# Patient Record
Sex: MALE | Race: OTHER | Employment: OTHER | URBAN - METROPOLITAN AREA
[De-identification: names, ages, dates, MRNs, and addresses within clinical notes are randomized per-mention and may not be internally consistent; named-entity substitution may affect disease eponyms.]

---

## 2024-09-16 ENCOUNTER — TELEPHONE (OUTPATIENT)
Age: 74
End: 2024-09-16

## 2024-09-16 NOTE — TELEPHONE ENCOUNTER
[facility] called in to schedule appt. Could not obtain all registration questions.

## 2024-09-18 ENCOUNTER — OFFICE VISIT (OUTPATIENT)
Age: 74
End: 2024-09-18
Payer: COMMERCIAL

## 2024-09-18 ENCOUNTER — TELEPHONE (OUTPATIENT)
Age: 74
End: 2024-09-18

## 2024-09-18 VITALS
SYSTOLIC BLOOD PRESSURE: 124 MMHG | BODY MASS INDEX: 27.05 KG/M2 | WEIGHT: 193.2 LBS | HEIGHT: 71 IN | DIASTOLIC BLOOD PRESSURE: 70 MMHG

## 2024-09-18 DIAGNOSIS — R13.14 PHARYNGOESOPHAGEAL DYSPHAGIA: Primary | ICD-10-CM

## 2024-09-18 DIAGNOSIS — R93.3 ABNORMAL CT SCAN, ESOPHAGUS: ICD-10-CM

## 2024-09-18 PROCEDURE — 99203 OFFICE O/P NEW LOW 30 MIN: CPT | Performed by: NURSE PRACTITIONER

## 2024-09-18 RX ORDER — OMEPRAZOLE 40 MG/1
40 CAPSULE, DELAYED RELEASE ORAL DAILY
Qty: 30 CAPSULE | Refills: 5 | Status: SHIPPED | OUTPATIENT
Start: 2024-09-18 | End: 2024-09-20

## 2024-09-18 RX ORDER — AMLODIPINE BESYLATE 10 MG/1
10 TABLET ORAL DAILY
COMMUNITY

## 2024-09-18 RX ORDER — LISINOPRIL 10 MG/1
TABLET ORAL
COMMUNITY

## 2024-09-18 RX ORDER — SIMVASTATIN 20 MG
20 TABLET ORAL DAILY
COMMUNITY

## 2024-09-18 NOTE — PROGRESS NOTES
[facility] - Outpatient Consultation  Hector Carrera 73 y.o. male MRN: [id number]  Encounter: [id number]    ASSESSMENT AND PLAN:      1. Pharyngoesophageal dysphagia  Acute onset of dysphagia with solid foods x 2 weeks.  Previously with occasional dysphagia which cleared with swallowing and liquid wash  Now experiencing sensation of food sticking mid-esophagus and need to regurgitate to clear.  Reports gurgling sound with attempting to swallow  CT chest/abdomen/pelvis 9/16 per PCP showed asymmetrical/right-sided esophageal thickening with surrounding inflammation  Denies GERD symptoms  Recommend proceeding with EGD to evaluate for possible esophagitis or ulceration versus esophageal mass  -Scheduled for EGD at [facility] as soon as possible  -Start omeprazole 40 mg daily  -Continue soft diet and reviewed dysphagia precautions with patient    2.  Screening for colon cancer  Patient reports remote colonoscopy at least 10 years ago  Denies colon polyps, no family history of colon cancer  Overdue for CRC screening but patient is requesting defer until dysphagia workup completed  Can discuss proceeding with colonoscopy versus Cologuard at next office visit      Followup Appointment: 2 months  ______________________________________________________________________    Chief Complaint   Patient presents with    Difficulty Swallowing     Pt was experiencing difficulty swallowing for last 1-2 weeks. Currently eating only soft foods, symptoms seemed to have improved slightly with soft foods. PCP recommended Gi consult       HPI:   Hector Carrera is a 73 y.o. year old male who presents with 2-week history of dysphagia.  Patient reports sensation of solid food sticking in mid esophagus which occurred daily and with every meal.  Reports regurgitation of food in order to clear sensation.  Denies difficulty swallowing liquids    Denies reflux symptoms or history of GERD however does avoid eating late  "at night otherwise will experience liquid reflux with lying down    Some improvement in dysphagia since modifying diet-currently eating soft/liquids.  Reports 5 pound weight loss over past 2 weeks    Patient underwent CT chest/abdomen/pelvis with contrast 9/16 per PCP which showed asymmetrical to right wall thickening of the distal esophagus with adjacent fat stranding, multiple pulmonary nodules    Remote tobacco use, stopped smoking cigarettes 25 years ago  Social EtOH only-2 times per year  Prior colonoscopy at least 10 years ago    Historical Information   History reviewed. No pertinent past medical history.  History reviewed. No pertinent surgical history.  Social History     Substance and Sexual Activity   Alcohol Use Not Currently     Social History     Substance and Sexual Activity   Drug Use Never     Social History     Tobacco Use   Smoking Status Former    Types: Cigarettes    Passive exposure: Past   Smokeless Tobacco Never     Family History   Problem Relation Age of Onset    No Known Problems Mother     No Known Problems Father     No Known Problems Maternal Grandmother     No Known Problems Maternal Grandfather     No Known Problems Paternal Grandmother     No Known Problems Paternal Grandfather     Colon cancer Neg Hx     Esophageal cancer Neg Hx        Meds/Allergies     Current Outpatient Medications:     amLODIPine (NORVASC) 10 mg tablet    lisinopril (ZESTRIL) 10 mg tablet    simvastatin (ZOCOR) 20 mg tablet    Allergies   Allergen Reactions    Nuts - Food Allergy Throat Swelling     Tree nuts       PHYSICAL EXAM:    Blood pressure 124/70, height 5' 11\" (1.803 m), weight 87.6 kg (193 lb 3.2 oz). Body mass index is 26.95 kg/m².  General Appearance: NAD, cooperative, alert  ENT:  Normocephalic, atraumatic, normal mucosa.    Neck:  Supple, symmetrical, trachea midline,   Resp:  Clear to auscultation bilaterally; no rales, rhonchi or wheezing; respirations unlabored   CV:  S1 S2, Regular rate and " rhythm; no murmur, rub, or gallop.  GI:  Soft, non-tender, non-distended; normal bowel sounds; no masses, no organomegaly   Rectal: Deferred  Musculoskeletal: No cyanosis, clubbing or edema. Normal ROM.  Skin:  No jaundice, rashes, or lesions   Psych: Normal affect, good eye contact  Neuro: No gross deficits, AAOx3              REVIEW OF SYSTEMS:    CONSTITUTIONAL: Denies any fever, chills, rigors, and weight loss.  HEENT: No earache or tinnitus. Denies hearing loss or visual disturbances.  CARDIOVASCULAR: No chest pain or palpitations.   RESPIRATORY: Denies any cough, hemoptysis, shortness of breath or dyspnea on exertion.  GASTROINTESTINAL: As noted in the History of Present Illness.   GENITOURINARY: No problems with urination. Denies any hematuria or dysuria.  NEUROLOGIC: No dizziness or vertigo, denies headaches.   MUSCULOSKELETAL: Denies any muscle or joint pain.   SKIN: Denies skin rashes or itching.   ENDOCRINE: Denies excessive thirst. Denies intolerance to heat or cold.  PSYCHOSOCIAL: Denies depression or anxiety. Denies any recent memory loss.

## 2024-09-18 NOTE — H&P (VIEW-ONLY)
[facility] - Outpatient Consultation  Hector Carrera 73 y.o. male MRN: [id number]  Encounter: [id number]    ASSESSMENT AND PLAN:      1. Pharyngoesophageal dysphagia  Acute onset of dysphagia with solid foods x 2 weeks.  Previously with occasional dysphagia which cleared with swallowing and liquid wash  Now experiencing sensation of food sticking mid-esophagus and need to regurgitate to clear.  Reports gurgling sound with attempting to swallow  CT chest/abdomen/pelvis 9/16 per PCP showed asymmetrical/right-sided esophageal thickening with surrounding inflammation  Denies GERD symptoms  Recommend proceeding with EGD to evaluate for possible esophagitis or ulceration versus esophageal mass  -Scheduled for EGD at [facility] as soon as possible  -Start omeprazole 40 mg daily  -Continue soft diet and reviewed dysphagia precautions with patient    2.  Screening for colon cancer  Patient reports remote colonoscopy at least 10 years ago  Denies colon polyps, no family history of colon cancer  Overdue for CRC screening but patient is requesting defer until dysphagia workup completed  Can discuss proceeding with colonoscopy versus Cologuard at next office visit      Followup Appointment: 2 months  ______________________________________________________________________    Chief Complaint   Patient presents with    Difficulty Swallowing     Pt was experiencing difficulty swallowing for last 1-2 weeks. Currently eating only soft foods, symptoms seemed to have improved slightly with soft foods. PCP recommended Gi consult       HPI:   Hector Carrera is a 73 y.o. year old male who presents with 2-week history of dysphagia.  Patient reports sensation of solid food sticking in mid esophagus which occurred daily and with every meal.  Reports regurgitation of food in order to clear sensation.  Denies difficulty swallowing liquids    Denies reflux symptoms or history of GERD however does avoid eating late  "at night otherwise will experience liquid reflux with lying down    Some improvement in dysphagia since modifying diet-currently eating soft/liquids.  Reports 5 pound weight loss over past 2 weeks    Patient underwent CT chest/abdomen/pelvis with contrast 9/16 per PCP which showed asymmetrical to right wall thickening of the distal esophagus with adjacent fat stranding, multiple pulmonary nodules    Remote tobacco use, stopped smoking cigarettes 25 years ago  Social EtOH only-2 times per year  Prior colonoscopy at least 10 years ago    Historical Information   History reviewed. No pertinent past medical history.  History reviewed. No pertinent surgical history.  Social History     Substance and Sexual Activity   Alcohol Use Not Currently     Social History     Substance and Sexual Activity   Drug Use Never     Social History     Tobacco Use   Smoking Status Former    Types: Cigarettes    Passive exposure: Past   Smokeless Tobacco Never     Family History   Problem Relation Age of Onset    No Known Problems Mother     No Known Problems Father     No Known Problems Maternal Grandmother     No Known Problems Maternal Grandfather     No Known Problems Paternal Grandmother     No Known Problems Paternal Grandfather     Colon cancer Neg Hx     Esophageal cancer Neg Hx        Meds/Allergies     Current Outpatient Medications:     amLODIPine (NORVASC) 10 mg tablet    lisinopril (ZESTRIL) 10 mg tablet    simvastatin (ZOCOR) 20 mg tablet    Allergies   Allergen Reactions    Nuts - Food Allergy Throat Swelling     Tree nuts       PHYSICAL EXAM:    Blood pressure 124/70, height 5' 11\" (1.803 m), weight 87.6 kg (193 lb 3.2 oz). Body mass index is 26.95 kg/m².  General Appearance: NAD, cooperative, alert  ENT:  Normocephalic, atraumatic, normal mucosa.    Neck:  Supple, symmetrical, trachea midline,   Resp:  Clear to auscultation bilaterally; no rales, rhonchi or wheezing; respirations unlabored   CV:  S1 S2, Regular rate and " rhythm; no murmur, rub, or gallop.  GI:  Soft, non-tender, non-distended; normal bowel sounds; no masses, no organomegaly   Rectal: Deferred  Musculoskeletal: No cyanosis, clubbing or edema. Normal ROM.  Skin:  No jaundice, rashes, or lesions   Psych: Normal affect, good eye contact  Neuro: No gross deficits, AAOx3              REVIEW OF SYSTEMS:    CONSTITUTIONAL: Denies any fever, chills, rigors, and weight loss.  HEENT: No earache or tinnitus. Denies hearing loss or visual disturbances.  CARDIOVASCULAR: No chest pain or palpitations.   RESPIRATORY: Denies any cough, hemoptysis, shortness of breath or dyspnea on exertion.  GASTROINTESTINAL: As noted in the History of Present Illness.   GENITOURINARY: No problems with urination. Denies any hematuria or dysuria.  NEUROLOGIC: No dizziness or vertigo, denies headaches.   MUSCULOSKELETAL: Denies any muscle or joint pain.   SKIN: Denies skin rashes or itching.   ENDOCRINE: Denies excessive thirst. Denies intolerance to heat or cold.  PSYCHOSOCIAL: Denies depression or anxiety. Denies any recent memory loss.

## 2024-09-18 NOTE — TELEPHONE ENCOUNTER
Scheduled date of EGD(as of today): 9/20/24  Physician performing EGD: ND  Location of EGD: [facility]  Instructions reviewed with patient by: DAVID  Clearances: N

## 2024-09-19 ENCOUNTER — ANESTHESIA EVENT (OUTPATIENT)
Dept: ANESTHESIOLOGY | Facility: AMBULATORY SURGERY CENTER | Age: 74
End: 2024-09-19

## 2024-09-19 ENCOUNTER — ANESTHESIA (OUTPATIENT)
Dept: ANESTHESIOLOGY | Facility: AMBULATORY SURGERY CENTER | Age: 74
End: 2024-09-19

## 2024-09-20 ENCOUNTER — TELEPHONE (OUTPATIENT)
Age: 74
End: 2024-09-20

## 2024-09-20 ENCOUNTER — HOSPITAL ENCOUNTER (OUTPATIENT)
Dept: GASTROENTEROLOGY | Facility: AMBULATORY SURGERY CENTER | Age: 74
Discharge: HOME | End: 2024-09-20
Payer: COMMERCIAL

## 2024-09-20 ENCOUNTER — ANESTHESIA (OUTPATIENT)
Dept: GASTROENTEROLOGY | Facility: AMBULATORY SURGERY CENTER | Age: 74
End: 2024-09-20

## 2024-09-20 ENCOUNTER — ANESTHESIA EVENT (OUTPATIENT)
Dept: GASTROENTEROLOGY | Facility: AMBULATORY SURGERY CENTER | Age: 74
End: 2024-09-20

## 2024-09-20 VITALS
BODY MASS INDEX: 27.02 KG/M2 | DIASTOLIC BLOOD PRESSURE: 64 MMHG | RESPIRATION RATE: 16 BRPM | WEIGHT: 193 LBS | HEART RATE: 69 BPM | HEIGHT: 71 IN | TEMPERATURE: 98 F | OXYGEN SATURATION: 98 % | SYSTOLIC BLOOD PRESSURE: 137 MMHG

## 2024-09-20 DIAGNOSIS — R93.3 ABNORMAL CT SCAN, ESOPHAGUS: ICD-10-CM

## 2024-09-20 DIAGNOSIS — R13.14 PHARYNGOESOPHAGEAL DYSPHAGIA: ICD-10-CM

## 2024-09-20 PROCEDURE — 43239 EGD BIOPSY SINGLE/MULTIPLE: CPT | Performed by: STUDENT IN AN ORGANIZED HEALTH CARE EDUCATION/TRAINING PROGRAM

## 2024-09-20 PROCEDURE — 88341 IMHCHEM/IMCYTCHM EA ADD ANTB: CPT | Performed by: SPECIALIST

## 2024-09-20 PROCEDURE — 88342 IMHCHEM/IMCYTCHM 1ST ANTB: CPT | Performed by: SPECIALIST

## 2024-09-20 PROCEDURE — 88305 TISSUE EXAM BY PATHOLOGIST: CPT | Performed by: SPECIALIST

## 2024-09-20 RX ORDER — SODIUM CHLORIDE, SODIUM LACTATE, POTASSIUM CHLORIDE, CALCIUM CHLORIDE 600; 310; 30; 20 MG/100ML; MG/100ML; MG/100ML; MG/100ML
50 INJECTION, SOLUTION INTRAVENOUS CONTINUOUS
Status: DISCONTINUED | OUTPATIENT
Start: 2024-09-20 | End: 2024-09-24 | Stop reason: HOSPADM

## 2024-09-20 RX ORDER — OMEPRAZOLE 40 MG/1
40 CAPSULE, DELAYED RELEASE ORAL 2 TIMES DAILY
Qty: 30 CAPSULE | Refills: 5 | Status: SHIPPED | OUTPATIENT
Start: 2024-09-20 | End: 2024-12-19

## 2024-09-20 RX ORDER — LIDOCAINE HYDROCHLORIDE 20 MG/ML
INJECTION, SOLUTION EPIDURAL; INFILTRATION; INTRACAUDAL; PERINEURAL AS NEEDED
Status: DISCONTINUED | OUTPATIENT
Start: 2024-09-20 | End: 2024-09-20

## 2024-09-20 RX ORDER — PROPOFOL 10 MG/ML
INJECTION, EMULSION INTRAVENOUS AS NEEDED
Status: DISCONTINUED | OUTPATIENT
Start: 2024-09-20 | End: 2024-09-20

## 2024-09-20 RX ADMIN — SODIUM CHLORIDE, SODIUM LACTATE, POTASSIUM CHLORIDE, CALCIUM CHLORIDE 50 ML/HR: 600; 310; 30; 20 INJECTION, SOLUTION INTRAVENOUS at 08:44

## 2024-09-20 RX ADMIN — PROPOFOL 150 MG: 10 INJECTION, EMULSION INTRAVENOUS at 09:01

## 2024-09-20 RX ADMIN — PROPOFOL 50 MG: 10 INJECTION, EMULSION INTRAVENOUS at 09:07

## 2024-09-20 RX ADMIN — PROPOFOL 50 MG: 10 INJECTION, EMULSION INTRAVENOUS at 09:04

## 2024-09-20 RX ADMIN — LIDOCAINE HYDROCHLORIDE 100 MG: 20 INJECTION, SOLUTION EPIDURAL; INFILTRATION; INTRACAUDAL; PERINEURAL at 09:01

## 2024-09-20 NOTE — TELEPHONE ENCOUNTER
Patients GI provider:      Number to return call: [phone]     Reason for call: Pt calling because the Dr doubled his dosage of omeprazole today after his EGD but the pharmacy wont approve him to  the script until 08.15.24 because he had just gotten a refill and according to them he should have enough to hold him til them. He will not have enough because of the increase so he needs us to speak with the Pharmacy so they understands and will give him the meds. He would like a call back once its all sorted so he can go back and .

## 2024-09-20 NOTE — INTERVAL H&P NOTE
H&P reviewed. After examining the patient I find no changes in the patients condition since the H&P had been written.    Vitals:    09/20/24 0835   BP: 148/66   Pulse: 75   Resp: 19   Temp: 98 °F (36.7 °C)   SpO2: 98%

## 2024-09-20 NOTE — ANESTHESIA POSTPROCEDURE EVALUATION
Post-Op Assessment Note    CV Status:  Stable  Pain Score: 0    Pain management: adequate       Mental Status:  Alert and awake   Hydration Status:  Euvolemic and stable   PONV Controlled:  None   Airway Patency:  Patent     Post Op Vitals Reviewed: Yes    No anethesia notable event occurred.    Staff: CRNA               /56 (09/20/24 0910)    Temp      Pulse 74 (09/20/24 0910)   Resp 18 (09/20/24 0910)    SpO2 99 % (09/20/24 0910)

## 2024-09-20 NOTE — ANESTHESIA PREPROCEDURE EVALUATION
Procedure:  EGD    Relevant Problems   ANESTHESIA (within normal limits)      CARDIO (within normal limits)      ENDO (within normal limits)      GI/HEPATIC   (+) Pharyngoesophageal dysphagia      /RENAL (within normal limits)      GYN (within normal limits)      HEMATOLOGY (within normal limits)      MUSCULOSKELETAL (within normal limits)      NEURO/PSYCH (within normal limits)      PULMONARY (within normal limits)        Physical Exam    Airway       Dental       Cardiovascular  Cardiovascular exam normal    Pulmonary  Pulmonary exam normal     Other Findings        Anesthesia Plan  ASA Score- 2     Anesthesia Type- IV sedation with anesthesia with ASA Monitors.         Additional Monitors:     Airway Plan:            Plan Factors-Exercise tolerance (METS): >4 METS.    Chart reviewed.    Patient summary reviewed.    Patient is not a current smoker. Patient not instructed to abstain from smoking on day of procedure. Patient did not smoke on day of surgery.            Induction-     Postoperative Plan-         Informed Consent- Anesthetic plan and risks discussed with patient.  I personally reviewed this patient with the CRNA. Discussed and agreed on the Anesthesia Plan with the CRNA..

## 2024-09-23 NOTE — TELEPHONE ENCOUNTER
EGD 9/20/24. Provider increased omeprazole 40 mg BID.  Spoke with pt, at this time he will continue omeprazole once daily as he is not comfortable taking it BID.  I advised bx results have not come back yet.

## 2024-09-25 NOTE — TELEPHONE ENCOUNTER
Spoke to patient and emphasized importance of taking omeprazole 40 mg twice daily due to severe grade D esophagitis on recent EGD  Patient is agreeable.  I will send new prescription to his pharmacy for twice daily dosing  Encourage patient to call the office with any further problems

## 2024-09-26 PROCEDURE — 88341 IMHCHEM/IMCYTCHM EA ADD ANTB: CPT | Performed by: SPECIALIST

## 2024-09-26 PROCEDURE — 88305 TISSUE EXAM BY PATHOLOGIST: CPT | Performed by: SPECIALIST

## 2024-09-26 PROCEDURE — 88342 IMHCHEM/IMCYTCHM 1ST ANTB: CPT | Performed by: SPECIALIST

## 2024-10-03 ENCOUNTER — TELEPHONE (OUTPATIENT)
Age: 74
End: 2024-10-03

## 2024-10-03 NOTE — TELEPHONE ENCOUNTER
Patient called stating he is running out of his Pantoprazole because of taking it 2x per day now.  I advised on 9/20/24 a new RX was sent to pharmacy for 2x per day.  He stated he will go to pharmacy to .

## 2024-10-10 DIAGNOSIS — R13.14 PHARYNGOESOPHAGEAL DYSPHAGIA: ICD-10-CM

## 2024-10-10 DIAGNOSIS — R93.3 ABNORMAL CT SCAN, ESOPHAGUS: ICD-10-CM

## 2024-10-11 RX ORDER — OMEPRAZOLE 40 MG/1
40 CAPSULE, DELAYED RELEASE ORAL 2 TIMES DAILY
Qty: 180 CAPSULE | Refills: 1 | Status: SHIPPED | OUTPATIENT
Start: 2024-10-11

## 2024-10-16 ENCOUNTER — PREP FOR PROCEDURE (OUTPATIENT)
Age: 74
End: 2024-10-16

## 2024-10-16 ENCOUNTER — TELEPHONE (OUTPATIENT)
Age: 74
End: 2024-10-16

## 2024-10-16 DIAGNOSIS — R13.14 PHARYNGOESOPHAGEAL DYSPHAGIA: Primary | ICD-10-CM

## 2024-11-07 ENCOUNTER — TELEPHONE (OUTPATIENT)
Age: 74
End: 2024-11-07

## 2024-11-07 NOTE — TELEPHONE ENCOUNTER
Patient with recent dysphagia with solid foods.  He underwent EGD 9/20/2024 which showed severe localized grade D esophagitis, moderate gastritis and duodenitis  Treated with omeprazole 40 mg twice daily  Surveillance EGD recommended in 8 weeks  Patient called the office today to report 1 week history of worsening dysphagia despite soft diet.  Difficulty managing secretions and frequent regurgitation of mucus with food particles noted in secretion.  Able to swallow liquids  Discussed with schedulers and patient is scheduled for urgent EGD tomorrow 11/8 at [facility]

## 2024-11-07 NOTE — TELEPHONE ENCOUNTER
Patients GI provider:  QUINTEN VARGHESE    Number to return call: [phone]    Reason for call: Pt calling requesting to speak to NICHOLNayeli Tobin stated he is having some issues. Advised appt on 12/6/24 for EGD does not want to confirm till he speak with her. Please contact pt for further details     Scheduled procedure/appointment date if applicable: procedure 12/6/24

## 2024-11-08 ENCOUNTER — HOSPITAL ENCOUNTER (OUTPATIENT)
Dept: GASTROENTEROLOGY | Facility: HOSPITAL | Age: 74
Setting detail: OUTPATIENT SURGERY
Discharge: HOME | End: 2024-11-08
Attending: STUDENT IN AN ORGANIZED HEALTH CARE EDUCATION/TRAINING PROGRAM
Payer: COMMERCIAL

## 2024-11-08 ENCOUNTER — ANESTHESIA (OUTPATIENT)
Dept: GASTROENTEROLOGY | Facility: HOSPITAL | Age: 74
End: 2024-11-08
Payer: COMMERCIAL

## 2024-11-08 ENCOUNTER — ANESTHESIA EVENT (OUTPATIENT)
Dept: GASTROENTEROLOGY | Facility: HOSPITAL | Age: 74
End: 2024-11-08
Payer: COMMERCIAL

## 2024-11-08 VITALS
RESPIRATION RATE: 20 BRPM | OXYGEN SATURATION: 98 % | HEART RATE: 67 BPM | DIASTOLIC BLOOD PRESSURE: 66 MMHG | BODY MASS INDEX: 27.02 KG/M2 | SYSTOLIC BLOOD PRESSURE: 150 MMHG | HEIGHT: 71 IN | TEMPERATURE: 97.4 F | WEIGHT: 193 LBS

## 2024-11-08 DIAGNOSIS — R13.14 PHARYNGOESOPHAGEAL DYSPHAGIA: ICD-10-CM

## 2024-11-08 DIAGNOSIS — K21.01 GASTROESOPHAGEAL REFLUX DISEASE WITH ESOPHAGITIS AND HEMORRHAGE: Primary | ICD-10-CM

## 2024-11-08 RX ORDER — SUCRALFATE ORAL 1 G/10ML
1 SUSPENSION ORAL 4 TIMES DAILY
Qty: 560 ML | Refills: 0 | Status: SHIPPED | OUTPATIENT
Start: 2024-11-08 | End: 2024-11-22

## 2024-11-08 RX ORDER — SODIUM CHLORIDE 9 MG/ML
INJECTION, SOLUTION INTRAVENOUS CONTINUOUS PRN
Status: DISCONTINUED | OUTPATIENT
Start: 2024-11-08 | End: 2024-11-08

## 2024-11-08 RX ORDER — PROPOFOL 10 MG/ML
INJECTION, EMULSION INTRAVENOUS AS NEEDED
Status: DISCONTINUED | OUTPATIENT
Start: 2024-11-08 | End: 2024-11-08

## 2024-11-08 RX ORDER — LIDOCAINE HYDROCHLORIDE 20 MG/ML
INJECTION, SOLUTION EPIDURAL; INFILTRATION; INTRACAUDAL; PERINEURAL AS NEEDED
Status: DISCONTINUED | OUTPATIENT
Start: 2024-11-08 | End: 2024-11-08

## 2024-11-08 RX ADMIN — SODIUM CHLORIDE: 0.9 INJECTION, SOLUTION INTRAVENOUS at 12:44

## 2024-11-08 RX ADMIN — PROPOFOL 50 MG: 10 INJECTION, EMULSION INTRAVENOUS at 13:13

## 2024-11-08 RX ADMIN — PROPOFOL 80 MG: 10 INJECTION, EMULSION INTRAVENOUS at 13:08

## 2024-11-08 RX ADMIN — LIDOCAINE HYDROCHLORIDE 100 MG: 20 INJECTION, SOLUTION EPIDURAL; INFILTRATION; INTRACAUDAL; PERINEURAL at 13:08

## 2024-11-08 RX ADMIN — PROPOFOL 50 MG: 10 INJECTION, EMULSION INTRAVENOUS at 13:09

## 2024-11-08 NOTE — ANESTHESIA PREPROCEDURE EVALUATION
Procedure:  EGD    Relevant Problems   ANESTHESIA (within normal limits)      CARDIO   (+) Hyperlipidemia   (+) Hypertension      GI/HEPATIC   (+) GERD (gastroesophageal reflux disease)   (+) Pharyngoesophageal dysphagia        Physical Exam    Airway    Mallampati score: I  TM Distance: >3 FB  Neck ROM: full     Dental   No notable dental hx     Cardiovascular  Cardiovascular exam normal    Pulmonary  Pulmonary exam normal     Other Findings        Anesthesia Plan  ASA Score- 2     Anesthesia Type- IV sedation with anesthesia with ASA Monitors.         Additional Monitors:     Airway Plan:     Comment: I discussed risks (reviewed with patient on the anesthesia consent form), benefits and alternatives of monitored sedation including the possibility under sedation to have recall or mild discomfort.  .       Plan Factors-    Chart reviewed.    Patient summary reviewed.                  Induction- intravenous.    Postoperative Plan-         Informed Consent- Anesthetic plan and risks discussed with patient.  I personally reviewed this patient with the CRNA. Discussed and agreed on the Anesthesia Plan with the CRNA..

## 2024-11-08 NOTE — H&P
"History and Physical - [facility]    Hector Carrera 73 y.o. male MRN: [id number]      HPI: Hector Carrera is a 73 y.o. male who presents for f/u grade D esophagitis.     Allergies   Allergen Reactions    Nuts - Food Allergy Throat Swelling     Tree nuts         REVIEW OF SYSTEMS: Per the HPI, and otherwise unremarkable.    Historical Information     Past Medical History:   Diagnosis Date    GERD (gastroesophageal reflux disease)     Hyperlipidemia     Hypertension      Past Surgical History:   Procedure Laterality Date    NO PAST SURGERIES       Social History   Social History     Substance and Sexual Activity   Alcohol Use Not Currently     Social History     Substance and Sexual Activity   Drug Use Never     Social History     Tobacco Use   Smoking Status Former    Types: Cigarettes    Passive exposure: Past   Smokeless Tobacco Never     Family History   Problem Relation Age of Onset    No Known Problems Mother     No Known Problems Father     No Known Problems Maternal Grandmother     No Known Problems Maternal Grandfather     No Known Problems Paternal Grandmother     No Known Problems Paternal Grandfather     Colon cancer Neg Hx     Esophageal cancer Neg Hx        Meds/Allergies       Current Outpatient Medications:     amLODIPine (NORVASC) 10 mg tablet    lisinopril (ZESTRIL) 10 mg tablet    omeprazole (PriLOSEC) 40 MG capsule    simvastatin (ZOCOR) 20 mg tablet  No current facility-administered medications for this encounter.    Facility-Administered Medications Ordered in Other Encounters:     sodium chloride 0.9 % infusion, , Intravenous, Continuous PRN, New Bag at 11/08/24 1244        Objective     /70   Pulse 78   Temp (!) 97.4 °F (36.3 °C) (Temporal)   Resp 16   Ht 5' 11\" (1.803 m)   Wt 87.5 kg (193 lb)   SpO2 98%   BMI 26.92 kg/m²       PHYSICAL EXAM    Gen: NAD AAOx3  Head: Normocephalic, Atraumatic  CV: S1S2 RRR no m/r/g  CHEST: Clear b/l no c/r/w  ABD: soft, " +BS NT/ND no masses  EXT: no edema      ASSESSMENT/PLAN:  This is a 73 y.o. year old male here for egd, and he is stable and optimized for his procedure.

## 2024-11-08 NOTE — ANESTHESIA POSTPROCEDURE EVALUATION
Post-Op Assessment Note    CV Status:  Stable  Pain Score: 0    Pain management: adequate       Mental Status:  Alert and awake   Hydration Status:  Euvolemic   PONV Controlled:  Controlled   Airway Patency:  Patent     Post Op Vitals Reviewed: Yes    No anethesia notable event occurred.    Staff: CRNA           Last Filed PACU Vitals:  Vitals Value Taken Time   Temp     Pulse 81    /71    Resp 17    SpO2 96        Modified Melissa:  No data recorded

## 2024-11-11 ENCOUNTER — TELEPHONE (OUTPATIENT)
Age: 74
End: 2024-11-11

## 2024-11-11 NOTE — TELEPHONE ENCOUNTER
Patients GI provider:  Dr. Betancourt    Number to return call: [phone]    Reason for call: Pt calling stating at EGD on 11/8/24 he was given Carafate and he is on Omeprazole. Wanted to make sure he takes both and can take at same time in the morning. Instructions tell him he has to wait 1 hour before consuming anything.   Advised him he can take them upon waking in the AM.   Please return call if different    Scheduled procedure/appointment date if applicable: 1/6/25

## 2024-11-11 NOTE — TELEPHONE ENCOUNTER
I contacted patient and reviewed previous instructions which he will follow (30 minute for med and one hour for eating) with administration of sucralfate.

## 2024-11-15 NOTE — TELEPHONE ENCOUNTER
Patients GI provider:  Dr. Crocker    Number to return call: [phone]    Reason for call: Pt called in to obtain EGD results from 11/8/2024. Please  reach out to patient once results have been reviewed, thank you.    Scheduled procedure/appointment date if applicable: Apt/procedure 1/6/2025

## 2024-11-16 ENCOUNTER — HOSPITAL ENCOUNTER (EMERGENCY)
Facility: HOSPITAL | Age: 74
End: 2024-11-16
Attending: EMERGENCY MEDICINE
Payer: COMMERCIAL

## 2024-11-16 ENCOUNTER — APPOINTMENT (EMERGENCY)
Dept: RADIOLOGY | Facility: HOSPITAL | Age: 74
End: 2024-11-16
Payer: COMMERCIAL

## 2024-11-16 ENCOUNTER — HOSPITAL ENCOUNTER (INPATIENT)
Facility: HOSPITAL | Age: 74
LOS: 6 days | Discharge: HOME | DRG: 392 | End: 2024-11-22
Attending: INTERNAL MEDICINE | Admitting: INTERNAL MEDICINE
Payer: COMMERCIAL

## 2024-11-16 VITALS
TEMPERATURE: 98.8 F | SYSTOLIC BLOOD PRESSURE: 143 MMHG | OXYGEN SATURATION: 99 % | HEART RATE: 77 BPM | RESPIRATION RATE: 16 BRPM | DIASTOLIC BLOOD PRESSURE: 66 MMHG

## 2024-11-16 DIAGNOSIS — E05.90 HYPERTHYROIDISM: ICD-10-CM

## 2024-11-16 DIAGNOSIS — K22.2 ESOPHAGEAL STRICTURE: Primary | ICD-10-CM

## 2024-11-16 DIAGNOSIS — R11.10 VOMITING: ICD-10-CM

## 2024-11-16 DIAGNOSIS — K22.2 ESOPHAGEAL STRICTURE: ICD-10-CM

## 2024-11-16 DIAGNOSIS — R13.10 DIFFICULTY SWALLOWING: Primary | ICD-10-CM

## 2024-11-16 PROBLEM — R63.4 WEIGHT LOSS: Status: ACTIVE | Noted: 2024-11-16

## 2024-11-16 LAB
2HR DELTA HS TROPONIN: -1 NG/L
ALBUMIN SERPL BCG-MCNC: 4.4 G/DL (ref 3.5–5)
ALP SERPL-CCNC: 76 U/L (ref 34–104)
ALT SERPL W P-5'-P-CCNC: 14 U/L (ref 7–52)
ANION GAP SERPL CALCULATED.3IONS-SCNC: 9 MMOL/L (ref 4–13)
AST SERPL W P-5'-P-CCNC: 16 U/L (ref 13–39)
ATRIAL RATE: 77 BPM
BASOPHILS # BLD AUTO: 0.02 THOUSANDS/ÂΜL (ref 0–0.1)
BASOPHILS NFR BLD AUTO: 0 % (ref 0–1)
BILIRUB SERPL-MCNC: 0.71 MG/DL (ref 0.2–1)
BUN SERPL-MCNC: 26 MG/DL (ref 5–25)
CALCIUM SERPL-MCNC: 9.3 MG/DL (ref 8.4–10.2)
CARDIAC TROPONIN I PNL SERPL HS: 7 NG/L (ref ?–50)
CARDIAC TROPONIN I PNL SERPL HS: 8 NG/L (ref ?–50)
CHLORIDE SERPL-SCNC: 106 MMOL/L (ref 96–108)
CO2 SERPL-SCNC: 26 MMOL/L (ref 21–32)
CREAT SERPL-MCNC: 0.78 MG/DL (ref 0.6–1.3)
EOSINOPHIL # BLD AUTO: 0.11 THOUSAND/ÂΜL (ref 0–0.61)
EOSINOPHIL NFR BLD AUTO: 1 % (ref 0–6)
ERYTHROCYTE [DISTWIDTH] IN BLOOD BY AUTOMATED COUNT: 12.3 % (ref 11.6–15.1)
GFR SERPL CREATININE-BSD FRML MDRD: 88 ML/MIN/1.73SQ M
GLUCOSE SERPL-MCNC: 96 MG/DL (ref 65–140)
HCT VFR BLD AUTO: 41.7 % (ref 36.5–49.3)
HGB BLD-MCNC: 13.7 G/DL (ref 12–17)
IMM GRANULOCYTES # BLD AUTO: 0.06 THOUSAND/UL (ref 0–0.2)
IMM GRANULOCYTES NFR BLD AUTO: 1 % (ref 0–2)
LYMPHOCYTES # BLD AUTO: 1.19 THOUSANDS/ÂΜL (ref 0.6–4.47)
LYMPHOCYTES NFR BLD AUTO: 11 % (ref 14–44)
MCH RBC QN AUTO: 29.3 PG (ref 26.8–34.3)
MCHC RBC AUTO-ENTMCNC: 32.9 G/DL (ref 31.4–37.4)
MCV RBC AUTO: 89 FL (ref 82–98)
MONOCYTES # BLD AUTO: 0.63 THOUSAND/ÂΜL (ref 0.17–1.22)
MONOCYTES NFR BLD AUTO: 6 % (ref 4–12)
NEUTROPHILS # BLD AUTO: 8.59 THOUSANDS/ÂΜL (ref 1.85–7.62)
NEUTS SEG NFR BLD AUTO: 81 % (ref 43–75)
NRBC BLD AUTO-RTO: 0 /100 WBCS
P AXIS: 13 DEGREES
PLATELET # BLD AUTO: 235 THOUSANDS/UL (ref 149–390)
PMV BLD AUTO: 9.7 FL (ref 8.9–12.7)
POTASSIUM SERPL-SCNC: 4 MMOL/L (ref 3.5–5.3)
PR INTERVAL: 148 MS
PROT SERPL-MCNC: 7.3 G/DL (ref 6.4–8.4)
QRS AXIS: 49 DEGREES
QRSD INTERVAL: 82 MS
QT INTERVAL: 368 MS
QTC INTERVAL: 416 MS
RBC # BLD AUTO: 4.67 MILLION/UL (ref 3.88–5.62)
SODIUM SERPL-SCNC: 141 MMOL/L (ref 135–147)
T WAVE AXIS: 74 DEGREES
VENTRICULAR RATE: 77 BPM
WBC # BLD AUTO: 10.6 THOUSAND/UL (ref 4.31–10.16)

## 2024-11-16 PROCEDURE — 99205 OFFICE O/P NEW HI 60 MIN: CPT | Performed by: PHYSICIAN ASSISTANT

## 2024-11-16 PROCEDURE — 96360 HYDRATION IV INFUSION INIT: CPT

## 2024-11-16 PROCEDURE — 96361 HYDRATE IV INFUSION ADD-ON: CPT

## 2024-11-16 PROCEDURE — 71045 X-RAY EXAM CHEST 1 VIEW: CPT

## 2024-11-16 PROCEDURE — 36415 COLL VENOUS BLD VENIPUNCTURE: CPT

## 2024-11-16 PROCEDURE — 99284 EMERGENCY DEPT VISIT MOD MDM: CPT

## 2024-11-16 PROCEDURE — 80053 COMPREHEN METABOLIC PANEL: CPT

## 2024-11-16 PROCEDURE — 84484 ASSAY OF TROPONIN QUANT: CPT

## 2024-11-16 PROCEDURE — 85025 COMPLETE CBC W/AUTO DIFF WBC: CPT

## 2024-11-16 PROCEDURE — 99223 1ST HOSP IP/OBS HIGH 75: CPT | Performed by: INTERNAL MEDICINE

## 2024-11-16 PROCEDURE — 99285 EMERGENCY DEPT VISIT HI MDM: CPT

## 2024-11-16 PROCEDURE — 93005 ELECTROCARDIOGRAM TRACING: CPT

## 2024-11-16 RX ORDER — HYDRALAZINE HYDROCHLORIDE 20 MG/ML
5 INJECTION INTRAMUSCULAR; INTRAVENOUS EVERY 6 HOURS PRN
Status: DISCONTINUED | OUTPATIENT
Start: 2024-11-16 | End: 2024-11-22 | Stop reason: HOSPADM

## 2024-11-16 RX ORDER — HEPARIN SODIUM 5000 [USP'U]/ML
5000 INJECTION, SOLUTION INTRAVENOUS; SUBCUTANEOUS EVERY 8 HOURS SCHEDULED
Status: DISCONTINUED | OUTPATIENT
Start: 2024-11-16 | End: 2024-11-22 | Stop reason: HOSPADM

## 2024-11-16 RX ORDER — SODIUM CHLORIDE, SODIUM GLUCONATE, SODIUM ACETATE, POTASSIUM CHLORIDE, MAGNESIUM CHLORIDE, SODIUM PHOSPHATE, DIBASIC, AND POTASSIUM PHOSPHATE .53; .5; .37; .037; .03; .012; .00082 G/100ML; G/100ML; G/100ML; G/100ML; G/100ML; G/100ML; G/100ML
75 INJECTION, SOLUTION INTRAVENOUS CONTINUOUS
Status: DISCONTINUED | OUTPATIENT
Start: 2024-11-16 | End: 2024-11-22 | Stop reason: HOSPADM

## 2024-11-16 RX ORDER — PANTOPRAZOLE SODIUM 40 MG/10ML
40 INJECTION, POWDER, LYOPHILIZED, FOR SOLUTION INTRAVENOUS EVERY 12 HOURS SCHEDULED
Status: DISCONTINUED | OUTPATIENT
Start: 2024-11-16 | End: 2024-11-22 | Stop reason: HOSPADM

## 2024-11-16 RX ADMIN — PANTOPRAZOLE SODIUM 40 MG: 40 INJECTION, POWDER, FOR SOLUTION INTRAVENOUS at 18:11

## 2024-11-16 RX ADMIN — SODIUM CHLORIDE, SODIUM GLUCONATE, SODIUM ACETATE, POTASSIUM CHLORIDE, MAGNESIUM CHLORIDE, SODIUM PHOSPHATE, DIBASIC, AND POTASSIUM PHOSPHATE 75 ML/HR: .53; .5; .37; .037; .03; .012; .00082 INJECTION, SOLUTION INTRAVENOUS at 18:11

## 2024-11-16 RX ADMIN — SODIUM CHLORIDE 1000 ML: 0.9 INJECTION, SOLUTION INTRAVENOUS at 10:03

## 2024-11-16 NOTE — EMTALA/ACUTE CARE TRANSFER
[facility] EMERGENCY DEPARTMENT  [address] [address]  [facility] [address]  Dept: [phone]      EMTALA TRANSFER CONSENT    NAME Hector MCLAIN 1950                              MRN [id number]    I have been informed of my rights regarding examination, treatment, and transfer   by Dr. Morgan Crocker DO    Benefits: Specialized equipment and/or services available at the receiving facility (Include comment)________________________ (GI)    Risks: Potential for delay in receiving treatment, Potential deterioration of medical condition, Loss of IV, Increased discomfort during transfer, Possible worsening of condition or death during transfer      Transfer Request   I acknowledge that my medical condition has been evaluated and explained to me by the emergency department physician or other qualified medical person and/or my attending physician who has recommended and offered to me further medical examination and treatment. I understand the Hospital's obligation with respect to the treatment and stabilization of my emergency medical condition. I nevertheless request to be transferred. I release the Hospital, the doctor, and any other persons caring for me from all responsibility or liability for any injury or ill effects that may result from my transfer and agree to accept all responsibility for the consequences of my choice to transfer, rather than receive stabilizing treatment at the Hospital. I understand that because the transfer is my request, my insurance may not provide reimbursement for the services.  The Hospital will assist and direct me and my family in how to make arrangements for transfer, but the hospital is not liable for any fees charged by the transport service.  In spite of this understanding, I refuse to consent to further medical examination and treatment which has been offered to me, and request transfer to Accepting Facility  Name, City & State : [facility] [place]. I authorize the performance of emergency medical procedures and treatments upon me in both transit and upon arrival at the receiving facility.  Additionally, I authorize the release of any and all medical records to the receiving facility and request they be transported with me, if possible.    I authorize the performance of emergency medical procedures and treatments upon me in both transit and upon arrival at the receiving facility.  Additionally, I authorize the release of any and all medical records to the receiving facility and request they be transported with me, if possible.  I understand that the safest mode of transportation during a medical emergency is an ambulance and that the Hospital advocates the use of this mode of transport. Risks of traveling to the receiving facility by car, including absence of medical control, life sustaining equipment, such as oxygen, and medical personnel has been explained to me and I fully understand them.    (NATALIIA CORRECT BOX BELOW)  [  ]  I consent to the stated transfer and to be transported by ambulance/helicopter.  [  ]  I consent to the stated transfer, but refuse transportation by ambulance and accept full responsibility for my transportation by car.  I understand the risks of non-ambulance transfers and I exonerate the Hospital and its staff from any deterioration in my condition that results from this refusal.    X___________________________________________    DATE  24  TIME________  Signature of patient or legally responsible individual signing on patient behalf           RELATIONSHIP TO PATIENT_________________________          Provider Certification    NAME Hector Carrera                                         1950                              MRN [id number]    A medical screening exam was performed on the above named patient.  Based on the examination:    Condition Necessitating Transfer The encounter diagnosis was  Difficulty swallowing.    Patient Condition: The patient has been stabilized such that within reasonable medical probability, no material deterioration of the patient condition or the condition of the unborn child(jose) is likely to result from the transfer    Reason for Transfer: Level of Care needed not available at this facility    Transfer Requirements: Facility [facility]   Space available and qualified personnel available for treatment as acknowledged by    Agreed to accept transfer and to provide appropriate medical treatment as acknowledged by       [place]  Appropriate medical records of the examination and treatment of the patient are provided at the time of transfer   STAFF INITIAL WHEN COMPLETED _______  Transfer will be performed by qualified personnel from    and appropriate transfer equipment as required, including the use of necessary and appropriate life support measures.    Provider Certification: I have examined the patient and explained the following risks and benefits of being transferred/refusing transfer to the patient/family:         Based on these reasonable risks and benefits to the patient and/or the unborn child(jose), and based upon the information available at the time of the patient’s examination, I certify that the medical benefits reasonably to be expected from the provision of appropriate medical treatments at another medical facility outweigh the increasing risks, if any, to the individual’s medical condition, and in the case of labor to the unborn child, from effecting the transfer.    X____________________________________________ DATE 11/16/24        TIME_______      ORIGINAL - SEND TO MEDICAL RECORDS   COPY - SEND WITH PATIENT DURING TRANSFER

## 2024-11-16 NOTE — ASSESSMENT & PLAN NOTE
Presented to [facility] ED today with inability to swallow even water  Progressively worsening dysphagia since 2.5 months  Initially seen by GI on 9/18/2024 when he complained of dysphagia for solid foods for 2 weeks  EGD on 9/20/2024 showed grade D esophagitis in the lower third of the esophagus.  He was advised to increase Omeprazole to twice daily and repeat EGD was planned in 8 to 12 weeks.  Biopsy was negative for malignancy.  On 11/7/2024 he contacted the GI office with worsening dysphagia even with soft foods and regurgitation  Urgent EGD done on 11/8/2024 showed severe, localized edematous, erythematous, friable and nodular mucosa with exudate measuring 3 mm in the lower third of the esophagus and GE junction. Indeterminate and inflamed intrinsic stricture (not traversable) in the esophagus (36 cm from the incisors).  He was advised to continue Omeprazole 40 mg twice daily and Carafate suspension was added  Today he noted inability to swallow even water and hence presented to [facility] ED  Pathology from EGD on 11/8/2024 is pending  Weight loss of 5-10 lbs in 2 months  He was seen by GI and transferred to [facility] for further evaluation based on pathology results.  If pathology is positive for malignancy surgical oncology would be consulted but if pathology is nondiagnostic he will need EUS/FNA and possible stent  NPO  IV fluids  Protonix 40 mg IV twice daily  Plan discussed with GI - possible procedure on 11/18

## 2024-11-16 NOTE — CONSULTS
Consultation - [facility] Gastroenterology     Hector Carrera 73 y.o. male MRN: [id number]  Unit/Bed#: ED 11 Encounter: [id number]    Inpatient consult to gastroenterology  Consult performed by: Izzy Clay PA-C  Consult ordered by: JEANNE Barrera          ASSESSMENT and PLAN  Hector Carrera is a 73 y.o. year old male with a past medical history of hypertension, hyperlipidemia, 20-pack-year history of tobacco use presents to the ER with progressive dysphagia/odynophagia with inability to swallow even water since yesterday but without sialorrhea and weight loss. EGD 11/8/2024 showed abnormal friable esophageal mucosa with stricture unable to be traversed, started on Carafate QID, recommend EUS FNA pending pathology which has not yet resulted.    Suspect progressive dysphagia may be due to stricture versus underlying malignancy.  Recommend transfer to [facility] for EUS/FNA with possible esophageal dilation, Dr. Betancourt discussed with Dr. Brady of advanced endoscopy who is agreeable.  In the interim recommend n.p.o. status, IV fluids, PPI IV twice daily.    Progressive dysphagia  Esophageal stricture vs mass  Weight loss  NPO/IVF  PPI 40 IV BID  Transfer to [facility] for EUS/FNA possible esophageal dilation  Follow up pending pathology results      Chief Complaint   Patient presents with    Difficulty Swallowing     History of esophageal stricture.  Unable to pass any food or liquids since yesterday        Physician Requesting Consult: Morgan Crocker, DO    HPI    Hector Carrera is a 73 y.o. year old male with a past medical history of hypertension, hyperlipidemia, 20-pack-year history of tobacco use presents to the ER with inability to swallow even water since yesterday.  Patient seen in our office in September 2024 for dysphagia to solids for 2 weeks with regurgitation.  Had CT C/A/P with contrast at [facility] PCP showing asymmetric right sided esophageal thickening with surrounding inflammation, started on PPI and set up for  endoscopy.    EGD 9/20/24 showed distal esophagitis grade D, gastritis, erosive duodenitis.  Patient started on high-dose PPI advised to have repeat endoscopy in 8 weeks, biopsy showed inflammation with atypia.      EGD 11/8/2024 showed abnormal friable esophageal mucosa with stricture unable to be traversed, started on Carafate QID, recommend EUS FNA pending pathology which has not yet resulted.    In the interim dysphagia has been getting worse and has progressed to liquids.  Since yesterday he has been unable to tolerate even water but is tolerating his secretions.  Does note odynophagia.  Denies symptoms of reflux or abdominal pain.  Last BM 2 days ago without bleeding.  Notes poor oral intake and weight loss but is unable to quantify amount.  Takes aspirin 81 mg about every third day.  Last colonoscopy over 10 years ago and was normal.      ROS:   Constitutional: denies fatigue, fever.  HEENT: denies visual disturbance, postnasal drip, sore throat.  Respiratory: denies cough, shortness of breath.  Cardiovascular: denies chest pain, leg swelling.  Gastrointestinal: as noted above in HPI.  : denies difficulty urinating, dysuria.  Musculoskeletal: denies arthralgias, back pain.  Neurological: denies dizziness, syncope.  Psychiatric: denies confusion, anxiety.    Historical Information   Past Medical History:   Diagnosis Date    GERD (gastroesophageal reflux disease)     Hyperlipidemia     Hypertension      Past Surgical History:   Procedure Laterality Date    NO PAST SURGERIES       Social History   Social History     Substance and Sexual Activity   Alcohol Use Not Currently     Social History     Substance and Sexual Activity   Drug Use Never     Social History     Tobacco Use   Smoking Status Former    Types: Cigarettes    Passive exposure: Past   Smokeless Tobacco Never     Family History   Problem Relation Age of Onset    No Known Problems Mother     No Known Problems Father     No Known Problems Maternal  "Grandmother     No Known Problems Maternal Grandfather     No Known Problems Paternal Grandmother     No Known Problems Paternal Grandfather     Colon cancer Neg Hx     Esophageal cancer Neg Hx        Meds/Allergies     No current facility-administered medications for this encounter.  Not in a hospital admission.    Allergies   Allergen Reactions    Nuts - Food Allergy Throat Swelling     Tree nuts       PHYSICAL EXAM:    Constitutional: Well-developed, no acute distress  HEENT: normocephalic, mucous membranes moist.  Neck: Supple  Skin: warm and dry  Respiratory: Lungs are clear to auscultation B/L.  Cardiovascular: Heart is regular rate and rhythm.  Gastrointestinal: Soft, nontender, nondistended with normal active bowel sounds.  No masses, guarding, rebound.   Rectal Exam: Deferred.  Extremities: No edema.  Neurologic: Nonfocal. A & O ×3.   Psychiatric: Normal affect.      Lab Results   Component Value Date    CALCIUM 9.3 11/16/2024    K 4.0 11/16/2024    CO2 26 11/16/2024     11/16/2024    BUN 26 (H) 11/16/2024    CREATININE 0.78 11/16/2024     Lab Results   Component Value Date    WBC 10.60 (H) 11/16/2024    HGB 13.7 11/16/2024    MCV 89 11/16/2024     11/16/2024     Lab Results   Component Value Date    ALT 14 11/16/2024    AST 16 11/16/2024    ALKPHOS 76 11/16/2024    TBILI 0.71 11/16/2024     No results found for: \"AMYLASE\"  No results found for: \"LIPASE\"  No results found for: \"IRON\", \"TIBC\", \"FERRITIN\"  No results found for: \"INR\"    EGD  Result Date: 11/8/2024  Narrative: Table formatting from the original result was not included. [facility] [address] [address] [phone] DATE OF SERVICE: 11/08/24 PHYSICIAN(S): Attending: Felicia Betancourt MD INDICATION: Follow up Grade D esophagitis HISTORY: Previous EGD bx negative for malignancy. Has been on PPI BID. Previous CT C/A/P 9/2024 showed irreg thickening of the distal esophagus. POST-OP " DIAGNOSIS: See the impression below. PREPROCEDURE: Informed consent was obtained for the procedure, including sedation.  Risks of perforation, hemorrhage, adverse drug reaction and aspiration were discussed. The patient was placed in the left lateral decubitus position. Patient was explained about the risks and benefits of the procedure. Risks including but not limited to bleeding, infection, and perforation were explained in detail. Also explained about less than 100% sensitivity with the exam and other alternatives. PROCEDURE: EGD DETAILS OF PROCEDURE: Patient was taken to the procedure room where a time out was performed to confirm correct patient and correct procedure. The patient underwent monitored anesthesia care, which was administered by an anesthesia professional. The patient's blood pressure, heart rate, level of consciousness, respirations, oxygen, ECG and ETCO2 were monitored throughout the procedure. The scope was introduced through the mouth and advanced to the lower third of the esophagus. The patient experienced no blood loss. The procedure was not difficult. The patient tolerated the procedure well. There were no apparent adverse events. ANESTHESIA INFORMATION: ASA: II Anesthesia Type: IV Sedation with Anesthesia MEDICATIONS: No administrations occurring from 1303 to 1315 on 11/08/24 FINDINGS: Severe, localized edematous, erythematous, friable and nodular mucosa with exudate measuring 3 mm in the lower third of the esophagus and GE junction (36 cm from the incisorsGE junction); performed cold forceps biopsy Indeterminate and inflamed intrinsic stricture (not traversable) in the esophagus (36 cm from the incisors) SPECIMENS: ID Type Source Tests Collected by Time Destination 1 : distal esophagus biopsy mucosa Tissue Esophagus TISSUE EXAM Felicia Betancourt MD 11/8/2024  1:10 PM      Impression: Worsening lower esophageal stricture, inflamed, nodular, unable to be traversed. Biopsies taken.  RECOMMENDATION: Resume home meds. Cont omeprazole 40mg twice a day. Will add carafate suspension four times a day for 14 days. Pending biopsy results, consider referral for EUS. Resume previous diet. [facility]. Follow up with your primary care provider as previously scheduled.   Felicia Betancourt MD       Imaging Studies: I have personally reviewed pertinent reports.      Pathology, and Other Studies: I have personally reviewed pertinent reports.      Patient expressed understanding and had all questions and concerns addressed.    Izzy Clay PA-C  11/16/24   11:03 AM     Counseling / Coordination of Care  Total floor / unit time spent today 45 minutes.     This chart was completed in part utilizing [employer] speech voice recognition software. Random word insertions, pronoun errors, and incomplete sentences are an occasional consequence of this system due to software limitations, and ambient noise. Any questions or concerns about the content, text, or information contained within the body of this dictation should be directly addressed to the provider for clarification.

## 2024-11-16 NOTE — ED PROVIDER NOTES
"Time reflects when diagnosis was documented in both MDM as applicable and the Disposition within this note       Time User Action Codes Description Comment    11/16/2024 10:29 AM Yanelis Kraft Add [R13.10] Difficulty swallowing     11/16/2024  3:44 PM Yanelis Kraft Add [R11.10] Vomiting           ED Disposition       ED Disposition   Transfer to Another Facility-In Network    Condition   --    Date/Time   Sat Nov 16, 2024 10:28 AM    Comment   Hector Carrera should be transferred out to [facility].               Assessment & Plan       Medical Decision Making  Differential diagnosis including BLAIRE, esophageal stricture, ulcer abnormality  Patient 73-year-old male with known esophageal stricture, last EGD was unsuccessful due to stricture.  This was completed on 11/8.  Patient reports that since last night he has attempted several times to pass liquid p.o., and it will \"sit there and I [place] and comes back up.\"  Patient has no chest pain, no abdominal pain.  Patient states his last attempt was this morning at 730 with applesauce with the same result.  Patient arrives in no acute distress, no tachycardia, no hypotension.  Patient resting comfortably during exam.  Patient tolerating own secretions, no stridor, no wheeze.  Given known etiology at this time of stricture, did reach out to GI, obtain chest x-ray, basic labs.  Given patient's age and risk factors EKG and initial troponin to assess for any further etiology for vomiting however based on history likely related to known esophageal pathology. IV fluids provided.   Mild leukocytosis, no anemia.  No BLAIRE. No electrolyte abnormality. LFT WNL.    Heart score 3.  Due to patient's reports of vomiting, troponin, EKG were ordered to rule out atypical ACS.  EKG normal sinus, no ischemic changes.  Patient's troponin negative x 2.  No further workup at this time.  Chest x-ray within normal limits.  Case was discussed with on-call GI, HALIMA Magana, and  recommendation " for transfer to [facility], and will reach out to their colleagues at that [place].  Patient was accepted to the [facility] service at that [place].  Patient stable throughout his evaluation, no vomiting, no further episodes of nausea.  Patient remains pain-free.  Patient stable for transport to [facility].       Amount and/or Complexity of Data Reviewed  Labs: ordered.  Radiology: ordered and independent interpretation performed.             Medications   sodium chloride 0.9 % bolus 1,000 mL (0 mL Intravenous Stopped 11/16/24 1200)       ED Risk Strat Scores   HEART Risk Score      Flowsheet Row Most Recent Value   Heart Score Risk Calculator    History 0 Filed at: 11/16/2024 1440   ECG 0 Filed at: 11/16/2024 1440   Age 2 Filed at: 11/16/2024 1440   Risk Factors 1 Filed at: 11/16/2024 1440   Troponin 0 Filed at: 11/16/2024 1440   HEART Score 3 Filed at: 11/16/2024 1440                               SBIRT 20yo+      Flowsheet Row Most Recent Value   Initial Alcohol Screen: US AUDIT-C     1. How often do you have a drink containing alcohol? 0 Filed at: 11/16/2024 0948   2. How many drinks containing alcohol do you have on a typical day you are drinking?  0 Filed at: 11/16/2024 0948   3a. Male UNDER 65: How often do you have five or more drinks on one occasion? 0 Filed at: 11/16/2024 0948   3b. FEMALE Any Age, or MALE 65+: How often do you have 4 or more drinks on one occassion? 0 Filed at: 11/16/2024 0948   Audit-C Score 0 Filed at: 11/16/2024 0948   ÁLVARO: How many times in the past year have you...    Used an illegal drug or used a prescription medication for non-medical reasons? Never Filed at: 11/16/2024 0948                            History of Present Illness       Chief Complaint   Patient presents with    Difficulty Swallowing     History of esophageal stricture.  Unable to pass any food or liquids since yesterday        Past Medical History:   Diagnosis Date    GERD (gastroesophageal reflux  disease)     Hyperlipidemia     Hypertension       Past Surgical History:   Procedure Laterality Date    NO PAST SURGERIES        Family History   Problem Relation Age of Onset    No Known Problems Mother     No Known Problems Father     No Known Problems Maternal Grandmother     No Known Problems Maternal Grandfather     No Known Problems Paternal Grandmother     No Known Problems Paternal Grandfather     Colon cancer Neg Hx     Esophageal cancer Neg Hx       Social History     Tobacco Use    Smoking status: Former     Types: Cigarettes     Passive exposure: Past    Smokeless tobacco: Never   Vaping Use    Vaping status: Never Used   Substance Use Topics    Alcohol use: Not Currently    Drug use: Never      E-Cigarette/Vaping    E-Cigarette Use Never User       E-Cigarette/Vaping Substances      I have reviewed and agree with the history as documented.     Patient is a 72 yo M pmhx of htn arriving for evaluation of vomiting. Patient states he has known esophageal problem that has been getting worse. Patient states he had an EGD that couldn't be completed on 11/8 due to stricture. Patient states he has had intermittent difficulty swallowing for months. Patient states since last night he has not been able to pass any PO liquid or otherwise. Patient states that his last attempted at 7:30 AM with apple sauce. Patient states he will swallow the contents, and it will cause belching and then comes back up. Patient denies chest pain, abdominal pain. Patient resting comfortably managing own secretions. Patient has no stridor or wheeze, no hypotension or tachycardia.         Review of Systems   Constitutional: Negative.    HENT: Negative.     Eyes: Negative.    Respiratory: Negative.     Cardiovascular: Negative.    Gastrointestinal:  Positive for nausea and vomiting.   Endocrine: Negative.    Genitourinary: Negative.    Musculoskeletal: Negative.    Skin: Negative.    Allergic/Immunologic: Negative.    Neurological:  Negative.    Hematological: Negative.    Psychiatric/Behavioral: Negative.     All other systems reviewed and are negative.          Objective       ED Triage Vitals [11/16/24 0946]   Temperature Pulse Blood Pressure Respirations SpO2 Patient Position - Orthostatic VS   98.8 °F (37.1 °C) 80 170/74 18 98 % Sitting      Temp Source Heart Rate Source BP Location FiO2 (%) Pain Score    Oral Monitor Left arm -- No Pain      Vitals      Date and Time Temp Pulse SpO2 Resp BP Pain Score FACES Pain Rating User   11/16/24 1504 -- 77 99 % 16 143/66 No Pain -- KP   11/16/24 1330 -- 79 98 % 20 151/67 -- --    11/16/24 1200 -- 70 99 % 15 147/66 -- --    11/16/24 1045 -- -- -- -- -- No Pain --    11/16/24 1030 -- 71 100 % 20 150/69 -- --    11/16/24 0946 98.8 °F (37.1 °C) 80 98 % 18 170/74 No Pain -- KP            Physical Exam  Vitals and nursing note reviewed.   Constitutional:       Appearance: Normal appearance. He is normal weight.   HENT:      Head: Normocephalic.      Right Ear: External ear normal.      Left Ear: External ear normal.      Nose: Nose normal.      Mouth/Throat:      Mouth: Mucous membranes are moist.      Pharynx: Oropharynx is clear.   Eyes:      Extraocular Movements: Extraocular movements intact.      Conjunctiva/sclera: Conjunctivae normal.      Pupils: Pupils are equal, round, and reactive to light.   Cardiovascular:      Rate and Rhythm: Normal rate and regular rhythm.      Pulses: Normal pulses.      Heart sounds: Normal heart sounds.   Pulmonary:      Effort: Pulmonary effort is normal.      Breath sounds: Normal breath sounds.   Abdominal:      General: Abdomen is flat. Bowel sounds are normal. There is no distension.      Palpations: Abdomen is soft. There is no mass.      Tenderness: There is no abdominal tenderness. There is no right CVA tenderness, left CVA tenderness, guarding or rebound.      Hernia: No hernia is present.   Musculoskeletal:      Cervical back: Normal range of motion  and neck supple.   Skin:     General: Skin is warm.      Capillary Refill: Capillary refill takes less than 2 seconds.   Neurological:      General: No focal deficit present.      Mental Status: He is alert and oriented to person, place, and time. Mental status is at baseline.   Psychiatric:         Mood and Affect: Mood normal.         Behavior: Behavior normal.         Thought Content: Thought content normal.         Judgment: Judgment normal.         Results Reviewed       Procedure Component Value Units Date/Time    HS Troponin I 2hr [[id number]]  (Normal) Collected: 11/16/24 1218    Lab Status: Final result Specimen: Blood from Arm, Left Updated: 11/16/24 1305     hs TnI 2hr 7 ng/L      Delta 2hr hsTnI -1 ng/L     HS Troponin I 4hr [[id number]]     Lab Status: No result Specimen: Blood     HS Troponin 0hr (reflex protocol) [[id number]]  (Normal) Collected: 11/16/24 1001    Lab Status: Final result Specimen: Blood from Arm, Left Updated: 11/16/24 1030     hs TnI 0hr 8 ng/L     Comprehensive metabolic panel [[id number]]  (Abnormal) Collected: 11/16/24 1001    Lab Status: Final result Specimen: Blood from Arm, Left Updated: 11/16/24 1024     Sodium 141 mmol/L      Potassium 4.0 mmol/L      Chloride 106 mmol/L      CO2 26 mmol/L      ANION GAP 9 mmol/L      BUN 26 mg/dL      Creatinine 0.78 mg/dL      Glucose 96 mg/dL      Calcium 9.3 mg/dL      AST 16 U/L      ALT 14 U/L      Alkaline Phosphatase 76 U/L      Total Protein 7.3 g/dL      Albumin 4.4 g/dL      Total Bilirubin 0.71 mg/dL      eGFR 88 ml/min/1.73sq m     Narrative:      National Kidney Disease Foundation guidelines for Chronic Kidney Disease (CKD):     Stage 1 with normal or high GFR (GFR > 90 mL/min/1.73 square meters)    Stage 2 Mild CKD (GFR = 60-89 mL/min/1.73 square meters)    Stage 3A Moderate CKD (GFR = 45-59 mL/min/1.73 square meters)    Stage 3B Moderate CKD (GFR = 30-44 mL/min/1.73 square meters)    Stage 4 Severe CKD (GFR = 15-29 mL/min/1.73  square meters)    Stage 5 End Stage CKD (GFR <15 mL/min/1.73 square meters)  Note: GFR calculation is accurate only with a steady state creatinine    CBC and differential [[id number]]  (Abnormal) Collected: 11/16/24 1001    Lab Status: Final result Specimen: Blood from Arm, Left Updated: 11/16/24 1009     WBC 10.60 Thousand/uL      RBC 4.67 Million/uL      Hemoglobin 13.7 g/dL      Hematocrit 41.7 %      MCV 89 fL      MCH 29.3 pg      MCHC 32.9 g/dL      RDW 12.3 %      MPV 9.7 fL      Platelets 235 Thousands/uL      nRBC 0 /100 WBCs      Segmented % 81 %      Immature Grans % 1 %      Lymphocytes % 11 %      Monocytes % 6 %      Eosinophils Relative 1 %      Basophils Relative 0 %      Absolute Neutrophils 8.59 Thousands/µL      Absolute Immature Grans 0.06 Thousand/uL      Absolute Lymphocytes 1.19 Thousands/µL      Absolute Monocytes 0.63 Thousand/µL      Eosinophils Absolute 0.11 Thousand/µL      Basophils Absolute 0.02 Thousands/µL             XR chest 1 view portable   ED Interpretation by JEANNE Barrera (11/16 1003)   No acute cardiopulmonary disease          ECG 12 Lead Documentation Only    Date/Time: 11/16/2024 9:55 AM    Performed by: JEANNE Barrera  Authorized by: JEANNE Barrera    Indications / Diagnosis:  Vomiting  ECG reviewed by me, the ED Provider: yes    Patient location:  ED  Interpretation:     Interpretation: normal    Rate:     ECG rate:  77    ECG rate assessment: normal    Rhythm:     Rhythm: sinus rhythm    Ectopy:     Ectopy: none    QRS:     QRS axis:  Normal  Conduction:     Conduction: normal    ST segments:     ST segments:  Normal  T waves:     T waves: normal        ED Medication and Procedure Management   Prior to Admission Medications   Prescriptions Last Dose Informant Patient Reported? Taking?   amLODIPine (NORVASC) 10 mg tablet  Self Yes No   Sig: Take 10 mg by mouth daily   lisinopril (ZESTRIL) 10 mg tablet  Self Yes No   Sig: take 1 tablet by mouth  twice a day for 90 days   omeprazole (PriLOSEC) 40 MG capsule   No No   Sig: TAKE 1 CAPSULE BY MOUTH TWICE A DAY   simvastatin (ZOCOR) 20 mg tablet  Self Yes No   Sig: Take 20 mg by mouth daily   sucralfate (CARAFATE) 1 g/10 mL suspension   No No   Sig: Take 10 mL (1 g total) by mouth 4 (four) times a day for 14 days      Facility-Administered Medications: None     Discharge Medication List as of 11/16/2024  1:01 PM        CONTINUE these medications which have NOT CHANGED    Details   amLODIPine (NORVASC) 10 mg tablet Take 10 mg by mouth daily, Historical Med      lisinopril (ZESTRIL) 10 mg tablet take 1 tablet by mouth twice a day for 90 days, Historical Med      omeprazole (PriLOSEC) 40 MG capsule TAKE 1 CAPSULE BY MOUTH TWICE A DAY, Starting Fri 10/11/2024, Normal      simvastatin (ZOCOR) 20 mg tablet Take 20 mg by mouth daily, Historical Med      sucralfate (CARAFATE) 1 g/10 mL suspension Take 10 mL (1 g total) by mouth 4 (four) times a day for 14 days, Starting Fri 11/8/2024, Until Fri 11/22/2024, Normal           No discharge procedures on file.  ED SEPSIS DOCUMENTATION   Time reflects when diagnosis was documented in both MDM as applicable and the Disposition within this note       Time User Action Codes Description Comment    11/16/2024 10:29 AM Yanelis Kraft [R13.10] Difficulty swallowing     11/16/2024  3:44 PM Yanelis Kraft [R11.10] Vomiting                  JEANNE aBrrera  11/16/24 1544

## 2024-11-17 ENCOUNTER — APPOINTMENT (INPATIENT)
Dept: RADIOLOGY | Facility: HOSPITAL | Age: 74
DRG: 392 | End: 2024-11-17
Payer: COMMERCIAL

## 2024-11-17 PROBLEM — E03.9 HYPOTHYROIDISM: Status: ACTIVE | Noted: 2024-11-17

## 2024-11-17 PROBLEM — B37.0 THRUSH: Status: ACTIVE | Noted: 2024-11-17

## 2024-11-17 PROBLEM — E05.90 HYPERTHYROIDISM: Status: ACTIVE | Noted: 2024-11-17

## 2024-11-17 LAB
ALBUMIN SERPL BCG-MCNC: 3.8 G/DL (ref 3.5–5)
ALP SERPL-CCNC: 66 U/L (ref 34–104)
ALT SERPL W P-5'-P-CCNC: 12 U/L (ref 7–52)
ANION GAP SERPL CALCULATED.3IONS-SCNC: 12 MMOL/L (ref 4–13)
AST SERPL W P-5'-P-CCNC: 18 U/L (ref 13–39)
BASOPHILS # BLD AUTO: 0.03 THOUSANDS/ÂΜL (ref 0–0.1)
BASOPHILS NFR BLD AUTO: 0 % (ref 0–1)
BILIRUB SERPL-MCNC: 0.6 MG/DL (ref 0.2–1)
BUN SERPL-MCNC: 27 MG/DL (ref 5–25)
CALCIUM SERPL-MCNC: 8.8 MG/DL (ref 8.4–10.2)
CHLORIDE SERPL-SCNC: 108 MMOL/L (ref 96–108)
CO2 SERPL-SCNC: 22 MMOL/L (ref 21–32)
CREAT SERPL-MCNC: 0.59 MG/DL (ref 0.6–1.3)
EOSINOPHIL # BLD AUTO: 0.22 THOUSAND/ÂΜL (ref 0–0.61)
EOSINOPHIL NFR BLD AUTO: 3 % (ref 0–6)
ERYTHROCYTE [DISTWIDTH] IN BLOOD BY AUTOMATED COUNT: 12.5 % (ref 11.6–15.1)
GFR SERPL CREATININE-BSD FRML MDRD: 99 ML/MIN/1.73SQ M
GLUCOSE SERPL-MCNC: 75 MG/DL (ref 65–140)
HCT VFR BLD AUTO: 38.6 % (ref 36.5–49.3)
HGB BLD-MCNC: 12.7 G/DL (ref 12–17)
IMM GRANULOCYTES # BLD AUTO: 0.02 THOUSAND/UL (ref 0–0.2)
IMM GRANULOCYTES NFR BLD AUTO: 0 % (ref 0–2)
LYMPHOCYTES # BLD AUTO: 1.08 THOUSANDS/ÂΜL (ref 0.6–4.47)
LYMPHOCYTES NFR BLD AUTO: 16 % (ref 14–44)
MAGNESIUM SERPL-MCNC: 1.9 MG/DL (ref 1.9–2.7)
MCH RBC QN AUTO: 29.1 PG (ref 26.8–34.3)
MCHC RBC AUTO-ENTMCNC: 32.9 G/DL (ref 31.4–37.4)
MCV RBC AUTO: 89 FL (ref 82–98)
MONOCYTES # BLD AUTO: 0.55 THOUSAND/ÂΜL (ref 0.17–1.22)
MONOCYTES NFR BLD AUTO: 8 % (ref 4–12)
NEUTROPHILS # BLD AUTO: 4.79 THOUSANDS/ÂΜL (ref 1.85–7.62)
NEUTS SEG NFR BLD AUTO: 73 % (ref 43–75)
NRBC BLD AUTO-RTO: 0 /100 WBCS
PLATELET # BLD AUTO: 199 THOUSANDS/UL (ref 149–390)
PMV BLD AUTO: 9.9 FL (ref 8.9–12.7)
POTASSIUM SERPL-SCNC: 3.7 MMOL/L (ref 3.5–5.3)
PROT SERPL-MCNC: 6.2 G/DL (ref 6.4–8.4)
RBC # BLD AUTO: 4.36 MILLION/UL (ref 3.88–5.62)
SODIUM SERPL-SCNC: 142 MMOL/L (ref 135–147)
T4 FREE SERPL-MCNC: 1.33 NG/DL (ref 0.61–1.12)
TSH SERPL DL<=0.05 MIU/L-ACNC: <0.01 UIU/ML (ref 0.45–4.5)
WBC # BLD AUTO: 6.69 THOUSAND/UL (ref 4.31–10.16)

## 2024-11-17 PROCEDURE — 84439 ASSAY OF FREE THYROXINE: CPT | Performed by: INTERNAL MEDICINE

## 2024-11-17 PROCEDURE — 71260 CT THORAX DX C+: CPT

## 2024-11-17 PROCEDURE — 84443 ASSAY THYROID STIM HORMONE: CPT | Performed by: INTERNAL MEDICINE

## 2024-11-17 PROCEDURE — 74177 CT ABD & PELVIS W/CONTRAST: CPT

## 2024-11-17 PROCEDURE — 80053 COMPREHEN METABOLIC PANEL: CPT | Performed by: INTERNAL MEDICINE

## 2024-11-17 PROCEDURE — 83735 ASSAY OF MAGNESIUM: CPT | Performed by: INTERNAL MEDICINE

## 2024-11-17 PROCEDURE — 99222 1ST HOSP IP/OBS MODERATE 55: CPT | Performed by: INTERNAL MEDICINE

## 2024-11-17 PROCEDURE — 99232 SBSQ HOSP IP/OBS MODERATE 35: CPT | Performed by: NURSE PRACTITIONER

## 2024-11-17 PROCEDURE — 85025 COMPLETE CBC W/AUTO DIFF WBC: CPT | Performed by: INTERNAL MEDICINE

## 2024-11-17 PROCEDURE — 99232 SBSQ HOSP IP/OBS MODERATE 35: CPT | Performed by: INTERNAL MEDICINE

## 2024-11-17 RX ORDER — NYSTATIN 100000 [USP'U]/ML
500000 SUSPENSION ORAL 4 TIMES DAILY
Status: DISCONTINUED | OUTPATIENT
Start: 2024-11-17 | End: 2024-11-22 | Stop reason: HOSPADM

## 2024-11-17 RX ADMIN — PANTOPRAZOLE SODIUM 40 MG: 40 INJECTION, POWDER, FOR SOLUTION INTRAVENOUS at 21:08

## 2024-11-17 RX ADMIN — PANTOPRAZOLE SODIUM 40 MG: 40 INJECTION, POWDER, FOR SOLUTION INTRAVENOUS at 10:19

## 2024-11-17 RX ADMIN — IOHEXOL 100 ML: 350 INJECTION, SOLUTION INTRAVENOUS at 13:22

## 2024-11-17 RX ADMIN — NYSTATIN 500000 UNITS: 100000 SUSPENSION ORAL at 21:07

## 2024-11-17 RX ADMIN — HEPARIN SODIUM 5000 UNITS: 5000 INJECTION, SOLUTION INTRAVENOUS; SUBCUTANEOUS at 21:08

## 2024-11-17 RX ADMIN — NYSTATIN 500000 UNITS: 100000 SUSPENSION ORAL at 13:00

## 2024-11-17 RX ADMIN — SODIUM CHLORIDE, SODIUM GLUCONATE, SODIUM ACETATE, POTASSIUM CHLORIDE, MAGNESIUM CHLORIDE, SODIUM PHOSPHATE, DIBASIC, AND POTASSIUM PHOSPHATE 75 ML/HR: .53; .5; .37; .037; .03; .012; .00082 INJECTION, SOLUTION INTRAVENOUS at 08:51

## 2024-11-17 RX ADMIN — HEPARIN SODIUM 5000 UNITS: 5000 INJECTION, SOLUTION INTRAVENOUS; SUBCUTANEOUS at 13:02

## 2024-11-17 RX ADMIN — SODIUM CHLORIDE, SODIUM GLUCONATE, SODIUM ACETATE, POTASSIUM CHLORIDE, MAGNESIUM CHLORIDE, SODIUM PHOSPHATE, DIBASIC, AND POTASSIUM PHOSPHATE 75 ML/HR: .53; .5; .37; .037; .03; .012; .00082 INJECTION, SOLUTION INTRAVENOUS at 21:12

## 2024-11-17 NOTE — ASSESSMENT & PLAN NOTE
Presented to [facility] ED today with inability to swallow even water  Progressively worsening dysphagia since 2.5 months  Initially seen by GI on 9/18/2024 when he complained of dysphagia for solid foods for 2 weeks  EGD on 9/20/2024 showed grade D esophagitis in the lower third of the esophagus.  He was advised to increase Omeprazole to twice daily and repeat EGD was planned in 8 to 12 weeks.  Biopsy was negative for malignancy.  On 11/7/2024 he contacted the GI office with worsening dysphagia even with soft foods and regurgitation  Urgent EGD done on 11/8/2024 showed severe, localized edematous, erythematous, friable and nodular mucosa with exudate measuring 3 mm in the lower third of the esophagus and GE junction. Indeterminate and inflamed intrinsic stricture (not traversable) in the esophagus (36 cm from the incisors).  He was advised to continue Omeprazole 40 mg twice daily and Carafate suspension was added  . However lacks ability to take Carafate  Today he noted inability to swallow even water and hence presented to [facility] ED  Pathology from EGD on 11/8/2024 is pending  Weight loss of 5-10 lbs in 2 month   Simultaneously noted to have low TSH elevated free T4 ?/hypothyroidism  He was seen by GI and transferred to [facility] for further evaluation based on pathology results.  If pathology is positive for malignancy surgical oncology would be consulted but if pathology is nondiagnostic he will need EUS/FNA and possible stent  NPO until confirmed with GI  Discussion with GI at bedside  IV fluids  Protonix 40 mg IV twice daily  Plan discussed with GI - possible procedure , however biopsy results have not yet returned and therefore decision not yet determined for possible EUS  Patient reports last bowel movement on Friday

## 2024-11-17 NOTE — CONSULTS
Consultation - Hector Carrera 73 y.o. male MRN: [id number]    Unit/Bed#: [facility] 815-01 Encounter: [id number]      Assessment & Plan   73-year-old male with progressively worsening dysphagia, weight loss and suppressed TSH since May 2024.  -Free T4 is mildly elevated, he is also on heparin which can displace free T4 from binding sites but that is usually an artifact/.  TSH has been suppressed since May-for now we will check T3 as well as TSI.  Consider starting methimazole after that-he is currently n.p.o. awaiting biopsy report from EGD from November 8.  GI on board-workup for esophageal stricture versus mass ongoing.      CC: hyperthyroidism Consult    History of Present Illness     HPI: Hector Carrera is a 73 y.o. year old male with history of hypertension, progressively worsening dysphagia over the past 2 months-was undergoing workup as outpatient and presented to the ED with inability to swallow even water and was admitted for further evaluation-during workup was found to have suppressed TSH and mildly elevated free T4 and thus endocrine consultation is requested for evaluation of hyperthyroidism.      History is obtained from the patient as well as his wife at the bedside-she is reporting that he used to weigh around 230 pounds a couple of years back and in the past year has lost significant weight.  Weight loss predates the dysphagia  Lost 15 lbs since may 2024 -he had seen neurology for workup of diplopia and was found to have suppressed TSH  Has had CAT scan with IV contrast in September  Was taking over-the-counter supplements including multivitamins etc. however has not been able to take any supplements for the past week    He denies any palpitations, heat intolerance, tremors, sleeping disturbances or anxiety    He reports strong family history of thyroid dysfunction with multiple family members have thyroid issues      Inpatient consult to Endocrinology  Consult performed by: Tammy Miranda MD  Consult ordered  "by: JEANNE Suarez          Review of Systems    Historical Information   Past Medical History:   Diagnosis Date    GERD (gastroesophageal reflux disease)     Hyperlipidemia     Hypertension      Past Surgical History:   Procedure Laterality Date    NO PAST SURGERIES       Social History   Social History     Substance and Sexual Activity   Alcohol Use Not Currently     Social History     Substance and Sexual Activity   Drug Use Never     Social History     Tobacco Use   Smoking Status Former    Types: Cigarettes    Passive exposure: Past   Smokeless Tobacco Never     Family History:   Family History   Problem Relation Age of Onset    No Known Problems Mother     No Known Problems Father     No Known Problems Maternal Grandmother     No Known Problems Maternal Grandfather     No Known Problems Paternal Grandmother     No Known Problems Paternal Grandfather     Colon cancer Neg Hx     Esophageal cancer Neg Hx        Meds/Allergies   Current Facility-Administered Medications   Medication Dose Route Frequency Provider Last Rate Last Admin    heparin (porcine) subcutaneous injection 5,000 Units  5,000 Units Subcutaneous Q8H MARTHA Zepeda MD        hydrALAZINE (APRESOLINE) injection 5 mg  5 mg Intravenous Q6H PRN Jory Zepeda MD        multi-electrolyte (PLASMALYTE-A/ISOLYTE-S PH 7.4) IV solution  75 mL/hr Intravenous Continuous Jory Zepeda MD 75 mL/hr at 11/17/24 0851 75 mL/hr at 11/17/24 0851    nystatin (MYCOSTATIN) oral suspension 500,000 Units  500,000 Units Swish & Swallow 4x Daily JEANNE Suarez        pantoprazole (PROTONIX) injection 40 mg  40 mg Intravenous Q12H MARTHA Zepeda MD   40 mg at 11/17/24 1019     Allergies   Allergen Reactions    Nuts - Food Allergy Throat Swelling     Tree nuts       Objective   Vitals: Blood pressure 144/63, pulse 77, temperature 97.6 °F (36.4 °C), resp. rate 18, height 5' 11\" (1.803 m), weight 86.2 kg (190 lb), SpO2 98%.    Intake/Output " "Summary (Last 24 hours) at 11/17/2024 1156  Last data filed at 11/17/2024 0800  Gross per 24 hour   Intake 1036.25 ml   Output 0 ml   Net 1036.25 ml     Invasive Devices       Peripheral Intravenous Line  Duration             Peripheral IV 11/16/24 Left;Ventral (anterior) Forearm 1 day                    Physical Exam  Vitals reviewed.   Constitutional:       General: He is not in acute distress.     Appearance: Normal appearance. He is obese. He is not ill-appearing, toxic-appearing or diaphoretic.   HENT:      Head: Normocephalic and atraumatic.   Eyes:      General: No scleral icterus.     Extraocular Movements: Extraocular movements intact.      Comments: No exophthalmos    Cardiovascular:      Rate and Rhythm: Normal rate and regular rhythm.      Heart sounds: Normal heart sounds. No murmur heard.  Pulmonary:      Effort: Pulmonary effort is normal. No respiratory distress.      Breath sounds: Normal breath sounds. No wheezing or rales.   Musculoskeletal:      Cervical back: Neck supple.      Right lower leg: No edema.      Left lower leg: No edema.      Comments: No tremors of outstretched hands   Lymphadenopathy:      Cervical: No cervical adenopathy.   Skin:     General: Skin is warm and dry.   Neurological:      General: No focal deficit present.      Mental Status: He is alert and oriented to person, place, and time.      Gait: Gait normal.   Psychiatric:         Mood and Affect: Mood normal.         Behavior: Behavior normal.         Thought Content: Thought content normal.         Judgment: Judgment normal.         The history was obtained from the review of the chart, patient.    Lab Results:       0 Result Notes      Component  Ref Range & Units (hover) 11/17/24 0612   Free T4 1.33 High    Comment: Specimens with biotin concentrations > 10 ng/mL can lead to significant (> 10%) positive bias in result.              Narrative      \"Therapeutic range for patients medicated with thyroid disorders: " "0.61-1.24 ng/dL.\"             Component  Ref Range & Units (hover) 11/17/24 0612   TSH 3RD GENERATON <0.010 Low               Component 08/08/24 05/08/24   TSH 0.024 Low  0.032 Low      Lab Results   Component Value Date    WBC 6.69 11/17/2024    HGB 12.7 11/17/2024    HCT 38.6 11/17/2024    MCV 89 11/17/2024     11/17/2024     Lab Results   Component Value Date/Time    BUN 27 (H) 11/17/2024 06:12 AM    K 3.7 11/17/2024 06:12 AM     11/17/2024 06:12 AM    CO2 22 11/17/2024 06:12 AM    CREATININE 0.59 (L) 11/17/2024 06:12 AM    AST 18 11/17/2024 06:12 AM    ALT 12 11/17/2024 06:12 AM    TP 6.2 (L) 11/17/2024 06:12 AM    ALB 3.8 11/17/2024 06:12 AM     No results for input(s): \"CHOL\", \"HDL\", \"LDL\", \"TRIG\", \"VLDL\" in the last 72 hours.  No results found for: \"MICROALBUR\", \"ZOPC60PQX\"  No results found for: \"POCGLU\"    Imaging Studies: Results Review Statement: No pertinent imaging studies reviewed.    Portions of the record may have been created with voice recognition software.   "

## 2024-11-17 NOTE — ASSESSMENT & PLAN NOTE
Home regimen: Amlodipine 10 mg daily, Lisinopril 10 mg twice daily - hold as NPO  Hydralazine prn  Monitor BP  If persistently elevated add scheduled Lopressor

## 2024-11-17 NOTE — H&P
H&P - Hospitalist   Name: Hector Carrera 73 y.o. male I MRN: [id number]  Unit/Bed#: [facility] 815-01 I Date of Admission: 11/16/2024   Date of Service: 11/16/2024 I Hospital Day: 0     Assessment & Plan  Dysphagia  Presented to [facility] ED today with inability to swallow even water  Progressively worsening dysphagia since 2.5 months  Initially seen by GI on 9/18/2024 when he complained of dysphagia for solid foods for 2 weeks  EGD on 9/20/2024 showed grade D esophagitis in the lower third of the esophagus.  He was advised to increase Omeprazole to twice daily and repeat EGD was planned in 8 to 12 weeks.  Biopsy was negative for malignancy.  On 11/7/2024 he contacted the GI office with worsening dysphagia even with soft foods and regurgitation  Urgent EGD done on 11/8/2024 showed severe, localized edematous, erythematous, friable and nodular mucosa with exudate measuring 3 mm in the lower third of the esophagus and GE junction. Indeterminate and inflamed intrinsic stricture (not traversable) in the esophagus (36 cm from the incisors).  He was advised to continue Omeprazole 40 mg twice daily and Carafate suspension was added  Today he noted inability to swallow even water and hence presented to [facility] ED  Pathology from EGD on 11/8/2024 is pending  Weight loss of 5-10 lbs in 2 months  He was seen by GI and transferred to [facility] for further evaluation based on pathology results.  If pathology is positive for malignancy surgical oncology would be consulted but if pathology is nondiagnostic he will need EUS/FNA and possible stent  NPO  IV fluids  Protonix 40 mg IV twice daily  Plan discussed with GI - possible procedure on 11/18  Esophageal stricture vs mass  Plan as above  Weight loss  Plan as above  Hypertension  Home regimen: Amlodipine 10 mg daily, Lisinopril 10 mg twice daily - hold as NPO  Hydralazine prn  Monitor BP  If persistently elevated add scheduled Lopressor  Hyperlipidemia  Home  med: Simvastatin 20 mg daily - held as NPO      VTE Pharmacologic Prophylaxis: VTE Score: 4 Moderate Risk (Score 3-4) - Pharmacological DVT Prophylaxis Ordered: heparin.  Code Status: Level 1 - Full Code   Discussion with family: Patient declined call to .     Anticipated Length of Stay: Patient will be admitted on an inpatient basis with an anticipated length of stay of greater than 2 midnights secondary to severe dysphagia/esophageal stricture vs mass awaiting evaluation.    History of Present Illness   Chief Complaint: Dysphagia    Hector Carrera is a 73 y.o. male with a PMH of hypertension, hyperlipidemia who presents as a transfer from [facility] ED for further evaluation of worsening dysphagia.   His dysphagia initially started early in September 2024 when he had difficulty swallowing solids.  EGD done on 9/20/2024 showed grade D esophagitis in the lower third of the esophagus.  His Omeprazole was increased to twice daily.   His dysphagia worsened and he underwent an urgent EGD on 11/8/2024 which showed severe, localized edematous, erythematous, friable and nodular mucosa with an indeterminate and inflamed intrinsic esophageal stricture which was not traversable.  Carafate was added and pathology results were awaited.    Today he presented to [facility] ED with inability to swallow even water.  He was seen by gastroenterology who recommended that he be kept nil by mouth, with PPI intravenous twice daily and IV fluids and was transferred to [facility] for further evaluation.  He has also noted a 5 to 10 pound weight loss over the past 2 months.    Review of Systems   Constitutional:  Positive for unexpected weight change.   Gastrointestinal:         Dysphagia   All other systems reviewed and are negative.      Historical Information   Past Medical History:   Diagnosis Date    GERD (gastroesophageal reflux disease)     Hyperlipidemia     Hypertension      Past Surgical  History:   Procedure Laterality Date    NO PAST SURGERIES       Social History     Tobacco Use    Smoking status: Former     Types: Cigarettes     Passive exposure: Past    Smokeless tobacco: Never   Vaping Use    Vaping status: Never Used   Substance and Sexual Activity    Alcohol use: Not Currently    Drug use: Never    Sexual activity: Not on file     E-Cigarette/Vaping    E-Cigarette Use Never User      E-Cigarette/Vaping Substances     Family History   Problem Relation Age of Onset    No Known Problems Mother     No Known Problems Father     No Known Problems Maternal Grandmother     No Known Problems Maternal Grandfather     No Known Problems Paternal Grandmother     No Known Problems Paternal Grandfather     Colon cancer Neg Hx     Esophageal cancer Neg Hx      Social History:  Marital Status: /Civil Union     Meds/Allergies   I have reviewed home medications with patient personally.  Prior to Admission medications    Medication Sig Start Date End Date Taking? Authorizing Provider   amLODIPine (NORVASC) 10 mg tablet Take 10 mg by mouth daily    Historical Provider, MD   lisinopril (ZESTRIL) 10 mg tablet take 1 tablet by mouth twice a day for 90 days    Historical Provider, MD   omeprazole (PriLOSEC) 40 MG capsule TAKE 1 CAPSULE BY MOUTH TWICE A DAY 10/11/24   Elijah Crocker DO   simvastatin (ZOCOR) 20 mg tablet Take 20 mg by mouth daily    Historical Provider, MD   sucralfate (CARAFATE) 1 g/10 mL suspension Take 10 mL (1 g total) by mouth 4 (four) times a day for 14 days 11/8/24 11/22/24  Felicia Betancourt MD     Allergies   Allergen Reactions    Nuts - Food Allergy Throat Swelling     Tree nuts       Objective :  Temp:  [98.3 °F (36.8 °C)-98.8 °F (37.1 °C)] 98.3 °F (36.8 °C)  HR:  [70-80] 78  BP: (143-170)/(62-74) 160/62  Resp:  [15-20] 18  SpO2:  [97 %-100 %] 97 %  O2 Device: None (Room air)    Physical Exam  Vitals reviewed.   HENT:      Head: Normocephalic.      Nose: Nose normal.       Mouth/Throat:      Mouth: Mucous membranes are moist.   Eyes:      Extraocular Movements: Extraocular movements intact.   Cardiovascular:      Rate and Rhythm: Normal rate and regular rhythm.   Pulmonary:      Effort: Pulmonary effort is normal. No respiratory distress.      Breath sounds: Normal breath sounds. No wheezing.   Abdominal:      General: Bowel sounds are normal. There is no distension.      Palpations: Abdomen is soft.      Tenderness: There is no abdominal tenderness.   Musculoskeletal:         General: No swelling.      Cervical back: Neck supple.   Skin:     General: Skin is warm and dry.   Neurological:      General: No focal deficit present.      Mental Status: He is alert and oriented to person, place, and time.   Psychiatric:         Mood and Affect: Mood normal.         Behavior: Behavior normal.          Lab Results: I have reviewed the following results:  Results from last 7 days   Lab Units 11/16/24  1001   WBC Thousand/uL 10.60*   HEMOGLOBIN g/dL 13.7   HEMATOCRIT % 41.7   PLATELETS Thousands/uL 235   SEGS PCT % 81*   LYMPHO PCT % 11*   MONO PCT % 6   EOS PCT % 1     Results from last 7 days   Lab Units 11/16/24  1001   SODIUM mmol/L 141   POTASSIUM mmol/L 4.0   CHLORIDE mmol/L 106   CO2 mmol/L 26   BUN mg/dL 26*   CREATININE mg/dL 0.78   ANION GAP mmol/L 9   CALCIUM mg/dL 9.3   ALBUMIN g/dL 4.4   TOTAL BILIRUBIN mg/dL 0.71   ALK PHOS U/L 76   ALT U/L 14   AST U/L 16   GLUCOSE RANDOM mg/dL 96           Other Study Results Review: Other studies reviewed include: EGD    Administrative Statements   I have spent a total time of 75 minutes in caring for this patient on the day of the visit/encounter including Diagnostic results, Patient and family education, Impressions, Counseling / Coordination of care, Documenting in the medical record, Reviewing / ordering tests, medicine, procedures  , Obtaining or reviewing history  , and Communicating with other healthcare professionals .    ** Please  Note: This note has been constructed using a voice recognition system. **

## 2024-11-17 NOTE — ASSESSMENT & PLAN NOTE
Potentially related to hyperthyroidism, management per primary team  NPO currently while awaiting path results   IVF

## 2024-11-17 NOTE — RESPIRATORY THERAPY NOTE
RT Protocol Note  Hector Carrera 73 y.o. male MRN: [id number]  Unit/Bed#: [facility] 815-01 Encounter: [id number]    Assessment    Active Problems:    Dysphagia    Esophageal stricture      Home Pulmonary Medications:  none       Past Medical History:   Diagnosis Date    GERD (gastroesophageal reflux disease)     Hyperlipidemia     Hypertension      Social History     Socioeconomic History    Marital status: /Civil Union     Spouse name: None    Number of children: None    Years of education: None    Highest education level: None   Occupational History    None   Tobacco Use    Smoking status: Former     Types: Cigarettes     Passive exposure: Past    Smokeless tobacco: Never   Vaping Use    Vaping status: Never Used   Substance and Sexual Activity    Alcohol use: Not Currently    Drug use: Never    Sexual activity: None   Other Topics Concern    None   Social History Narrative    None     Social Drivers of Health     Financial Resource Strain: Not on file   Food Insecurity: No Food Insecurity (11/16/2024)    Nursing - Inadequate Food Risk Classification     Worried About Running Out of Food in the Last Year: Not on file     Ran Out of Food in the Last Year: Not on file     Ran Out of Food in the Last Year: 1   Transportation Needs: No Transportation Needs (11/16/2024)    Nursing - Transportation Risk Classification     Lack of Transportation: Not on file     Lack of Transportation: 2   Physical Activity: Not on file   Stress: Not on file   Social Connections: Not on file   Intimate Partner Violence: Unknown (11/16/2024)    Nursing IPS     Feels Physically and Emotionally Safe: Not on file     Physically Hurt by Someone: Not on file     Humiliated or Emotionally Abused by Someone: Not on file     Physically Hurt by Someone: 2     Hurt or Threatened by Someone: 2   Housing Stability: Unknown (11/16/2024)    Nursing: Inadequate Housing Risk Classification     Has Housing: Not on file     Worried About Losing Housing: Not  "on file     Unable to Get Utilities: Not on file     Unable to Pay for Housing in the Last Year: 2     Has Housin       Subjective         Objective    Physical Exam:   Assessment Type: (P) Assess only  General Appearance: (P) Awake, Alert  Respiratory Pattern: (P) Normal  Chest Assessment: (P) Chest expansion symmetrical  Bilateral Breath Sounds: (P) Clear    Vitals:  Blood pressure 160/62, pulse 78, temperature 98.3 °F (36.8 °C), temperature source Oral, resp. rate 18, height 5' 11\" (1.803 m), weight 86.2 kg (190 lb), SpO2 97%.          Imaging and other studies: Results Review Statement: No pertinent imaging studies reviewed.          Plan    Respiratory Plan: (P) No distress/Pulmonary history, Discontinue Protocol        Resp Comments: (P) pt assessed for respiratory protocol.  pt has no pulmonary hx and does not take any pulmonary medications. pt  admitted for difficulty swollowing.  bronchodilators are not indicated at this time. will dc protocol.   "

## 2024-11-17 NOTE — ASSESSMENT & PLAN NOTE
In the setting of severe esophagitis causing stricture vs mass   Keep NPO   IV formulations of medications as able  IVF for hydration   Awaiting pathology from second EGD   Will obtain updated CT C/A/P to evaluate esophageal thickening, r/o LAD   Consider slim scope evaluation vs dilation vs EUS vs surg onc c/s pending pathology result

## 2024-11-17 NOTE — PROGRESS NOTES
Progress Note - Gastroenterology   Name: Hector Carrera 73 y.o. male I MRN: [id number]  [facility] I Date of Admission: 11/16/2024   Date of Service: 11/17/2024 I Hospital Day: 1     Assessment & Plan  Dysphagia  In the setting of severe esophagitis causing stricture vs mass   Keep NPO   IV formulations of medications as able  IVF for hydration   Awaiting pathology from second EGD   Will obtain updated CT C/A/P to evaluate esophageal thickening, r/o LAD   Consider slim scope evaluation vs dilation vs EUS vs surg onc c/s pending pathology result   Esophageal stricture vs mass  As above   Weight loss  Potentially related to hyperthyroidism, management per primary team  NPO currently while awaiting path results   IVF     Above discussed with primary team and patient's family. Please see attending attestation for any additional recommendations.     24 Hour Events : Transferred to [facility] for further management of esophageal stricture   Subjective :   Hector Carrera is a 73M with h/o former 20py tobacco use, HTN, HLD dysphagia, weight loss in the setting of CT with asymmetric R sided esophageal thickening and surrounding inflammation. He underwent initial EGD on 9/20 which showed severe esophagitis, gastritis, duodenitis; path with inflammation with reactive atypia, no dysplasia. Started on PPI and returned for repeat EGD 11/8 with severe inflammation, stricturing, and inability to pass scope through distal esophagus. He was discharged on PPI and carafate, biopsies still pending. He presented to [facility] ER with inability to swallow applesauce or water. No pain or bleeding.     He reports today he continues to not have any pain or discomfort. No issues with swallowing saliva. No chest pains now or in past. Was having dysphagia to liquids and solids PTA. Was taking his Ppi and carafate RTC until 2 days prior to presentation. Has not had issues with acid reflux in past, occasional regurgitation since sx started in September.      Objective :  Temp:  [97.6 °F (36.4 °C)-99.6 °F (37.6 °C)] 97.6 °F (36.4 °C)  HR:  [70-85] 77  BP: (118-170)/(62-74) 144/63  Resp:  [15-20] 18  SpO2:  [95 %-100 %] 98 %  O2 Device: None (Room air)    Physical Exam  Constitutional:       Appearance: He is normal weight.   HENT:      Head: Normocephalic and atraumatic.      Mouth/Throat:      Mouth: Mucous membranes are moist.   Cardiovascular:      Rate and Rhythm: Normal rate and regular rhythm.   Pulmonary:      Effort: Pulmonary effort is normal.      Breath sounds: Normal breath sounds.   Abdominal:      General: Abdomen is flat. There is no distension.      Palpations: Abdomen is soft.      Tenderness: There is no abdominal tenderness. There is no guarding.   Skin:     General: Skin is warm and dry.   Neurological:      General: No focal deficit present.      Mental Status: He is alert.   Psychiatric:         Mood and Affect: Mood normal.         Lab Results: I have reviewed the following results:CBC/BMP:   .     11/17/24  0612   WBC 6.69   HGB 12.7   HCT 38.6      SODIUM 142   K 3.7      CO2 22   BUN 27*   CREATININE 0.59*   GLUC 75   MG 1.9    , LFTs:   .     11/17/24  0612   AST 18   ALT 12   ALB 3.8   TBILI 0.60   ALKPHOS 66    , TSH:   Results from last 7 days   Lab Units 11/17/24  0612   TSH 3RD GENERATON uIU/mL <0.010*       Imaging Results Review: I reviewed radiology reports from this admission including: chest xray.  Other Study Results Review: Pathology reports reviewed.

## 2024-11-17 NOTE — PLAN OF CARE
Problem: PAIN - ADULT  Goal: Verbalizes/displays adequate comfort level or baseline comfort level  Description: Interventions:  - Encourage patient to monitor pain and request assistance  - Assess pain using appropriate pain scale  - Administer analgesics based on type and severity of pain and evaluate response  - Implement non-pharmacological measures as appropriate and evaluate response  - Consider cultural and social influences on pain and pain management  - Notify physician/advanced practitioner if interventions unsuccessful or patient reports new pain  Outcome: Progressing     Problem: INFECTION - ADULT  Goal: Absence or prevention of progression during hospitalization  Description: INTERVENTIONS:  - Assess and monitor for signs and symptoms of infection  - Monitor lab/diagnostic results  - Monitor all insertion sites, i.e. indwelling lines, tubes, and drains  - Monitor endotracheal if appropriate and nasal secretions for changes in amount and color  - Edison appropriate cooling/warming therapies per order  - Administer medications as ordered  - Instruct and encourage patient and family to use good hand hygiene technique  - Identify and instruct in appropriate isolation precautions for identified infection/condition  Outcome: Progressing  Goal: Absence of fever/infection during neutropenic period  Description: INTERVENTIONS:  - Monitor WBC    Outcome: Progressing     Problem: SAFETY ADULT  Goal: Patient will remain free of falls  Description: INTERVENTIONS:  - Educate patient/family on patient safety including physical limitations  - Instruct patient to call for assistance with activity   - Consult OT/PT to assist with strengthening/mobility   - Keep Call bell within reach  - Keep bed low and locked with side rails adjusted as appropriate  - Keep care items and personal belongings within reach  - Initiate and maintain comfort rounds  - Make Fall Risk Sign visible to staff  Problem: Knowledge Deficit  Goal:  Patient/family/caregiver demonstrates understanding of disease process, treatment plan, medications, and discharge instructions  Description: Complete learning assessment and assess knowledge base.  Interventions:  - Provide teaching at level of understanding  - Provide teaching via preferred learning methods  Outcome: Progressing     - Apply yellow socks and bracelet for high fall risk patients  - Consider moving patient to room near nurses station  Outcome: Progressing  Goal: Maintain or return to baseline ADL function  Description: INTERVENTIONS:  -  Assess patient's ability to carry out ADLs; assess patient's baseline for ADL function and identify physical deficits which impact ability to perform ADLs (bathing, care of mouth/teeth, toileting, grooming, dressing, etc.)  - Assess/evaluate cause of self-care deficits   - Assess range of motion  - Assess patient's mobility; develop plan if impaired  - Assess patient's need for assistive devices and provide as appropriate  - Encourage maximum independence but intervene and supervise when necessary  - Involve family in performance of ADLs  - Assess for home care needs following discharge   - Consider OT consult to assist with ADL evaluation and planning for discharge  - Provide patient education as appropriate  Outcome: Progressing  Goal: Maintains/Returns to pre admission functional level  Description: INTERVENTIONS:  - Perform AM-PAC 6 Click Basic Mobility/ Daily Activity assessment daily.  - Set and communicate daily mobility goal to care team and patient/family/caregiver.   - Collaborate with rehabilitation services on mobility goals if consulted  - Out of bed for toileting  - Record patient progress and toleration of activity level   Outcome: Progressing

## 2024-11-17 NOTE — ASSESSMENT & PLAN NOTE
Tongue with white coating  Question if residual from Carafate administration at home vs a thrush  Will treat with nystatin swish and request that he spit rather than swallow -discussed with nursing

## 2024-11-17 NOTE — PROGRESS NOTES
Progress Note - Hospitalist   Name: Hector Carrera 73 y.o. male I MRN: [id number]  [facility] I Date of Admission: 11/16/2024   Date of Service: 11/17/2024 I Hospital Day: 1    Assessment & Plan  Dysphagia  Presented to [facility] ED today with inability to swallow even water  Progressively worsening dysphagia since 2.5 months  Initially seen by GI on 9/18/2024 when he complained of dysphagia for solid foods for 2 weeks  EGD on 9/20/2024 showed grade D esophagitis in the lower third of the esophagus.  He was advised to increase Omeprazole to twice daily and repeat EGD was planned in 8 to 12 weeks.  Biopsy was negative for malignancy.  On 11/7/2024 he contacted the GI office with worsening dysphagia even with soft foods and regurgitation  Urgent EGD done on 11/8/2024 showed severe, localized edematous, erythematous, friable and nodular mucosa with exudate measuring 3 mm in the lower third of the esophagus and GE junction. Indeterminate and inflamed intrinsic stricture (not traversable) in the esophagus (36 cm from the incisors).  He was advised to continue Omeprazole 40 mg twice daily and Carafate suspension was added  . However lacks ability to take Carafate  Today he noted inability to swallow even water and hence presented to [facility] ED  Pathology from EGD on 11/8/2024 is pending  Weight loss of 5-10 lbs in 2 month   Simultaneously noted to have low TSH elevated free T4 ?/hypothyroidism  He was seen by GI and transferred to [facility] for further evaluation based on pathology results.  If pathology is positive for malignancy surgical oncology would be consulted but if pathology is nondiagnostic he will need EUS/FNA and possible stent  NPO until confirmed with GI  Discussion with GI at bedside  IV fluids  Protonix 40 mg IV twice daily  Plan discussed with GI - possible procedure , however biopsy results have not yet returned and therefore decision not yet determined for possible  EUS  Patient reports last bowel movement on Friday  Esophageal stricture vs mass  Plan as above  Patient denies any reflux type symptoms -last known eating  Thrush  Tongue with white coating  Question if residual from Carafate administration at home vs a thrush  Will treat with nystatin swish and request that he spit rather than swallow -discussed with nursing  Weight loss  Plan as above  Hypertension  Home regimen: Amlodipine 10 mg daily, Lisinopril 10 mg twice daily - hold as NPO  Hydralazine prn  Monitor BP  If persistently elevated add scheduled Lopressor  Hyperlipidemia  Home med: Simvastatin 20 mg daily - held as NPO    Hypothyroidism  Patient noted to have TSH less than 0.010 and a free T4 of 1.33  Upon further discussion with family/daughter and wife at bedside.  They report significant family history of hypothyroidism.  1 daughter having had recent removal of thyroid for unclear etiology.  Recently it appears that PCP may have encouraged outpatient workup but inability per wife to get appointment for at least 1 year  Patient endorses weight loss but is also not been eating due to primary diagnosis  Ongoing workup  Consult endocrinology     VTE Pharmacologic Prophylaxis: VTE Score: 4 High Risk (Score >/= 5) - Pharmacological DVT Prophylaxis Ordered: heparin. Sequential Compression Devices Ordered.    Mobility:   Basic Mobility Inpatient Raw Score: 24  JH-HLM Goal: 8: Walk 250 feet or more  JH-HLM Achieved: 8: Walk 250 feet ot more  JH-HLM Goal achieved. Continue to encourage appropriate mobility.    Patient Centered Rounds: I performed bedside rounds with nursing staff today.   Discussions with Specialists or Other Care Team Provider: nursing     Education and Discussions with Family / Patient: Updated  (wife and daughter) at bedside.    Current Length of Stay: 1 day(s)  Current Patient Status: Inpatient   Certification Statement: The patient will continue to require additional inpatient  hospital stay due to inability to tolerate any oral intake  Discharge Plan: Anticipate discharge in >72 hrs to home.    Code Status: Level 1 - Full Code    Subjective   Patient resting in bed appears comfortable.  Describes his lack of ability to swallow anything and seems to go partial weight down and then will regurgitate back up per the patient.  He reports a pain like sensation when trying to swallow.  Denies any reflux symptoms when not eating.  Tongue appears to have thrush versus residual from prior Carafate at home.  Patient denies any dizziness or lightheadedness no palpitations has had weight loss.  Last bowel movement reported to be Friday, he reports because he has not eaten typically does not go every day.    Objective :  Temp:  [97.6 °F (36.4 °C)-99.6 °F (37.6 °C)] 97.6 °F (36.4 °C)  HR:  [70-85] 77  BP: (118-160)/(62-67) 144/63  Resp:  [15-20] 18  SpO2:  [95 %-99 %] 98 %  O2 Device: None (Room air)    Body mass index is 26.5 kg/m².     Input and Output Summary (last 24 hours):     Intake/Output Summary (Last 24 hours) at 11/17/2024 1112  Last data filed at 11/17/2024 0800  Gross per 24 hour   Intake 1036.25 ml   Output 0 ml   Net 1036.25 ml       Physical Exam  Constitutional:       General: He is not in acute distress.     Appearance: He is not ill-appearing, toxic-appearing or diaphoretic.      Comments: Cachectic   HENT:      Head: Atraumatic.      Comments: Temporal wasting  Eyes:      General:         Right eye: No discharge.         Left eye: No discharge.   Cardiovascular:      Rate and Rhythm: Normal rate.      Heart sounds: No murmur heard.     No friction rub. No gallop.   Pulmonary:      Effort: No respiratory distress.      Breath sounds: No stridor. No wheezing, rhonchi or rales.   Chest:      Chest wall: No tenderness.   Abdominal:      General: There is no distension.      Palpations: There is no mass.      Tenderness: There is no abdominal tenderness. There is no guarding or rebound.       Hernia: No hernia is present.   Musculoskeletal:         General: No swelling, tenderness, deformity or signs of injury.      Right lower leg: No edema.      Left lower leg: No edema.   Skin:     Coloration: Skin is not jaundiced or pale.      Findings: No bruising, erythema, lesion or rash.   Neurological:      Mental Status: He is alert and oriented to person, place, and time.   Psychiatric:         Behavior: Behavior normal.           Lines/Drains:              Lab Results: I have reviewed the following results:   Results from last 7 days   Lab Units 11/17/24  0612   WBC Thousand/uL 6.69   HEMOGLOBIN g/dL 12.7   HEMATOCRIT % 38.6   PLATELETS Thousands/uL 199   SEGS PCT % 73   LYMPHO PCT % 16   MONO PCT % 8   EOS PCT % 3     Results from last 7 days   Lab Units 11/17/24  0612   SODIUM mmol/L 142   POTASSIUM mmol/L 3.7   CHLORIDE mmol/L 108   CO2 mmol/L 22   BUN mg/dL 27*   CREATININE mg/dL 0.59*   ANION GAP mmol/L 12   CALCIUM mg/dL 8.8   ALBUMIN g/dL 3.8   TOTAL BILIRUBIN mg/dL 0.60   ALK PHOS U/L 66   ALT U/L 12   AST U/L 18   GLUCOSE RANDOM mg/dL 75                       Recent Cultures (last 7 days):         Imaging Results Review: I reviewed radiology reports from this admission including: chest xray.  Other Study Results Review: No additional pertinent studies reviewed.    Last 24 Hours Medication List:     Current Facility-Administered Medications:     heparin (porcine) subcutaneous injection 5,000 Units, Q8H MARTHA    hydrALAZINE (APRESOLINE) injection 5 mg, Q6H PRN    multi-electrolyte (PLASMALYTE-A/ISOLYTE-S PH 7.4) IV solution, Continuous, Last Rate: 75 mL/hr (11/17/24 0851)    nystatin (MYCOSTATIN) oral suspension 500,000 Units, 4x Daily    pantoprazole (PROTONIX) injection 40 mg, Q12H [facility]    Administrative Statements   Today, Patient Was Seen By: JEANNE Suarez      **Please Note: This note may have been constructed using a voice recognition system.**

## 2024-11-18 ENCOUNTER — RESULTS FOLLOW-UP (OUTPATIENT)
Dept: GASTROENTEROLOGY | Facility: CLINIC | Age: 74
End: 2024-11-18

## 2024-11-18 ENCOUNTER — TELEPHONE (OUTPATIENT)
Dept: GASTROENTEROLOGY | Facility: CLINIC | Age: 74
End: 2024-11-18

## 2024-11-18 LAB
ATRIAL RATE: 77 BPM
P AXIS: 13 DEGREES
PR INTERVAL: 148 MS
QRS AXIS: 49 DEGREES
QRSD INTERVAL: 82 MS
QT INTERVAL: 368 MS
QTC INTERVAL: 416 MS
T WAVE AXIS: 74 DEGREES
T3 SERPL-MCNC: 0.7 NG/ML (ref 0.9–1.8)
VENTRICULAR RATE: 77 BPM

## 2024-11-18 PROCEDURE — 99232 SBSQ HOSP IP/OBS MODERATE 35: CPT | Performed by: NURSE PRACTITIONER

## 2024-11-18 PROCEDURE — 84480 ASSAY TRIIODOTHYRONINE (T3): CPT | Performed by: INTERNAL MEDICINE

## 2024-11-18 PROCEDURE — 99232 SBSQ HOSP IP/OBS MODERATE 35: CPT | Performed by: INTERNAL MEDICINE

## 2024-11-18 PROCEDURE — 84445 ASSAY OF TSI GLOBULIN: CPT | Performed by: INTERNAL MEDICINE

## 2024-11-18 PROCEDURE — 93010 ELECTROCARDIOGRAM REPORT: CPT | Performed by: INTERNAL MEDICINE

## 2024-11-18 RX ADMIN — NYSTATIN 500000 UNITS: 100000 SUSPENSION ORAL at 08:21

## 2024-11-18 RX ADMIN — NYSTATIN 500000 UNITS: 100000 SUSPENSION ORAL at 21:06

## 2024-11-18 RX ADMIN — NYSTATIN 500000 UNITS: 100000 SUSPENSION ORAL at 12:25

## 2024-11-18 RX ADMIN — NYSTATIN 500000 UNITS: 100000 SUSPENSION ORAL at 18:32

## 2024-11-18 RX ADMIN — HEPARIN SODIUM 5000 UNITS: 5000 INJECTION, SOLUTION INTRAVENOUS; SUBCUTANEOUS at 16:22

## 2024-11-18 RX ADMIN — PANTOPRAZOLE SODIUM 40 MG: 40 INJECTION, POWDER, FOR SOLUTION INTRAVENOUS at 21:06

## 2024-11-18 RX ADMIN — HEPARIN SODIUM 5000 UNITS: 5000 INJECTION, SOLUTION INTRAVENOUS; SUBCUTANEOUS at 05:18

## 2024-11-18 RX ADMIN — PANTOPRAZOLE SODIUM 40 MG: 40 INJECTION, POWDER, FOR SOLUTION INTRAVENOUS at 08:21

## 2024-11-18 RX ADMIN — HEPARIN SODIUM 5000 UNITS: 5000 INJECTION, SOLUTION INTRAVENOUS; SUBCUTANEOUS at 21:06

## 2024-11-18 NOTE — ASSESSMENT & PLAN NOTE
Lab Results   Component Value Date    [id number] <0.010 (L) 11/17/2024    FREET4 1.33 (H) 11/17/2024   Noted to have suppressed TSH since May 2024 while undergoing evaluation for diplopia.  At the time advised to see endocrinology but did not.  Endorses weight loss for the past 1.5 years, but no heat intolerance, diarrhea, anxiety, tremors, sleep disturbance.  Also endorses intermittent diplopia, particularly towards the end of the day.  Was previously evaluated by [facility] neurology with diplopia without clear etiology.  Now undergoing evaluation for dysphagia with undetectable TSH and mildly elevated free T4.  No thyromegaly or thyroid nodules noted on physical exam.  T3 earlier today-0.7.  Unclear etiology at this time question hyperthyroidism versus nonthyroidal illness syndrome.    Plan:  TSI pending at this time-follow-up.  Will order reverse T3  Depending on results-consider initiating management with methimazole

## 2024-11-18 NOTE — PROGRESS NOTES
Progress Note - Endocrinology   Name: Hector Carrera 73 y.o. male I MRN: [id number]  Unit/Bed#: [facility] 815-01 I Date of Admission: 11/16/2024   Date of Service: 11/18/2024 I Hospital Day: 2    Assessment & Plan  Hyperthyroidism  Lab Results   Component Value Date    [id number] <0.010 (L) 11/17/2024    FREET4 1.33 (H) 11/17/2024   Noted to have suppressed TSH since May 2024 while undergoing evaluation for diplopia.  At the time advised to see endocrinology but did not.  Endorses weight loss for the past 1.5 years, but no heat intolerance, diarrhea, anxiety, tremors, sleep disturbance.  Also endorses intermittent diplopia, particularly towards the end of the day.  Was previously evaluated by [facility] neurology with diplopia without clear etiology.  Now undergoing evaluation for dysphagia with undetectable TSH and mildly elevated free T4.  No thyromegaly or thyroid nodules noted on physical exam.  T3 earlier today-0.7.  Unclear etiology at this time question hyperthyroidism versus nonthyroidal illness syndrome.    Plan:  TSI pending at this time-follow-up.  Will order reverse T3  Depending on results-consider initiating management with methimazole   Weight loss  Question whether this is in the setting of hyperthyroidism, although he denies any additional symptoms, and physical exam does not demonstrate thyromegaly or thyroid nodules.  Dysphagia  Presented to the ED due to worsening dysphagia since September 2024 - at the time of presentation had dysphagia for liquids.   Plan as below.  Esophageal stricture vs mass  Indeterminate and inflamed intrinsic stricture in the esophagus-36 cm from incisors.  Severe localized edematous, erythematous, friable and nodular mucosa with exudate noted on EGD performed on 11/8/2024.    Management per primary team and GI.    24 Hour Events : No significant overnight events.  Subjective : Patient seen and examined at the bedside.  Not in acute distress at the time of my evaluation.  He  continues to endorse difficulty swallowing.  States he has had 50 pound weight loss in the past 1.5 years.  Denies heat intolerance, diarrhea, tremors, palpitations, sleep disturbance.  States that he was previously evaluated for diplopia at [facility].    Objective :  Temp:  [97.4 °F (36.3 °C)-100.1 °F (37.8 °C)] 97.4 °F (36.3 °C)  HR:  [76-82] 76  BP: (154-160)/(67-72) 154/67  Resp:  [16] 16  SpO2:  [94 %-98 %] 98 %  O2 Device: None (Room air)    Physical Exam  Vitals and nursing note reviewed.   Constitutional:       General: He is not in acute distress.     Appearance: Normal appearance. He is not ill-appearing.   HENT:      Head: Normocephalic and atraumatic.      Nose: Nose normal.      Mouth/Throat:      Pharynx: Oropharynx is clear.   Eyes:      General: No scleral icterus.        Right eye: No discharge.         Left eye: No discharge.      Extraocular Movements: Extraocular movements intact.      Conjunctiva/sclera: Conjunctivae normal.   Neck:      Comments: Thyroid gland not enlarged, nontender to palpation.  No thyroid nodules appreciated.  Cardiovascular:      Rate and Rhythm: Normal rate and regular rhythm.      Heart sounds: Normal heart sounds. No murmur heard.  Pulmonary:      Effort: Pulmonary effort is normal. No respiratory distress.      Breath sounds: Normal breath sounds. No wheezing, rhonchi or rales.   Abdominal:      General: There is no distension.      Palpations: Abdomen is soft.      Tenderness: There is no abdominal tenderness.   Musculoskeletal:         General: Normal range of motion.      Cervical back: Normal range of motion and neck supple.   Skin:     General: Skin is warm and dry.      Coloration: Skin is not jaundiced or pale.   Neurological:      Mental Status: He is alert and oriented to person, place, and time. Mental status is at baseline.   Psychiatric:         Mood and Affect: Mood normal.         Behavior: Behavior normal.         Lab Results: I have reviewed the following  results:CBC/BMP: No new results in last 24 hours. , LFTs: No new results in last 24 hours.     Imaging Results Review: I reviewed radiology reports from this admission including: CT chest and CT abdomen/pelvis.  Other Study Results Review: No additional pertinent studies reviewed.

## 2024-11-18 NOTE — ASSESSMENT & PLAN NOTE
Presented to the ED due to worsening dysphagia since September 2024 - at the time of presentation had dysphagia for liquids.   Plan as below.

## 2024-11-18 NOTE — ASSESSMENT & PLAN NOTE
Presented to [facility] ED today with inability to swallow even water  Progressively worsening dysphagia since 2.5 months  Initially seen by GI on 9/18/2024 when he complained of dysphagia for solid foods for 2 weeks  EGD on 9/20/2024 showed grade D esophagitis in the lower third of the esophagus.  He was advised to increase Omeprazole to twice daily and repeat EGD was planned in 8 to 12 weeks.  Biopsy was negative for malignancy.  On 11/7/2024 he contacted the GI office with worsening dysphagia even with soft foods and regurgitation  Urgent EGD done on 11/8/2024 showed severe, localized edematous, erythematous, friable and nodular mucosa with exudate measuring 3 mm in the lower third of the esophagus and GE junction. Indeterminate and inflamed intrinsic stricture (not traversable) in the esophagus (36 cm from the incisors).  He was advised to continue Omeprazole 40 mg twice daily and Carafate suspension was added  . However lacks ability to take Carafate and ppi now iv   Noted inability to swallow even water and hence presented to [facility] ED  Pathology from EGD on 11/8/2024 is pending  Weight loss of 5-10 lbs in 2 month   Simultaneously noted to have low TSH elevated free T4 ?/hyperthyroidism  He was seen by GI and transferred to [facility] for further evaluation based on pathology results.  If pathology is positive for malignancy surgical oncology would be consulted but if pathology is nondiagnostic he will need EUS/FNA and possible stent  NPO until confirmed with GI  Discussion with GI at bedside 11/17  CT chest abdomen and pelvis completed pending read . Called reading room for read   IV fluids will ultimately require nutrients   Protonix 40 mg IV twice daily  Plan discussed with GI - possible procedure , however biopsy results have not yet returned and therefore decision not yet determined for possible EUS  Patient reports last bowel movement on Friday

## 2024-11-18 NOTE — ASSESSMENT & PLAN NOTE
Indeterminate and inflamed intrinsic stricture in the esophagus-36 cm from incisors.  Severe localized edematous, erythematous, friable and nodular mucosa with exudate noted on EGD performed on 11/8/2024.    Management per primary team and GI.

## 2024-11-18 NOTE — ASSESSMENT & PLAN NOTE
73M with 20pack yeat tobacco use hx, progressive dysphagia over several months, initial EGD performed in September 2024 showed grade D esophagitis, gastritis and duodenitis.  Started on high-dose PPI twice daily.  Presented with worsening dysphagia.  Repeat EGD November 2024 showed worsening lower esophageal stricture that was inflamed, nodular and unable to be traversed.  Biopsies taken.  Suspect progressive dysphagia despite PPI and Carafate secondary to worsening severe esophagitis causing seizure versus mass.Transferred from  for dilation vs EUS.    Plan  Awaiting pathology from second EGD. Reached out to Path lab today to make it STAT  Keep NPO  CT chest abdomen pelvis obtained 11/1720 24 to evaluate esophageal thickening and assess for lymphadenopathy.  Awaiting official read  Continue Protonix 40 mg IV twice daily.  IV formulations of medications as able  IVF for hydration   Consider slim scope evaluation vs dilation vs EUS vs surg onc c/s pending pathology result

## 2024-11-18 NOTE — PROGRESS NOTES
Progress Note - Hospitalist   Name: Hector Carrera 73 y.o. male I MRN: [id number]  [facility] I Date of Admission: 11/16/2024   Date of Service: 11/18/2024 I Hospital Day: 2    Assessment & Plan  Dysphagia  Presented to [facility] ED today with inability to swallow even water  Progressively worsening dysphagia since 2.5 months  Initially seen by GI on 9/18/2024 when he complained of dysphagia for solid foods for 2 weeks  EGD on 9/20/2024 showed grade D esophagitis in the lower third of the esophagus.  He was advised to increase Omeprazole to twice daily and repeat EGD was planned in 8 to 12 weeks.  Biopsy was negative for malignancy.  On 11/7/2024 he contacted the GI office with worsening dysphagia even with soft foods and regurgitation  Urgent EGD done on 11/8/2024 showed severe, localized edematous, erythematous, friable and nodular mucosa with exudate measuring 3 mm in the lower third of the esophagus and GE junction. Indeterminate and inflamed intrinsic stricture (not traversable) in the esophagus (36 cm from the incisors).  He was advised to continue Omeprazole 40 mg twice daily and Carafate suspension was added  . However lacks ability to take Carafate and ppi now iv   Noted inability to swallow even water and hence presented to [facility] ED  Pathology from EGD on 11/8/2024 is pending  Weight loss of 5-10 lbs in 2 month   Simultaneously noted to have low TSH elevated free T4 ?/hyperthyroidism  He was seen by GI and transferred to [facility] for further evaluation based on pathology results.  If pathology is positive for malignancy surgical oncology would be consulted but if pathology is nondiagnostic he will need EUS/FNA and possible stent  NPO until confirmed with GI  Discussion with GI at bedside 11/17  CT chest abdomen and pelvis completed pending read . Called reading room for read   IV fluids will ultimately require nutrients   Protonix 40 mg IV twice daily  Plan discussed  with GI - possible procedure , however biopsy results have not yet returned and therefore decision not yet determined for possible EUS  Patient reports last bowel movement on Friday  Esophageal stricture vs mass  Plan as above  Patient denies any reflux type symptoms -last known eating  Thrush  Tongue with white coating  Question if residual from Carafate administration at home vs a thrush  Will treat with nystatin swish and request that he spit rather than swallow -discussed with nursing  Hyperthyroidism  Patient noted to have TSH less than 0.010 and a free T4 of 1.33  Upon further discussion with family/daughter and wife at bedside.  They report significant family history of hyperthyroidism.  1 daughter having had recent removal of thyroid for unclear etiology.  Recently it appears that PCP may have encouraged outpatient workup but inability per wife to get appointment for at least 1 year  Patient endorses weight loss but is also not been eating due to primary diagnosis  Ongoing workup/ pruior studies with contrast could affect   Consult endocrinology , appreciated   Per endo T3 total is 0.7  May need to consider Methimazole however remains NPO at this time   Weight loss  Plan as above  Hypertension  Home regimen: Amlodipine 10 mg daily, Lisinopril 10 mg twice daily - hold as NPO  Hydralazine prn  Monitor BP  If persistently elevated add scheduled Lopressor  Hyperlipidemia  Home med: Simvastatin 20 mg daily - held as NPO      VTE Pharmacologic Prophylaxis: VTE Score: 4 High Risk (Score >/= 5) - Pharmacological DVT Prophylaxis Ordered: heparin. Sequential Compression Devices Ordered.    Mobility:   Basic Mobility Inpatient Raw Score: 24  JH-HLM Goal: 8: Walk 250 feet or more  JH-HLM Achieved: 6: Walk 10 steps or more  JH-HLM Goal achieved. Continue to encourage appropriate mobility.    Patient Centered Rounds: I performed bedside rounds with nursing staff today.   Discussions with Specialists or Other Care Team  Provider: nursing     Education and Discussions with Family / Patient:  patients wife will be coming in he will call me to talk to her when she arrives .     Current Length of Stay: 2 day(s)  Current Patient Status: Inpatient   Certification Statement: The patient will continue to require additional inpatient hospital stay due to pending biopsy and possible procedure   Discharge Plan: Anticipate discharge in >72 hrs to home.    Code Status: Level 1 - Full Code    Subjective   Patient sitting in chair.  Reports he is feeling well overall.  He does state however that he feels tired.  No nausea vomiting no bowel movement.  Reports he cannot have 1 because he has not had any nutrition.  Tongue still appears white on examination.  Awaiting further plan understands plan provided to him by GI fellow this morning    Objective :  Temp:  [97.4 °F (36.3 °C)-100.1 °F (37.8 °C)] 97.4 °F (36.3 °C)  HR:  [76-82] 76  BP: (154-160)/(67-72) 154/67  Resp:  [16] 16  SpO2:  [94 %-98 %] 98 %  O2 Device: None (Room air)    Body mass index is 26.5 kg/m².     Input and Output Summary (last 24 hours):     Intake/Output Summary (Last 24 hours) at 11/18/2024 1007  Last data filed at 11/18/2024 0830  Gross per 24 hour   Intake 0 ml   Output 0 ml   Net 0 ml       Physical Exam  Constitutional:       General: He is not in acute distress.     Appearance: He is not ill-appearing, toxic-appearing or diaphoretic.      Comments: Cachectic    HENT:      Head: Normocephalic and atraumatic.      Mouth/Throat:      Comments: Tongue white ? Thrush vs residual from prehospital carafate  Eyes:      General:         Right eye: No discharge.         Left eye: No discharge.   Cardiovascular:      Rate and Rhythm: Normal rate.      Heart sounds: No murmur heard.     No friction rub. No gallop.   Pulmonary:      Effort: No respiratory distress.      Breath sounds: No stridor. No wheezing, rhonchi or rales.   Chest:      Chest wall: No tenderness.   Abdominal:       General: There is no distension.      Palpations: There is no mass.      Tenderness: There is no abdominal tenderness. There is no guarding or rebound.      Hernia: No hernia is present.   Musculoskeletal:         General: No swelling, tenderness, deformity or signs of injury.      Right lower leg: No edema.      Left lower leg: No edema.   Skin:     Coloration: Skin is not jaundiced or pale.      Findings: No bruising, erythema, lesion or rash.   Neurological:      Mental Status: He is alert and oriented to person, place, and time.   Psychiatric:         Behavior: Behavior normal.           Lines/Drains:              Lab Results: I have reviewed the following results:   Results from last 7 days   Lab Units 11/17/24  0612   WBC Thousand/uL 6.69   HEMOGLOBIN g/dL 12.7   HEMATOCRIT % 38.6   PLATELETS Thousands/uL 199   SEGS PCT % 73   LYMPHO PCT % 16   MONO PCT % 8   EOS PCT % 3     Results from last 7 days   Lab Units 11/17/24  0612   SODIUM mmol/L 142   POTASSIUM mmol/L 3.7   CHLORIDE mmol/L 108   CO2 mmol/L 22   BUN mg/dL 27*   CREATININE mg/dL 0.59*   ANION GAP mmol/L 12   CALCIUM mg/dL 8.8   ALBUMIN g/dL 3.8   TOTAL BILIRUBIN mg/dL 0.60   ALK PHOS U/L 66   ALT U/L 12   AST U/L 18   GLUCOSE RANDOM mg/dL 75                       Recent Cultures (last 7 days):         Imaging Results Review: I reviewed radiology reports from this admission including: CT chest and CT abdomen/pelvis.  Other Study Results Review: No additional pertinent studies reviewed.    Last 24 Hours Medication List:     Current Facility-Administered Medications:     heparin (porcine) subcutaneous injection 5,000 Units, Q8H [facility]    hydrALAZINE (APRESOLINE) injection 5 mg, Q6H PRN    multi-electrolyte (PLASMALYTE-A/ISOLYTE-S PH 7.4) IV solution, Continuous, Last Rate: 75 mL/hr (11/17/24 2112)    nystatin (MYCOSTATIN) oral suspension 500,000 Units, 4x Daily    pantoprazole (PROTONIX) injection 40 mg, Q12H [facility]    Administrative Statements   Today,  Patient Was Seen By: JEANNE Suarez      **Please Note: This note may have been constructed using a voice recognition system.**

## 2024-11-18 NOTE — ASSESSMENT & PLAN NOTE
Patient noted to have TSH less than 0.010 and a free T4 of 1.33  Upon further discussion with family/daughter and wife at bedside.  They report significant family history of hyperthyroidism.  1 daughter having had recent removal of thyroid for unclear etiology.  Recently it appears that PCP may have encouraged outpatient workup but inability per wife to get appointment for at least 1 year  Patient endorses weight loss but is also not been eating due to primary diagnosis  Ongoing workup/ pruior studies with contrast could affect   Consult endocrinology , appreciated   Per endo T3 total is 0.7  May need to consider Methimazole however remains NPO at this time

## 2024-11-18 NOTE — QUICK NOTE
Preliminary results of patient's pathology from esophageal biopsy on 11/8/2024 returned with findings of highly atypical glandular epithelium, pending outside consultation.  NPO with strict aspiration precaution given CT scan findings of esophageal dilation.  Will discuss with advanced endoscopy team tomorrow options for possible interventions including EGD with slim scope +/- dilation vs stent placement.      Ade Bowen MD  Gastroenterology Fellow, PGY- 4  Available on EPIC Secure Chat  11/18/2024 3:31 PM

## 2024-11-18 NOTE — PLAN OF CARE
Problem: PAIN - ADULT  Goal: Verbalizes/displays adequate comfort level or baseline comfort level  Description: Interventions:  - Encourage patient to monitor pain and request assistance  - Assess pain using appropriate pain scale  - Administer analgesics based on type and severity of pain and evaluate response  - Implement non-pharmacological measures as appropriate and evaluate response  - Consider cultural and social influences on pain and pain management  - Notify physician/advanced practitioner if interventions unsuccessful or patient reports new pain  Outcome: Progressing     Problem: INFECTION - ADULT  Goal: Absence or prevention of progression during hospitalization  Description: INTERVENTIONS:  - Assess and monitor for signs and symptoms of infection  - Monitor lab/diagnostic results  - Monitor all insertion sites, i.e. indwelling lines, tubes, and drains  - Monitor endotracheal if appropriate and nasal secretions for changes in amount and color  - [place] appropriate cooling/warming therapies per order  - Administer medications as ordered  - Instruct and encourage patient and family to use good hand hygiene technique  - Identify and instruct in appropriate isolation precautions for identified infection/condition  Outcome: Progressing  Goal: Absence of fever/infection during neutropenic period  Description: INTERVENTIONS:  - Monitor WBC    Outcome: Progressing     Problem: SAFETY ADULT  Goal: Patient will remain free of falls  Description: INTERVENTIONS:  - Educate patient/family on patient safety including physical limitations  - Instruct patient to call for assistance with activity   - Consult OT/PT to assist with strengthening/mobility   - Keep Call bell within reach  - Keep bed low and locked with side rails adjusted as appropriate  - Keep care items and personal belongings within reach  - Initiate and maintain comfort rounds  - Make Fall Risk Sign visible to staff  - Apply yellow socks and bracelet  for high fall risk patients  - Consider moving patient to room near nurses station  Outcome: Progressing  Goal: Maintain or return to baseline ADL function  Description: INTERVENTIONS:  -  Assess patient's ability to carry out ADLs; assess patient's baseline for ADL function and identify physical deficits which impact ability to perform ADLs (bathing, care of mouth/teeth, toileting, grooming, dressing, etc.)  - Assess/evaluate cause of self-care deficits   - Assess range of motion  - Assess patient's mobility; develop plan if impaired  - Assess patient's need for assistive devices and provide as appropriate  - Encourage maximum independence but intervene and supervise when necessary  - Involve family in performance of ADLs  - Assess for home care needs following discharge   - Consider OT consult to assist with ADL evaluation and planning for discharge  - Provide patient education as appropriate  Outcome: Progressing  Goal: Maintains/Returns to pre admission functional level  Description: INTERVENTIONS:  - Perform AM-PAC 6 Click Basic Mobility/ Daily Activity assessment daily.  - Set and communicate daily mobility goal to care team and patient/family/caregiver.   - Collaborate with rehabilitation services on mobility goals if consulted  - Out of bed for toileting  - Record patient progress and toleration of activity level   Outcome: Progressing     Problem: DISCHARGE PLANNING  Goal: Discharge to home or other facility with appropriate resources  Description: INTERVENTIONS:  - Identify barriers to discharge w/patient and caregiver  - Arrange for needed discharge resources and transportation as appropriate  - Identify discharge learning needs (meds, wound care, etc.)  - Arrange for interpretive services to assist at discharge as needed  - Refer to Case Management Department for coordinating discharge planning if the patient needs post-hospital services based on physician/advanced practitioner order or complex needs  related to functional status, cognitive ability, or social support system  Outcome: Progressing     Problem: Knowledge Deficit  Goal: Patient/family/caregiver demonstrates understanding of disease process, treatment plan, medications, and discharge instructions  Description: Complete learning assessment and assess knowledge base.  Interventions:  - Provide teaching at level of understanding  - Provide teaching via preferred learning methods  Outcome: Progressing     Problem: GASTROINTESTINAL - ADULT  Goal: Maintains or returns to baseline bowel function  Description: INTERVENTIONS:  - Assess bowel function  - Encourage oral fluids to ensure adequate hydration  - Administer IV fluids if ordered to ensure adequate hydration  - Administer ordered medications as needed  - Encourage mobilization and activity  - Consider nutritional services referral to assist patient with adequate nutrition and appropriate food choices  Outcome: Progressing  Goal: Maintains adequate nutritional intake  Description: INTERVENTIONS:  - Monitor percentage of each meal consumed  - Identify factors contributing to decreased intake, treat as appropriate  - Assist with meals as needed  - Monitor I&O, weight, and lab values if indicated  - Obtain nutrition services referral as needed  Outcome: Progressing

## 2024-11-18 NOTE — UTILIZATION REVIEW
"NOTIFICATION OF INPATIENT ADMISSION   AUTHORIZATION REQUEST   SERVICING FACILITY:   [facility]  Address: [address] [place] [address]  Tax ID: [id number]  NPI: [id number] ATTENDING PROVIDER:  Attending Name and NPI#: Stephanie Osman Do [[id number]]  Address: [address] [place] [address]  Phone: [phone]   ADMISSION INFORMATION:  Place of Service: Inpatient [facility]  Place of Service Code: 21  Inpatient Admission Date/Time: 11/16/24  4:10 PM  Discharge Date/Time: No discharge date for patient encounter.  Admitting Diagnosis Code/Description:  Difficulty swallowing [R13.10]     UTILIZATION REVIEW CONTACT:  Lakesha \"Naima\"Bao Utilization   Network Utilization Review Department  Phone: [phone]  Fax: [phone]  Email: [email or web address]  Contact for approvals/pending authorizations, clinical reviews, and discharge.     PHYSICIAN ADVISORY SERVICES:  Medical Necessity Denial & Kesu-kn-Kyjw Review  Phone: [phone]  Fax: [phone]  Email: [email or web address]     DISCHARGE SUPPORT TEAM:  For Patients Discharge Needs & Updates  Phone: [phone] opt. 2 Fax: [phone]  Email: [email or web address]     "

## 2024-11-18 NOTE — PLAN OF CARE
Problem: PAIN - ADULT  Goal: Verbalizes/displays adequate comfort level or baseline comfort level  Description: Interventions:  - Encourage patient to monitor pain and request assistance  - Assess pain using appropriate pain scale  - Administer analgesics based on type and severity of pain and evaluate response  - Implement non-pharmacological measures as appropriate and evaluate response  - Consider cultural and social influences on pain and pain management  - Notify physician/advanced practitioner if interventions unsuccessful or patient reports new pain  Outcome: Progressing     Problem: DISCHARGE PLANNING  Goal: Discharge to home or other facility with appropriate resources  Description: INTERVENTIONS:  - Identify barriers to discharge w/patient and caregiver  - Arrange for needed discharge resources and transportation as appropriate  - Identify discharge learning needs (meds, wound care, etc.)  - Arrange for interpretive services to assist at discharge as needed  - Refer to Case Management Department for coordinating discharge planning if the patient needs post-hospital services based on physician/advanced practitioner order or complex needs related to functional status, cognitive ability, or social support system  Outcome: Progressing     Problem: Knowledge Deficit  Goal: Patient/family/caregiver demonstrates understanding of disease process, treatment plan, medications, and discharge instructions  Description: Complete learning assessment and assess knowledge base.  Interventions:  - Provide teaching at level of understanding  - Provide teaching via preferred learning methods  Outcome: Progressing     Problem: GASTROINTESTINAL - ADULT  Goal: Maintains or returns to baseline bowel function  Description: INTERVENTIONS:  - Assess bowel function  - Encourage oral fluids to ensure adequate hydration  - Administer IV fluids if ordered to ensure adequate hydration  - Administer ordered medications as needed  -  Encourage mobilization and activity  - Consider nutritional services referral to assist patient with adequate nutrition and appropriate food choices  Outcome: Not Progressing  Goal: Maintains adequate nutritional intake  Description: INTERVENTIONS:  - Monitor percentage of each meal consumed  - Identify factors contributing to decreased intake, treat as appropriate  - Assist with meals as needed  - Monitor I&O, weight, and lab values if indicated  - Obtain nutrition services referral as needed  Outcome: Not Progressing

## 2024-11-18 NOTE — ASSESSMENT & PLAN NOTE
Question whether this is in the setting of hyperthyroidism, although he denies any additional symptoms, and physical exam does not demonstrate thyromegaly or thyroid nodules.

## 2024-11-18 NOTE — ASSESSMENT & PLAN NOTE
Plan as above   Detail Level: Detailed Post-Care Instructions: I reviewed with the patient in detail post-care instructions. Patient is to wear sunprotection, and avoid picking at any of the treated lesions. Pt may apply Vaseline to crusted or scabbing areas. Render Post-Care Instructions In Note?: no Number Of Freeze-Thaw Cycles: 3 freeze-thaw cycles Consent: The patient's consent was obtained including but not limited to risks of crusting, scabbing, blistering, scarring, darker or lighter pigmentary change, recurrence, incomplete removal and infection. Medical Necessity Information: It is in your best interest to select a reason for this procedure from the list below. All of these items fulfill various CMS LCD requirements except the new and changing color options. Medical Necessity Clause: This procedure was medically necessary because the lesions that were treated were:

## 2024-11-18 NOTE — PROGRESS NOTES
Progress Note - Gastroenterology   Name: Hector Carrera 73 y.o. male I MRN: [id number]  [facility] Date of Admission: 11/16/2024   Date of Service: 11/18/2024 I Hospital Day: 2     Assessment & Plan  Dysphagia  73M with 20pack yeat tobacco use hx, progressive dysphagia over several months, initial EGD performed in September 2024 showed grade D esophagitis, gastritis and duodenitis.  Started on high-dose PPI twice daily.  Presented with worsening dysphagia.  Repeat EGD November 2024 showed worsening lower esophageal stricture that was inflamed, nodular and unable to be traversed.  Biopsies taken.  Suspect progressive dysphagia despite PPI and Carafate secondary to worsening severe esophagitis causing seizure versus mass.Transferred from  for dilation vs EUS.    Plan  Awaiting pathology from second EGD. Reached out to Path lab today to make it STAT  Keep NPO  CT chest abdomen pelvis obtained 11/1720 24 to evaluate esophageal thickening and assess for lymphadenopathy.  Awaiting official read  Continue Protonix 40 mg IV twice daily.  IV formulations of medications as able  IVF for hydration   Consider slim scope evaluation vs dilation vs EUS vs surg onc c/s pending pathology result   Esophageal stricture vs mass  As above   Weight loss  Potentially related to hyperthyroidism, management per primary team  NPO currently while awaiting path results   IVF   Thrush  Nyastatin swish and spit for 7-14days  Hyperthyroidism  Management per primary team      Subjective : Patient seen and examined bedside.  Denies any acute complaints.  He has some oropharyngeal discomfort but denies chest pain, abdominal pain, nausea or vomiting.      Objective :  Temp:  [97.4 °F (36.3 °C)-100.1 °F (37.8 °C)] 97.4 °F (36.3 °C)  HR:  [76-82] 76  BP: (154-160)/(67-72) 154/67  Resp:  [16] 16  SpO2:  [94 %-98 %] 98 %  O2 Device: None (Room air)    Physical Exam  Vitals and nursing note reviewed.   Constitutional:       General: He is not  in acute distress.     Appearance: He is well-developed.   HENT:      Head: Normocephalic and atraumatic.   Eyes:      Conjunctiva/sclera: Conjunctivae normal.   Cardiovascular:      Rate and Rhythm: Normal rate and regular rhythm.      Heart sounds: No murmur heard.  Pulmonary:      Effort: Pulmonary effort is normal. No respiratory distress.      Breath sounds: Normal breath sounds.   Abdominal:      Palpations: Abdomen is soft.      Tenderness: There is no abdominal tenderness.   Musculoskeletal:         General: No swelling.      Cervical back: Neck supple.   Skin:     General: Skin is warm and dry.      Capillary Refill: Capillary refill takes less than 2 seconds.   Neurological:      Mental Status: He is alert.   Psychiatric:         Mood and Affect: Mood normal.         Lab Results: I have reviewed the following results:CBC/BMP: No new results in last 24 hours.     Imaging Results Review: I reviewed radiology reports from this admission including: CT chest and CT abdomen/pelvis.  Ade Bowen MD  Gastroenterology Fellow, PGY- 4  Available on EPIC Secure Chat  11/18/2024 10:11 AM

## 2024-11-18 NOTE — UTILIZATION REVIEW
Initial Clinical Review    Admission: Date/Time/Statement:   Admission Orders (From admission, onward)       Ordered        11/16/24 1637  INPATIENT ADMISSION  Once                          Orders Placed This Encounter   Procedures    INPATIENT ADMISSION     Standing Status:   Standing     Number of Occurrences:   1     Level of Care:   Med Surg [16]     Estimated length of stay:   More than 2 Midnights     Certification:   I certify that inpatient services are medically necessary for this patient for a duration of greater than two midnights. See H&P and MD Progress Notes for additional information about the patient's course of treatment.     Initial Presentation: 73 y.o. male with PMHx includes HTN, HLD, who presented initially to [facility] then transferred to [facility], admitted Inpatient status dt Severe Dysphagia.  Presented due to continued swallowing difficulties and now with the inability to swallow liquids.  He was seen by GI who recommended that he be kept NPO, with PPI intravenous twice daily and IV fluids and transferred for further evaluation.  He has also noted a 5 to 10 pound weight loss over the past 2 months.    Plan:  Admit to med surg :  GI consult, keep NPO, continue IVF and IV PPI, monitor VS and labs.     Anticipated Length of Stay/Certification Statement: Patient will be admitted on an inpatient basis with an anticipated length of stay of greater than 2 midnights secondary to severe dysphagia/esophageal stricture vs mass awaiting evaluation.     11/16 Per GI: given he is a former smoker and EGD shows worsening disease despite 8 weeks of PPI BID, concerning for malignancy is high. Will transfer pt to [facility] for further management. Asked pathology to expedite the current EGD path as well.   If path shows malignancy - will need surg onc.  If path remains nondiagnostic, may need dilation with EUS/FNA for better targeted sampling? Poss stent?      Date: 11/17   Day 2:   biopsy results have not yet returned and therefore decision not yet determined for possible EUS. Consult Endocrinology.    11/17 Per Endocrinology: Free T4 is mildly elevated, he is also on heparin which can displace free T4 from binding sites but that is usually an artifact. TSH has been suppressed since May-for now we will check T3 as well as TSI. Consider starting methimazole after that-he is currently n.p.o. awaiting biopsy report from EGD from 11/8/24.    Date: 11/18  Day 3: Has surpassed a 2nd midnight with active treatments and services. GI and Endo following. Per GI: Awaiting pathology from second EGD. Reached out to Path lab today to make it STAT. Keep NPO, continue IVF and IV PPI.     Scheduled Medications:  heparin (porcine), 5,000 Units, Subcutaneous, Q8H MARTHA  nystatin, 500,000 Units, Swish & Swallow, 4x Daily  pantoprazole, 40 mg, Intravenous, Q12H MARTHA      Continuous IV Infusions:  multi-electrolyte, 75 mL/hr, Intravenous, Continuous      PRN Meds:  hydrALAZINE, 5 mg, Intravenous, Q6H PRN      Triage Vitals   Temperature Pulse Respirations Blood Pressure SpO2 Pain Score   11/16/24 1616 11/16/24 1616 11/16/24 1616 11/16/24 1616 11/16/24 1616 11/16/24 1746   98.3 °F (36.8 °C) 78 18 160/62 97 % No Pain     Weight (last 2 days)       Date/Time Weight    11/16/24 16:16:28 86.2 (190)            Vital Signs (last 3 days)       Date/Time Temp Pulse Resp BP MAP (mmHg) SpO2 O2 Device Pain    11/18/24 08:09:13 97.4 °F (36.3 °C) 76 -- 154/67 96 98 % -- No Pain    11/17/24 22:01:29 100.1 °F (37.8 °C) 82 16 160/72 101 94 % -- --    11/17/24 2036 -- -- -- -- -- -- None (Room air) No Pain    11/17/24 0850 -- -- -- -- -- -- -- No Pain    11/17/24 07:45:30 97.6 °F (36.4 °C) 77 18 144/63 90 98 % -- --    11/16/24 22:05:12 99.6 °F (37.6 °C) 85 16 118/66 83 95 % -- --    11/16/24 2014 -- -- -- -- -- -- -- No Pain    11/16/24 1751 -- -- -- -- -- -- None (Room air) --    11/16/24 2516 -- -- -- -- -- -- -- No Pain    11/16/24  16:16:28 98.3 °F (36.8 °C) 78 18 160/62 95 97 % -- --              Pertinent Labs/Diagnostic Test Results:   Radiology:  CT chest abdomen pelvis w contrast   Final Interpretation by Albert Leyva MD (11/18 1026)      1.  Distal esophageal wall thickening with surrounding inflammation and small amount of fluid. No extraluminal gas identified. If there is clinical concern for esophageal perforation this may be evaluated with CT or fluoroscopic esophagram. Correlate    with pending biopsy results for etiology of wall thickening. No prior imaging available.   2.  Retained contents in the upper esophagus to the level of the neck. Patient may be at risk for aspiration.   3.  Ill-defined nodular and groundglass opacities greatest in the right lower lobe. Largest nodule measures 1 cm. These may be infectious/inflammatory. Recommend 3-month follow-up chest CT.   4.  No suspicious lymphadenopathy.   5.  Multiple bilateral simple renal cysts. A 2.8 cm right renal lesion measuring slightly above simple fluid density also likely reflects a cyst. This can be monitored on follow-up imaging or confirmed with outpatient renal ultrasound.            The study was marked in EPIC for immediate notification.               Workstation performed: [id number]           Cardiology:  No orders to display     GI:  No orders to display           Results from last 7 days   Lab Units 11/17/24 0612 11/16/24  1001   WBC Thousand/uL 6.69 10.60*   HEMOGLOBIN g/dL 12.7 13.7   HEMATOCRIT % 38.6 41.7   PLATELETS Thousands/uL 199 235   TOTAL NEUT ABS Thousands/µL 4.79 8.59*         Results from last 7 days   Lab Units 11/17/24 0612 11/16/24  1001   SODIUM mmol/L 142 141   POTASSIUM mmol/L 3.7 4.0   CHLORIDE mmol/L 108 106   CO2 mmol/L 22 26   ANION GAP mmol/L 12 9   BUN mg/dL 27* 26*   CREATININE mg/dL 0.59* 0.78   EGFR ml/min/1.73sq m 99 88   CALCIUM mg/dL 8.8 9.3   MAGNESIUM mg/dL 1.9  --      Results from last 7 days   Lab Units 11/17/24 0612  11/16/24  1001   AST U/L 18 16   ALT U/L 12 14   ALK PHOS U/L 66 76   TOTAL PROTEIN g/dL 6.2* 7.3   ALBUMIN g/dL 3.8 4.4   TOTAL BILIRUBIN mg/dL 0.60 0.71         Results from last 7 days   Lab Units 11/17/24  0612 11/16/24  1001   GLUCOSE RANDOM mg/dL 75 96     Results from last 7 days   Lab Units 11/16/24  1218 11/16/24  1001   HS TNI 0HR ng/L  --  8   HS TNI 2HR ng/L 7  --    HSTNI D2 ng/L -1  --      Results from last 7 days   Lab Units 11/17/24  0612   TSH 3RD GENERATON uIU/mL <0.010*     Past Medical History:   Diagnosis Date    GERD (gastroesophageal reflux disease)     Hyperlipidemia     Hypertension      Present on Admission:   Dysphagia   Hypertension   Hyperlipidemia      Admitting Diagnosis: Difficulty swallowing [R13.10]  Age/Sex: 73 y.o. male    Network Utilization Review Department  ATTENTION: Please call with any questions or concerns to [phone] and carefully listen to the prompts so that you are directed to the right person. All voicemails are confidential.   For Discharge needs, contact Care Management DC Support Team at [phone] opt. 2  Send all requests for admission clinical reviews, approved or denied determinations and any other requests to dedicated fax number below belonging to the campus where the patient is receiving treatment. List of dedicated fax numbers for the Facilities:  FACILITY NAME UR FAX NUMBER   ADMISSION DENIALS (Administrative/Medical Necessity) [phone]   DISCHARGE SUPPORT TEAM (NETWORK) [phone]   PARENT CHILD HEALTH (Maternity/NICU/Pediatrics) [phone]   [facility] [phone]   [facility] [phone]   [facility] [phone]   [facility] [phone]   [facility] [phone]   [facility] [phone]   [facility] [phone]   [facility]  [facility] [phone]   [facility] [phone]   [facility] [phone]   [facility] [phone]   [facility] [phone]

## 2024-11-19 ENCOUNTER — APPOINTMENT (INPATIENT)
Dept: GASTROENTEROLOGY | Facility: HOSPITAL | Age: 74
DRG: 392 | End: 2024-11-19
Payer: COMMERCIAL

## 2024-11-19 ENCOUNTER — ANESTHESIA (INPATIENT)
Dept: GASTROENTEROLOGY | Facility: HOSPITAL | Age: 74
DRG: 392 | End: 2024-11-19
Payer: COMMERCIAL

## 2024-11-19 ENCOUNTER — ANESTHESIA EVENT (INPATIENT)
Dept: GASTROENTEROLOGY | Facility: HOSPITAL | Age: 74
DRG: 392 | End: 2024-11-19
Payer: COMMERCIAL

## 2024-11-19 LAB
ALBUMIN SERPL BCG-MCNC: 4 G/DL (ref 3.5–5)
ALP SERPL-CCNC: 66 U/L (ref 34–104)
ALT SERPL W P-5'-P-CCNC: 22 U/L (ref 7–52)
ANION GAP SERPL CALCULATED.3IONS-SCNC: 12 MMOL/L (ref 4–13)
AST SERPL W P-5'-P-CCNC: 26 U/L (ref 13–39)
BASOPHILS # BLD AUTO: 0.03 THOUSANDS/ÂΜL (ref 0–0.1)
BASOPHILS NFR BLD AUTO: 1 % (ref 0–1)
BILIRUB SERPL-MCNC: 0.55 MG/DL (ref 0.2–1)
BUN SERPL-MCNC: 25 MG/DL (ref 5–25)
CALCIUM SERPL-MCNC: 9.1 MG/DL (ref 8.4–10.2)
CHLORIDE SERPL-SCNC: 111 MMOL/L (ref 96–108)
CO2 SERPL-SCNC: 24 MMOL/L (ref 21–32)
CREAT SERPL-MCNC: 0.66 MG/DL (ref 0.6–1.3)
EOSINOPHIL # BLD AUTO: 0.11 THOUSAND/ÂΜL (ref 0–0.61)
EOSINOPHIL NFR BLD AUTO: 2 % (ref 0–6)
ERYTHROCYTE [DISTWIDTH] IN BLOOD BY AUTOMATED COUNT: 12.8 % (ref 11.6–15.1)
GFR SERPL CREATININE-BSD FRML MDRD: 94 ML/MIN/1.73SQ M
GLUCOSE SERPL-MCNC: 85 MG/DL (ref 65–140)
HCT VFR BLD AUTO: 39.2 % (ref 36.5–49.3)
HGB BLD-MCNC: 13 G/DL (ref 12–17)
IMM GRANULOCYTES # BLD AUTO: 0.01 THOUSAND/UL (ref 0–0.2)
IMM GRANULOCYTES NFR BLD AUTO: 0 % (ref 0–2)
LYMPHOCYTES # BLD AUTO: 1.19 THOUSANDS/ÂΜL (ref 0.6–4.47)
LYMPHOCYTES NFR BLD AUTO: 19 % (ref 14–44)
MCH RBC QN AUTO: 29.6 PG (ref 26.8–34.3)
MCHC RBC AUTO-ENTMCNC: 33.2 G/DL (ref 31.4–37.4)
MCV RBC AUTO: 89 FL (ref 82–98)
MONOCYTES # BLD AUTO: 0.56 THOUSAND/ÂΜL (ref 0.17–1.22)
MONOCYTES NFR BLD AUTO: 9 % (ref 4–12)
NEUTROPHILS # BLD AUTO: 4.44 THOUSANDS/ÂΜL (ref 1.85–7.62)
NEUTS SEG NFR BLD AUTO: 69 % (ref 43–75)
NRBC BLD AUTO-RTO: 0 /100 WBCS
PLATELET # BLD AUTO: 206 THOUSANDS/UL (ref 149–390)
PMV BLD AUTO: 10 FL (ref 8.9–12.7)
POTASSIUM SERPL-SCNC: 3.7 MMOL/L (ref 3.5–5.3)
PROT SERPL-MCNC: 6.3 G/DL (ref 6.4–8.4)
RBC # BLD AUTO: 4.39 MILLION/UL (ref 3.88–5.62)
SODIUM SERPL-SCNC: 147 MMOL/L (ref 135–147)
WBC # BLD AUTO: 6.34 THOUSAND/UL (ref 4.31–10.16)

## 2024-11-19 PROCEDURE — 43239 EGD BIOPSY SINGLE/MULTIPLE: CPT | Performed by: INTERNAL MEDICINE

## 2024-11-19 PROCEDURE — 99232 SBSQ HOSP IP/OBS MODERATE 35: CPT | Performed by: FAMILY MEDICINE

## 2024-11-19 PROCEDURE — 85025 COMPLETE CBC W/AUTO DIFF WBC: CPT | Performed by: NURSE PRACTITIONER

## 2024-11-19 PROCEDURE — 80053 COMPREHEN METABOLIC PANEL: CPT | Performed by: NURSE PRACTITIONER

## 2024-11-19 PROCEDURE — 88342 IMHCHEM/IMCYTCHM 1ST ANTB: CPT | Performed by: PATHOLOGY

## 2024-11-19 PROCEDURE — 0DB48ZX EXCISION OF ESOPHAGOGASTRIC JUNCTION, VIA NATURAL OR ARTIFICIAL OPENING ENDOSCOPIC, DIAGNOSTIC: ICD-10-PCS | Performed by: INTERNAL MEDICINE

## 2024-11-19 PROCEDURE — 84482 T3 REVERSE: CPT

## 2024-11-19 PROCEDURE — C1874 STENT, COATED/COV W/DEL SYS: HCPCS

## 2024-11-19 PROCEDURE — C1769 GUIDE WIRE: HCPCS

## 2024-11-19 PROCEDURE — NC001 PR NO CHARGE: Performed by: INTERNAL MEDICINE

## 2024-11-19 PROCEDURE — 88305 TISSUE EXAM BY PATHOLOGIST: CPT | Performed by: PATHOLOGY

## 2024-11-19 PROCEDURE — 43266 EGD ENDOSCOPIC STENT PLACE: CPT | Performed by: INTERNAL MEDICINE

## 2024-11-19 PROCEDURE — 0D738DZ DILATION OF LOWER ESOPHAGUS WITH INTRALUMINAL DEVICE, VIA NATURAL OR ARTIFICIAL OPENING ENDOSCOPIC: ICD-10-PCS | Performed by: INTERNAL MEDICINE

## 2024-11-19 PROCEDURE — 88341 IMHCHEM/IMCYTCHM EA ADD ANTB: CPT | Performed by: PATHOLOGY

## 2024-11-19 RX ORDER — ACETAMINOPHEN 160 MG/5ML
650 SUSPENSION ORAL EVERY 4 HOURS PRN
Status: DISCONTINUED | OUTPATIENT
Start: 2024-11-19 | End: 2024-11-19

## 2024-11-19 RX ORDER — SUCCINYLCHOLINE/SOD CL,ISO/PF 100 MG/5ML
SYRINGE (ML) INTRAVENOUS AS NEEDED
Status: DISCONTINUED | OUTPATIENT
Start: 2024-11-19 | End: 2024-11-19

## 2024-11-19 RX ORDER — DEXAMETHASONE SODIUM PHOSPHATE 10 MG/ML
INJECTION, SOLUTION INTRAMUSCULAR; INTRAVENOUS AS NEEDED
Status: DISCONTINUED | OUTPATIENT
Start: 2024-11-19 | End: 2024-11-19

## 2024-11-19 RX ORDER — ACETAMINOPHEN 10 MG/ML
1000 INJECTION, SOLUTION INTRAVENOUS EVERY 6 HOURS PRN
Status: DISCONTINUED | OUTPATIENT
Start: 2024-11-19 | End: 2024-11-22 | Stop reason: HOSPADM

## 2024-11-19 RX ORDER — PROPOFOL 10 MG/ML
INJECTION, EMULSION INTRAVENOUS AS NEEDED
Status: DISCONTINUED | OUTPATIENT
Start: 2024-11-19 | End: 2024-11-19

## 2024-11-19 RX ORDER — ONDANSETRON 2 MG/ML
INJECTION INTRAMUSCULAR; INTRAVENOUS AS NEEDED
Status: DISCONTINUED | OUTPATIENT
Start: 2024-11-19 | End: 2024-11-19

## 2024-11-19 RX ORDER — ACETAMINOPHEN 325 MG/1
650 TABLET ORAL EVERY 6 HOURS PRN
Status: DISCONTINUED | OUTPATIENT
Start: 2024-11-19 | End: 2024-11-19

## 2024-11-19 RX ORDER — LIDOCAINE HYDROCHLORIDE 10 MG/ML
INJECTION, SOLUTION EPIDURAL; INFILTRATION; INTRACAUDAL; PERINEURAL AS NEEDED
Status: DISCONTINUED | OUTPATIENT
Start: 2024-11-19 | End: 2024-11-19

## 2024-11-19 RX ADMIN — DEXAMETHASONE SODIUM PHOSPHATE 10 MG: 10 INJECTION, SOLUTION INTRAMUSCULAR; INTRAVENOUS at 15:22

## 2024-11-19 RX ADMIN — SODIUM CHLORIDE, SODIUM GLUCONATE, SODIUM ACETATE, POTASSIUM CHLORIDE, MAGNESIUM CHLORIDE, SODIUM PHOSPHATE, DIBASIC, AND POTASSIUM PHOSPHATE 75 ML/HR: .53; .5; .37; .037; .03; .012; .00082 INJECTION, SOLUTION INTRAVENOUS at 00:48

## 2024-11-19 RX ADMIN — NYSTATIN 500000 UNITS: 100000 SUSPENSION ORAL at 12:38

## 2024-11-19 RX ADMIN — NYSTATIN 500000 UNITS: 100000 SUSPENSION ORAL at 17:05

## 2024-11-19 RX ADMIN — HYDRALAZINE HYDROCHLORIDE 5 MG: 20 INJECTION INTRAMUSCULAR; INTRAVENOUS at 18:00

## 2024-11-19 RX ADMIN — ONDANSETRON 4 MG: 2 INJECTION INTRAMUSCULAR; INTRAVENOUS at 15:22

## 2024-11-19 RX ADMIN — NYSTATIN 500000 UNITS: 100000 SUSPENSION ORAL at 08:49

## 2024-11-19 RX ADMIN — SODIUM CHLORIDE, SODIUM GLUCONATE, SODIUM ACETATE, POTASSIUM CHLORIDE, MAGNESIUM CHLORIDE, SODIUM PHOSPHATE, DIBASIC, AND POTASSIUM PHOSPHATE 75 ML/HR: .53; .5; .37; .037; .03; .012; .00082 INJECTION, SOLUTION INTRAVENOUS at 21:59

## 2024-11-19 RX ADMIN — PANTOPRAZOLE SODIUM 40 MG: 40 INJECTION, POWDER, FOR SOLUTION INTRAVENOUS at 21:53

## 2024-11-19 RX ADMIN — Medication 100 MG: at 15:18

## 2024-11-19 RX ADMIN — HEPARIN SODIUM 5000 UNITS: 5000 INJECTION, SOLUTION INTRAVENOUS; SUBCUTANEOUS at 21:53

## 2024-11-19 RX ADMIN — PANTOPRAZOLE SODIUM 40 MG: 40 INJECTION, POWDER, FOR SOLUTION INTRAVENOUS at 08:49

## 2024-11-19 RX ADMIN — LIDOCAINE HYDROCHLORIDE 50 MG: 10 INJECTION, SOLUTION EPIDURAL; INFILTRATION; INTRACAUDAL; PERINEURAL at 15:17

## 2024-11-19 RX ADMIN — SODIUM CHLORIDE, SODIUM GLUCONATE, SODIUM ACETATE, POTASSIUM CHLORIDE, MAGNESIUM CHLORIDE, SODIUM PHOSPHATE, DIBASIC, AND POTASSIUM PHOSPHATE 75 ML/HR: .53; .5; .37; .037; .03; .012; .00082 INJECTION, SOLUTION INTRAVENOUS at 13:48

## 2024-11-19 RX ADMIN — HEPARIN SODIUM 5000 UNITS: 5000 INJECTION, SOLUTION INTRAVENOUS; SUBCUTANEOUS at 05:43

## 2024-11-19 RX ADMIN — PROPOFOL 140 MG: 10 INJECTION, EMULSION INTRAVENOUS at 15:17

## 2024-11-19 RX ADMIN — ACETAMINOPHEN 1000 MG: 10 INJECTION INTRAVENOUS at 19:47

## 2024-11-19 NOTE — ASSESSMENT & PLAN NOTE
73M with 20pack yeat tobacco use hx, progressive dysphagia over several months, initial EGD performed in September 2024 showed grade D esophagitis, gastritis and duodenitis.  Started on high-dose PPI twice daily.  Presented with worsening dysphagia.  Repeat EGD November 2024 showed worsening lower esophageal stricture that was inflamed, nodular and unable to be traversed.  Biopsies taken.  Suspect progressive dysphagia despite PPI and Carafate secondary to worsening severe esophagitis causing stricture versus mass.Transferred from  for dilation vs EUS. CT chest abdomen pelvis obtained 11/17/24 distal esophageal wall thickening with surrounding inflammation.  Retained fluid contents up to the level of his neck.  No suspicious lymphadenopathy    Plan   Esophageal biopsy on 11/8/2024 returned with findings of highly atypical glandular epithelium, pending outside consultation.   Discussed with advanced endoscopy team, plan for repeat EGD with Slim scope with tentative esophageal stent placement and repeat biopsy today.  Keep NPO  Continue Protonix 40 mg IV twice daily.  IV formulations of medications as able  IVF for hydration

## 2024-11-19 NOTE — ANESTHESIA PREPROCEDURE EVALUATION
Procedure:  EGD    EKG 2024:  NSR    Relevant Problems   CARDIO   (+) Hyperlipidemia   (+) Hypertension      ENDO   (+) Hyperthyroidism      GI/HEPATIC   (+) Dysphagia   (+) GERD (gastroesophageal reflux disease)      Esophageal stricture with dysphagia to solids and liquids    Last ate apple sauce on Friday    Meds:  Amlod, HCTZ, hydral none taken since Friday  SBP 140s    METS >4      Physical Exam    Airway    Mallampati score: II         Dental        Cardiovascular  Cardiovascular exam normal    Pulmonary  Pulmonary exam normal     Other Findings        Anesthesia Plan  ASA Score- 2     Anesthesia Type- general with ASA Monitors.         Additional Monitors:     Airway Plan: ETT.    Comment: GA vs sedation.       Plan Factors-Exercise tolerance (METS): >4 METS.Exercise comment: Can go up 2 flights of stairs in home  .    Chart reviewed. EKG reviewed.   Patient summary reviewed.    Patient is not a current smoker.              Induction- intravenous.    Postoperative Plan-     Perioperative Resuscitation Plan - Level 1 - Full Code.       Informed Consent- Anesthetic plan and risks discussed with patient.  I personally reviewed this patient with the CRNA. Discussed and agreed on the Anesthesia Plan with the CRNA..

## 2024-11-19 NOTE — PLAN OF CARE
Problem: PAIN - ADULT  Goal: Verbalizes/displays adequate comfort level or baseline comfort level  Description: Interventions:  - Encourage patient to monitor pain and request assistance  - Assess pain using appropriate pain scale  - Administer analgesics based on type and severity of pain and evaluate response  - Implement non-pharmacological measures as appropriate and evaluate response  - Consider cultural and social influences on pain and pain management  - Notify physician/advanced practitioner if interventions unsuccessful or patient reports new pain  Outcome: Progressing     Problem: INFECTION - ADULT  Goal: Absence or prevention of progression during hospitalization  Description: INTERVENTIONS:  - Assess and monitor for signs and symptoms of infection  - Monitor lab/diagnostic results  - Monitor all insertion sites, i.e. indwelling lines, tubes, and drains  - Monitor endotracheal if appropriate and nasal secretions for changes in amount and color  - Schuyler appropriate cooling/warming therapies per order  - Administer medications as ordered  - Instruct and encourage patient and family to use good hand hygiene technique  - Identify and instruct in appropriate isolation precautions for identified infection/condition  Outcome: Progressing     Problem: SAFETY ADULT  Goal: Patient will remain free of falls  Description: INTERVENTIONS:  - Educate patient/family on patient safety including physical limitations  - Instruct patient to call for assistance with activity   - Consult OT/PT to assist with strengthening/mobility   - Keep Call bell within reach  - Keep bed low and locked with side rails adjusted as appropriate  - Keep care items and personal belongings within reach  - Initiate and maintain comfort rounds  - Make Fall Risk Sign visible to staff  - Apply yellow socks and bracelet for high fall risk patients  - Consider moving patient to room near nurses station  Outcome: Progressing  Goal: Maintain or  return to baseline ADL function  Description: INTERVENTIONS:  -  Assess patient's ability to carry out ADLs; assess patient's baseline for ADL function and identify physical deficits which impact ability to perform ADLs (bathing, care of mouth/teeth, toileting, grooming, dressing, etc.)  - Assess/evaluate cause of self-care deficits   - Assess range of motion  - Assess patient's mobility; develop plan if impaired  - Assess patient's need for assistive devices and provide as appropriate  - Encourage maximum independence but intervene and supervise when necessary  - Involve family in performance of ADLs  - Assess for home care needs following discharge   - Consider OT consult to assist with ADL evaluation and planning for discharge  - Provide patient education as appropriate  Outcome: Progressing

## 2024-11-19 NOTE — PROGRESS NOTES
Progress Note - Gastroenterology   Name: Hector Carrera 73 y.o. male I MRN: [id number]  Unit/Bed#: [facility] 815-01 I Date of Admission: 11/16/2024   Date of Service: 11/19/2024 I Hospital Day: 3     Assessment & Plan  Dysphagia  73M with 20pack yeat tobacco use hx, progressive dysphagia over several months, initial EGD performed in September 2024 showed grade D esophagitis, gastritis and duodenitis.  Started on high-dose PPI twice daily.  Presented with worsening dysphagia.  Repeat EGD November 2024 showed worsening lower esophageal stricture that was inflamed, nodular and unable to be traversed.  Biopsies taken.  Suspect progressive dysphagia despite PPI and Carafate secondary to worsening severe esophagitis causing stricture versus mass.Transferred from  for dilation vs EUS. CT chest abdomen pelvis obtained 11/17/24 distal esophageal wall thickening with surrounding inflammation.  Retained fluid contents up to the level of his neck.  No suspicious lymphadenopathy    Plan   Esophageal biopsy on 11/8/2024 returned with findings of highly atypical glandular epithelium, pending outside consultation.   Discussed with advanced endoscopy team, plan for repeat EGD with Slim scope with tentative esophageal stent placement and repeat biopsy today.  Keep NPO  Continue Protonix 40 mg IV twice daily.  IV formulations of medications as able  IVF for hydration   Esophageal stricture vs mass  As above   Weight loss  Potentially related to hyperthyroidism, management per primary team  NPO currently while awaiting path results   IVF   Thrush  Nyastatin swish and spit for 7-14days  Hyperthyroidism  Management per primary team      Subjective : No reported acute events overnight.  Patient is understandably frustrated with the course of his clinical illness     Objective :  Temp:  [97.8 °F (36.6 °C)-98.5 °F (36.9 °C)] 98.5 °F (36.9 °C)  HR:  [63-68] 63  BP: (160-164)/(63-70) 164/63  Resp:  [15-18] 18  SpO2:  [96 %-99 %] 99 %  O2 Device:  None (Room air)    Physical Exam  Vitals and nursing note reviewed.   Constitutional:       General: He is not in acute distress.     Appearance: He is well-developed.   HENT:      Head: Normocephalic and atraumatic.   Eyes:      Conjunctiva/sclera: Conjunctivae normal.   Cardiovascular:      Rate and Rhythm: Normal rate and regular rhythm.      Heart sounds: No murmur heard.  Pulmonary:      Effort: Pulmonary effort is normal. No respiratory distress.      Breath sounds: Normal breath sounds.   Abdominal:      Palpations: Abdomen is soft.      Tenderness: There is no abdominal tenderness.   Musculoskeletal:         General: No swelling.      Cervical back: Neck supple.   Skin:     General: Skin is warm and dry.      Capillary Refill: Capillary refill takes less than 2 seconds.   Neurological:      Mental Status: He is alert.   Psychiatric:         Mood and Affect: Mood normal.         Lab Results: I have reviewed the following results:CBC/BMP:   .     11/19/24  0548   WBC 6.34   HGB 13.0   HCT 39.2      SODIUM 147   K 3.7   *   CO2 24   BUN 25   CREATININE 0.66   GLUC 85        Imaging Results Review: I reviewed radiology reports from this admission including: CT chest.  Other Study Results Review: No additional pertinent studies reviewed.    Ade Bowen MD  Gastroenterology Fellow, PGY- 4  Available on EPIC Secure Chat  11/19/2024 12:11 PM

## 2024-11-19 NOTE — PROGRESS NOTES
Progress Note - Hospitalist   Name: Hector Carrera 73 y.o. male I MRN: [id number]  Unit/Bed#: [facility] 815-01 I Date of Admission: 11/16/2024   Date of Service: 11/19/2024 I Hospital Day: 3    Assessment & Plan  Dysphagia  Pending biopsy  EGD and stenting November 19  Will advance diet per GI recommendations  Continue PPI  Esophageal stricture vs mass  GI consulted and is planning EGD with stenting 1/19  Thrush  Continue nystatin swish and swallow  Hyperthyroidism  Patient noted to have TSH less than 0.010 and a free T4 of 1.33  Upon further discussion with family/daughter and wife at bedside.  They report significant family history of hyperthyroidism.  1 daughter having had recent removal of thyroid for unclear etiology.  Recently it appears that PCP may have encouraged outpatient workup but inability per wife to get appointment for at least 1 year  Patient endorses weight loss but is also not been eating due to primary diagnosis  Ongoing workup/ pruior studies with contrast could affect   Consult endocrinology , appreciated   Per endo T3 total is 0.7  May need to consider Methimazole however remains NPO at this time   Weight loss  Plan as above  Hypertension  Home regimen: Amlodipine 10 mg daily, Lisinopril 10 mg twice daily - hold as NPO  Hydralazine prn  Monitor BP  If persistently elevated add scheduled Lopressor  Hyperlipidemia  Home med: Simvastatin 20 mg daily - held as NPO      VTE Pharmacologic Prophylaxis: VTE Score: 4 Moderate Risk (Score 3-4) - Pharmacological DVT Prophylaxis Ordered: heparin.    Mobility:   Basic Mobility Inpatient Raw Score: 24  JH-HLM Goal: 8: Walk 250 feet or more  JH-HLM Achieved: 8: Walk 250 feet ot more  JH-HLM Goal achieved. Continue to encourage appropriate mobility.    Patient Centered Rounds: I performed bedside rounds with nursing staff today.   Discussions with Specialists or Other Care Team Provider: Gastroenterology    Education and Discussions with Family / Patient: Patient  declined call to .     Current Length of Stay: 3 day(s)  Current Patient Status: Inpatient   Certification Statement: The patient will continue to require additional inpatient hospital stay due to esophageal mass  Discharge Plan:  Pending clinical improvement    Code Status: Level 1 - Full Code    Subjective   This is a very pleasant 73-year-old gentleman who was seen evaluated today at bedside.  Patient is frustrated regarding his circumstance however is understandable.    Objective :  Temp:  [97.8 °F (36.6 °C)-98.5 °F (36.9 °C)] 98.3 °F (36.8 °C)  HR:  [63-68] 65  BP: (160-170)/(63-78) 170/78  Resp:  [15-18] 18  SpO2:  [96 %-99 %] 99 %  O2 Device: None (Room air)    Body mass index is 26.5 kg/m².     Input and Output Summary (last 24 hours):     Intake/Output Summary (Last 24 hours) at 11/19/2024 1504  Last data filed at 11/19/2024 1445  Gross per 24 hour   Intake 2215 ml   Output 900 ml   Net 1315 ml       Physical Exam  Vitals reviewed.   Constitutional:       Appearance: He is ill-appearing.   HENT:      Head: Normocephalic.      Nose: No congestion.      Mouth/Throat:      Pharynx: No oropharyngeal exudate or posterior oropharyngeal erythema.   Eyes:      Conjunctiva/sclera: Conjunctivae normal.   Cardiovascular:      Rate and Rhythm: Normal rate and regular rhythm.   Pulmonary:      Effort: Pulmonary effort is normal.   Abdominal:      General: Abdomen is flat.      Palpations: Abdomen is soft.   Skin:     General: Skin is warm and dry.   Neurological:      Mental Status: He is alert and oriented to person, place, and time. Mental status is at baseline.           Lines/Drains:              Lab Results: I have reviewed the following results:   Results from last 7 days   Lab Units 11/19/24  0548   WBC Thousand/uL 6.34   HEMOGLOBIN g/dL 13.0   HEMATOCRIT % 39.2   PLATELETS Thousands/uL 206   SEGS PCT % 69   LYMPHO PCT % 19   MONO PCT % 9   EOS PCT % 2     Results from last 7 days   Lab Units  11/19/24  0548   SODIUM mmol/L 147   POTASSIUM mmol/L 3.7   CHLORIDE mmol/L 111*   CO2 mmol/L 24   BUN mg/dL 25   CREATININE mg/dL 0.66   ANION GAP mmol/L 12   CALCIUM mg/dL 9.1   ALBUMIN g/dL 4.0   TOTAL BILIRUBIN mg/dL 0.55   ALK PHOS U/L 66   ALT U/L 22   AST U/L 26   GLUCOSE RANDOM mg/dL 85                       Recent Cultures (last 7 days):         Imaging Results Review: I reviewed radiology reports from this admission including: CT abdomen/pelvis.  Other Study Results Review: No additional pertinent studies reviewed.    Last 24 Hours Medication List:     Current Facility-Administered Medications:     heparin (porcine) subcutaneous injection 5,000 Units, Q8H MARTHA    hydrALAZINE (APRESOLINE) injection 5 mg, Q6H PRN    multi-electrolyte (PLASMALYTE-A/ISOLYTE-S PH 7.4) IV solution, Continuous, Last Rate: 75 mL/hr (11/19/24 1348)    nystatin (MYCOSTATIN) oral suspension 500,000 Units, 4x Daily    pantoprazole (PROTONIX) injection 40 mg, Q12H MARTHA    Administrative Statements   Today, Patient Was Seen By: Mata Rodríguez MD  I have spent a total time of 40 minutes in caring for this patient on the day of the visit/encounter including Diagnostic results, Prognosis, Importance of tx compliance, Reviewing / ordering tests, medicine, procedures  , and Communicating with other healthcare professionals .    **Please Note: This note may have been constructed using a voice recognition system.**

## 2024-11-19 NOTE — ASSESSMENT & PLAN NOTE
Pending biopsy  EGD and stenting November 19  Will advance diet per GI recommendations  Continue PPI

## 2024-11-19 NOTE — ANESTHESIA POSTPROCEDURE EVALUATION
Post-Op Assessment Note    CV Status:  Stable    Pain management: adequate       Mental Status:  Alert and awake   Hydration Status:  Euvolemic   PONV Controlled:  Controlled   Airway Patency:  Patent     Post Op Vitals Reviewed: Yes    No anethesia notable event occurred.    Staff: Anesthesiologist, CRNA           Last Filed PACU Vitals:  Vitals Value Taken Time   Temp 97.6 °F (36.4 °C) 11/19/24 1559   Pulse 77 11/19/24 1615   /83 11/19/24 1615   Resp 16 11/19/24 1615   SpO2 98 % 11/19/24 1615       Modified Melissa:  Activity: 2 (11/19/2024  4:13 PM)  Respiration: 2 (11/19/2024  4:13 PM)  Circulation: 2 (11/19/2024  4:13 PM)  Consciousness: 2 (11/19/2024  4:13 PM)  Oxygen Saturation: 2 (11/19/2024  4:13 PM)  Modified Melissa Score: 10 (11/19/2024  4:13 PM)

## 2024-11-19 NOTE — ANESTHESIA POSTPROCEDURE EVALUATION
Post-Op Assessment Note    CV Status:  Stable  Pain Score: 0    Pain management: adequate       Mental Status:  Awake   Hydration Status:  Euvolemic   PONV Controlled:  Controlled   Airway Patency:  Patent     Post Op Vitals Reviewed: Yes    No anethesia notable event occurred.    Staff: Anesthesiologist, CRNA           Last Filed PACU Vitals:  Vitals Value Taken Time   Temp 97.6    Pulse 87    /81    Resp 20    SpO2 97        Modified Melissa:  Activity: 2 (11/19/2024  2:00 PM)  Respiration: 2 (11/19/2024  2:00 PM)  Circulation: 2 (11/19/2024  2:00 PM)  Consciousness: 2 (11/19/2024  2:00 PM)  Oxygen Saturation: 2 (11/19/2024  2:00 PM)  Modified Melissa Score: 10 (11/19/2024  2:00 PM)

## 2024-11-20 LAB
ALBUMIN SERPL BCG-MCNC: 3.9 G/DL (ref 3.5–5)
ALP SERPL-CCNC: 64 U/L (ref 34–104)
ALT SERPL W P-5'-P-CCNC: 21 U/L (ref 7–52)
ANION GAP SERPL CALCULATED.3IONS-SCNC: 13 MMOL/L (ref 4–13)
AST SERPL W P-5'-P-CCNC: 22 U/L (ref 13–39)
BASOPHILS # BLD AUTO: 0 THOUSANDS/ÂΜL (ref 0–0.1)
BASOPHILS NFR BLD AUTO: 0 % (ref 0–1)
BILIRUB SERPL-MCNC: 0.46 MG/DL (ref 0.2–1)
BUN SERPL-MCNC: 26 MG/DL (ref 5–25)
CALCIUM SERPL-MCNC: 9.5 MG/DL (ref 8.4–10.2)
CHLORIDE SERPL-SCNC: 111 MMOL/L (ref 96–108)
CO2 SERPL-SCNC: 22 MMOL/L (ref 21–32)
CREAT SERPL-MCNC: 0.69 MG/DL (ref 0.6–1.3)
EOSINOPHIL # BLD AUTO: 0 THOUSAND/ÂΜL (ref 0–0.61)
EOSINOPHIL NFR BLD AUTO: 0 % (ref 0–6)
ERYTHROCYTE [DISTWIDTH] IN BLOOD BY AUTOMATED COUNT: 12.8 % (ref 11.6–15.1)
GFR SERPL CREATININE-BSD FRML MDRD: 92 ML/MIN/1.73SQ M
GLUCOSE SERPL-MCNC: 114 MG/DL (ref 65–140)
HCT VFR BLD AUTO: 39.5 % (ref 36.5–49.3)
HGB BLD-MCNC: 12.7 G/DL (ref 12–17)
IMM GRANULOCYTES # BLD AUTO: 0.01 THOUSAND/UL (ref 0–0.2)
IMM GRANULOCYTES NFR BLD AUTO: 0 % (ref 0–2)
LYMPHOCYTES # BLD AUTO: 0.37 THOUSANDS/ÂΜL (ref 0.6–4.47)
LYMPHOCYTES NFR BLD AUTO: 10 % (ref 14–44)
MAGNESIUM SERPL-MCNC: 2.3 MG/DL (ref 1.9–2.7)
MCH RBC QN AUTO: 29.1 PG (ref 26.8–34.3)
MCHC RBC AUTO-ENTMCNC: 32.2 G/DL (ref 31.4–37.4)
MCV RBC AUTO: 91 FL (ref 82–98)
MONOCYTES # BLD AUTO: 0.12 THOUSAND/ÂΜL (ref 0.17–1.22)
MONOCYTES NFR BLD AUTO: 3 % (ref 4–12)
NEUTROPHILS # BLD AUTO: 3.35 THOUSANDS/ÂΜL (ref 1.85–7.62)
NEUTS SEG NFR BLD AUTO: 87 % (ref 43–75)
NRBC BLD AUTO-RTO: 0 /100 WBCS
PLATELET # BLD AUTO: 198 THOUSANDS/UL (ref 149–390)
PMV BLD AUTO: 10.2 FL (ref 8.9–12.7)
POTASSIUM SERPL-SCNC: 4.4 MMOL/L (ref 3.5–5.3)
PROT SERPL-MCNC: 6.5 G/DL (ref 6.4–8.4)
RBC # BLD AUTO: 4.36 MILLION/UL (ref 3.88–5.62)
SODIUM SERPL-SCNC: 146 MMOL/L (ref 135–147)
TSI SER-ACNC: <0.1 IU/L (ref 0–0.55)
WBC # BLD AUTO: 3.85 THOUSAND/UL (ref 4.31–10.16)

## 2024-11-20 PROCEDURE — 99232 SBSQ HOSP IP/OBS MODERATE 35: CPT | Performed by: INTERNAL MEDICINE

## 2024-11-20 PROCEDURE — 80053 COMPREHEN METABOLIC PANEL: CPT | Performed by: FAMILY MEDICINE

## 2024-11-20 PROCEDURE — 85025 COMPLETE CBC W/AUTO DIFF WBC: CPT | Performed by: FAMILY MEDICINE

## 2024-11-20 PROCEDURE — 83735 ASSAY OF MAGNESIUM: CPT | Performed by: FAMILY MEDICINE

## 2024-11-20 PROCEDURE — 99232 SBSQ HOSP IP/OBS MODERATE 35: CPT | Performed by: FAMILY MEDICINE

## 2024-11-20 RX ADMIN — SODIUM CHLORIDE, SODIUM GLUCONATE, SODIUM ACETATE, POTASSIUM CHLORIDE, MAGNESIUM CHLORIDE, SODIUM PHOSPHATE, DIBASIC, AND POTASSIUM PHOSPHATE 75 ML/HR: .53; .5; .37; .037; .03; .012; .00082 INJECTION, SOLUTION INTRAVENOUS at 20:46

## 2024-11-20 RX ADMIN — NYSTATIN 500000 UNITS: 100000 SUSPENSION ORAL at 09:09

## 2024-11-20 RX ADMIN — HEPARIN SODIUM 5000 UNITS: 5000 INJECTION, SOLUTION INTRAVENOUS; SUBCUTANEOUS at 14:51

## 2024-11-20 RX ADMIN — HEPARIN SODIUM 5000 UNITS: 5000 INJECTION, SOLUTION INTRAVENOUS; SUBCUTANEOUS at 22:15

## 2024-11-20 RX ADMIN — HEPARIN SODIUM 5000 UNITS: 5000 INJECTION, SOLUTION INTRAVENOUS; SUBCUTANEOUS at 05:14

## 2024-11-20 RX ADMIN — NYSTATIN 500000 UNITS: 100000 SUSPENSION ORAL at 22:16

## 2024-11-20 RX ADMIN — NYSTATIN 500000 UNITS: 100000 SUSPENSION ORAL at 17:19

## 2024-11-20 RX ADMIN — PANTOPRAZOLE SODIUM 40 MG: 40 INJECTION, POWDER, FOR SOLUTION INTRAVENOUS at 20:52

## 2024-11-20 RX ADMIN — PANTOPRAZOLE SODIUM 40 MG: 40 INJECTION, POWDER, FOR SOLUTION INTRAVENOUS at 09:08

## 2024-11-20 NOTE — PLAN OF CARE
Problem: PAIN - ADULT  Goal: Verbalizes/displays adequate comfort level or baseline comfort level  Description: Interventions:  - Encourage patient to monitor pain and request assistance  - Assess pain using appropriate pain scale  - Administer analgesics based on type and severity of pain and evaluate response  - Implement non-pharmacological measures as appropriate and evaluate response  - Consider cultural and social influences on pain and pain management  - Notify physician/advanced practitioner if interventions unsuccessful or patient reports new pain  Outcome: Progressing     Problem: INFECTION - ADULT  Goal: Absence or prevention of progression during hospitalization  Description: INTERVENTIONS:  - Assess and monitor for signs and symptoms of infection  - Monitor lab/diagnostic results  - Monitor all insertion sites, i.e. indwelling lines, tubes, and drains  - Monitor endotracheal if appropriate and nasal secretions for changes in amount and color  - Norco appropriate cooling/warming therapies per order  - Administer medications as ordered  - Instruct and encourage patient and family to use good hand hygiene technique  - Identify and instruct in appropriate isolation precautions for identified infection/condition  Outcome: Progressing  Goal: Absence of fever/infection during neutropenic period  Description: INTERVENTIONS:  - Monitor WBC    Outcome: Progressing     Problem: SAFETY ADULT  Goal: Patient will remain free of falls  Description: INTERVENTIONS:  - Educate patient/family on patient safety including physical limitations  - Instruct patient to call for assistance with activity   - Consult OT/PT to assist with strengthening/mobility   - Keep Call bell within reach  - Keep bed low and locked with side rails adjusted as appropriate  - Keep care items and personal belongings within reach  - Initiate and maintain comfort rounds  - Make Fall Risk Sign visible to staff  - Obtain necessary fall risk  management equipment:-   - Apply yellow socks and bracelet for high fall risk patients  - Consider moving patient to room near nurses station  Outcome: Progressing  Goal: Maintain or return to baseline ADL function  Description: INTERVENTIONS:  -  Assess patient's ability to carry out ADLs; assess patient's baseline for ADL function and identify physical deficits which impact ability to perform ADLs (bathing, care of mouth/teeth, toileting, grooming, dressing, etc.)  - Assess/evaluate cause of self-care deficits   - Assess range of motion  - Assess patient's mobility; develop plan if impaired  - Assess patient's need for assistive devices and provide as appropriate  - Encourage maximum independence but intervene and supervise when necessary  - Involve family in performance of ADLs  - Assess for home care needs following discharge   - Consider OT consult to assist with ADL evaluation and planning for discharge  - Provide patient education as appropriate  Outcome: Progressing    Outcome: Progressing     Problem: DISCHARGE PLANNING  Goal: Discharge to home or other facility with appropriate resources  Description: INTERVENTIONS:  - Identify barriers to discharge w/patient and caregiver  - Arrange for needed discharge resources and transportation as appropriate  - Identify discharge learning needs (meds, wound care, etc.)  - Arrange for interpretive services to assist at discharge as needed  - Refer to Case Management Department for coordinating discharge planning if the patient needs post-hospital services based on physician/advanced practitioner order or complex needs related to functional status, cognitive ability, or social support system  Outcome: Progressing     Problem: Knowledge Deficit  Goal: Patient/family/caregiver demonstrates understanding of disease process, treatment plan, medications, and discharge instructions  Description: Complete learning assessment and assess knowledge base.  Interventions:  -  Provide teaching at level of understanding  - Provide teaching via preferred learning methods  Outcome: Progressing     Problem: GASTROINTESTINAL - ADULT  Goal: Maintains or returns to baseline bowel function  Description: INTERVENTIONS:  - Assess bowel function  - Encourage oral fluids to ensure adequate hydration  - Administer IV fluids if ordered to ensure adequate hydration  - Administer ordered medications as needed  - Encourage mobilization and activity  - Consider nutritional services referral to assist patient with adequate nutrition and appropriate food choices  Outcome: Progressing  Goal: Maintains adequate nutritional intake  Description: INTERVENTIONS:  - Monitor percentage of each meal consumed  - Identify factors contributing to decreased intake, treat as appropriate  - Assist with meals as needed  - Monitor I&O, weight, and lab values if indicated  - Obtain nutrition services referral as needed  Outcome: Progressing     Problem: Nutrition/Hydration-ADULT  Goal: Nutrient/Hydration intake appropriate for improving, restoring or maintaining nutritional needs  Description: Monitor and assess patient's nutrition/hydration status for malnutrition. Collaborate with interdisciplinary team and initiate plan and interventions as ordered.  Monitor patient's weight and dietary intake as ordered or per policy. Utilize nutrition screening tool and intervene as necessary. Determine patient's food preferences and provide high-protein, high-caloric foods as appropriate.     INTERVENTIONS:  - Monitor oral intake, urinary output, labs, and treatment plans  - Assess nutrition and hydration status and recommend course of action  - Evaluate amount of meals eaten  - Assist patient with eating if necessary   - Allow adequate time for meals  - Recommend/ encourage appropriate diets, oral nutritional supplements, and vitamin/mineral supplements  - Order, calculate, and assess calorie counts as needed  - Recommend, monitor, and  adjust tube feedings and TPN/PPN based on assessed needs  - Assess need for intravenous fluids  - Provide specific nutrition/hydration education as appropriate  - Include patient/family/caregiver in decisions related to nutrition  Outcome: Progressing

## 2024-11-20 NOTE — PROGRESS NOTES
Progress Note - Hospitalist   Name: Hector Carrera 73 y.o. male I MRN: [id number]  Unit/Bed#: [facility] 815-01 I Date of Admission: 11/16/2024   Date of Service: 11/20/2024 I Hospital Day: 4    Assessment & Plan  Dysphagia  S/p EGD and stenting November 19  Will advance diet per GI recommendations  Continue PPI  Esophageal stricture vs mass  Post biopsy, stent placement by GI on November 19  Continue IV fluid hydration  Patient diet advanced, monitor patient tolerance   Thrush  Continue nystatin swish and swallow  Hyperthyroidism  Patient noted to have TSH less than 0.010 and a free T4 of 1.33  Upon further discussion with family/daughter and wife at bedside.  They report significant family history of hyperthyroidism.  1 daughter having had recent removal of thyroid for unclear etiology.  Recently it appears that PCP may have encouraged outpatient workup but inability per wife to get appointment for at least 1 year  Patient endorses weight loss but is also not been eating due to primary diagnosis  Ongoing workup/ pruior studies with contrast could affect   Consult endocrinology , appreciated   Per endo T3 total is 0.7  May need to consider Methimazole   Weight loss  Plan as above  Hypertension  Home regimen: Amlodipine 10 mg daily, Lisinopril 10 mg twice daily - hold as NPO  Hydralazine prn  Monitor BP  If persistently elevated add scheduled Lopressor  Hyperlipidemia  Home med: Simvastatin 20 mg daily      VTE Pharmacologic Prophylaxis: VTE Score: 4 Moderate Risk (Score 3-4) - Pharmacological DVT Prophylaxis Ordered: heparin.    Mobility:   Basic Mobility Inpatient Raw Score: 24  JH-HLM Goal: 8: Walk 250 feet or more  JH-HLM Achieved: 6: Walk 10 steps or more  JH-HLM Goal NOT achieved. Continue with multidisciplinary rounding and encourage appropriate mobility to improve upon JH-HLM goals.    Patient Centered Rounds: I performed bedside rounds with nursing staff today.   Discussions with Specialists or Other Care Team  Provider: GI    Education and Discussions with Family / Patient: Patient declined call to .     Current Length of Stay: 4 day(s)  Current Patient Status: Inpatient   Certification Statement: The patient will continue to require additional inpatient hospital stay due to esophageal stricture  Discharge Plan:  Pending clinical improvement    Code Status: Level 1 - Full Code    Subjective   Is a very pleasant 73-year-old gentleman who was seen and evaluated today at bedside.  Patient has no acute complaints at this time.    Objective :  Temp:  [97.6 °F (36.4 °C)-99.6 °F (37.6 °C)] 99.3 °F (37.4 °C)  HR:  [70-90] 72  BP: (141-190)/(67-83) 141/68  Resp:  [16-18] 18  SpO2:  [96 %-99 %] 97 %  O2 Device: None (Room air)    Body mass index is 26.5 kg/m².     Input and Output Summary (last 24 hours):     Intake/Output Summary (Last 24 hours) at 11/20/2024 1526  Last data filed at 11/20/2024 1401  Gross per 24 hour   Intake 1520 ml   Output 940 ml   Net 580 ml       Physical Exam  Vitals reviewed.   Constitutional:       Appearance: He is ill-appearing.   HENT:      Head: Normocephalic.      Nose: No congestion.      Mouth/Throat:      Pharynx: No oropharyngeal exudate or posterior oropharyngeal erythema.   Eyes:      Conjunctiva/sclera: Conjunctivae normal.   Cardiovascular:      Rate and Rhythm: Normal rate and regular rhythm.   Pulmonary:      Effort: Pulmonary effort is normal.   Abdominal:      General: Abdomen is flat.      Palpations: Abdomen is soft.   Skin:     General: Skin is warm and dry.   Neurological:      Mental Status: He is alert and oriented to person, place, and time. Mental status is at baseline.           Lines/Drains:              Lab Results: I have reviewed the following results:   Results from last 7 days   Lab Units 11/20/24  0357   WBC Thousand/uL 3.85*   HEMOGLOBIN g/dL 12.7   HEMATOCRIT % 39.5   PLATELETS Thousands/uL 198   SEGS PCT % 87*   LYMPHO PCT % 10*   MONO PCT % 3*   EOS PCT %  0     Results from last 7 days   Lab Units 11/20/24  0357   SODIUM mmol/L 146   POTASSIUM mmol/L 4.4   CHLORIDE mmol/L 111*   CO2 mmol/L 22   BUN mg/dL 26*   CREATININE mg/dL 0.69   ANION GAP mmol/L 13   CALCIUM mg/dL 9.5   ALBUMIN g/dL 3.9   TOTAL BILIRUBIN mg/dL 0.46   ALK PHOS U/L 64   ALT U/L 21   AST U/L 22   GLUCOSE RANDOM mg/dL 114                       Recent Cultures (last 7 days):         Imaging Results Review: No pertinent imaging studies reviewed.  Other Study Results Review: No additional pertinent studies reviewed.    Last 24 Hours Medication List:     Current Facility-Administered Medications:     acetaminophen (Ofirmev) injection 1,000 mg, Q6H PRN, Last Rate: 1,000 mg (11/19/24 1947)    heparin (porcine) subcutaneous injection 5,000 Units, Q8H MARTHA    hydrALAZINE (APRESOLINE) injection 5 mg, Q6H PRN    multi-electrolyte (PLASMALYTE-A/ISOLYTE-S PH 7.4) IV solution, Continuous, Last Rate: 75 mL/hr (11/19/24 2159)    nystatin (MYCOSTATIN) oral suspension 500,000 Units, 4x Daily    pantoprazole (PROTONIX) injection 40 mg, Q12H MARTHA    Administrative Statements   Today, Patient Was Seen By: Mata Rodríguez MD  I have spent a total time of 40 minutes in caring for this patient on the day of the visit/encounter including Diagnostic results, Prognosis, Instructions for management, Importance of tx compliance, Counseling / Coordination of care, Reviewing / ordering tests, medicine, procedures  , and Obtaining or reviewing history  .    **Please Note: This note may have been constructed using a voice recognition system.**

## 2024-11-20 NOTE — ASSESSMENT & PLAN NOTE
73M with 20pack yeat tobacco use hx, progressive dysphagia over several months, initial EGD performed in September 2024 showed grade D esophagitis, gastritis and duodenitis.  Started on high-dose PPI twice daily.  Presented with worsening dysphagia.  Repeat EGD November 2024 showed worsening lower esophageal stricture that was inflamed, nodular and unable to be traversed.  Biopsies taken.  Suspect progressive dysphagia despite PPI and Carafate secondary to worsening severe esophagitis causing stricture versus mass.Transferred from  for dilation vs EUS. CT chest abdomen pelvis obtained 11/17/24 distal esophageal wall thickening with surrounding inflammation.  Retained fluid contents up to the level of his neck.  No suspicious lymphadenopathy    Now status post EGD yesterday with indeterminant stricture for which biopsies were obtained and stent placed.    Plan   Esophageal biopsy on 11/8/2024 returned with findings of highly atypical glandular epithelium, pending outside consultation.   Started on full liquid diet with addition of nutritional supplements.  Will see how the patient does.  Continue Protonix 40 mg IV twice daily.  IV formulations of medications as able  IVF for hydration

## 2024-11-20 NOTE — ASSESSMENT & PLAN NOTE
Lab Results   Component Value Date    [id number] <0.010 (L) 11/17/2024    FREET4 1.33 (H) 11/17/2024   Noted to have suppressed TSH since May 2024 while undergoing evaluation for diplopia.  At the time advised to see endocrinology but did not.  Endorses weight loss for the past 1.5 years, but no heat intolerance, diarrhea, anxiety, tremors, sleep disturbance.  Also endorses intermittent diplopia, particularly towards the end of the day.  Was previously evaluated by [facility] neurology with diplopia without clear etiology.  Now undergoing evaluation for dysphagia with undetectable TSH and mildly elevated free T4.  No thyromegaly or thyroid nodules noted on physical exam.   T3 earlier today-0.7.TSI - negative  Unclear etiology at this time question hyperthyroidism versus nonthyroidal illness syndrome.    Plan:  Hold off on initiating methimazole given lack of symptoms  Recommend repeating TFTs in 2-4 weeks after discharge from hospital.   Recommend close endocrinology follow up

## 2024-11-20 NOTE — ASSESSMENT & PLAN NOTE
Post biopsy, stent placement by GI on November 19  Continue IV fluid hydration  Patient diet advanced, monitor patient tolerance

## 2024-11-20 NOTE — PROGRESS NOTES
Progress Note - Endocrinology   Name: Hector Carrera 73 y.o. male I MRN: [id number]  Unit/Bed#: [facility] 815-01 I Date of Admission: 11/16/2024   Date of Service: 11/20/2024 I Hospital Day: 4    Assessment & Plan  Hyperthyroidism  Lab Results   Component Value Date    [id number] <0.010 (L) 11/17/2024    FREET4 1.33 (H) 11/17/2024   Noted to have suppressed TSH since May 2024 while undergoing evaluation for diplopia.  At the time advised to see endocrinology but did not.  Endorses weight loss for the past 1.5 years, but no heat intolerance, diarrhea, anxiety, tremors, sleep disturbance.  Also endorses intermittent diplopia, particularly towards the end of the day.  Was previously evaluated by [facility] neurology with diplopia without clear etiology.  Now undergoing evaluation for dysphagia with undetectable TSH and mildly elevated free T4.  No thyromegaly or thyroid nodules noted on physical exam.   T3 earlier today-0.7.TSI - negative  Unclear etiology at this time question hyperthyroidism versus nonthyroidal illness syndrome.    Plan:  Hold off on initiating methimazole given lack of symptoms  Recommend repeating TFTs in 2-4 weeks after discharge from hospital.   Recommend close endocrinology follow up  Weight loss  Question whether this is in the setting of hyperthyroidism, although he denies any additional symptoms, and physical exam does not demonstrate thyromegaly or thyroid nodules.  Dysphagia  Presented to the ED due to worsening dysphagia since September 2024 - at the time of presentation had dysphagia for liquids.   Plan as below.  Esophageal stricture vs mass  Indeterminate and inflamed intrinsic stricture in the esophagus-36 cm from incisors.  Severe localized edematous, erythematous, friable and nodular mucosa with exudate noted on EGD performed on 11/8/2024.    Management per primary team and GI.    24 Hour Events : No acute overnight events.  Subjective : Patient seen and examined at the bedside, not in acute  distress at the time of my evaluation. Offers no new complaints.     Objective :  Temp:  [99.3 °F (37.4 °C)-99.6 °F (37.6 °C)] 99.5 °F (37.5 °C)  HR:  [67-72] 67  BP: (141-161)/(68-75) 159/70  Resp:  [18-19] 19  SpO2:  [96 %-97 %] 97 %    Physical Exam  Vitals and nursing note reviewed.   Constitutional:       General: He is not in acute distress.     Appearance: Normal appearance. He is not ill-appearing, toxic-appearing or diaphoretic.   HENT:      Head: Normocephalic and atraumatic.      Nose: Nose normal.      Mouth/Throat:      Pharynx: Oropharynx is clear.   Eyes:      General: No scleral icterus.        Right eye: No discharge.         Left eye: No discharge.      Extraocular Movements: Extraocular movements intact.      Conjunctiva/sclera: Conjunctivae normal.   Pulmonary:      Effort: Pulmonary effort is normal. No respiratory distress.   Abdominal:      General: Abdomen is flat. There is no distension.   Musculoskeletal:         General: Normal range of motion.      Cervical back: Normal range of motion and neck supple.   Skin:     Coloration: Skin is not jaundiced or pale.   Neurological:      Mental Status: He is alert and oriented to person, place, and time. Mental status is at baseline.   Psychiatric:         Mood and Affect: Mood normal.         Behavior: Behavior normal.         Thought Content: Thought content normal.         Judgment: Judgment normal.         Lab Results: I have reviewed the following results:CBC/BMP:   .     11/20/24  0357   WBC 3.85*   HGB 12.7   HCT 39.5      SODIUM 146   K 4.4   *   CO2 22   BUN 26*   CREATININE 0.69   GLUC 114   MG 2.3    , Lipid Profile:   , TSH:   Results from last 7 days   Lab Units 11/17/24  0612   TSH 3RD GENERATON uIU/mL <0.010*       Imaging Results Review: No pertinent imaging studies reviewed.  Other Study Results Review: No additional pertinent studies reviewed.

## 2024-11-20 NOTE — PROGRESS NOTES
Progress Note - Gastroenterology   Name: Hector Carrera 73 y.o. male I MRN: [id number]  Unit/Bed#: [facility] 815-01 I Date of Admission: 11/16/2024   Date of Service: 11/20/2024 I Hospital Day: 4    Assessment & Plan  Dysphagia  73M with 20pack yeat tobacco use hx, progressive dysphagia over several months, initial EGD performed in September 2024 showed grade D esophagitis, gastritis and duodenitis.  Started on high-dose PPI twice daily.  Presented with worsening dysphagia.  Repeat EGD November 2024 showed worsening lower esophageal stricture that was inflamed, nodular and unable to be traversed.  Biopsies taken.  Suspect progressive dysphagia despite PPI and Carafate secondary to worsening severe esophagitis causing stricture versus mass.Transferred from  for dilation vs EUS. CT chest abdomen pelvis obtained 11/17/24 distal esophageal wall thickening with surrounding inflammation.  Retained fluid contents up to the level of his neck.  No suspicious lymphadenopathy    Now status post EGD yesterday with indeterminant stricture for which biopsies were obtained and stent placed.    Plan   Esophageal biopsy on 11/8/2024 returned with findings of highly atypical glandular epithelium, pending outside consultation.   Started on full liquid diet with addition of nutritional supplements.  Will see how the patient does.  Continue Protonix 40 mg IV twice daily.  IV formulations of medications as able  IVF for hydration   Esophageal stricture vs mass  As above   Weight loss  Potentially related to hyperthyroidism, management per primary team  NPO currently while awaiting path results   IVF   Thrush  Nyastatin swish and spit for 7-14days  Hyperthyroidism  Management per primary team        Subjective   Patient seen this morning with wife at the bedside.  He has some abdominal discomfort but no significant chest pain, heartburn, regurgitation.  He tolerated some liquids and feels well.  He is eager to be able to drink and eventually  eat    Objective :  Temp:  [97.6 °F (36.4 °C)-99.6 °F (37.6 °C)] 99.3 °F (37.4 °C)  HR:  [65-90] 72  BP: (141-190)/(67-83) 141/68  Resp:  [16-18] 18  SpO2:  [96 %-99 %] 97 %  O2 Device: None (Room air)    PHYSICAL EXAMINATION:    General Appearance:   Alert, cooperative, no distress   HEENT:  Normocephalic, atraumatic, anicteric. Neck supple, symmetrical, trachea midline.   Lungs:   Equal chest rise and unlabored breathing, normal effort, no coughing.   Cardiovascular:   No visualized JVD.   Abdomen:   No abdominal distension.   Skin:   No jaundice, rashes, or lesions.    Musculoskeletal:   Normal range of motion visualized.   Psych:  Normal affect and normal insight.   Neuro:  Alert and appropriate.         Lab Results: I have reviewed the following results:

## 2024-11-20 NOTE — PLAN OF CARE
Problem: PAIN - ADULT  Goal: Verbalizes/displays adequate comfort level or baseline comfort level  Description: Interventions:  - Encourage patient to monitor pain and request assistance  - Assess pain using appropriate pain scale  - Administer analgesics based on type and severity of pain and evaluate response  - Implement non-pharmacological measures as appropriate and evaluate response  - Consider cultural and social influences on pain and pain management  - Notify physician/advanced practitioner if interventions unsuccessful or patient reports new pain  Outcome: Progressing     Problem: INFECTION - ADULT  Goal: Absence or prevention of progression during hospitalization  Description: INTERVENTIONS:  - Assess and monitor for signs and symptoms of infection  - Monitor lab/diagnostic results  - Monitor all insertion sites, i.e. indwelling lines, tubes, and drains  - Monitor endotracheal if appropriate and nasal secretions for changes in amount and color  - [place] appropriate cooling/warming therapies per order  - Administer medications as ordered  - Instruct and encourage patient and family to use good hand hygiene technique  - Identify and instruct in appropriate isolation precautions for identified infection/condition  Outcome: Progressing  Goal: Absence of fever/infection during neutropenic period  Description: INTERVENTIONS:  - Monitor WBC    Outcome: Progressing     Problem: SAFETY ADULT  Goal: Patient will remain free of falls  Description: INTERVENTIONS:  - Educate patient/family on patient safety including physical limitations  - Instruct patient to call for assistance with activity   - Consult OT/PT to assist with strengthening/mobility   - Keep Call bell within reach  - Keep bed low and locked with side rails adjusted as appropriate  - Keep care items and personal belongings within reach  - Initiate and maintain comfort rounds  - Make Fall Risk Sign visible to staff  - Apply yellow socks and bracelet  for high fall risk patients  - Consider moving patient to room near nurses station  Outcome: Progressing  Goal: Maintain or return to baseline ADL function  Description: INTERVENTIONS:  -  Assess patient's ability to carry out ADLs; assess patient's baseline for ADL function and identify physical deficits which impact ability to perform ADLs (bathing, care of mouth/teeth, toileting, grooming, dressing, etc.)  - Assess/evaluate cause of self-care deficits   - Assess range of motion  - Assess patient's mobility; develop plan if impaired  - Assess patient's need for assistive devices and provide as appropriate  - Encourage maximum independence but intervene and supervise when necessary  - Involve family in performance of ADLs  - Assess for home care needs following discharge   - Consider OT consult to assist with ADL evaluation and planning for discharge  - Provide patient education as appropriate  Outcome: Progressing  Goal: Maintains/Returns to pre admission functional level  Description: INTERVENTIONS:  - Perform AM-PAC 6 Click Basic Mobility/ Daily Activity assessment daily.  - Set and communicate daily mobility goal to care team and patient/family/caregiver.   - Collaborate with rehabilitation services on mobility goals if consulted  - Stand patient 3 times a day  - Ambulate patient 3 times a day  - Out of bed to chair 3 times a day   - Out of bed for toileting  - Record patient progress and toleration of activity level   Outcome: Progressing     Problem: DISCHARGE PLANNING  Goal: Discharge to home or other facility with appropriate resources  Description: INTERVENTIONS:  - Identify barriers to discharge w/patient and caregiver  - Arrange for needed discharge resources and transportation as appropriate  - Identify discharge learning needs (meds, wound care, etc.)  - Arrange for interpretive services to assist at discharge as needed  - Refer to Case Management Department for coordinating discharge planning if the  patient needs post-hospital services based on physician/advanced practitioner order or complex needs related to functional status, cognitive ability, or social support system  Outcome: Progressing     Problem: Knowledge Deficit  Goal: Patient/family/caregiver demonstrates understanding of disease process, treatment plan, medications, and discharge instructions  Description: Complete learning assessment and assess knowledge base.  Interventions:  - Provide teaching at level of understanding  - Provide teaching via preferred learning methods  Outcome: Progressing     Problem: GASTROINTESTINAL - ADULT  Goal: Maintains or returns to baseline bowel function  Description: INTERVENTIONS:  - Assess bowel function  - Encourage oral fluids to ensure adequate hydration  - Administer IV fluids if ordered to ensure adequate hydration  - Administer ordered medications as needed  - Encourage mobilization and activity  - Consider nutritional services referral to assist patient with adequate nutrition and appropriate food choices  Outcome: Progressing  Goal: Maintains adequate nutritional intake  Description: INTERVENTIONS:  - Monitor percentage of each meal consumed  - Identify factors contributing to decreased intake, treat as appropriate  - Assist with meals as needed  - Monitor I&O, weight, and lab values if indicated  - Obtain nutrition services referral as needed  Outcome: Progressing     Problem: Nutrition/Hydration-ADULT  Goal: Nutrient/Hydration intake appropriate for improving, restoring or maintaining nutritional needs  Description: Monitor and assess patient's nutrition/hydration status for malnutrition. Collaborate with interdisciplinary team and initiate plan and interventions as ordered.  Monitor patient's weight and dietary intake as ordered or per policy. Utilize nutrition screening tool and intervene as necessary. Determine patient's food preferences and provide high-protein, high-caloric foods as appropriate.      INTERVENTIONS:  - Monitor oral intake, urinary output, labs, and treatment plans  - Assess nutrition and hydration status and recommend course of action  - Evaluate amount of meals eaten  - Assist patient with eating if necessary   - Allow adequate time for meals  - Recommend/ encourage appropriate diets, oral nutritional supplements, and vitamin/mineral supplements  - Order, calculate, and assess calorie counts as needed  - Recommend, monitor, and adjust tube feedings and TPN/PPN based on assessed needs  - Assess need for intravenous fluids  - Provide specific nutrition/hydration education as appropriate  - Include patient/family/caregiver in decisions related to nutrition  Outcome: Progressing

## 2024-11-20 NOTE — ASSESSMENT & PLAN NOTE
Patient noted to have TSH less than 0.010 and a free T4 of 1.33  Upon further discussion with family/daughter and wife at bedside.  They report significant family history of hyperthyroidism.  1 daughter having had recent removal of thyroid for unclear etiology.  Recently it appears that PCP may have encouraged outpatient workup but inability per wife to get appointment for at least 1 year  Patient endorses weight loss but is also not been eating due to primary diagnosis  Ongoing workup/ pruior studies with contrast could affect   Consult endocrinology , appreciated   Per endo T3 total is 0.7  May need to consider Methimazole

## 2024-11-21 LAB
ALBUMIN SERPL BCG-MCNC: 3.6 G/DL (ref 3.5–5)
ALP SERPL-CCNC: 57 U/L (ref 34–104)
ALT SERPL W P-5'-P-CCNC: 19 U/L (ref 7–52)
ANION GAP SERPL CALCULATED.3IONS-SCNC: 9 MMOL/L (ref 4–13)
AST SERPL W P-5'-P-CCNC: 23 U/L (ref 13–39)
BASOPHILS # BLD AUTO: 0.01 THOUSANDS/ÂΜL (ref 0–0.1)
BASOPHILS NFR BLD AUTO: 0 % (ref 0–1)
BILIRUB SERPL-MCNC: 0.6 MG/DL (ref 0.2–1)
BUN SERPL-MCNC: 25 MG/DL (ref 5–25)
CALCIUM SERPL-MCNC: 8.4 MG/DL (ref 8.4–10.2)
CHLORIDE SERPL-SCNC: 111 MMOL/L (ref 96–108)
CO2 SERPL-SCNC: 25 MMOL/L (ref 21–32)
CREAT SERPL-MCNC: 0.61 MG/DL (ref 0.6–1.3)
EOSINOPHIL # BLD AUTO: 0.03 THOUSAND/ÂΜL (ref 0–0.61)
EOSINOPHIL NFR BLD AUTO: 0 % (ref 0–6)
ERYTHROCYTE [DISTWIDTH] IN BLOOD BY AUTOMATED COUNT: 12.8 % (ref 11.6–15.1)
GFR SERPL CREATININE-BSD FRML MDRD: 97 ML/MIN/1.73SQ M
GLUCOSE SERPL-MCNC: 105 MG/DL (ref 65–140)
HCT VFR BLD AUTO: 36.6 % (ref 36.5–49.3)
HGB BLD-MCNC: 11.8 G/DL (ref 12–17)
IMM GRANULOCYTES # BLD AUTO: 0.02 THOUSAND/UL (ref 0–0.2)
IMM GRANULOCYTES NFR BLD AUTO: 0 % (ref 0–2)
LYMPHOCYTES # BLD AUTO: 1.02 THOUSANDS/ÂΜL (ref 0.6–4.47)
LYMPHOCYTES NFR BLD AUTO: 15 % (ref 14–44)
MAGNESIUM SERPL-MCNC: 2.1 MG/DL (ref 1.9–2.7)
MCH RBC QN AUTO: 28.8 PG (ref 26.8–34.3)
MCHC RBC AUTO-ENTMCNC: 32.2 G/DL (ref 31.4–37.4)
MCV RBC AUTO: 89 FL (ref 82–98)
MONOCYTES # BLD AUTO: 0.63 THOUSAND/ÂΜL (ref 0.17–1.22)
MONOCYTES NFR BLD AUTO: 9 % (ref 4–12)
NEUTROPHILS # BLD AUTO: 5.1 THOUSANDS/ÂΜL (ref 1.85–7.62)
NEUTS SEG NFR BLD AUTO: 76 % (ref 43–75)
NRBC BLD AUTO-RTO: 0 /100 WBCS
PLATELET # BLD AUTO: 165 THOUSANDS/UL (ref 149–390)
PMV BLD AUTO: 10.2 FL (ref 8.9–12.7)
POTASSIUM SERPL-SCNC: 3.5 MMOL/L (ref 3.5–5.3)
PROT SERPL-MCNC: 5.6 G/DL (ref 6.4–8.4)
RBC # BLD AUTO: 4.1 MILLION/UL (ref 3.88–5.62)
SODIUM SERPL-SCNC: 145 MMOL/L (ref 135–147)
T3REVERSE SERPL-MCNC: 74.9 NG/DL (ref 9.2–24.1)
WBC # BLD AUTO: 6.81 THOUSAND/UL (ref 4.31–10.16)

## 2024-11-21 PROCEDURE — 99232 SBSQ HOSP IP/OBS MODERATE 35: CPT | Performed by: FAMILY MEDICINE

## 2024-11-21 PROCEDURE — 80053 COMPREHEN METABOLIC PANEL: CPT | Performed by: FAMILY MEDICINE

## 2024-11-21 PROCEDURE — 85025 COMPLETE CBC W/AUTO DIFF WBC: CPT | Performed by: FAMILY MEDICINE

## 2024-11-21 PROCEDURE — 83735 ASSAY OF MAGNESIUM: CPT | Performed by: FAMILY MEDICINE

## 2024-11-21 PROCEDURE — 99232 SBSQ HOSP IP/OBS MODERATE 35: CPT | Performed by: INTERNAL MEDICINE

## 2024-11-21 RX ADMIN — NYSTATIN 500000 UNITS: 100000 SUSPENSION ORAL at 17:58

## 2024-11-21 RX ADMIN — NYSTATIN 500000 UNITS: 100000 SUSPENSION ORAL at 09:00

## 2024-11-21 RX ADMIN — NYSTATIN 500000 UNITS: 100000 SUSPENSION ORAL at 13:36

## 2024-11-21 RX ADMIN — HEPARIN SODIUM 5000 UNITS: 5000 INJECTION, SOLUTION INTRAVENOUS; SUBCUTANEOUS at 13:36

## 2024-11-21 RX ADMIN — NYSTATIN 500000 UNITS: 100000 SUSPENSION ORAL at 21:56

## 2024-11-21 RX ADMIN — SODIUM CHLORIDE, SODIUM GLUCONATE, SODIUM ACETATE, POTASSIUM CHLORIDE, MAGNESIUM CHLORIDE, SODIUM PHOSPHATE, DIBASIC, AND POTASSIUM PHOSPHATE 75 ML/HR: .53; .5; .37; .037; .03; .012; .00082 INJECTION, SOLUTION INTRAVENOUS at 19:57

## 2024-11-21 RX ADMIN — PANTOPRAZOLE SODIUM 40 MG: 40 INJECTION, POWDER, FOR SOLUTION INTRAVENOUS at 21:56

## 2024-11-21 RX ADMIN — HEPARIN SODIUM 5000 UNITS: 5000 INJECTION, SOLUTION INTRAVENOUS; SUBCUTANEOUS at 05:43

## 2024-11-21 RX ADMIN — PANTOPRAZOLE SODIUM 40 MG: 40 INJECTION, POWDER, FOR SOLUTION INTRAVENOUS at 09:00

## 2024-11-21 RX ADMIN — SODIUM CHLORIDE, SODIUM GLUCONATE, SODIUM ACETATE, POTASSIUM CHLORIDE, MAGNESIUM CHLORIDE, SODIUM PHOSPHATE, DIBASIC, AND POTASSIUM PHOSPHATE 75 ML/HR: .53; .5; .37; .037; .03; .012; .00082 INJECTION, SOLUTION INTRAVENOUS at 05:43

## 2024-11-21 RX ADMIN — HEPARIN SODIUM 5000 UNITS: 5000 INJECTION, SOLUTION INTRAVENOUS; SUBCUTANEOUS at 21:56

## 2024-11-21 NOTE — ASSESSMENT & PLAN NOTE
Post biopsy, stent placement by GI on November 19  Continue IV fluid hydration  Patient diet advanced, monitor patient tolerance   Nutrition service consulted follow-up with calorie count

## 2024-11-21 NOTE — PROGRESS NOTES
Progress Note - Hospitalist   Name: Hector Carrera 73 y.o. male I MRN: [id number]  Unit/Bed#: [facility] 815-01 I Date of Admission: 11/16/2024   Date of Service: 11/21/2024 I Hospital Day: 5    Assessment & Plan  Dysphagia  S/p EGD and stenting November 19  Will advance diet per GI recommendations  Continue PPI  Esophageal stricture vs mass  Post biopsy, stent placement by GI on November 19  Continue IV fluid hydration  Patient diet advanced, monitor patient tolerance   Nutrition service consulted follow-up with calorie count  Thrush  Continue nystatin swish and swallow  Hyperthyroidism  Patient noted to have TSH less than 0.010 and a free T4 of 1.33  Upon further discussion with family/daughter and wife at bedside.  They report significant family history of hyperthyroidism.  1 daughter having had recent removal of thyroid for unclear etiology.  Recently it appears that PCP may have encouraged outpatient workup but inability per wife to get appointment for at least 1 year  Patient endorses weight loss but is also not been eating due to primary diagnosis  Ongoing workup/ pruior studies with contrast could affect   Consult endocrinology , appreciated   Per endo T3 total is 0.7  May need to consider Methimazole   Weight loss  Plan as above  Hypertension  Home regimen: Amlodipine 10 mg daily, Lisinopril 10 mg twice daily - hold as NPO  Hydralazine prn  Monitor BP  If persistently elevated add scheduled Lopressor  Hyperlipidemia  Home med: Simvastatin 20 mg daily      VTE Pharmacologic Prophylaxis: VTE Score: 4 Moderate Risk (Score 3-4) - Pharmacological DVT Prophylaxis Ordered: heparin.    Mobility:   Basic Mobility Inpatient Raw Score: 24  JH-HLM Goal: 8: Walk 250 feet or more  JH-HLM Achieved: 4: Move to chair/commode  JH-HLM Goal NOT achieved. Continue with multidisciplinary rounding and encourage appropriate mobility to improve upon JH-HLM goals.    Patient Centered Rounds: I performed bedside rounds with nursing staff  today.   Discussions with Specialists or Other Care Team Provider: GI    Education and Discussions with Family / Patient: Updated  (wife) at bedside.    Current Length of Stay: 5 day(s)  Current Patient Status: Inpatient   Certification Statement: The patient will continue to require additional inpatient hospital stay due to calorie count to ensure patient has adequate nutritional intake  Discharge Plan: Anticipate discharge in 24-48 hrs to pending PT/OT recommendations    Code Status: Level 1 - Full Code    Subjective   Is a very pleasant 73-year-old gentleman who was seen and evaluated today at bedside.  Patient denies any acute complaints at this time.    Objective :  Temp:  [98.6 °F (37 °C)-99.5 °F (37.5 °C)] 98.6 °F (37 °C)  HR:  [62-82] 82  BP: (149-159)/(70-73) 149/71  Resp:  [14-19] 14  SpO2:  [97 %-98 %] 98 %  O2 Device: None (Room air)    Body mass index is 26.5 kg/m².     Input and Output Summary (last 24 hours):     Intake/Output Summary (Last 24 hours) at 11/21/2024 1429  Last data filed at 11/21/2024 0901  Gross per 24 hour   Intake 300 ml   Output 700 ml   Net -400 ml       Physical Exam  Vitals reviewed.   Constitutional:       Appearance: He is ill-appearing.   HENT:      Head: Normocephalic.      Nose: No congestion.      Mouth/Throat:      Pharynx: No oropharyngeal exudate or posterior oropharyngeal erythema.   Eyes:      Conjunctiva/sclera: Conjunctivae normal.   Cardiovascular:      Rate and Rhythm: Normal rate and regular rhythm.   Pulmonary:      Effort: Pulmonary effort is normal.   Abdominal:      General: Abdomen is flat.      Palpations: Abdomen is soft.   Skin:     General: Skin is warm and dry.   Neurological:      Mental Status: He is alert and oriented to person, place, and time. Mental status is at baseline.           Lines/Drains:              Lab Results: I have reviewed the following results:   Results from last 7 days   Lab Units 11/21/24  0533   WBC Thousand/uL 6.81    HEMOGLOBIN g/dL 11.8*   HEMATOCRIT % 36.6   PLATELETS Thousands/uL 165   SEGS PCT % 76*   LYMPHO PCT % 15   MONO PCT % 9   EOS PCT % 0     Results from last 7 days   Lab Units 11/21/24  0533   SODIUM mmol/L 145   POTASSIUM mmol/L 3.5   CHLORIDE mmol/L 111*   CO2 mmol/L 25   BUN mg/dL 25   CREATININE mg/dL 0.61   ANION GAP mmol/L 9   CALCIUM mg/dL 8.4   ALBUMIN g/dL 3.6   TOTAL BILIRUBIN mg/dL 0.60   ALK PHOS U/L 57   ALT U/L 19   AST U/L 23   GLUCOSE RANDOM mg/dL 105                       Recent Cultures (last 7 days):         Imaging Results Review: No pertinent imaging studies reviewed.  Other Study Results Review: No additional pertinent studies reviewed.    Last 24 Hours Medication List:     Current Facility-Administered Medications:     acetaminophen (Ofirmev) injection 1,000 mg, Q6H PRN, Last Rate: 1,000 mg (11/19/24 1947)    heparin (porcine) subcutaneous injection 5,000 Units, Q8H MARTHA    hydrALAZINE (APRESOLINE) injection 5 mg, Q6H PRN    multi-electrolyte (PLASMALYTE-A/ISOLYTE-S PH 7.4) IV solution, Continuous, Last Rate: 75 mL/hr (11/21/24 0543)    nystatin (MYCOSTATIN) oral suspension 500,000 Units, 4x Daily    pantoprazole (PROTONIX) injection 40 mg, Q12H [facility]    Administrative Statements   Today, Patient Was Seen By: Mata Rodríguez MD  I have spent a total time of 40 minutes in caring for this patient on the day of the visit/encounter including Diagnostic results, Prognosis, Importance of tx compliance, Impressions, Reviewing / ordering tests, medicine, procedures  , and Communicating with other healthcare professionals .    **Please Note: This note may have been constructed using a voice recognition system.**

## 2024-11-21 NOTE — PROGRESS NOTES
Progress Note - Gastroenterology   Name: Hector Carrera 73 y.o. male I MRN: [id number]  Unit/Bed#: [facility] 815-01 I Date of Admission: 11/16/2024   Date of Service: 11/21/2024 I Hospital Day: 5     Assessment & Plan  Dysphagia  73M with 20pack yeat tobacco use hx, progressive dysphagia over several months, initial EGD performed in September 2024 showed grade D esophagitis, gastritis and duodenitis.  Started on high-dose PPI twice daily.  Presented with worsening dysphagia.  Repeat EGD November 2024 showed worsening lower esophageal stricture that was inflamed, nodular and unable to be traversed.  Biopsies taken.  Suspect progressive dysphagia despite PPI and Carafate secondary to worsening severe esophagitis causing stricture versus mass.Transferred from  for dilation vs EUS. CT chest abdomen pelvis obtained 11/17/24 distal esophageal wall thickening with surrounding inflammation.  Retained fluid contents up to the level of his neck.  No suspicious lymphadenopathy. Esophageal biopsy on 11/8/2024 returned with findings of highly atypical glandular epithelium (pending further outside consultation)     Now status post EGD 11/19 with indeterminant stricture for which biopsies were obtained and stent placed.    Plan  Started on full liquid diet with addition of nutritional supplements.  He appears to be tolerating this diet but reports symptoms of regurgitation.  Continue with PPI twice daily and can consider addition of Reglan.  Okay to advance his diet to surgical soft as this may help with regurgitation symptoms.  Will place nutrition consult to evaluate caloric intake needs prior to discharge  Okay for discharge from a GI perspective.  He already has an outpatient GI follow-up scheduled for 01/06/2025.  Esophageal stricture vs mass  As above   Will follow-up biopsy results outpatient  Weight loss  Potentially related to hyperthyroidism, management per primary team  NPO currently while awaiting path results   IVF    Thrush  Nyastatin swish and spit for 7-14days  Hyperthyroidism  Management per primary team      Subjective : Patient reports tolerating a liquid diet over the past 24 hours but with some regurgitative symptoms.  Denies any nausea, vomiting, fever, chills, worsening dysphagia or abdominal pain.    Objective :  Temp:  [98.6 °F (37 °C)-99.5 °F (37.5 °C)] 98.6 °F (37 °C)  HR:  [62-82] 82  BP: (149-159)/(70-73) 149/71  Resp:  [14-19] 14  SpO2:  [97 %-98 %] 98 %  O2 Device: None (Room air)    Physical Exam  Vitals and nursing note reviewed.   Constitutional:       General: He is not in acute distress.     Appearance: He is well-developed.   HENT:      Head: Normocephalic and atraumatic.   Eyes:      Conjunctiva/sclera: Conjunctivae normal.   Cardiovascular:      Rate and Rhythm: Normal rate and regular rhythm.      Heart sounds: No murmur heard.  Pulmonary:      Effort: Pulmonary effort is normal. No respiratory distress.      Breath sounds: Normal breath sounds.   Abdominal:      Palpations: Abdomen is soft.      Tenderness: There is no abdominal tenderness.   Musculoskeletal:         General: No swelling.      Cervical back: Neck supple.   Skin:     General: Skin is warm and dry.      Capillary Refill: Capillary refill takes less than 2 seconds.   Neurological:      Mental Status: He is alert.   Psychiatric:         Mood and Affect: Mood normal.         Lab Results: I have reviewed the following results:CBC/BMP:   .     11/21/24  0533   WBC 6.81   HGB 11.8*   HCT 36.6      SODIUM 145   K 3.5   *   CO2 25   BUN 25   CREATININE 0.61   GLUC 105   MG 2.1        Imaging Results Review: No pertinent imaging studies reviewed.  Other Study Results Review: No additional pertinent studies reviewed.    Ade Bowen MD  Gastroenterology Fellow, PGY- 4  Available on [employer] Secure Chat  11/21/2024 11:24 AM

## 2024-11-21 NOTE — ASSESSMENT & PLAN NOTE
73M with 20pack yeat tobacco use hx, progressive dysphagia over several months, initial EGD performed in September 2024 showed grade D esophagitis, gastritis and duodenitis.  Started on high-dose PPI twice daily.  Presented with worsening dysphagia.  Repeat EGD November 2024 showed worsening lower esophageal stricture that was inflamed, nodular and unable to be traversed.  Biopsies taken.  Suspect progressive dysphagia despite PPI and Carafate secondary to worsening severe esophagitis causing stricture versus mass.Transferred from  for dilation vs EUS. CT chest abdomen pelvis obtained 11/17/24 distal esophageal wall thickening with surrounding inflammation.  Retained fluid contents up to the level of his neck.  No suspicious lymphadenopathy. Esophageal biopsy on 11/8/2024 returned with findings of highly atypical glandular epithelium (pending further outside consultation)     Now status post EGD 11/19 with indeterminant stricture for which biopsies were obtained and stent placed.    Plan  Started on full liquid diet with addition of nutritional supplements.  He appears to be tolerating this diet but reports symptoms of regurgitation.  Continue with PPI twice daily and can consider addition of Reglan.  Okay to advance his diet to surgical soft as this may help with regurgitation symptoms.  Will place nutrition consult to evaluate caloric intake needs prior to discharge  Okay for discharge from a GI perspective.  He already has an outpatient GI follow-up scheduled for 01/06/2025.

## 2024-11-21 NOTE — PLAN OF CARE
Problem: PAIN - ADULT  Goal: Verbalizes/displays adequate comfort level or baseline comfort level  Description: Interventions:  - Encourage patient to monitor pain and request assistance  - Assess pain using appropriate pain scale  - Administer analgesics based on type and severity of pain and evaluate response  - Implement non-pharmacological measures as appropriate and evaluate response  - Consider cultural and social influences on pain and pain management  - Notify physician/advanced practitioner if interventions unsuccessful or patient reports new pain  Outcome: Progressing     Problem: INFECTION - ADULT  Goal: Absence or prevention of progression during hospitalization  Description: INTERVENTIONS:  - Assess and monitor for signs and symptoms of infection  - Monitor lab/diagnostic results  - Monitor all insertion sites, i.e. indwelling lines, tubes, and drains  - Monitor endotracheal if appropriate and nasal secretions for changes in amount and color  - [place] appropriate cooling/warming therapies per order  - Administer medications as ordered  - Instruct and encourage patient and family to use good hand hygiene technique  - Identify and instruct in appropriate isolation precautions for identified infection/condition  Outcome: Progressing  Goal: Absence of fever/infection during neutropenic period  Description: INTERVENTIONS:  - Monitor WBC    Outcome: Progressing     Problem: SAFETY ADULT  Goal: Patient will remain free of falls  Description: INTERVENTIONS:  - Educate patient/family on patient safety including physical limitations  - Instruct patient to call for assistance with activity   - Consult OT/PT to assist with strengthening/mobility   - Keep Call bell within reach  - Keep bed low and locked with side rails adjusted as appropriate  - Keep care items and personal belongings within reach  - Initiate and maintain comfort rounds  - Obtain necessary fall risk management equipment  - Apply yellow socks  and bracelet for high fall risk patients  - Consider moving patient to room near nurses station  Outcome: Progressing  Goal: Maintain or return to baseline ADL function  Description: INTERVENTIONS:  -  Assess patient's ability to carry out ADLs; assess patient's baseline for ADL function and identify physical deficits which impact ability to perform ADLs (bathing, care of mouth/teeth, toileting, grooming, dressing, etc.)  - Assess/evaluate cause of self-care deficits   - Assess range of motion  - Assess patient's mobility; develop plan if impaired  - Assess patient's need for assistive devices and provide as appropriate  - Encourage maximum independence but intervene and supervise when necessary  - Involve family in performance of ADLs  - Assess for home care needs following discharge   - Consider OT consult to assist with ADL evaluation and planning for discharge  - Provide patient education as appropriate  Outcome: Progressing  Goal: Maintains/Returns to pre admission functional level  Description: INTERVENTIONS:  - Perform AM-PAC 6 Click Basic Mobility/ Daily Activity assessment daily.  - Set and communicate daily mobility goal to care team and patient/family/caregiver.   - Collaborate with rehabilitation services on mobility goals if consulted  - Perform Range of Motion 2 times a day.  - Dangle patient 3 times a day  - Stand patient 3 times a day  - Ambulate patient 3 times a day  - Out of bed to chair 3 times a day   - Out of bed for meals 3 times a day  - Out of bed for toileting  - Record patient progress and toleration of activity level   Outcome: Progressing     Problem: DISCHARGE PLANNING  Goal: Discharge to home or other facility with appropriate resources  Description: INTERVENTIONS:  - Identify barriers to discharge w/patient and caregiver  - Arrange for needed discharge resources and transportation as appropriate  - Identify discharge learning needs (meds, wound care, etc.)  - Arrange for interpretive  services to assist at discharge as needed  - Refer to Case Management Department for coordinating discharge planning if the patient needs post-hospital services based on physician/advanced practitioner order or complex needs related to functional status, cognitive ability, or social support system  Outcome: Progressing     Problem: Knowledge Deficit  Goal: Patient/family/caregiver demonstrates understanding of disease process, treatment plan, medications, and discharge instructions  Description: Complete learning assessment and assess knowledge base.  Interventions:  - Provide teaching at level of understanding  - Provide teaching via preferred learning methods  Outcome: Progressing     Problem: GASTROINTESTINAL - ADULT  Goal: Maintains or returns to baseline bowel function  Description: INTERVENTIONS:  - Assess bowel function  - Encourage oral fluids to ensure adequate hydration  - Administer IV fluids if ordered to ensure adequate hydration  - Administer ordered medications as needed  - Encourage mobilization and activity  - Consider nutritional services referral to assist patient with adequate nutrition and appropriate food choices  Outcome: Progressing  Goal: Maintains adequate nutritional intake  Description: INTERVENTIONS:  - Monitor percentage of each meal consumed  - Identify factors contributing to decreased intake, treat as appropriate  - Assist with meals as needed  - Monitor I&O, weight, and lab values if indicated  - Obtain nutrition services referral as needed  Outcome: Progressing  Goal: Minimal or absence of nausea and/or vomiting  Description: INTERVENTIONS:  - Administer IV fluids if ordered to ensure adequate hydration  - Maintain NPO status until nausea and vomiting are resolved  - Nasogastric tube if ordered  - Administer ordered antiemetic medications as needed  - Provide nonpharmacologic comfort measures as appropriate  - Advance diet as tolerated, if ordered  - Consider nutrition services  referral to assist patient with adequate nutrition and appropriate food choices  Outcome: Progressing  Goal: Oral mucous membranes remain intact  Description: INTERVENTIONS  - Assess oral mucosa and hygiene practices  - Implement preventative oral hygiene regimen  - Implement oral medicated treatments as ordered  - Initiate Nutrition services referral as needed  Outcome: Progressing     Problem: Nutrition/Hydration-ADULT  Goal: Nutrient/Hydration intake appropriate for improving, restoring or maintaining nutritional needs  Description: Monitor and assess patient's nutrition/hydration status for malnutrition. Collaborate with interdisciplinary team and initiate plan and interventions as ordered.  Monitor patient's weight and dietary intake as ordered or per policy. Utilize nutrition screening tool and intervene as necessary. Determine patient's food preferences and provide high-protein, high-caloric foods as appropriate.     INTERVENTIONS:  - Monitor oral intake, urinary output, labs, and treatment plans  - Assess nutrition and hydration status and recommend course of action  - Evaluate amount of meals eaten  - Assist patient with eating if necessary   - Allow adequate time for meals  - Recommend/ encourage appropriate diets, oral nutritional supplements, and vitamin/mineral supplements  - Order, calculate, and assess calorie counts as needed  - Recommend, monitor, and adjust tube feedings and TPN/PPN based on assessed needs  - Assess need for intravenous fluids  - Provide specific nutrition/hydration education as appropriate  - Include patient/family/caregiver in decisions related to nutrition  Outcome: Progressing

## 2024-11-21 NOTE — PLAN OF CARE
Problem: PAIN - ADULT  Goal: Verbalizes/displays adequate comfort level or baseline comfort level  Description: Interventions:  - Encourage patient to monitor pain and request assistance  - Assess pain using appropriate pain scale  - Administer analgesics based on type and severity of pain and evaluate response  - Implement non-pharmacological measures as appropriate and evaluate response  - Consider cultural and social influences on pain and pain management  - Notify physician/advanced practitioner if interventions unsuccessful or patient reports new pain  Outcome: Progressing     Problem: SAFETY ADULT  Goal: Patient will remain free of falls  Description: INTERVENTIONS:  - Educate patient/family on patient safety including physical limitations  - Instruct patient to call for assistance with activity   - Consult OT/PT to assist with strengthening/mobility   - Keep Call bell within reach  - Keep bed low and locked with side rails adjusted as appropriate  - Keep care items and personal belongings within reach  - Initiate and maintain comfort rounds  - Obtain necessary fall risk management equipment  - Apply yellow socks and bracelet for high fall risk patients  - Consider moving patient to room near nurses station  Outcome: Progressing     Problem: INFECTION - ADULT  Goal: Absence or prevention of progression during hospitalization  Description: INTERVENTIONS:  - Assess and monitor for signs and symptoms of infection  - Monitor lab/diagnostic results  - Monitor all insertion sites, i.e. indwelling lines, tubes, and drains  - Monitor endotracheal if appropriate and nasal secretions for changes in amount and color  - [place] appropriate cooling/warming therapies per order  - Administer medications as ordered  - Instruct and encourage patient and family to use good hand hygiene technique  - Identify and instruct in appropriate isolation precautions for identified infection/condition  Outcome: Progressing      Problem: GASTROINTESTINAL - ADULT  Goal: Maintains or returns to baseline bowel function  Description: INTERVENTIONS:  - Assess bowel function  - Encourage oral fluids to ensure adequate hydration  - Administer IV fluids if ordered to ensure adequate hydration  - Administer ordered medications as needed  - Encourage mobilization and activity  - Consider nutritional services referral to assist patient with adequate nutrition and appropriate food choices  Outcome: Progressing     Problem: GASTROINTESTINAL - ADULT  Goal: Maintains adequate nutritional intake  Description: INTERVENTIONS:  - Monitor percentage of each meal consumed  - Identify factors contributing to decreased intake, treat as appropriate  - Assist with meals as needed  - Monitor I&O, weight, and lab values if indicated  - Obtain nutrition services referral as needed  Outcome: Progressing     Problem: GASTROINTESTINAL - ADULT  Goal: Minimal or absence of nausea and/or vomiting  Description: INTERVENTIONS:  - Administer IV fluids if ordered to ensure adequate hydration  - Maintain NPO status until nausea and vomiting are resolved  - Nasogastric tube if ordered  - Administer ordered antiemetic medications as needed  - Provide nonpharmacologic comfort measures as appropriate  - Advance diet as tolerated, if ordered  - Consider nutrition services referral to assist patient with adequate nutrition and appropriate food choices  Outcome: Progressing     Problem: GASTROINTESTINAL - ADULT  Goal: Oral mucous membranes remain intact  Description: INTERVENTIONS  - Assess oral mucosa and hygiene practices  - Implement preventative oral hygiene regimen  - Implement oral medicated treatments as ordered  - Initiate Nutrition services referral as needed  Outcome: Progressing     Problem: Nutrition/Hydration-ADULT  Goal: Nutrient/Hydration intake appropriate for improving, restoring or maintaining nutritional needs  Description: Monitor and assess patient's  nutrition/hydration status for malnutrition. Collaborate with interdisciplinary team and initiate plan and interventions as ordered.  Monitor patient's weight and dietary intake as ordered or per policy. Utilize nutrition screening tool and intervene as necessary. Determine patient's food preferences and provide high-protein, high-caloric foods as appropriate.     INTERVENTIONS:  - Monitor oral intake, urinary output, labs, and treatment plans  - Assess nutrition and hydration status and recommend course of action  - Evaluate amount of meals eaten  - Assist patient with eating if necessary   - Allow adequate time for meals  - Recommend/ encourage appropriate diets, oral nutritional supplements, and vitamin/mineral supplements  - Order, calculate, and assess calorie counts as needed  - Recommend, monitor, and adjust tube feedings and TPN/PPN based on assessed needs  - Assess need for intravenous fluids  - Provide specific nutrition/hydration education as appropriate  - Include patient/family/caregiver in decisions related to nutrition  Outcome: Progressing     Problem: Knowledge Deficit  Goal: Patient/family/caregiver demonstrates understanding of disease process, treatment plan, medications, and discharge instructions  Description: Complete learning assessment and assess knowledge base.  Interventions:  - Provide teaching at level of understanding  - Provide teaching via preferred learning methods  Outcome: Progressing     Problem: DISCHARGE PLANNING  Goal: Discharge to home or other facility with appropriate resources  Description: INTERVENTIONS:  - Identify barriers to discharge w/patient and caregiver  - Arrange for needed discharge resources and transportation as appropriate  - Identify discharge learning needs (meds, wound care, etc.)  - Arrange for interpretive services to assist at discharge as needed  - Refer to Case Management Department for coordinating discharge planning if the patient needs post-hospital  services based on physician/advanced practitioner order or complex needs related to functional status, cognitive ability, or social support system  Outcome: Progressing

## 2024-11-22 VITALS
DIASTOLIC BLOOD PRESSURE: 68 MMHG | TEMPERATURE: 98.7 F | HEART RATE: 67 BPM | BODY MASS INDEX: 26.6 KG/M2 | RESPIRATION RATE: 16 BRPM | HEIGHT: 71 IN | OXYGEN SATURATION: 95 % | SYSTOLIC BLOOD PRESSURE: 146 MMHG | WEIGHT: 190 LBS

## 2024-11-22 DIAGNOSIS — R13.10 DYSPHAGIA: Primary | ICD-10-CM

## 2024-11-22 LAB
ALBUMIN SERPL BCG-MCNC: 3.5 G/DL (ref 3.5–5)
ALP SERPL-CCNC: 58 U/L (ref 34–104)
ALT SERPL W P-5'-P-CCNC: 18 U/L (ref 7–52)
ANION GAP SERPL CALCULATED.3IONS-SCNC: 8 MMOL/L (ref 4–13)
AST SERPL W P-5'-P-CCNC: 21 U/L (ref 13–39)
BASOPHILS # BLD AUTO: 0.01 THOUSANDS/ÂΜL (ref 0–0.1)
BASOPHILS NFR BLD AUTO: 0 % (ref 0–1)
BILIRUB SERPL-MCNC: 0.65 MG/DL (ref 0.2–1)
BUN SERPL-MCNC: 20 MG/DL (ref 5–25)
CALCIUM SERPL-MCNC: 8.3 MG/DL (ref 8.4–10.2)
CHLORIDE SERPL-SCNC: 107 MMOL/L (ref 96–108)
CO2 SERPL-SCNC: 28 MMOL/L (ref 21–32)
CREAT SERPL-MCNC: 0.58 MG/DL (ref 0.6–1.3)
EOSINOPHIL # BLD AUTO: 0.11 THOUSAND/ÂΜL (ref 0–0.61)
EOSINOPHIL NFR BLD AUTO: 2 % (ref 0–6)
ERYTHROCYTE [DISTWIDTH] IN BLOOD BY AUTOMATED COUNT: 12.7 % (ref 11.6–15.1)
GFR SERPL CREATININE-BSD FRML MDRD: 99 ML/MIN/1.73SQ M
GLUCOSE SERPL-MCNC: 96 MG/DL (ref 65–140)
HCT VFR BLD AUTO: 37 % (ref 36.5–49.3)
HGB BLD-MCNC: 12.1 G/DL (ref 12–17)
IMM GRANULOCYTES # BLD AUTO: 0.02 THOUSAND/UL (ref 0–0.2)
IMM GRANULOCYTES NFR BLD AUTO: 0 % (ref 0–2)
LYMPHOCYTES # BLD AUTO: 1.07 THOUSANDS/ÂΜL (ref 0.6–4.47)
LYMPHOCYTES NFR BLD AUTO: 16 % (ref 14–44)
MAGNESIUM SERPL-MCNC: 2 MG/DL (ref 1.9–2.7)
MCH RBC QN AUTO: 28.9 PG (ref 26.8–34.3)
MCHC RBC AUTO-ENTMCNC: 32.7 G/DL (ref 31.4–37.4)
MCV RBC AUTO: 89 FL (ref 82–98)
MONOCYTES # BLD AUTO: 0.54 THOUSAND/ÂΜL (ref 0.17–1.22)
MONOCYTES NFR BLD AUTO: 8 % (ref 4–12)
NEUTROPHILS # BLD AUTO: 4.9 THOUSANDS/ÂΜL (ref 1.85–7.62)
NEUTS SEG NFR BLD AUTO: 74 % (ref 43–75)
NRBC BLD AUTO-RTO: 0 /100 WBCS
PLATELET # BLD AUTO: 149 THOUSANDS/UL (ref 149–390)
PMV BLD AUTO: 10.9 FL (ref 8.9–12.7)
POTASSIUM SERPL-SCNC: 3.3 MMOL/L (ref 3.5–5.3)
PROT SERPL-MCNC: 5.7 G/DL (ref 6.4–8.4)
RBC # BLD AUTO: 4.18 MILLION/UL (ref 3.88–5.62)
SODIUM SERPL-SCNC: 143 MMOL/L (ref 135–147)
WBC # BLD AUTO: 6.65 THOUSAND/UL (ref 4.31–10.16)

## 2024-11-22 PROCEDURE — 80053 COMPREHEN METABOLIC PANEL: CPT | Performed by: FAMILY MEDICINE

## 2024-11-22 PROCEDURE — 99239 HOSP IP/OBS DSCHRG MGMT >30: CPT | Performed by: FAMILY MEDICINE

## 2024-11-22 PROCEDURE — 83735 ASSAY OF MAGNESIUM: CPT | Performed by: FAMILY MEDICINE

## 2024-11-22 PROCEDURE — 85025 COMPLETE CBC W/AUTO DIFF WBC: CPT | Performed by: FAMILY MEDICINE

## 2024-11-22 RX ORDER — POTASSIUM CHLORIDE 20MEQ/15ML
40 LIQUID (ML) ORAL ONCE
Status: COMPLETED | OUTPATIENT
Start: 2024-11-22 | End: 2024-11-22

## 2024-11-22 RX ORDER — NYSTATIN 100000 [USP'U]/ML
500000 SUSPENSION ORAL 4 TIMES DAILY
Qty: 600 ML | Refills: 0 | Status: SHIPPED | OUTPATIENT
Start: 2024-11-22

## 2024-11-22 RX ORDER — PANTOPRAZOLE SODIUM 40 MG/10ML
40 INJECTION, POWDER, LYOPHILIZED, FOR SOLUTION INTRAVENOUS EVERY 12 HOURS SCHEDULED
Qty: 1 EACH | Refills: 0 | Status: SHIPPED | OUTPATIENT
Start: 2024-11-22 | End: 2024-11-22

## 2024-11-22 RX ORDER — PANTOPRAZOLE SODIUM 40 MG/1
40 TABLET, DELAYED RELEASE ORAL 2 TIMES DAILY
Qty: 60 TABLET | Refills: 0 | Status: SHIPPED | OUTPATIENT
Start: 2024-11-22

## 2024-11-22 RX ADMIN — NYSTATIN 500000 UNITS: 100000 SUSPENSION ORAL at 09:07

## 2024-11-22 RX ADMIN — POTASSIUM CHLORIDE 40 MEQ: 1.5 SOLUTION ORAL at 09:06

## 2024-11-22 RX ADMIN — PANTOPRAZOLE SODIUM 40 MG: 40 INJECTION, POWDER, FOR SOLUTION INTRAVENOUS at 09:06

## 2024-11-22 RX ADMIN — NYSTATIN 500000 UNITS: 100000 SUSPENSION ORAL at 11:47

## 2024-11-22 RX ADMIN — HEPARIN SODIUM 5000 UNITS: 5000 INJECTION, SOLUTION INTRAVENOUS; SUBCUTANEOUS at 06:09

## 2024-11-22 RX ADMIN — SODIUM CHLORIDE, SODIUM GLUCONATE, SODIUM ACETATE, POTASSIUM CHLORIDE, MAGNESIUM CHLORIDE, SODIUM PHOSPHATE, DIBASIC, AND POTASSIUM PHOSPHATE 75 ML/HR: .53; .5; .37; .037; .03; .012; .00082 INJECTION, SOLUTION INTRAVENOUS at 06:09

## 2024-11-22 NOTE — ASSESSMENT & PLAN NOTE
Patient noted to have TSH less than 0.010 and a free T4 of 1.33  Upon further discussion with family/daughter and wife at bedside.  They report significant family history of hyperthyroidism.  1 daughter having had recent removal of thyroid for unclear etiology.  Recently it appears that PCP may have encouraged outpatient workup but inability per wife to get appointment for at least 1 year  Patient endorses weight loss but is also not been eating due to primary diagnosis  Ongoing workup/ pruior studies with contrast could affect   Consult endocrinology , appreciated   Per endo T3 total is 0.7  May need to consider Methimazole   F/U endocrinology outpatient

## 2024-11-22 NOTE — PLAN OF CARE
Problem: PAIN - ADULT  Goal: Verbalizes/displays adequate comfort level or baseline comfort level  Description: Interventions:  - Encourage patient to monitor pain and request assistance  - Assess pain using appropriate pain scale  - Administer analgesics based on type and severity of pain and evaluate response  - Implement non-pharmacological measures as appropriate and evaluate response  - Consider cultural and social influences on pain and pain management  - Notify physician/advanced practitioner if interventions unsuccessful or patient reports new pain  Outcome: Progressing     Problem: SAFETY ADULT  Goal: Patient will remain free of falls  Description: INTERVENTIONS:  - Educate patient/family on patient safety including physical limitations  - Instruct patient to call for assistance with activity   - Consult OT/PT to assist with strengthening/mobility   - Keep Call bell within reach  - Keep bed low and locked with side rails adjusted as appropriate  - Keep care items and personal belongings within reach  - Initiate and maintain comfort rounds  - Obtain necessary fall risk management equipment  - Apply yellow socks and bracelet for high fall risk patients  - Consider moving patient to room near nurses station  Outcome: Progressing     Problem: INFECTION - ADULT  Goal: Absence or prevention of progression during hospitalization  Description: INTERVENTIONS:  - Assess and monitor for signs and symptoms of infection  - Monitor lab/diagnostic results  - Monitor all insertion sites, i.e. indwelling lines, tubes, and drains  - Monitor endotracheal if appropriate and nasal secretions for changes in amount and color  - [place] appropriate cooling/warming therapies per order  - Administer medications as ordered  - Instruct and encourage patient and family to use good hand hygiene technique  - Identify and instruct in appropriate isolation precautions for identified infection/condition  Outcome: Progressing      Problem: GASTROINTESTINAL - ADULT  Goal: Maintains or returns to baseline bowel function  Description: INTERVENTIONS:  - Assess bowel function  - Encourage oral fluids to ensure adequate hydration  - Administer IV fluids if ordered to ensure adequate hydration  - Administer ordered medications as needed  - Encourage mobilization and activity  - Consider nutritional services referral to assist patient with adequate nutrition and appropriate food choices  Outcome: Progressing     Problem: GASTROINTESTINAL - ADULT  Goal: Minimal or absence of nausea and/or vomiting  Description: INTERVENTIONS:  - Administer IV fluids if ordered to ensure adequate hydration  - Maintain NPO status until nausea and vomiting are resolved  - Nasogastric tube if ordered  - Administer ordered antiemetic medications as needed  - Provide nonpharmacologic comfort measures as appropriate  - Advance diet as tolerated, if ordered  - Consider nutrition services referral to assist patient with adequate nutrition and appropriate food choices  Outcome: Progressing     Problem: GASTROINTESTINAL - ADULT  Goal: Oral mucous membranes remain intact  Description: INTERVENTIONS  - Assess oral mucosa and hygiene practices  - Implement preventative oral hygiene regimen  - Implement oral medicated treatments as ordered  - Initiate Nutrition services referral as needed  Outcome: Progressing     Problem: Nutrition/Hydration-ADULT  Goal: Nutrient/Hydration intake appropriate for improving, restoring or maintaining nutritional needs  Description: Monitor and assess patient's nutrition/hydration status for malnutrition. Collaborate with interdisciplinary team and initiate plan and interventions as ordered.  Monitor patient's weight and dietary intake as ordered or per policy. Utilize nutrition screening tool and intervene as necessary. Determine patient's food preferences and provide high-protein, high-caloric foods as appropriate.     INTERVENTIONS:  - Monitor  oral intake, urinary output, labs, and treatment plans  - Assess nutrition and hydration status and recommend course of action  - Evaluate amount of meals eaten  - Assist patient with eating if necessary   - Allow adequate time for meals  - Recommend/ encourage appropriate diets, oral nutritional supplements, and vitamin/mineral supplements  - Order, calculate, and assess calorie counts as needed  - Recommend, monitor, and adjust tube feedings and TPN/PPN based on assessed needs  - Assess need for intravenous fluids  - Provide specific nutrition/hydration education as appropriate  - Include patient/family/caregiver in decisions related to nutrition  Outcome: Progressing     Problem: Knowledge Deficit  Goal: Patient/family/caregiver demonstrates understanding of disease process, treatment plan, medications, and discharge instructions  Description: Complete learning assessment and assess knowledge base.  Interventions:  - Provide teaching at level of understanding  - Provide teaching via preferred learning methods  Outcome: Progressing     Problem: DISCHARGE PLANNING  Goal: Discharge to home or other facility with appropriate resources  Description: INTERVENTIONS:  - Identify barriers to discharge w/patient and caregiver  - Arrange for needed discharge resources and transportation as appropriate  - Identify discharge learning needs (meds, wound care, etc.)  - Arrange for interpretive services to assist at discharge as needed  - Refer to Case Management Department for coordinating discharge planning if the patient needs post-hospital services based on physician/advanced practitioner order or complex needs related to functional status, cognitive ability, or social support system  Outcome: Progressing

## 2024-11-22 NOTE — ASSESSMENT & PLAN NOTE
Post biopsy, stent placement by GI on November 19  Continue IV fluid hydration  Patient diet advanced, monitor patient tolerance   Pt tolerating PO diet well

## 2024-11-22 NOTE — DISCHARGE SUMMARY
Discharge Summary - Hospitalist   Name: Hector Carrera 73 y.o. male I MRN: [id number]  [facility] I Date of Admission: 11/16/2024   Date of Service: 11/22/2024 I Hospital Day: 6     Assessment & Plan  Dysphagia  S/p EGD and stenting November 19  Continue PPI  Esophageal stricture vs mass  Post biopsy, stent placement by GI on November 19  Continue IV fluid hydration  Patient diet advanced, monitor patient tolerance   Pt tolerating PO diet well  Thrush  Continue nystatin swish and swallow  Hyperthyroidism  Patient noted to have TSH less than 0.010 and a free T4 of 1.33  Upon further discussion with family/daughter and wife at bedside.  They report significant family history of hyperthyroidism.  1 daughter having had recent removal of thyroid for unclear etiology.  Recently it appears that PCP may have encouraged outpatient workup but inability per wife to get appointment for at least 1 year  Patient endorses weight loss but is also not been eating due to primary diagnosis  Ongoing workup/ pruior studies with contrast could affect   Consult endocrinology , appreciated   Per endo T3 total is 0.7  May need to consider Methimazole   F/U endocrinology outpatient   Weight loss  Plan as above  Hypertension  Home regimen: Amlodipine 10 mg daily, Lisinopril 10 mg twice daily - hold as NPO  Hydralazine prn  Monitor BP  If persistently elevated add scheduled Lopressor  Hyperlipidemia  Home med: Simvastatin 20 mg daily       Medical Problems      Discharging Physician / Practitioner: Mata Rodríguez MD  PCP: Nella Hernandez MD  Admission Date:   Admission Orders (From admission, onward)       Ordered        11/16/24 1637  INPATIENT ADMISSION  Once                          Discharge Date: 11/22/24    Consultations During Hospital Stay:  Gastroenterology  Endocrinology    Procedures Performed:   EGD    Significant Findings / Test Results:   1.  Distal esophageal wall thickening with surrounding inflammation and small amount  of fluid. No extraluminal gas identified. If there is clinical concern for esophageal perforation this may be evaluated with CT or fluoroscopic esophagram. Correlate   with pending biopsy results for etiology of wall thickening. No prior imaging available.  2.  Retained contents in the upper esophagus to the level of the neck. Patient may be at risk for aspiration.  3.  Ill-defined nodular and groundglass opacities greatest in the right lower lobe. Largest nodule measures 1 cm. These may be infectious/inflammatory. Recommend 3-month follow-up chest CT.  4.  No suspicious lymphadenopathy.  5.  Multiple bilateral simple renal cysts. A 2.8 cm right renal lesion measuring slightly above simple fluid density also likely reflects a cyst. This can be monitored on follow-up imaging or confirmed with outpatient renal ultrasound.    Incidental Findings:   As above   I reviewed the above mentioned incidental findings with the patient and/or family and they expressed understanding.    Test Results Pending at Discharge (will require follow up):   Biopsy     Outpatient Tests Requested:  None    Complications:  None    Reason for Admission: Dysphagia     Hospital Course:   Hector Carrera is a 73 y.o. male patient who originally presented to the hospital on 11/16/2024 due to dysphagia.  Patient previously was unable to tolerate water.  Patient had EGD with balloon dilation.  Patient is now able to tolerate approximately 90% of his meals.  Patient is medically cleared for discharge.  Patient will need close follow-up outpatient.      Please see above list of diagnoses and related plan for additional information.     Condition at Discharge: stable    Discharge Day Visit / Exam:   Subjective: This is a very pleasant 73-year-old gentleman who was seen evaluated today at bedside.  Patient denies any acute complaints at this time.  Vitals: Blood Pressure: 146/68 (11/22/24 0624)  Pulse: 67 (11/22/24 0624)  Temperature: 98.7 °F (37.1 °C)  "(11/22/24 0624)  Temp Source: Tympanic (11/19/24 1559)  Respirations: 16 (11/22/24 0624)  Height: 5' 11\" (180.3 cm) (11/16/24 1616)  Weight - Scale: 86.2 kg (190 lb) (11/16/24 1616)  SpO2: 95 % (11/22/24 0624)  Physical Exam  Vitals reviewed.   Constitutional:       Appearance: He is ill-appearing.   HENT:      Head: Normocephalic.      Nose: No congestion.      Mouth/Throat:      Pharynx: No oropharyngeal exudate or posterior oropharyngeal erythema.   Eyes:      Conjunctiva/sclera: Conjunctivae normal.   Cardiovascular:      Rate and Rhythm: Normal rate and regular rhythm.   Pulmonary:      Effort: Pulmonary effort is normal.   Abdominal:      General: Abdomen is flat.      Palpations: Abdomen is soft.   Skin:     General: Skin is warm and dry.   Neurological:      Mental Status: He is alert and oriented to person, place, and time. Mental status is at baseline.          Discussion with Family: Updated  (wife, sister, and friend) at bedside.    Discharge instructions/Information to patient and family:   See after visit summary for information provided to patient and family.      Provisions for Follow-Up Care:  See after visit summary for information related to follow-up care and any pertinent home health orders.      Mobility at time of Discharge:   Basic Mobility Inpatient Raw Score: 24  JH-HLM Goal: 8: Walk 250 feet or more  JH-HLM Achieved: 8: Walk 250 feet ot more  HLM Goal achieved. Continue to encourage appropriate mobility.     Disposition:   Home    Planned Readmission: None    Discharge Medications:  See after visit summary for reconciled discharge medications provided to patient and/or family.      Administrative Statements   Discharge Statement:  I have spent a total time of 40 minutes in caring for this patient on the day of the visit/encounter. >30 minutes of time was spent on: Diagnostic results, Prognosis, Instructions for management, Risk factor reductions, Documenting in the medical " record, and Communicating with other healthcare professionals .    **Please Note: This note may have been constructed using a voice recognition system**

## 2024-11-25 ENCOUNTER — PATIENT OUTREACH (OUTPATIENT)
Dept: HEMATOLOGY ONCOLOGY | Facility: CLINIC | Age: 74
End: 2024-11-25

## 2024-11-25 ENCOUNTER — HOSPITAL ENCOUNTER (INPATIENT)
Facility: HOSPITAL | Age: 74
LOS: 3 days | Discharge: HOME | DRG: 374 | End: 2024-11-28
Attending: EMERGENCY MEDICINE | Admitting: FAMILY MEDICINE
Payer: COMMERCIAL

## 2024-11-25 ENCOUNTER — TELEPHONE (OUTPATIENT)
Age: 74
End: 2024-11-25

## 2024-11-25 ENCOUNTER — DOCUMENTATION (OUTPATIENT)
Dept: HEMATOLOGY ONCOLOGY | Facility: CLINIC | Age: 74
End: 2024-11-25

## 2024-11-25 ENCOUNTER — TELEPHONE (OUTPATIENT)
Dept: GASTROENTEROLOGY | Facility: CLINIC | Age: 74
End: 2024-11-25

## 2024-11-25 DIAGNOSIS — R63.4 WEIGHT LOSS: ICD-10-CM

## 2024-11-25 DIAGNOSIS — C78.89 MALIGNANT NEOPLASM METASTATIC TO LOWER THIRD OF ESOPHAGUS WITH UNKNOWN PRIMARY SITE (HCC): ICD-10-CM

## 2024-11-25 DIAGNOSIS — C15.3 MALIGNANT NEOPLASM OF UPPER THIRD OF ESOPHAGUS (HCC): Primary | ICD-10-CM

## 2024-11-25 DIAGNOSIS — K22.2 ESOPHAGEAL STRICTURE: ICD-10-CM

## 2024-11-25 DIAGNOSIS — E05.90 HYPERTHYROIDISM: ICD-10-CM

## 2024-11-25 DIAGNOSIS — C80.1 MALIGNANT NEOPLASM METASTATIC TO LOWER THIRD OF ESOPHAGUS WITH UNKNOWN PRIMARY SITE (HCC): ICD-10-CM

## 2024-11-25 DIAGNOSIS — C15.9 ESOPHAGEAL CANCER (HCC): ICD-10-CM

## 2024-11-25 DIAGNOSIS — R13.10 DYSPHAGIA: ICD-10-CM

## 2024-11-25 DIAGNOSIS — C15.5 MALIGNANT NEOPLASM OF LOWER THIRD OF ESOPHAGUS (HCC): Primary | ICD-10-CM

## 2024-11-25 LAB
ANION GAP SERPL CALCULATED.3IONS-SCNC: 11 MMOL/L (ref 4–13)
BASOPHILS # BLD AUTO: 0.02 THOUSANDS/ΜL (ref 0–0.1)
BASOPHILS NFR BLD AUTO: 0 % (ref 0–1)
BUN SERPL-MCNC: 27 MG/DL (ref 5–25)
CALCIUM SERPL-MCNC: 9.3 MG/DL (ref 8.4–10.2)
CHLORIDE SERPL-SCNC: 107 MMOL/L (ref 96–108)
CO2 SERPL-SCNC: 27 MMOL/L (ref 21–32)
CREAT SERPL-MCNC: 0.68 MG/DL (ref 0.6–1.3)
EOSINOPHIL # BLD AUTO: 0.13 THOUSAND/ΜL (ref 0–0.61)
EOSINOPHIL NFR BLD AUTO: 2 % (ref 0–6)
ERYTHROCYTE [DISTWIDTH] IN BLOOD BY AUTOMATED COUNT: 12.9 % (ref 11.6–15.1)
GFR SERPL CREATININE-BSD FRML MDRD: 93 ML/MIN/1.73SQ M
GLUCOSE SERPL-MCNC: 85 MG/DL (ref 65–140)
HCT VFR BLD AUTO: 41.8 % (ref 36.5–49.3)
HGB BLD-MCNC: 13.5 G/DL (ref 12–17)
IMM GRANULOCYTES # BLD AUTO: 0.06 THOUSAND/UL (ref 0–0.2)
IMM GRANULOCYTES NFR BLD AUTO: 1 % (ref 0–2)
LYMPHOCYTES # BLD AUTO: 0.91 THOUSANDS/ΜL (ref 0.6–4.47)
LYMPHOCYTES NFR BLD AUTO: 11 % (ref 14–44)
MCH RBC QN AUTO: 29.1 PG (ref 26.8–34.3)
MCHC RBC AUTO-ENTMCNC: 32.3 G/DL (ref 31.4–37.4)
MCV RBC AUTO: 90 FL (ref 82–98)
MONOCYTES # BLD AUTO: 0.66 THOUSAND/ΜL (ref 0.17–1.22)
MONOCYTES NFR BLD AUTO: 8 % (ref 4–12)
NEUTROPHILS # BLD AUTO: 6.46 THOUSANDS/ΜL (ref 1.85–7.62)
NEUTS SEG NFR BLD AUTO: 78 % (ref 43–75)
NRBC BLD AUTO-RTO: 0 /100 WBCS
PLATELET # BLD AUTO: 182 THOUSANDS/UL (ref 149–390)
PMV BLD AUTO: 10.6 FL (ref 8.9–12.7)
POTASSIUM SERPL-SCNC: 3.8 MMOL/L (ref 3.5–5.3)
RBC # BLD AUTO: 4.64 MILLION/UL (ref 3.88–5.62)
SODIUM SERPL-SCNC: 145 MMOL/L (ref 135–147)
WBC # BLD AUTO: 8.24 THOUSAND/UL (ref 4.31–10.16)

## 2024-11-25 PROCEDURE — 99223 1ST HOSP IP/OBS HIGH 75: CPT | Performed by: FAMILY MEDICINE

## 2024-11-25 PROCEDURE — 99285 EMERGENCY DEPT VISIT HI MDM: CPT

## 2024-11-25 PROCEDURE — 99285 EMERGENCY DEPT VISIT HI MDM: CPT | Performed by: EMERGENCY MEDICINE

## 2024-11-25 PROCEDURE — 80048 BASIC METABOLIC PNL TOTAL CA: CPT

## 2024-11-25 PROCEDURE — 96360 HYDRATION IV INFUSION INIT: CPT

## 2024-11-25 PROCEDURE — 85025 COMPLETE CBC W/AUTO DIFF WBC: CPT

## 2024-11-25 PROCEDURE — 36415 COLL VENOUS BLD VENIPUNCTURE: CPT

## 2024-11-25 RX ORDER — AMLODIPINE BESYLATE 5 MG/1
10 TABLET ORAL DAILY
Status: DISCONTINUED | OUTPATIENT
Start: 2024-11-26 | End: 2024-11-26

## 2024-11-25 RX ORDER — ENOXAPARIN SODIUM 100 MG/ML
40 INJECTION SUBCUTANEOUS DAILY
Status: DISCONTINUED | OUTPATIENT
Start: 2024-11-26 | End: 2024-11-28 | Stop reason: HOSPADM

## 2024-11-25 RX ORDER — PANTOPRAZOLE SODIUM 40 MG/10ML
40 INJECTION, POWDER, LYOPHILIZED, FOR SOLUTION INTRAVENOUS
Status: DISCONTINUED | OUTPATIENT
Start: 2024-11-26 | End: 2024-11-26

## 2024-11-25 RX ORDER — LISINOPRIL 10 MG/1
10 TABLET ORAL 2 TIMES DAILY
Status: DISCONTINUED | OUTPATIENT
Start: 2024-11-26 | End: 2024-11-26

## 2024-11-25 RX ORDER — DEXTROSE MONOHYDRATE, SODIUM CHLORIDE, AND POTASSIUM CHLORIDE 50; 1.49; 4.5 G/1000ML; G/1000ML; G/1000ML
125 INJECTION, SOLUTION INTRAVENOUS CONTINUOUS
Status: DISCONTINUED | OUTPATIENT
Start: 2024-11-25 | End: 2024-11-28 | Stop reason: HOSPADM

## 2024-11-25 RX ORDER — POTASSIUM CHLORIDE 14.9 MG/ML
20 INJECTION INTRAVENOUS ONCE
Status: DISCONTINUED | OUTPATIENT
Start: 2024-11-25 | End: 2024-11-25

## 2024-11-25 RX ORDER — LISINOPRIL 10 MG/1
10 TABLET ORAL DAILY
Status: DISCONTINUED | OUTPATIENT
Start: 2024-11-26 | End: 2024-11-25

## 2024-11-25 RX ADMIN — SODIUM CHLORIDE 1000 ML: 0.9 INJECTION, SOLUTION INTRAVENOUS at 20:49

## 2024-11-25 NOTE — TELEPHONE ENCOUNTER
Called patient w results of the final send out pathology from my EGD earlier this month. Discussed w patient and wife Mitzi.     Pt discharged 3 days ago from [facility] [facility] after EGD/esophageal stent. Path pending.     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     Pt is still not eating enough, and I advised that if he is not tolerating enough fluids to go back to the ER.    In the meantime, will refer to oncology for further work up.

## 2024-11-25 NOTE — TELEPHONE ENCOUNTER
Called and spoke to Mitzi regarding Dr. Crocker recommendations.  Wife states he does not want to go back to [facility] due to lack of communication.  Feels they did not keep them in the loop.  Explained she is free to go to any hospital but recommend [facility] incase needs any procedures.  States understanding and will see what  wants to do.

## 2024-11-25 NOTE — PROGRESS NOTES
All records needed are in patients chart. No records retrieval needed at this time.     Referral received/ Chart reviewed for work up completed    Imaging completed:    [] PET/CT   [] MRI   [] CT   [] US   [] Mammo   [] Bone scan   [x] N/A    Pathology completed:    Date:   Location:   [x]N/A    Additional records needed:   [] Genomic report   [] Genetic testing results   [] Office Note   [] Procedure/ Operative note   [] Lab results   [x] N/A      [] Radiation Oncology records retrieval needed (PN to route to rad/onc clerical pool once scheduled)  Date:  Location:      Chart rvw'd on 24      Referring Provider: Felicia Betancourt MD      Diagnosis: malignant neoplasm of lower third of esophagus      EGD/EUS:   EGD Fluoro 24    Impression:  Successful placement of esophageal stent in the distal esophagus area of the diaphyseal stricture. Biopsies were obtained.     Pathology:  Path pending from 24    Imagin24 CT c/a/p w contrast  1.  Distal esophageal wall thickening with surrounding inflammation and small amount of fluid. No extraluminal gas identified. If there is clinical concern for esophageal perforation this may be evaluated with CT or fluoroscopic esophagram. Correlate   with pending biopsy results for etiology of wall thickening. No prior imaging available.  2.  Retained contents in the upper esophagus to the level of the neck. Patient may be at risk for aspiration.  3.  Ill-defined nodular and groundglass opacities greatest in the right lower lobe. Largest nodule measures 1 cm. These may be infectious/inflammatory. Recommend 3-month follow-up chest CT.  4.  No suspicious lymphadenopathy.  5.  Multiple bilateral simple renal cysts. A 2.8 cm right renal lesion measuring slightly above simple fluid density also likely reflects a cyst. This can be monitored on follow-up imaging or confirmed with outpatient renal ultrasound.    Scheduled appointments: N/A    Surgery: N/A    Post Surgical  Pathology: N/A

## 2024-11-25 NOTE — PROGRESS NOTES
Call made to f/u if pt is going to go to ER based on symptoms that were called in today since having stent placed at hospital last week.    Per wife, Mitzi, pt is not keeping foods down now including soft foods/liquids, spitting/vomiting every time after done eating and bringing up white substance including having trouble managing secretions. Pt had stent placed last Tuesday and has progressively been having worsening symptoms.     NN advised pt to return to ER based on above, pt/wife agreeable to returning back to [place] ER. NN contact information provided. NN highly stressed to the pt/wife that addressing the acute issue of dysphagia won't be addressed as quickly outpt as compared to ER, pt/wife verbalize understanding.     NN will f/u tomorrow on outcome.

## 2024-11-25 NOTE — TELEPHONE ENCOUNTER
Pts' wife calling for a sooner appt because Pt is not doing well, was in the ED on 11.16 and saw  then told to FU on 1.06 but Pt continues to have symptoms and wife is very distressed. I have nothing before 12.02 with  or PA/NP, connected to Nurse Baca to advise

## 2024-11-25 NOTE — TELEPHONE ENCOUNTER
Wife Mitzi (consent verified by PEP) calling to say that  is not doing well with eating and drinking.  Was discharged from [facility] on Friday after having stent placed for swallowing.  Friday night patient started having trouble swallowing again.  When drinks anything, patient vomits it back up.  Wife unsure how much he is actually retaining.  Patient is weak and not looking well according to wife.  Patient was originally at [facility] but then taken to [facility] for stent.  Advised ED

## 2024-11-26 ENCOUNTER — ANESTHESIA (INPATIENT)
Dept: GASTROENTEROLOGY | Facility: HOSPITAL | Age: 74
DRG: 374 | End: 2024-11-26
Payer: COMMERCIAL

## 2024-11-26 ENCOUNTER — ANESTHESIA EVENT (INPATIENT)
Dept: GASTROENTEROLOGY | Facility: HOSPITAL | Age: 74
DRG: 374 | End: 2024-11-26
Payer: COMMERCIAL

## 2024-11-26 ENCOUNTER — APPOINTMENT (INPATIENT)
Dept: RADIOLOGY | Facility: HOSPITAL | Age: 74
DRG: 374 | End: 2024-11-26
Payer: COMMERCIAL

## 2024-11-26 ENCOUNTER — APPOINTMENT (INPATIENT)
Dept: GASTROENTEROLOGY | Facility: HOSPITAL | Age: 74
DRG: 374 | End: 2024-11-26
Payer: COMMERCIAL

## 2024-11-26 ENCOUNTER — PATIENT OUTREACH (OUTPATIENT)
Dept: HEMATOLOGY ONCOLOGY | Facility: CLINIC | Age: 74
End: 2024-11-26

## 2024-11-26 PROBLEM — C78.89: Status: ACTIVE | Noted: 2024-11-25

## 2024-11-26 PROBLEM — C80.1: Status: ACTIVE | Noted: 2024-11-25

## 2024-11-26 PROBLEM — C15.9 ESOPHAGEAL CANCER (HCC): Status: ACTIVE | Noted: 2024-11-16

## 2024-11-26 LAB
ANION GAP SERPL CALCULATED.3IONS-SCNC: 7 MMOL/L (ref 4–13)
BASOPHILS # BLD AUTO: 0.02 THOUSANDS/ΜL (ref 0–0.1)
BASOPHILS NFR BLD AUTO: 0 % (ref 0–1)
BUN SERPL-MCNC: 19 MG/DL (ref 5–25)
CALCIUM SERPL-MCNC: 8.3 MG/DL (ref 8.4–10.2)
CHLORIDE SERPL-SCNC: 108 MMOL/L (ref 96–108)
CO2 SERPL-SCNC: 28 MMOL/L (ref 21–32)
CREAT SERPL-MCNC: 0.57 MG/DL (ref 0.6–1.3)
EOSINOPHIL # BLD AUTO: 0.21 THOUSAND/ΜL (ref 0–0.61)
EOSINOPHIL NFR BLD AUTO: 3 % (ref 0–6)
ERYTHROCYTE [DISTWIDTH] IN BLOOD BY AUTOMATED COUNT: 12.8 % (ref 11.6–15.1)
GFR SERPL CREATININE-BSD FRML MDRD: 100 ML/MIN/1.73SQ M
GLUCOSE SERPL-MCNC: 111 MG/DL (ref 65–140)
HCT VFR BLD AUTO: 37.2 % (ref 36.5–49.3)
HGB BLD-MCNC: 12 G/DL (ref 12–17)
IMM GRANULOCYTES # BLD AUTO: 0.04 THOUSAND/UL (ref 0–0.2)
IMM GRANULOCYTES NFR BLD AUTO: 1 % (ref 0–2)
LYMPHOCYTES # BLD AUTO: 0.52 THOUSANDS/ΜL (ref 0.6–4.47)
LYMPHOCYTES NFR BLD AUTO: 6 % (ref 14–44)
MCH RBC QN AUTO: 28.8 PG (ref 26.8–34.3)
MCHC RBC AUTO-ENTMCNC: 32.3 G/DL (ref 31.4–37.4)
MCV RBC AUTO: 89 FL (ref 82–98)
MONOCYTES # BLD AUTO: 0.62 THOUSAND/ΜL (ref 0.17–1.22)
MONOCYTES NFR BLD AUTO: 8 % (ref 4–12)
NEUTROPHILS # BLD AUTO: 6.72 THOUSANDS/ΜL (ref 1.85–7.62)
NEUTS SEG NFR BLD AUTO: 82 % (ref 43–75)
NRBC BLD AUTO-RTO: 0 /100 WBCS
PLATELET # BLD AUTO: 146 THOUSANDS/UL (ref 149–390)
PLATELET # BLD AUTO: 149 THOUSANDS/UL (ref 149–390)
PMV BLD AUTO: 10.2 FL (ref 8.9–12.7)
PMV BLD AUTO: 10.6 FL (ref 8.9–12.7)
POTASSIUM SERPL-SCNC: 3.6 MMOL/L (ref 3.5–5.3)
RBC # BLD AUTO: 4.17 MILLION/UL (ref 3.88–5.62)
SODIUM SERPL-SCNC: 143 MMOL/L (ref 135–147)
T4 FREE SERPL-MCNC: 1.49 NG/DL (ref 0.61–1.12)
TSH SERPL DL<=0.05 MIU/L-ACNC: <0.01 UIU/ML (ref 0.45–4.5)
WBC # BLD AUTO: 8.13 THOUSAND/UL (ref 4.31–10.16)

## 2024-11-26 PROCEDURE — 99222 1ST HOSP IP/OBS MODERATE 55: CPT | Performed by: INTERNAL MEDICINE

## 2024-11-26 PROCEDURE — 84432 ASSAY OF THYROGLOBULIN: CPT

## 2024-11-26 PROCEDURE — C1874 STENT, COATED/COV W/DEL SYS: HCPCS

## 2024-11-26 PROCEDURE — 99223 1ST HOSP IP/OBS HIGH 75: CPT | Performed by: INTERNAL MEDICINE

## 2024-11-26 PROCEDURE — 99223 1ST HOSP IP/OBS HIGH 75: CPT | Performed by: STUDENT IN AN ORGANIZED HEALTH CARE EDUCATION/TRAINING PROGRAM

## 2024-11-26 PROCEDURE — 0D718DZ DILATION OF UPPER ESOPHAGUS WITH INTRALUMINAL DEVICE, VIA NATURAL OR ARTIFICIAL OPENING ENDOSCOPIC: ICD-10-PCS | Performed by: INTERNAL MEDICINE

## 2024-11-26 PROCEDURE — 36415 COLL VENOUS BLD VENIPUNCTURE: CPT | Performed by: FAMILY MEDICINE

## 2024-11-26 PROCEDURE — 43266 EGD ENDOSCOPIC STENT PLACE: CPT | Performed by: INTERNAL MEDICINE

## 2024-11-26 PROCEDURE — 84439 ASSAY OF FREE THYROXINE: CPT

## 2024-11-26 PROCEDURE — 0DP58DZ REMOVAL OF INTRALUMINAL DEVICE FROM ESOPHAGUS, VIA NATURAL OR ARTIFICIAL OPENING ENDOSCOPIC: ICD-10-PCS | Performed by: INTERNAL MEDICINE

## 2024-11-26 PROCEDURE — 84443 ASSAY THYROID STIM HORMONE: CPT

## 2024-11-26 PROCEDURE — 85025 COMPLETE CBC W/AUTO DIFF WBC: CPT | Performed by: FAMILY MEDICINE

## 2024-11-26 PROCEDURE — C1769 GUIDE WIRE: HCPCS

## 2024-11-26 PROCEDURE — 85049 AUTOMATED PLATELET COUNT: CPT | Performed by: FAMILY MEDICINE

## 2024-11-26 PROCEDURE — 76536 US EXAM OF HEAD AND NECK: CPT

## 2024-11-26 PROCEDURE — 43247 EGD REMOVE FOREIGN BODY: CPT | Performed by: INTERNAL MEDICINE

## 2024-11-26 PROCEDURE — 71046 X-RAY EXAM CHEST 2 VIEWS: CPT

## 2024-11-26 PROCEDURE — 74018 RADEX ABDOMEN 1 VIEW: CPT

## 2024-11-26 PROCEDURE — 99232 SBSQ HOSP IP/OBS MODERATE 35: CPT | Performed by: INTERNAL MEDICINE

## 2024-11-26 PROCEDURE — 80048 BASIC METABOLIC PNL TOTAL CA: CPT | Performed by: FAMILY MEDICINE

## 2024-11-26 PROCEDURE — 86800 THYROGLOBULIN ANTIBODY: CPT

## 2024-11-26 RX ORDER — PANTOPRAZOLE SODIUM 40 MG/10ML
40 INJECTION, POWDER, LYOPHILIZED, FOR SOLUTION INTRAVENOUS EVERY 12 HOURS SCHEDULED
Status: DISCONTINUED | OUTPATIENT
Start: 2024-11-26 | End: 2024-11-28 | Stop reason: HOSPADM

## 2024-11-26 RX ORDER — SODIUM CHLORIDE, SODIUM LACTATE, POTASSIUM CHLORIDE, CALCIUM CHLORIDE 600; 310; 30; 20 MG/100ML; MG/100ML; MG/100ML; MG/100ML
125 INJECTION, SOLUTION INTRAVENOUS CONTINUOUS
Status: DISCONTINUED | OUTPATIENT
Start: 2024-11-26 | End: 2024-11-27

## 2024-11-26 RX ORDER — LABETALOL HYDROCHLORIDE 5 MG/ML
10 INJECTION, SOLUTION INTRAVENOUS ONCE AS NEEDED
Status: CANCELLED | OUTPATIENT
Start: 2024-11-26

## 2024-11-26 RX ORDER — ACETAMINOPHEN 325 MG/1
650 TABLET ORAL EVERY 4 HOURS PRN
Status: DISCONTINUED | OUTPATIENT
Start: 2024-11-26 | End: 2024-11-28 | Stop reason: HOSPADM

## 2024-11-26 RX ORDER — ONDANSETRON 2 MG/ML
4 INJECTION INTRAMUSCULAR; INTRAVENOUS ONCE AS NEEDED
Status: CANCELLED | OUTPATIENT
Start: 2024-11-26

## 2024-11-26 RX ORDER — SUCCINYLCHOLINE/SOD CL,ISO/PF 100 MG/5ML
SYRINGE (ML) INTRAVENOUS AS NEEDED
Status: DISCONTINUED | OUTPATIENT
Start: 2024-11-26 | End: 2024-11-26

## 2024-11-26 RX ORDER — PROPOFOL 10 MG/ML
INJECTION, EMULSION INTRAVENOUS AS NEEDED
Status: DISCONTINUED | OUTPATIENT
Start: 2024-11-26 | End: 2024-11-26

## 2024-11-26 RX ORDER — LIDOCAINE HYDROCHLORIDE 20 MG/ML
INJECTION, SOLUTION EPIDURAL; INFILTRATION; INTRACAUDAL; PERINEURAL AS NEEDED
Status: DISCONTINUED | OUTPATIENT
Start: 2024-11-26 | End: 2024-11-26

## 2024-11-26 RX ORDER — SODIUM CHLORIDE 9 MG/ML
INJECTION, SOLUTION INTRAVENOUS CONTINUOUS PRN
Status: DISCONTINUED | OUTPATIENT
Start: 2024-11-26 | End: 2024-11-26

## 2024-11-26 RX ORDER — HYDROMORPHONE HCL IN WATER/PF 6 MG/30 ML
0.2 PATIENT CONTROLLED ANALGESIA SYRINGE INTRAVENOUS EVERY 4 HOURS PRN
Status: DISCONTINUED | OUTPATIENT
Start: 2024-11-26 | End: 2024-11-28 | Stop reason: HOSPADM

## 2024-11-26 RX ORDER — LABETALOL HYDROCHLORIDE 5 MG/ML
10 INJECTION, SOLUTION INTRAVENOUS EVERY 6 HOURS PRN
Status: DISCONTINUED | OUTPATIENT
Start: 2024-11-26 | End: 2024-11-28 | Stop reason: HOSPADM

## 2024-11-26 RX ORDER — ENALAPRILAT 1.25 MG/ML
1.25 INJECTION INTRAVENOUS EVERY 6 HOURS
Status: DISCONTINUED | OUTPATIENT
Start: 2024-11-26 | End: 2024-11-27

## 2024-11-26 RX ADMIN — ENALAPRILAT 1.25 MG: 1.25 INJECTION INTRAVENOUS at 17:47

## 2024-11-26 RX ADMIN — SODIUM CHLORIDE: 0.9 INJECTION, SOLUTION INTRAVENOUS at 16:24

## 2024-11-26 RX ADMIN — POTASSIUM CHLORIDE, DEXTROSE MONOHYDRATE AND SODIUM CHLORIDE 125 ML/HR: 150; 5; 450 INJECTION, SOLUTION INTRAVENOUS at 08:20

## 2024-11-26 RX ADMIN — ENALAPRILAT 1.25 MG: 1.25 INJECTION INTRAVENOUS at 08:51

## 2024-11-26 RX ADMIN — Medication 100 MG: at 16:26

## 2024-11-26 RX ADMIN — HYDROMORPHONE HYDROCHLORIDE 0.2 MG: 0.2 INJECTION, SOLUTION INTRAMUSCULAR; INTRAVENOUS; SUBCUTANEOUS at 18:14

## 2024-11-26 RX ADMIN — LIDOCAINE HYDROCHLORIDE 100 MG: 20 INJECTION, SOLUTION EPIDURAL; INFILTRATION; INTRACAUDAL; PERINEURAL at 16:26

## 2024-11-26 RX ADMIN — POTASSIUM CHLORIDE, DEXTROSE MONOHYDRATE AND SODIUM CHLORIDE 125 ML/HR: 150; 5; 450 INJECTION, SOLUTION INTRAVENOUS at 00:06

## 2024-11-26 RX ADMIN — POTASSIUM CHLORIDE, DEXTROSE MONOHYDRATE AND SODIUM CHLORIDE 125 ML/HR: 150; 5; 450 INJECTION, SOLUTION INTRAVENOUS at 21:16

## 2024-11-26 RX ADMIN — ENOXAPARIN SODIUM 40 MG: 40 INJECTION SUBCUTANEOUS at 08:59

## 2024-11-26 RX ADMIN — PANTOPRAZOLE SODIUM 40 MG: 40 INJECTION, POWDER, FOR SOLUTION INTRAVENOUS at 21:18

## 2024-11-26 RX ADMIN — PANTOPRAZOLE SODIUM 40 MG: 40 INJECTION, POWDER, FOR SOLUTION INTRAVENOUS at 08:59

## 2024-11-26 RX ADMIN — PROPOFOL 150 MG: 10 INJECTION, EMULSION INTRAVENOUS at 16:26

## 2024-11-26 RX ADMIN — HYDROMORPHONE HYDROCHLORIDE 0.2 MG: 0.2 INJECTION, SOLUTION INTRAMUSCULAR; INTRAVENOUS; SUBCUTANEOUS at 22:16

## 2024-11-26 RX ADMIN — PROPOFOL 50 MG: 10 INJECTION, EMULSION INTRAVENOUS at 16:41

## 2024-11-26 NOTE — ASSESSMENT & PLAN NOTE
Typically on norvasc and lisinopril however both on hold given NPO status/dysphagia  Place on IV enalapril q6h scheduled for now   PROBLEM DIAGNOSES  Problem: EKG abnormality  Assessment and Plan: EKG changes have spontaneously resolved. No symptoms at present. Cardiac enzyme testing unrevealing.  -No need for further inpatient testing  -Can consider stress testing as an outpatient if consistent with goals of care  -Continue statin    Problem: Shortness of breath  Assessment and Plan: Resolved  -CXR with clear lungs  -O2 saturation normal on room air  -Continue maintenance inhaler    Problem: Dementia  Assessment and Plan: Patient currently has a "" at home to assist with daily needs. Plan is for patient to fly to [place] in next few days to live with sister and establish care there, possibly at an assisted living facility. She notes no new medication changes, but that since her hospitalization over a month ago, she has been having some issues with her memory and mood.  -Currently calm and not a danger to self or others  -Will defer addition of antipsychotic regimen at this time  -Outpatient f/u with Dr. Schulte  -Social work evaluation.    Problem: Hypothyroidism  Assessment and Plan: -Continue synthroid at home dose    Problem: Asthma  Assessment and Plan: -Continue combination inhaler    Problem: Living alone  Assessment and Plan:     Problem: Hyperlipidemia  Assessment and Plan: -Continue statin at home dose  Discharge planning to home into the care of her sister today, pending social work evaluation.  Discharge time 35 minutes.  Will discuss with geriatrician, Dr. Schulte.

## 2024-11-26 NOTE — PROGRESS NOTES
NN phone outreach to the pt's wife to follow up after our call yesterday, we discussed the pt going to the ER where his stent was placed last week ([facility]). Navigation advised for the pt to go to the ER for f/u due to severe dysphagia and vomiting. New esophageal CA dx. The wife states the pt has been admitted and their plan is for GI to place feeding tube. I asked her if the pt is going to be rcving RT verus sx, she states that I mentioned the pt needing to build up his stamina and asked would they still do RT. I explained they may have him there a few days providing nutrition in order to help him regain his strength before them starting therapy and could still be at the hospital, but most likely will start OP since [facility] does not have RT dept anymore. She understands.     Wife expresses her stress over how much is happening w her  at this time, she is overwhelmed and upset. I provided emotional support and explained that once the pt has the feeding tube, regaining strength, and started on therapy that things will get better, just getting through this portion of the upfront diagnosis. She thanked me, I told her to call me if she has further questions or concerns that arise before the pt is seen in office.

## 2024-11-26 NOTE — ASSESSMENT & PLAN NOTE
Newly diagnosed as above, based on pathology report  S/p stent placement on 11/19  Awaiting GI and oncology input

## 2024-11-26 NOTE — ED PROVIDER NOTES
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 in setting of esophageal mass.  Will order CBC and BMP and admit to medicine.  Will give fluid bolus.    Patient was admitted to medicine in stable condition.    Amount and/or Complexity of Data Reviewed  Labs: ordered.    Risk  Decision regarding hospitalization.             Medications   enoxaparin (LOVENOX) subcutaneous injection 40 mg (40 mg Subcutaneous Given 11/26/24 0859)   dextrose 5 % and sodium chloride 0.45 % with KCl 20 mEq/L infusion (125 mL/hr Intravenous New Bag 11/26/24 0820)   pantoprazole (PROTONIX) injection 40 mg (40 mg Intravenous Given 11/26/24 0859)   Enalaprilat (VASOTEC) injection 1.25 mg (1.25 mg Intravenous Given 11/26/24 0851)   sodium chloride 0.9 % bolus 1,000 mL (0 mL Intravenous Stopped 11/26/24 0007)       ED Risk Strat Scores                                               History of Present Illness       Chief Complaint   Patient presents with    Medical Problem     Sent from oncology for new dx of esophageal cancer. Sent for vomiting and not being able to keep things down. Stent put in last week and is not working.        Past Medical History:   Diagnosis Date    GERD (gastroesophageal reflux disease)     Hyperlipidemia     Hypertension       Past Surgical History:   Procedure  Laterality Date    NO PAST SURGERIES        Family History   Problem Relation Age of Onset    No Known Problems Mother     No Known Problems Father     No Known Problems Maternal Grandmother     No Known Problems Maternal Grandfather     No Known Problems Paternal Grandmother     No Known Problems Paternal Grandfather     Colon cancer Neg Hx     Esophageal cancer Neg Hx       Social History     Tobacco Use    Smoking status: Former     Types: Cigarettes     Passive exposure: Past    Smokeless tobacco: Never   Vaping Use    Vaping status: Never Used   Substance Use Topics    Alcohol use: Not Currently    Drug use: Never      E-Cigarette/Vaping    E-Cigarette Use Never User       E-Cigarette/Vaping Substances      I have reviewed and agree with the history as documented.     HPI    Patient is a 73-year-old male with recently diagnosed esophageal carcinoma who presents with vomiting.  Patient reports that last week he had a stent placed for an esophageal mass but is unable to tolerate p.o. since.  He has been vomiting small amounts throughout the day.  He has not been able to tolerate fluids.  He was told today that he has esophageal carcinoma.  He spoke with GI who recommended admission for possible feeding tube placement.  He denies fever, chills, abdominal pain.    Review of Systems   Constitutional:  Negative for chills and fever.   Respiratory:  Negative for cough and shortness of breath.    Cardiovascular:  Negative for chest pain and palpitations.   Gastrointestinal:  Positive for vomiting. Negative for abdominal pain.   All other systems reviewed and are negative.          Objective       ED Triage Vitals   Temperature Pulse Blood Pressure Respirations SpO2 Patient Position - Orthostatic VS   11/25/24 1719 11/25/24 1719 11/25/24 1719 11/25/24 1719 11/25/24 1719 --   98.3 °F (36.8 °C) 75 169/71 18 98 %       Temp Source Heart Rate Source BP Location FiO2 (%) Pain Score    11/25/24 1719 11/26/24 0634 -- -- --     Tympanic Monitor         Vitals      Date and Time Temp Pulse SpO2 Resp BP Pain Score FACES Pain Rating User   11/26/24 0900 -- 74 95 % -- 158/73 -- -- JK   11/26/24 0634 -- 67 98 % 15 167/77 -- -- MB   11/26/24 0115 -- 66 93 % -- 168/79 -- -- MB   11/25/24 2230 -- 76 97 % 14 160/76 -- -- MB   11/25/24 1719 98.3 °F (36.8 °C) 75 98 % 18 169/71 -- -- HOLLIS            Physical Exam  Vitals and nursing note reviewed.   HENT:      Head: Normocephalic and atraumatic.      Nose: No congestion or rhinorrhea.      Mouth/Throat:      Mouth: Mucous membranes are dry.   Eyes:      General:         Right eye: No discharge.         Left eye: No discharge.      Extraocular Movements: Extraocular movements intact.   Cardiovascular:      Rate and Rhythm: Normal rate and regular rhythm.   Pulmonary:      Effort: Pulmonary effort is normal. No respiratory distress.      Breath sounds: Normal breath sounds. No wheezing or rhonchi.   Abdominal:      Palpations: Abdomen is soft.      Tenderness: There is no abdominal tenderness.   Musculoskeletal:      Cervical back: Normal range of motion.      Right lower leg: No edema.      Left lower leg: No edema.   Skin:     General: Skin is warm and dry.      Capillary Refill: Capillary refill takes less than 2 seconds.   Neurological:      Mental Status: He is alert.   Psychiatric:         Mood and Affect: Mood normal.         Results Reviewed       Procedure Component Value Units Date/Time    Basic metabolic panel [[id number]]  (Abnormal) Collected: 11/26/24 0612    Lab Status: Final result Specimen: Blood from Arm, Left Updated: 11/26/24 0654     Sodium 143 mmol/L      Potassium 3.6 mmol/L      Chloride 108 mmol/L      CO2 28 mmol/L      ANION GAP 7 mmol/L      BUN 19 mg/dL      Creatinine 0.57 mg/dL      Glucose 111 mg/dL      Calcium 8.3 mg/dL      eGFR 100 ml/min/1.73sq m     Narrative:      National Kid[employer] Foundation guidelines for Chronic Kidney Disease (CKD):     Stage 1 with normal  or high GFR (GFR > 90 mL/min/1.73 square meters)    Stage 2 Mild CKD (GFR = 60-89 mL/min/1.73 square meters)    Stage 3A Moderate CKD (GFR = 45-59 mL/min/1.73 square meters)    Stage 3B Moderate CKD (GFR = 30-44 mL/min/1.73 square meters)    Stage 4 Severe CKD (GFR = 15-29 mL/min/1.73 square meters)    Stage 5 End Stage CKD (GFR <15 mL/min/1.73 square meters)  Note: GFR calculation is accurate only with a steady state creatinine    CBC and differential [[id number]]  (Abnormal) Collected: 11/26/24 0612    Lab Status: Final result Specimen: Blood from Arm, Left Updated: 11/26/24 0647     WBC 8.13 Thousand/uL      RBC 4.17 Million/uL      Hemoglobin 12.0 g/dL      Hematocrit 37.2 %      MCV 89 fL      MCH 28.8 pg      MCHC 32.3 g/dL      RDW 12.8 %      MPV 10.2 fL      Platelets 146 Thousands/uL      nRBC 0 /100 WBCs      Segmented % 82 %      Immature Grans % 1 %      Lymphocytes % 6 %      Monocytes % 8 %      Eosinophils Relative 3 %      Basophils Relative 0 %      Absolute Neutrophils 6.72 Thousands/µL      Absolute Immature Grans 0.04 Thousand/uL      Absolute Lymphocytes 0.52 Thousands/µL      Absolute Monocytes 0.62 Thousand/µL      Eosinophils Absolute 0.21 Thousand/µL      Basophils Absolute 0.02 Thousands/µL     Platelet count [[id number]]  (Normal) Collected: 11/26/24 0612    Lab Status: Final result Specimen: Blood from Arm, Left Updated: 11/26/24 0638     Platelets 149 Thousands/uL      MPV 10.6 fL     Basic metabolic panel [[id number]]  (Abnormal) Collected: 11/25/24 2048    Lab Status: Final result Specimen: Blood from Arm, Left Updated: 11/25/24 2137     Sodium 145 mmol/L      Potassium 3.8 mmol/L      Chloride 107 mmol/L      CO2 27 mmol/L      ANION GAP 11 mmol/L      BUN 27 mg/dL      Creatinine 0.68 mg/dL      Glucose 85 mg/dL      Calcium 9.3 mg/dL      eGFR 93 ml/min/1.73sq m     Narrative:      National Kidney Disease Foundation guidelines for Chronic Kidney Disease (CKD):     Stage 1 with normal  or high GFR (GFR > 90 mL/min/1.73 square meters)    Stage 2 Mild CKD (GFR = 60-89 mL/min/1.73 square meters)    Stage 3A Moderate CKD (GFR = 45-59 mL/min/1.73 square meters)    Stage 3B Moderate CKD (GFR = 30-44 mL/min/1.73 square meters)    Stage 4 Severe CKD (GFR = 15-29 mL/min/1.73 square meters)    Stage 5 End Stage CKD (GFR <15 mL/min/1.73 square meters)  Note: GFR calculation is accurate only with a steady state creatinine    CBC and differential [[id number]]  (Abnormal) Collected: 11/25/24 2048    Lab Status: Final result Specimen: Blood from Arm, Left Updated: 11/25/24 2100     WBC 8.24 Thousand/uL      RBC 4.64 Million/uL      Hemoglobin 13.5 g/dL      Hematocrit 41.8 %      MCV 90 fL      MCH 29.1 pg      MCHC 32.3 g/dL      RDW 12.9 %      MPV 10.6 fL      Platelets 182 Thousands/uL      nRBC 0 /100 WBCs      Segmented % 78 %      Immature Grans % 1 %      Lymphocytes % 11 %      Monocytes % 8 %      Eosinophils Relative 2 %      Basophils Relative 0 %      Absolute Neutrophils 6.46 Thousands/µL      Absolute Immature Grans 0.06 Thousand/uL      Absolute Lymphocytes 0.91 Thousands/µL      Absolute Monocytes 0.66 Thousand/µL      Eosinophils Absolute 0.13 Thousand/µL      Basophils Absolute 0.02 Thousands/µL             XR chest pa and lateral    (Results Pending)       Procedures    ED Medication and Procedure Management   Prior to Admission Medications   Prescriptions Last Dose Informant Patient Reported? Taking?   amLODIPine (NORVASC) 10 mg tablet  Self Yes No   Sig: Take 10 mg by mouth daily   lisinopril (ZESTRIL) 10 mg tablet  Self Yes No   Sig: take 1 tablet by mouth twice a day for 90 days   nystatin (MYCOSTATIN) 500,000 units/5 mL suspension   No No   Sig: Swish and swallow 5 mL (500,000 Units total) 4 (four) times a day   pantoprazole (PROTONIX) 40 mg tablet   No No   Sig: Take 1 tablet (40 mg total) by mouth 2 (two) times a day   simvastatin (ZOCOR) 20 mg tablet  Self Yes No   Sig: Take 20 mg by  mouth daily   sucralfate (CARAFATE) 1 g/10 mL suspension   No No   Sig: Take 10 mL (1 g total) by mouth 4 (four) times a day for 14 days      Facility-Administered Medications: None     Patient's Medications   Discharge Prescriptions    No medications on file     No discharge procedures on file.  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

## 2024-11-26 NOTE — ANESTHESIA POSTPROCEDURE EVALUATION
Post-Op Assessment Note    CV Status:  Stable  Pain Score: 0    Pain management: adequate       Mental Status:  Alert and awake   Hydration Status:  Euvolemic   PONV Controlled:  Controlled   Airway Patency:  Patent     Post Op Vitals Reviewed: Yes    No anethesia notable event occurred.    Staff: Anesthesiologist           Last Filed PACU Vitals:  Vitals Value Taken Time   Temp 100.5 °F (38.1 °C) 11/26/24 1700   Pulse 100 11/26/24 1710   /87 11/26/24 1710   Resp 20 11/26/24 1710   SpO2 98 % 11/26/24 1710       Modified Melissa:  Activity: 2 (11/26/2024  5:00 PM)  Respiration: 2 (11/26/2024  5:00 PM)  Circulation: 2 (11/26/2024  5:00 PM)  Consciousness: 1 (11/26/2024  5:00 PM)  Oxygen Saturation: 2 (11/26/2024  5:00 PM)  Modified Melissa Score: 9 (11/26/2024  5:00 PM)

## 2024-11-26 NOTE — H&P
H&P - Hospitalist   Name: Hector Carrera 73 y.o. male I MRN: [id number]  Unit/Bed#: ED 21 I Date of Admission: 11/25/2024   Date of Service: 11/25/2024 I Hospital Day: 0     Assessment & Plan  Dysphagia  Patient with dysphagia secondary to esophageal mass.  Unable to tolerate liquids or solids despite stent being placed within the past 24 hours.  Will reconsult gastroenterology and oncology.  Will start IV fluids although at this point metabolically doing well  Hypertension  Patient is on a calcium channel blocker and ACE inhibitor but able to tolerate p.o.  Unguinal try sips of meds but if this cannot work may need IV medication  GERD (gastroesophageal reflux disease)  Patient is on PPI therapy for GERD.  Will switch to IV  Esophageal stricture vs mass  Patient with esophageal stricture.  Due to esophageal mass.  Had a stent placed but still unable to tolerate p.o.  As above will hydrate reconsult GI and consult the oncology team  Weight loss  Patient with weight loss likely secondary to decreased p.o. intake as well as the malignancy.  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  GI is following who placed the stent as noted above we will also get the oncology team to let the hand.      VTE Pharmacologic Prophylaxis:   Moderate Risk (Score 3-4) - Pharmacological DVT Prophylaxis Contraindicated. Sequential Compression Devices Ordered.  Code Status: Prior full  Discussion with family: Updated  (pt) at bedside.    Anticipated Length of Stay: Patient will be admitted on an inpatient basis with an anticipated length of stay of greater than 2 midnights secondary to secondary cannot take p.o.    History of Present Illness   Chief Complaint: Inability to tolerate p.o.    Hector Carrera is a 73 y.o. male with a PMH of pretension, GERD, recent diagnosis of esophageal cancer status post stent placement in the past 48 hours who  presents with assistant inability to tolerate p.o.  Patient actually is a very healthy saundra until the past 2 to 3 months has noticed decreased ability to tolerate p.o. first solids and then over a 3 to 4-day.  Liquids as well.  Do this came to the hospital also gastroenterology and EGD was done which showed esophageal mass and a stricture and a stent was placed.  The patient is post to follow-up with oncology to get treatment unfortunately he is still unable to take p.o liquids or solids.  For that reason he came to the emergency room.  Aside from this fact the patient is lying well.  The plan is to bring him into the hospital to have GI see him again consider a larger stent potentially and also have the oncology team see him the possibility of radiation.  Or whether surgery could be an option.  Patient is full code.  He is hemodynamically stable his blood pressure somewhat elevated because he could not take his blood pressure medicine BMP and CBC were unremarkable..    Review of Systems   Constitutional:  Negative for activity change, appetite change, chills, diaphoresis, fatigue and fever.   HENT:  Negative for congestion, ear discharge, ear pain, mouth sores, nosebleeds, sinus pain, sneezing, tinnitus and voice change.    Eyes:  Negative for pain and itching.   Respiratory:  Positive for stridor. Negative for apnea, cough, choking, chest tightness, shortness of breath and wheezing.    Cardiovascular:  Negative for chest pain, palpitations and leg swelling.   Gastrointestinal:  Positive for vomiting. Negative for abdominal distention, blood in stool, constipation and nausea.   Genitourinary:  Negative for dysuria, enuresis, frequency and hematuria.   Musculoskeletal:  Negative for arthralgias, back pain, gait problem, joint swelling and myalgias.   Skin:  Negative for color change, pallor, rash and wound.   Neurological:  Positive for dizziness. Negative for tremors, seizures, syncope, facial asymmetry,  light-headedness and numbness.   Hematological:  Negative for adenopathy.   Psychiatric/Behavioral:  Negative for agitation, behavioral problems and confusion.        Historical Information   Past Medical History:   Diagnosis Date    GERD (gastroesophageal reflux disease)     Hyperlipidemia     Hypertension      Past Surgical History:   Procedure Laterality Date    NO PAST SURGERIES       Social History     Tobacco Use    Smoking status: Former     Types: Cigarettes     Passive exposure: Past    Smokeless tobacco: Never   Vaping Use    Vaping status: Never Used   Substance and Sexual Activity    Alcohol use: Not Currently    Drug use: Never    Sexual activity: Not on file     E-Cigarette/Vaping    E-Cigarette Use Never User      E-Cigarette/Vaping Substances       Social History:  Marital Status: /Civil Union   Occupation: retired [employer]  Patient Pre-hospital Living Situation: Home  Patient Pre-hospital Level of Mobility: walks  Patient Pre-hospital Diet Restrictions: none    Meds/Allergies   I have reviewed home medications with patient personally.  Prior to Admission medications    Medication Sig Start Date End Date Taking? Authorizing Provider   amLODIPine (NORVASC) 10 mg tablet Take 10 mg by mouth daily    Historical Provider, MD   lisinopril (ZESTRIL) 10 mg tablet take 1 tablet by mouth twice a day for 90 days    Historical Provider, MD   nystatin (MYCOSTATIN) 500,000 units/5 mL suspension Swish and swallow 5 mL (500,000 Units total) 4 (four) times a day 11/22/24   Mata Rodríguez MD   pantoprazole (PROTONIX) 40 mg tablet Take 1 tablet (40 mg total) by mouth 2 (two) times a day 11/22/24   Mata Rodríguez MD   simvastatin (ZOCOR) 20 mg tablet Take 20 mg by mouth daily    Historical Provider, MD   sucralfate (CARAFATE) 1 g/10 mL suspension Take 10 mL (1 g total) by mouth 4 (four) times a day for 14 days 11/8/24 11/22/24  Felicia Betancourt MD     Allergies   Allergen Reactions    Nuts - Food Allergy Throat  Swelling     Tree nuts       Objective :  Temp:  [98.3 °F (36.8 °C)] 98.3 °F (36.8 °C)  HR:  [75] 75  BP: (169)/(71) 169/71  Resp:  [18] 18  SpO2:  [98 %] 98 %  O2 Device: None (Room air)    Physical Exam  Constitutional:       Appearance: Normal appearance.   HENT:      Head: Normocephalic.      Right Ear: Tympanic membrane normal.      Left Ear: Tympanic membrane normal.      Nose: Nose normal. No congestion or rhinorrhea.      Mouth/Throat:      Mouth: Mucous membranes are moist.   Eyes:      General:         Right eye: No discharge.      Extraocular Movements: Extraocular movements intact.      Pupils: Pupils are equal, round, and reactive to light.   Cardiovascular:      Rate and Rhythm: Normal rate and regular rhythm.      Pulses: Normal pulses.   Pulmonary:      Effort: Pulmonary effort is normal. No respiratory distress.      Breath sounds: Normal breath sounds. No stridor.   Abdominal:      General: Abdomen is flat. There is no distension.      Palpations: Abdomen is soft. There is no mass.      Tenderness: There is no right CVA tenderness or left CVA tenderness.   Musculoskeletal:         General: No swelling or tenderness. Normal range of motion.      Cervical back: Normal range of motion and neck supple. No rigidity or tenderness.      Right lower leg: No edema.      Left lower leg: No edema.   Skin:     General: Skin is warm.      Capillary Refill: Capillary refill takes less than 2 seconds.      Coloration: Skin is not jaundiced or pale.      Findings: No lesion or rash.   Neurological:      General: No focal deficit present.      Mental Status: He is alert and oriented to person, place, and time.   Psychiatric:         Mood and Affect: Mood normal.         Behavior: Behavior normal.          Lines/Drains:            Lab Results: I have reviewed the following results:  Results from last 7 days   Lab Units 11/25/24  2048   WBC Thousand/uL 8.24   HEMOGLOBIN g/dL 13.5   HEMATOCRIT % 41.8   PLATELETS  "Thousands/uL 182   SEGS PCT % 78*   LYMPHO PCT % 11*   MONO PCT % 8   EOS PCT % 2     Results from last 7 days   Lab Units 11/22/24  0517   SODIUM mmol/L 143   POTASSIUM mmol/L 3.3*   CHLORIDE mmol/L 107   CO2 mmol/L 28   BUN mg/dL 20   CREATININE mg/dL 0.58*   ANION GAP mmol/L 8   CALCIUM mg/dL 8.3*   ALBUMIN g/dL 3.5   TOTAL BILIRUBIN mg/dL 0.65   ALK PHOS U/L 58   ALT U/L 18   AST U/L 21   GLUCOSE RANDOM mg/dL 96             No results found for: \"HGBA1C\"              Administrative Statements       ** Please Note: This note has been constructed using a voice recognition system. **    "

## 2024-11-26 NOTE — ANESTHESIA PREPROCEDURE EVALUATION
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         Additional Monitors:     Airway Plan: ETT.           Plan Factors-Exercise tolerance (METS): >4 METS.    Chart reviewed.   Existing labs reviewed. Patient summary reviewed.    Patient is not a current smoker.              Induction- intravenous.    Postoperative Plan- . Planned trial extubation    Perioperative Resuscitation Plan - Level 1 - Full Code.       Informed Consent- Anesthetic plan and risks discussed with patient.  I personally reviewed this patient with the CRNA. Discussed and agreed on the Anesthesia Plan with the CRNA..      Discussed General Anesthesia with patient including but not limited to risk of cardiac insult, pulmonary complication, stroke, reaction to medications and death. All questions answered and consent was obtained.

## 2024-11-26 NOTE — ASSESSMENT & PLAN NOTE
Stent with GI on 11/19. Stopped tolerating PO on 11/23  Good functional status for surgical candidacy  Plan:  Follow up GI EGD. J-tube consideration depending on EGD outcomes

## 2024-11-26 NOTE — ASSESSMENT & PLAN NOTE
Patient with esophageal stricture.  Due to esophageal mass.  Had a stent placed but still unable to tolerate p.o.  As above will hydrate reconsult GI and consult the oncology team

## 2024-11-26 NOTE — UTILIZATION REVIEW
Initial Clinical Review    Admission: Date/Time/Statement:   Admission Orders (From admission, onward)       Ordered        11/25/24 2133  Inpatient Admission  Once                          Orders Placed This Encounter   Procedures    Inpatient Admission     Standing Status:   Standing     Number of Occurrences:   1     Level of Care:   Med Surg [16]     Estimated length of stay:   More than 2 Midnights     Certification:   I certify that inpatient services are medically necessary for this patient for a duration of greater than two midnights. See H&P and MD Progress Notes for additional information about the patient's course of treatment.     ED Arrival Information       Expected   -    Arrival   11/25/2024 17:11    Acuity   Urgent              Means of arrival   Walk-In    Escorted by   Self    Service   Hospitalist    Admission type   Emergency              Arrival complaint   oncoloy             Chief Complaint   Patient presents with    Medical Problem     Sent from oncology for new dx of esophageal cancer. Sent for vomiting and not being able to keep things down. Stent put in last week and is not working.        Initial Presentation: 73 y.o. male to ED presents for inability to tolerated po. Pt t actually is a very healthy saundra until the past 2 to 3 months has noticed decreased ability to tolerate p.o. first solids and then over a 3 to 4-day. Liquids as well. 11/19 S/p EGD with GI which showed esophageal mass and a stricture and a stent was place. He is post to follow-up with oncology to get treatment unfortunately he is still unable to take p.o liquids or solids and came to ED.   PMH for pretension, GERD, recent diagnosis of esophageal cancer status post stent placement in the past 48 hours.  Admit to Inpatient Dx; Dysphagia. Wt loss; likely due to decrease po intake and malignancy  Had a stent placed 11/19 but still unable to tolerate p.o.   Plan; NPO; Sips with meds. Iv fluids. Iv PPI q12. GI and Oncology  consult.     Anticipated Length of Stay/Certification Statement: Patient will be admitted on an inpatient basis with an anticipated length of stay of greater than 2 midnights secondary to secondary cannot take p.o.     Date: 11/26   Day 2:   Progress notes; NPO, Sips with meds. Iv fluids. GI and Oncology consult pending.   Iv enalapril q6h for now. Continue Iv PPI bid.     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 Esophageal stricture  US Thyroid.  Thyroglobulin, thyroid levels. Speech consult recommended, consider tube feeds  F/u results of recent GI biopsy on 11/19, reach out to path to expedite.  Will need CT chest in less than 3 months. F/u GI recommendations.    Surgical-Oncology cons; Dysphagia  F/u GI EGD. J-tube consideration depending on EGD outcomes.  Pt has had significant weight loss (50lbs over year, 10lbs in last two weeks). He has excellent functional status, does yard work, cooks/cleans, drives, lives with wife and son.   Unable to mechanically get down liquids or solids. Spits these up     GI cons; Progressive dysphagia and weight loss over the last few months.  He has undergone multiple EGDs with findings of esophageal stricture s/p esophageal stent placement on 11/19 with updated biopsy findings now concerning for esophageal malignancy.  He reports difficulty with keeping down fluids.  CXR to ensure stent still in good position  Plan for EGD today. Keep NPO.     11/26 S/p EGD;  IMPRESSION:  Fully covered metal stent was visualized in the upper third of the esophagus. The stent was removed. The stent was replaced with a fully covered metal stent measuring 18 mm x 12 cm.  The stomach, duodenal bulb and 2nd part of the duodenum appeared normal.    RECOMMENDATION:  Follow clinically and calorie counts  Consider PEG tube placement if not able to tolerate PO      Date: 11/27  Day 3: Has surpassed a 2nd midnight with active treatments and services.  Iv PPI q12. Iv fluids.  Diet advanced to full liquid today 11/27, monitor for ability to tolerated for at least 24 hrs.  Nystatin swish and spit added for thrush. Change Iv meds to attempt home BP pills.  Pt was able to have clears for breakfast. He will try full liquids for lunch. He is hesitant to be dc too soon given readmission   On exam; underweight    Per Surgical-Oncology; Pt reports some mild chest and back discomfort. Patient did not try clear liquids after EGD due to fear of pain and dysphagia. No nausea vomiting fevers chills chest pain or shortness of breath.   On exam; Abdomen soft,non  tender        ED Treatment-Medication Administration from 11/25/2024 1711 to 11/26/2024 0945         Date/Time Order Dose Route Action     11/25/2024 2049 sodium chloride 0.9 % bolus 1,000 mL 1,000 mL Intravenous New Bag     11/26/2024 0859 enoxaparin (LOVENOX) subcutaneous injection 40 mg 40 mg Subcutaneous Given     11/26/2024 0006 dextrose 5 % and sodium chloride 0.45 % with KCl 20 mEq/L infusion 125 mL/hr Intravenous New Bag     11/26/2024 0820 dextrose 5 % and sodium chloride 0.45 % with KCl 20 mEq/L infusion 125 mL/hr Intravenous New Bag     11/26/2024 0859 pantoprazole (PROTONIX) injection 40 mg 40 mg Intravenous Given     11/26/2024 0851 Enalaprilat (VASOTEC) injection 1.25 mg 1.25 mg Intravenous Given            Scheduled Medications:  amLODIPine, 10 mg, Oral, Daily  enoxaparin, 40 mg, Subcutaneous, Daily  lisinopril, 10 mg, Oral, Daily  nystatin, 500,000 Units, Swish & Spit, BID  pantoprazole, 40 mg, Intravenous, Q12H MARTHA      Continuous IV Infusions:  dextrose 5 % and sodium chloride 0.45 % with KCl 20 mEq/L, 125 mL/hr, Intravenous, Continuous  lactated ringers, 125 mL/hr, Intravenous, Continuous      PRN Meds:  acetaminophen, 650 mg, Oral, Q4H PRN  HYDROmorphone, 0.2 mg, Intravenous, Q4H PRN  labetalol, 10 mg, Intravenous, Q6H PRN      ED Triage Vitals   Temperature Pulse Respirations Blood Pressure SpO2 Pain Score   11/25/24 0156  11/25/24 1719 11/25/24 1719 11/25/24 1719 11/25/24 1719 11/26/24 1053   98.3 °F (36.8 °C) 75 18 169/71 98 % No Pain     Weight (last 2 days)       Date/Time Weight    11/26/24 1436 76.9 (169.62)            Vital Signs (last 3 days)       Date/Time Temp Pulse Resp BP MAP (mmHg) SpO2 O2 Device Pain    11/27/24 07:44:53 98.3 °F (36.8 °C) 81 16 156/70 99 96 % -- --    11/27/24 06:03:54 -- 71 -- 162/76 105 94 % -- --    11/27/24 00:02:31 98.1 °F (36.7 °C) 76 -- 162/75 104 95 % -- --    11/26/24 2216 -- -- -- -- -- -- -- 4    11/26/24 21:15:42 -- 69 -- 169/74 106 96 % -- 6 11/26/24 19:27:54 -- 70 -- 179/78 112 97 % -- --    11/26/24 18:19:30 -- 76 -- 171/77 108 97 % -- --    11/26/24 1814 -- -- -- -- -- -- -- 6 11/26/24 18:05:59 -- 74 -- 175/80 112 97 % -- --    11/26/24 17:47:35 99 °F (37.2 °C) 83 17 201/87 125 97 % -- --    11/26/24 1747 -- -- -- -- -- -- None (Room air) --    11/26/24 1731 -- 74 20 200/88 -- 98 % None (Room air) --    11/26/24 1720 -- 79 20 202/66 -- 98 % None (Room air) --    11/26/24 1715 -- 85 20 192/87 -- 98 % None (Room air) --    11/26/24 1710 -- 100 20 198/87 -- 98 % None (Room air) --    11/26/24 1705 -- 85 20 172/102 -- 97 % None (Room air) --    11/26/24 1700 100.5 °F (38.1 °C) 94 20 163/117 -- 97 % None (Room air) --    11/26/24 1532 99.3 °F (37.4 °C) 78 16 160/70 -- 98 % None (Room air) No Pain    11/26/24 1444 -- -- -- -- -- -- None (Room air) No Pain    11/26/24 14:12:33 98.3 °F (36.8 °C) 72 16 154/72 99 96 % -- --    11/26/24 1209 -- -- -- -- -- -- -- No Pain    11/26/24 1053 -- -- -- -- -- -- -- No Pain    11/26/24 0900 -- 74 -- 158/73 105 95 % -- --    11/26/24 0634 -- 67 15 167/77 -- 98 % None (Room air) --    11/26/24 0115 -- 66 -- 168/79 114 93 % -- --    11/25/24 2230 -- 76 14 160/76 109 97 % -- --    11/25/24 1719 98.3 °F (36.8 °C) 75 18 169/71 -- 98 % None (Room air) --            Pertinent Labs/Diagnostic Test Results:   Radiology:  XR abdomen 1 view kub   Final  Interpretation by Matt Ku MD (11/27 0029)      Fluoroscopy provided for procedure guidance.      Please refer to the separate procedure note for additional details.                  Workstation performed: [id number]         US thyroid   Final Interpretation by Curtis Marie MD (11/26 2590)      The following meet current ACR criteria for recommending ultrasound guided biopsy:   Right nodule 2      The left nodules 1 and 2 meet criteria for ultrasound follow-up in 1, 2, 3 and 5 years.      Reference: ACR Thyroid Imaging, Reporting and Data System (TI-RADS): White Paper of the ACR TI-RADS Committee. J AM Bambi Radiol 2017;14:587-595. Additional recommendations based on American Thyroid Association 2015 guidelines.         Workstation performed: [id number]         XR chest pa and lateral   Final Interpretation by Albert Ace DO (11/26 1215)      Stent projects over the proximal esophagus.      No evidence of acute cardiopulmonary abnormality.            Resident: Luis Santos I, the attending radiologist, have reviewed the images and agree with the final report above.      Workstation performed: [id number]           Cardiology:  No orders to display     GI:  EGD Fluoro   Final Result by Bárbara Brady MD (11/26 1706)   Fully covered metal stent was visualized in the upper third of the    esophagus. The stent was removed. The stent was replaced with a fully    covered metal stent measuring 18 mm x 12 cm.   The stomach, duodenal bulb and 2nd part of the duodenum appeared normal.         RECOMMENDATION:   Follow clinically and calorie counts   Consider PEG tube placement if not able to tolerate PO           Bárbara Brady MD               Results from last 7 days   Lab Units 11/27/24  0455 11/26/24  0612 11/25/24  2048 11/22/24  0517 11/21/24  0533   WBC Thousand/uL 5.62 8.13 8.24 6.65 6.81   HEMOGLOBIN g/dL 12.3 12.0 13.5 12.1 11.8*   HEMATOCRIT % 38.9 37.2 41.8 37.0 36.6    PLATELETS Thousands/uL 153 146*  149 182 149 165   TOTAL NEUT ABS Thousands/µL 4.58 6.72 6.46 4.90 5.10         Results from last 7 days   Lab Units 11/27/24 0455 11/26/24 0612 11/25/24 2048 11/22/24  0517 11/21/24  0533   SODIUM mmol/L 140 143 145 143 145   POTASSIUM mmol/L 3.4* 3.6 3.8 3.3* 3.5   CHLORIDE mmol/L 104 108 107 107 111*   CO2 mmol/L 27 28 27 28 25   ANION GAP mmol/L 9 7 11 8 9   BUN mg/dL 10 19 27* 20 25   CREATININE mg/dL 0.62 0.57* 0.68 0.58* 0.61   EGFR ml/min/1.73sq m 97 100 93 99 97   CALCIUM mg/dL 8.5 8.3* 9.3 8.3* 8.4   MAGNESIUM mg/dL  --   --   --  2.0 2.1     Results from last 7 days   Lab Units 11/27/24 0455 11/22/24 0517 11/21/24  0533   AST U/L 15 21 23   ALT U/L 18 18 19   ALK PHOS U/L 67 58 57   TOTAL PROTEIN g/dL 5.9* 5.7* 5.6*   ALBUMIN g/dL 3.6 3.5 3.6   TOTAL BILIRUBIN mg/dL 0.81 0.65 0.60         Results from last 7 days   Lab Units 11/27/24 0455 11/26/24 0612 11/25/24 2048 11/22/24  0517 11/21/24  0533   GLUCOSE RANDOM mg/dL 123 111 85 96 105         Past Medical History:   Diagnosis Date    GERD (gastroesophageal reflux disease)     Hyperlipidemia     Hypertension      Present on Admission:   GERD (gastroesophageal reflux disease)   Hypertension   Weight loss   Dysphagia      Admitting Diagnosis: 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 male    Network Utilization Review Department  ATTENTION: Please call with any questions or concerns to [phone] and carefully listen to the prompts so that you are directed to the right person. All voicemails are confidential.   For Discharge needs, contact Care Management DC Support Team at [phone] opt. 2  Send all requests for admission clinical reviews, approved or denied determinations and any other requests to dedicated fax number below belonging to the campus where the patient is  receiving treatment. List of dedicated fax numbers for the Facilities:  FACILITY NAME UR FAX NUMBER   ADMISSION DENIALS (Administrative/Medical Necessity) [phone]   DISCHARGE SUPPORT TEAM (NETWORK) [phone]   PARENT CHILD HEALTH (Maternity/NICU/Pediatrics) [phone]   [facility] [phone]   [facility] [phone]   [facility] [phone]   [facility] [phone]   [facility] [phone]   [facility] [phone]   [facility] [phone]   [facility] [phone]   [facility] [phone]   Critical access hospital [phone]   [facility] [phone]   [facility] [phone]

## 2024-11-26 NOTE — ANESTHESIA POSTPROCEDURE EVALUATION
Post-Op Assessment Note    CV Status:  Stable  Pain Score: 0    Pain management: adequate       Mental Status:  Alert and awake   Hydration Status:  Euvolemic   PONV Controlled:  Controlled   Airway Patency:  Patent     Post Op Vitals Reviewed: Yes    No anethesia notable event occurred.    Staff: Anesthesiologist           Last Filed PACU Vitals:  Vitals Value Taken Time   Temp     Pulse     BP     Resp     SpO2         Modified Melissa:  Activity: 2 (11/26/2024  3:33 PM)  Respiration: 2 (11/26/2024  3:33 PM)  Circulation: 2 (11/26/2024  3:33 PM)  Consciousness: 2 (11/26/2024  3:33 PM)  Oxygen Saturation: 2 (11/26/2024  3:33 PM)  Modified Melissa Score: 10 (11/26/2024  3:33 PM)

## 2024-11-26 NOTE — CONSULTS
Consultation - Surgery-General   Name: Hector Carrera 73 y.o. 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 Stopped tolerating PO on 11/23  Good functional status for surgical candidacy  Plan:  Follow up GI EGD. J-tube consideration depending on EGD outcomes  Hypertension    GERD (gastroesophageal reflux disease)    Weight loss      History of Present Illness   Consult for dysphagia (possible J tube) with newly diagnosed esophageal cnacer with stent in place. Hector Carrera is a 73 y.o. male with hx of newly diagnosed distal esophageal carcinoma (36cm from incisors) with stent placed on 11/19. 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 He had an EGD for stent placement with repeat biopsy (pending) on 11/19. He currently presents with recurrent dysphagia, inability to tolerate any OP. He had a stent placed by GI on 11/19. He stopped being able to mechanically get anything down without regurgitation starting on 11/23 (4 days ago). His biopsy results came back yesterday, so no surgery team involved had been involved yet.    For nutritional/functional status, he has has had significant weight loss (50lbs over year, 10lbs in last two weeks). He has excellent functional status, does yard work, cooks/cleans, drives, lives with wife and son.    Review of Systems   Constitutional:  Positive for fatigue. Negative for fever.   Eyes: Negative.    Respiratory: Negative.     Cardiovascular: Negative.    Gastrointestinal: Negative.   Negative for abdominal distention, abdominal pain and nausea.        Unable to mechanically get down liquids or solids. Spits these up  No nausea   Endocrine: Negative.    Genitourinary: Negative.    Musculoskeletal: Negative.    Skin: Negative.    Neurological: Negative.    Hematological: Negative.      Objective :  Temp:  [98.3 °F (36.8 °C)] 98.3 °F (36.8 °C)  HR:  [66-76] 67  BP: (160-169)/(71-79) 167/77  Resp:  [14-18] 15  SpO2:  [93 %-98 %] 98 %  O2 Device: None (Room air)      Physical Exam  Constitutional:       Appearance: Normal appearance.   HENT:      Head: Normocephalic and atraumatic.      Mouth/Throat:      Mouth: Mucous membranes are moist.   Eyes:      Extraocular Movements: Extraocular movements intact.   Cardiovascular:      Rate and Rhythm: Normal rate and regular rhythm.   Pulmonary:      Effort: Pulmonary effort is normal.   Abdominal:      General: Abdomen is flat.      Palpations: Abdomen is soft.   Musculoskeletal:         General: Normal range of motion.      Cervical back: Normal range of motion and neck supple.   Skin:     General: Skin is warm and dry.   Neurological:      General: No focal deficit present.      Mental Status: He is alert and oriented to person, place, and time.         Lab Results: I have reviewed the following results:  Recent Labs     11/26/24  0612   WBC 8.13   HGB 12.0   HCT 37.2   *  149   SODIUM 143   K 3.6      CO2 28   BUN 19   CREATININE 0.57*   GLUC 111

## 2024-11-26 NOTE — ASSESSMENT & PLAN NOTE
Patient with malignant neoplasm of the esophagus.  GI is following who placed the stent as noted above we will also get the oncology team to let the hand.

## 2024-11-26 NOTE — ED ATTENDING ATTESTATION
11/25/2024  I, Talib Mills DO, saw and evaluated the patient. I have discussed the patient with the resident/non-physician practitioner and agree with the resident's/non-physician practitioner's findings, Plan of Care, and MDM as documented in the resident's/non-physician practitioner's note, except where noted. All available labs and Radiology studies were reviewed.  I was present for key portions of any procedure(s) performed by the resident/non-physician practitioner and I was immediately available to provide assistance.       At this point I agree with the current assessment done in the Emergency Department.  I have conducted an independent evaluation of this patient a history and physical is as follows:  Patient is a 73-year-old male with history of hyperlipidemia, hypertension, GERD, accompanied by his family.  The patient was hospitalized November 16 through November 22, 2024 for dysphagia, esophageal stricture versus mass, had EGD placed on November 19, and since then he has been still having a hard time swallowing liquids.  No fever, no chills, no pain anywhere, no abdominal pain, but over the last several days having increased vomiting of liquids and is not able to keep things down.  Patient was called today by his gastroenterology office, advised that the workup performed during the hospital was concerning for possibly a esophageal carcinoma and recommend the patient be admitted for possible feeding tube placement and oncology management.  Family indicates patient is otherwise acting normally    General:  Patient is well-appearing  Head:  Atraumatic  Eyes:  Conjunctiva pink  ENT:  Mucous membranes are dry, No swelling of the posterior pharynx. No tonsillar enlargement, exudate, lesions. No swelling in the floor of the mouth. No uvula deviation. No trismus.  Neck:  Supple  Cardiac:  S1-S2, without murmurs  Lungs:  Clear to auscultation bilaterally  Abdomen:  Soft, nontender, normal bowel sounds, no  CVA tenderness, no tympany, no rigidity, no guarding  Extremities:  Normal range of motion  Neurologic:  Awake, fluent speech, normal comprehension. AAOx3.   Skin:  Pink warm and dry  Psychiatric:  Alert, pleasant, cooperative          ED Course     Labs Reviewed   CBC AND DIFFERENTIAL - Abnormal       Result Value Ref Range Status    WBC 8.24  4.31 - 10.16 Thousand/uL Final    RBC 4.64  3.88 - 5.62 Million/uL Final    Hemoglobin 13.5  12.0 - 17.0 g/dL Final    Hematocrit 41.8  36.5 - 49.3 % Final    MCV 90  82 - 98 fL Final    MCH 29.1  26.8 - 34.3 pg Final    MCHC 32.3  31.4 - 37.4 g/dL Final    RDW 12.9  11.6 - 15.1 % Final    MPV 10.6  8.9 - 12.7 fL Final    Platelets 182  149 - 390 Thousands/uL Final    nRBC 0  /100 WBCs Final    Segmented % 78 (*) 43 - 75 % Final    Immature Grans % 1  0 - 2 % Final    Lymphocytes % 11 (*) 14 - 44 % Final    Monocytes % 8  4 - 12 % Final    Eosinophils Relative 2  0 - 6 % Final    Basophils Relative 0  0 - 1 % Final    Absolute Neutrophils 6.46  1.85 - 7.62 Thousands/µL Final    Absolute Immature Grans 0.06  0.00 - 0.20 Thousand/uL Final    Absolute Lymphocytes 0.91  0.60 - 4.47 Thousands/µL Final    Absolute Monocytes 0.66  0.17 - 1.22 Thousand/µL Final    Eosinophils Absolute 0.13  0.00 - 0.61 Thousand/µL Final    Basophils Absolute 0.02  0.00 - 0.10 Thousands/µL Final   BASIC METABOLIC PANEL - Abnormal    Sodium 145  135 - 147 mmol/L Final    Potassium 3.8  3.5 - 5.3 mmol/L Final    Chloride 107  96 - 108 mmol/L Final    CO2 27  21 - 32 mmol/L Final    ANION GAP 11  4 - 13 mmol/L Final    BUN 27 (*) 5 - 25 mg/dL Final    Creatinine 0.68  0.60 - 1.30 mg/dL Final    Comment: Standardized to IDMS reference method    Glucose 85  65 - 140 mg/dL Final    Comment: If the patient is fasting, the ADA then defines impaired fasting glucose as > 100 mg/dL and diabetes as > or equal to 123 mg/dL.    Calcium 9.3  8.4 - 10.2 mg/dL Final    eGFR 93  ml/min/1.73sq m Final    Narrative:      National Kidney Disease Foundation guidelines for Chronic Kidney Disease (CKD):     Stage 1 with normal or high GFR (GFR > 90 mL/min/1.73 square meters)    Stage 2 Mild CKD (GFR = 60-89 mL/min/1.73 square meters)    Stage 3A Moderate CKD (GFR = 45-59 mL/min/1.73 square meters)    Stage 3B Moderate CKD (GFR = 30-44 mL/min/1.73 square meters)    Stage 4 Severe CKD (GFR = 15-29 mL/min/1.73 square meters)    Stage 5 End Stage CKD (GFR <15 mL/min/1.73 square meters)  Note: GFR calculation is accurate only with a steady state creatinine   PLATELET COUNT     Patient reassessed, feeling comfortable.  Patient has dysphagia, recently diagnosed esophageal cancer, unable to completely swallow liquids and adequately hydrated home.  Case discussed with admitting medicine physician and patient accepted for admission.    The patient was evaluated for sepsis in the emergency department. After that assessment, at the time of admission, there was no evidence of severe sepsis or septic shock or indication that the patient should be included in the SEP-1 CMS quality measure.      MEDICAL DECISION MAKING CODING    COLLECTION AND INTERPRETATION OF DATA  I reviewed prior external notes, including November 22, 2024 hospital discharge summary    I ordered each unique test  Tests reviewed personally by me:  Labs: See above            Critical Care Time  Procedures

## 2024-11-26 NOTE — ASSESSMENT & PLAN NOTE
Patient with dysphagia secondary to esophageal mass.  Unable to tolerate liquids or solids despite stent being placed within the past 24 hours.  Will reconsult gastroenterology and oncology.  Will start IV fluids although at this point metabolically doing well

## 2024-11-26 NOTE — ASSESSMENT & PLAN NOTE
"Patient recently discharged on 11/22 after admission for progressive dysphagia and recent EGD showing \"worsening lower esophageal stricture that was inflamed, nodular and unable to be traversed.\" S/P EGD with stent placement on 11/19.  Presented to ER as still was unable to tolerate much by mouth despite stent.  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

## 2024-11-26 NOTE — ASSESSMENT & PLAN NOTE
Patient with weight loss likely secondary to decreased p.o. intake as well as the malignancy.  Distant history of smoking

## 2024-11-26 NOTE — CONSULTS
Consultation - Gastroenterology   Name: Hector Carrera 73 y.o. male I MRN: [id number]  Unit/Bed#: ED 21 I Date of Admission: 11/25/2024   Date of Service: 11/26/2024 I Hospital Day: 1   Inpatient consult to gastroenterology  Consult performed by: Ade Bowen MD  Consult ordered by: Horace Sheffield MD        Physician Requesting Evaluation: Peter Nagy   Reason for Evaluation / Principal Problem: Malignant neoplasm of the esophagus, esophageal stricture    Assessment & Plan  Dysphagia  Briefly, this is a 73-year-old male with a history of tobacco use, progressive dysphagia and weight loss over the last few months.  He has undergone multiple EGDs with findings of esophageal stricture s/p esophageal stent placement on 11/19 with updated biopsy findings now concerning for esophageal malignancy.  He reports difficulty with keeping down fluids    Plan  -Obtain chest x-ray to ensure stent still in good position  -Will plan for EGD today with Slim scope to evaluate for stent occlusion  -Keep n.p.o.  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 male  20pack year tobacco use hx, recent diagnosis of esophageal stricture s/p esophageal stent placement presented with c/o progressive dysphagia.    Initially presented a few months ago with complaints of progressive dysphagia and weight loss.  CT scan showed asymmetric right-sided esophageal thickening with surrounding inflammation.  He underwent the initial EGD on 9/20 which showed severe esophagitis, gastritis, duodenitis.  Pathology showed reactive atypia with no dysplasia.  He was started on high-dose PPI with plans for repeat EGD.  He represented again with similar complaints and underwent repeat EGD on 11/8 that showed severe inflammation, stricture with inability to pass the scope  through the distal esophagus.  CT chest abdomen pelvis obtained 11/17/24 distal esophageal wall thickening with surrounding inflammation. Retained fluid contents up to the level of his neck. No suspicious lymphadenopathy Preliminary results of patient's pathology from esophageal biopsy on 11/8/2024 returned with findings of highly atypical glandular epithelium, pending outside consultation at that time.  He underwent another EGD with Slim scope on fluoroscopy with repeat biopsy and stent placement on 11/19.  He had improvement in his symptoms and was started on full liquid diet along with nutritional shakes.  He was discharged on 11/22.  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     He now returns to the ED with continued dysphagia and intolerance to fluids.          Review of Systems  See HPI    Objective :  Temp:  [98.3 °F (36.8 °C)] 98.3 °F (36.8 °C)  HR:  [66-76] 74  BP: (158-169)/(71-79) 158/73  Resp:  [14-18] 15  SpO2:  [93 %-98 %] 95 %  O2 Device: None (Room air)    Physical Exam  Vitals and nursing note reviewed.   Constitutional:       General: He is not in acute distress.     Appearance: He is well-developed.   HENT:      Head: Normocephalic and atraumatic.   Eyes:      Conjunctiva/sclera: Conjunctivae normal.   Cardiovascular:      Rate and Rhythm: Normal rate and regular rhythm.      Heart sounds: No murmur heard.  Pulmonary:      Effort: Pulmonary effort is normal. No respiratory distress.      Breath sounds: Normal breath sounds.   Abdominal:      Palpations: Abdomen is soft.      Tenderness: There is no abdominal tenderness.   Musculoskeletal:         General: No swelling.      Cervical back: Neck supple.   Skin:     General: Skin is warm and dry.      Capillary Refill: Capillary refill takes less than 2 seconds.   Neurological:      Mental Status: He is alert.   Psychiatric:         Mood  and Affect: Mood normal.           Lab Results: I have reviewed the following results:CBC/BMP:   .     11/26/24  0612   WBC 8.13   HGB 12.0   HCT 37.2   *  149   SODIUM 143   K 3.6      CO2 28   BUN 19   CREATININE 0.57*   GLUC 111        Imaging Results Review: No pertinent imaging studies reviewed.  Other Study Results Review: No additional pertinent studies reviewed.    Ade Bowen MD  Gastroenterology Fellow, PGY- 4  Available on [employer] Secure Chat  11/26/2024 12:34 PM

## 2024-11-26 NOTE — PROGRESS NOTES
"Progress Note - Hospitalist   Name: Hector Carrera 73 y.o. male I MRN: [id number]  Unit/Bed#: ED 21 I Date of Admission: 11/25/2024   Date of Service: 11/26/2024 I Hospital Day: 1    Assessment & Plan  Dysphagia  Patient recently discharged on 11/22 after admission for progressive dysphagia and recent EGD showing \"worsening lower esophageal stricture that was inflamed, nodular and unable to be traversed.\" S/P EGD with stent placement on 11/19.  Presented to ER as still was unable to tolerate much by mouth despite stent.  Appreciate GI input--currently NPO with IVF pending their evaluation  Appreciate oncology input in light of pathology results showing \"focal intramucosal carcinoma in the background of high grade dysplasia of the esophagus\"  Hypertension  Typically on norvasc and lisinopril however both on hold given NPO status/dysphagia  Place on IV enalapril q6h scheduled for now  GERD (gastroesophageal reflux disease)  Continue PPI IV BID  Weight loss  Likely 2/2 poor oral intake in setting of malignancy  Appreciate GI and oncology inputs  Malignant neoplasm of upper third of esophagus (HCC)  Newly diagnosed as above, based on pathology report  S/p stent placement on 11/19  Awaiting GI and oncology input    VTE Pharmacologic Prophylaxis: VTE Score: 3 Moderate Risk (Score 3-4) - Pharmacological DVT Prophylaxis Ordered: enoxaparin (Lovenox).    Mobility:      -[facility] Goal achieved. Continue to encourage appropriate mobility.    Patient Centered Rounds: I performed bedside rounds with nursing staff today.   Discussions with Specialists or Other Care Team Provider: appreciate GI and oncology inputs     Education and Discussions with Family / Patient: Patient declined call to .     Current Length of Stay: 1 day(s)  Current Patient Status: Inpatient   Certification Statement: The patient will continue to require additional inpatient hospital stay due to as above  Discharge Plan: Anticipate discharge in 48-72 " hrs to home.    Code Status: Level 1 - Full Code    Subjective   Pt feels alright this AM. Awaiting to hear GI plan.     Objective :  Temp:  [98.3 °F (36.8 °C)] 98.3 °F (36.8 °C)  HR:  [66-76] 67  BP: (160-169)/(71-79) 167/77  Resp:  [14-18] 15  SpO2:  [93 %-98 %] 98 %  O2 Device: None (Room air)    There is no height or weight on file to calculate BMI.     Input and Output Summary (last 24 hours):     Intake/Output Summary (Last 24 hours) at 11/26/2024 0739  Last data filed at 11/26/2024 0610  Gross per 24 hour   Intake 1000 ml   Output 300 ml   Net 700 ml       Physical Exam  Vitals and nursing note reviewed.   Constitutional:       General: He is not in acute distress.     Appearance: He is underweight.   Cardiovascular:      Rate and Rhythm: Normal rate and regular rhythm.   Pulmonary:      Effort: No respiratory distress.   Abdominal:      General: There is no distension.   Musculoskeletal:      Right lower leg: No edema.      Left lower leg: No edema.   Neurological:      Mental Status: He is alert and oriented to person, place, and time.   Psychiatric:         Mood and Affect: Mood normal.           Lines/Drains:              Lab Results: I have reviewed the following results:   Results from last 7 days   Lab Units 11/26/24  0612   WBC Thousand/uL 8.13   HEMOGLOBIN g/dL 12.0   HEMATOCRIT % 37.2   PLATELETS Thousands/uL 146*  149   SEGS PCT % 82*   LYMPHO PCT % 6*   MONO PCT % 8   EOS PCT % 3     Results from last 7 days   Lab Units 11/26/24  0612 11/25/24 2048 11/22/24  0517   SODIUM mmol/L 143   < > 143   POTASSIUM mmol/L 3.6   < > 3.3*   CHLORIDE mmol/L 108   < > 107   CO2 mmol/L 28   < > 28   BUN mg/dL 19   < > 20   CREATININE mg/dL 0.57*   < > 0.58*   ANION GAP mmol/L 7   < > 8   CALCIUM mg/dL 8.3*   < > 8.3*   ALBUMIN g/dL  --   --  3.5   TOTAL BILIRUBIN mg/dL  --   --  0.65   ALK PHOS U/L  --   --  58   ALT U/L  --   --  18   AST U/L  --   --  21   GLUCOSE RANDOM mg/dL 111   < > 96    < > = values in  this interval not displayed.                       Recent Cultures (last 7 days):         Imaging Results Review: No pertinent imaging studies reviewed.  Other Study Results Review: No additional pertinent studies reviewed.    Last 24 Hours Medication List:     Current Facility-Administered Medications:     dextrose 5 % and sodium chloride 0.45 % with KCl 20 mEq/L infusion, Continuous, Last Rate: 125 mL/hr (11/26/24 0006)    Enalaprilat (VASOTEC) injection 1.25 mg, Q6H    enoxaparin (LOVENOX) subcutaneous injection 40 mg, Daily    pantoprazole (PROTONIX) injection 40 mg, Q12H MARTHA    Administrative Statements   Today, Patient Was Seen By: Stacey Gorman PA-C      **Please Note: This note may have been constructed using a voice recognition system.**

## 2024-11-26 NOTE — ASSESSMENT & PLAN NOTE
Briefly, this is a 73-year-old male with a history of tobacco use, progressive dysphagia and weight loss over the last few months.  He has undergone multiple EGDs with findings of esophageal stricture s/p esophageal stent placement on 11/19 with updated biopsy findings now concerning for esophageal malignancy.  He reports difficulty with keeping down fluids    Plan  -Obtain chest x-ray to ensure stent still in good position  -Will plan for EGD today with Slim scope to evaluate for stent occlusion  -Keep n.p.o.  -Surgical oncology consulted appreciate recommendations

## 2024-11-26 NOTE — ASSESSMENT & PLAN NOTE
Patient is on a calcium channel blocker and ACE inhibitor but able to tolerate p.o.  Unguinal try sips of meds but if this cannot work may need IV medication

## 2024-11-26 NOTE — CONSULTS
"  [facility]On[facility]  Consultation Note  Encounter: [id number]     PATIENT INFO     Name: Hector Carrera  YOB: 1950   Age: 73 y.o.   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 Repeat samples performed last week, if similarly inconclusive would need PET scan to determine if metastasis is present.     Obtain US Thyroid  Obtain thyroglobulin, thyroid levels  Will need CT chest in less than 3 months  Speech consult recommended, consider tube feeds  F/u results of recent GI biopsy on 11/19, reach out to path to expedite.  Follow-up GI recs  Outpt heme onc follow-up     HISTORY OF PRESENT ILLNESS       Hector Carrera is a 73 y.o. male past medical history of hypothyroidism, hypertension, hyperlipidemia, recent treatment of thrush who was recently hospitalized from 11/16-11/22 for esophageal stricture and worsening dysphagia.  Patient initially complained of dysphagia back in September where he was referred to gastroenterology who initially performed EGD showing gastritis with no dysplasia.  Patient had complained of 5 pound weight loss at that time.  Repeat EGD 11/8 showing focal intramucosal carcinoma in the background of high-grade columnar epithelial dysplasia and cardiac type mucosa with adjoining reactive squamous mucosa.  Ki-67 index is elevated and p53 shows wild-type staining pattern.  Patient subsequently continued to have dysphagia and in November of this month was hospitalized for dysphagia as described above.  Ended up getting esophageal stent placed by GI 11/19.  Patient was discharged home however continued to have worsening " difficulty swallowing and now complaining of difficulty swallowing liquids.  Hematology being consulted for concerns of worsening malignant neoplasm of the esophagus and esophageal stricture.     REVIEW OF SYSTEMS     CONSTITUTIONAL: Denies any fever, chills, rigors, and weight loss  HEENT: No earache or tinnitus, denies hearing loss or visual disturbances  CARDIOVASCULAR: No chest pain or palpitations  RESPIRATORY: Denies any cough, hemoptysis, shortness of breath or dyspnea on exertion  GASTROINTESTINAL: As noted in the History of Present Illness  GENITOURINARY: No problems with urination, denies any hematuria or dysuria  NEUROLOGIC: No dizziness or vertigo, denies headaches   MUSCULOSKELETAL: Denies any muscle or joint pain   SKIN: Denies skin rashes or itching  ENDOCRINE: Denies excessive thirst, denies intolerance to heat or cold  PSYCHOSOCIAL: Denies depression or anxiety, denies any recent memory loss     Historical Information   Past Medical History:   Diagnosis Date    GERD (gastroesophageal reflux disease)     Hyperlipidemia     Hypertension      Past Surgical History:   Procedure Laterality Date    NO PAST SURGERIES       Social History   Social History     Substance and Sexual Activity   Alcohol Use Not Currently     Social History     Substance and Sexual Activity   Drug Use Never     Social History     Tobacco Use   Smoking Status Former    Types: Cigarettes    Passive exposure: Past   Smokeless Tobacco Never     Family History   Problem Relation Age of Onset    No Known Problems Mother     No Known Problems Father     No Known Problems Maternal Grandmother     No Known Problems Maternal Grandfather     No Known Problems Paternal Grandmother     No Known Problems Paternal Grandfather     Colon cancer Neg Hx     Esophageal cancer Neg Hx         MEDICATIONS & ALLERGIES     Meds/Allergies   Not in a hospital admission.    Current Facility-Administered Medications:     dextrose 5 % and sodium chloride 0.45  % with KCl 20 mEq/L infusion, Continuous, Last Rate: 125 mL/hr (11/26/24 0820)    Enalaprilat (VASOTEC) injection 1.25 mg, Q6H    enoxaparin (LOVENOX) subcutaneous injection 40 mg, Daily    pantoprazole (PROTONIX) injection 40 mg, Q12H MARTHA  Allergies   Allergen Reactions    Nuts - Food Allergy Throat Swelling     Tree nuts        PHYSICAL EXAM     Objective   Blood pressure 167/77, pulse 67, temperature 98.3 °F (36.8 °C), temperature source Tympanic, resp. rate 15, SpO2 98%. There is no height or weight on file to calculate BMI.    Intake/Output Summary (Last 24 hours) at 11/26/2024 0839  Last data filed at 11/26/2024 0610  Gross per 24 hour   Intake 1000 ml   Output 300 ml   Net 700 ml     Medication Administration - last 24 hours from 11/25/2024 0839 to 11/26/2024 0839         Date/Time Order Dose Route Action Action by     11/26/2024 0007 EST sodium chloride 0.9 % bolus 1,000 mL 0 mL Intravenous Stopped Leticia Eduardo RN     11/25/2024 2049 EST sodium chloride 0.9 % bolus 1,000 mL 1,000 mL Intravenous New Bag Joan Zapien RN     11/26/2024 0820 EST dextrose 5 % and sodium chloride 0.45 % with KCl 20 mEq/L infusion 125 mL/hr Intravenous New Bag Jody Johnson RN     11/26/2024 0006 EST dextrose 5 % and sodium chloride 0.45 % with KCl 20 mEq/L infusion 125 mL/hr Intravenous New Bag Leticia Eduardo RN     11/25/2024 2206 EST potassium chloride 20 mEq IVPB (premix) 20 mEq Intravenous Not Given Joan Zapien RN            General Appearance:   Alert, cooperative, no distress   HEENT:   Normocephalic, atraumatic, anicteric     Lungs:   Equal chest rise, respirations unlabored    Heart:   Regular rate and rhythm   Abdomen:   Soft, non-tender, non-distended; normal bowel sounds; no masses, no organomegaly    Extremities:   No cyanosis, clubbing or edema    Neuro:   Moves all 4 extremities    Skin:   No jaundice, rashes, or lesions      Invasive Devices       Peripheral Intravenous Line  Duration              Peripheral IV 11/25/24 Left;Proximal;Ventral (anterior) Forearm <1 day                     LABORATORY RESULTS     Admission on 11/25/2024   Component Date Value    WBC 11/25/2024 8.24     RBC 11/25/2024 4.64     Hemoglobin 11/25/2024 13.5     Hematocrit 11/25/2024 41.8     MCV 11/25/2024 90     MCH 11/25/2024 29.1     MCHC 11/25/2024 32.3     RDW 11/25/2024 12.9     MPV 11/25/2024 10.6     Platelets 11/25/2024 182     nRBC 11/25/2024 0     Segmented % 11/25/2024 78 (H)     Immature Grans % 11/25/2024 1     Lymphocytes % 11/25/2024 11 (L)     Monocytes % 11/25/2024 8     Eosinophils Relative 11/25/2024 2     Basophils Relative 11/25/2024 0     Absolute Neutrophils 11/25/2024 6.46     Absolute Immature Grans 11/25/2024 0.06     Absolute Lymphocytes 11/25/2024 0.91     Absolute Monocytes 11/25/2024 0.66     Eosinophils Absolute 11/25/2024 0.13     Basophils Absolute 11/25/2024 0.02     Sodium 11/25/2024 145     Potassium 11/25/2024 3.8     Chloride 11/25/2024 107     CO2 11/25/2024 27     ANION GAP 11/25/2024 11     BUN 11/25/2024 27 (H)     Creatinine 11/25/2024 0.68     Glucose 11/25/2024 85     Calcium 11/25/2024 9.3     eGFR 11/25/2024 93     Platelets 11/26/2024 149     MPV 11/26/2024 10.6     Sodium 11/26/2024 143     Potassium 11/26/2024 3.6     Chloride 11/26/2024 108     CO2 11/26/2024 28     ANION GAP 11/26/2024 7     BUN 11/26/2024 19     Creatinine 11/26/2024 0.57 (L)     Glucose 11/26/2024 111     Calcium 11/26/2024 8.3 (L)     eGFR 11/26/2024 100     WBC 11/26/2024 8.13     RBC 11/26/2024 4.17     Hemoglobin 11/26/2024 12.0     Hematocrit 11/26/2024 37.2     MCV 11/26/2024 89     MCH 11/26/2024 28.8     MCHC 11/26/2024 32.3     RDW 11/26/2024 12.8     MPV 11/26/2024 10.2     Platelets 11/26/2024 146 (L)     nRBC 11/26/2024 0     Segmented % 11/26/2024 82 (H)     Immature Grans % 11/26/2024 1     Lymphocytes % 11/26/2024 6 (L)     Monocytes % 11/26/2024 8     Eosinophils Relative 11/26/2024 3     Basophils  Relative 11/26/2024 0     Absolute Neutrophils 11/26/2024 6.72     Absolute Immature Grans 11/26/2024 0.04     Absolute Lymphocytes 11/26/2024 0.52 (L)     Absolute Monocytes 11/26/2024 0.62     Eosinophils Absolute 11/26/2024 0.21     Basophils Absolute 11/26/2024 0.02         IMAGING RESULTS     EGD Fluoro  Result Date: 11/19/2024  Narrative: Table formatting from the original result was not included. [facility] [address] [facility] [address] [phone] DATE OF SERVICE: 11/19/24 PHYSICIAN(S): Attending: Bárbara Brady MD Fellow: No Staff Documented INDICATION: Esophageal stricture POST-OP DIAGNOSIS: See the impression below. PREPROCEDURE: Informed consent was obtained for the procedure, including sedation.  Risks of perforation, hemorrhage, adverse drug reaction and aspiration were discussed. The patient was placed in the left lateral decubitus position. Patient was explained about the risks and benefits of the procedure. Risks including but not limited to bleeding, infection, and perforation were explained in detail. Also explained about less than 100% sensitivity with the exam and other alternatives. PROCEDURE: EGD DETAILS OF PROCEDURE: Patient was taken to the procedure room where a time out was performed to confirm correct patient and correct procedure. The patient underwent monitored anesthesia care, which was administered by an anesthesia professional. The patient's blood pressure, heart rate, level of consciousness, respirations, oxygen, ECG and ETCO2 were monitored throughout the procedure. The scope was introduced through the mouth and advanced to the second part of the duodenum. Retroflexion was performed in the fundus. The patient experienced no blood loss. The procedure was not difficult. The patient tolerated the procedure well. There were no apparent adverse events. ANESTHESIA INFORMATION: ASA: II Anesthesia Type: General MEDICATIONS: No administrations occurring  from 1512 to 1553 on 11/19/24 FINDINGS: Indeterminate stricture (traversable) in the GE junction; performed cold forceps biopsy; successfully placed 18 mm x 6 cm fully covered stent using guidewire. The stricture was only traversable with the slim XP endoscope.  Biopsies were obtained with pediatric biopsy forceps.  A wire was advanced into the gastric lumen.  Initially a double lumen opposing stent was deployed thinking the stricture was approximately 1 cm in length however this did not fully expand due to length of stricture.  This was then exchanged for fully covered agile stent.  This was deployed successfully.  There was significant narrowing in the distal esophagus at the area of the stent as well. The stomach, duodenal bulb and 2nd part of the duodenum appeared normal. SPECIMENS: ID Type Source Tests Collected by Time Destination 1 : esophageal stricture bx Tissue Esophagus TISSUE EXAM Bárbara Brady MD 11/19/2024  3:30 PM      Impression: Successful placement of esophageal stent in the distal esophagus area of the diaphyseal stricture.  Biopsies were obtained. RECOMMENDATION: Await pathology.  Further recommendations to follow   Bárbara Brady MD     CT chest abdomen pelvis w contrast  Result Date: 11/18/2024  Narrative: CT CHEST, ABDOMEN AND PELVIS WITH IV CONTRAST INDICATION: esophageal stricture, evaluate for mass, growth compared to Sept scan, lymphadenopathy. COMPARISON: None. TECHNIQUE: CT examination of the chest, abdomen and pelvis was performed. Multiplanar 2D reformatted images were created from the source data. This examination, like all CT scans performed in the [facility], was performed utilizing techniques to minimize radiation dose exposure, including the use of iterative reconstruction and automated exposure control. Radiation dose length product (DLP) for this visit: 1108.25 mGy-cm IV Contrast: 100 mL of iohexol (OMNIPAQUE) Enteric Contrast: Not administered. FINDINGS:  CHEST LUNGS: Cluster of ill-defined nodular opacities with mild surrounding groundglass opacity in the right lower lobe. Largest individual nodule measures 1 cm (3/110) with the others on the subsequent images. Few additional areas of small groundglass nodules  such as 3 mm left lower lobe not (3/90) and tiny right middle lobe nodules (3/241 and 160 for example). There is a 6 mm solid appearing right upper lobe nodule (3/120). Central airways are patent.. PLEURA: Unremarkable. HEART/GREAT VESSELS: Heart is unremarkable for patient's age. No thoracic aortic aneurysm. Coronary artery and aortic calcifications. MEDIASTINUM AND ARMAND: Distended esophagus with focal narrowing distally where there is esophageal wall thickening. Inflammation surrounding the lower esophagus with small amount of free fluid. No extraluminal gas identified. No suspicious lymphadenopathy. Prominent calcified subcarinal lymph node is noted. CHEST WALL AND LOWER NECK: Unremarkable. ABDOMEN LIVER/BILIARY TREE: Unremarkable. GALLBLADDER: No calcified gallstones. No pericholecystic inflammatory change. SPLEEN: Unremarkable. Splenule in the left upper quadrant. PANCREAS: Unremarkable. ADRENAL GLANDS: Unremarkable. KIDNEYS/URETERS: 5 mm nonobstructing left lower pole calculus (601/85) with punctate nonobstructing calculus in the right kidney (same image). No hydronephrosis. Multiple bilateral simple and parapelvic renal cysts. Largest on the right is a 2 cm upper pole simple cyst (601/106, and largest on the left is a 5.5 cm medial upper pole simple cyst (601/107). 2.8 cm partially exophytic lesion in the lateral aspect of the right kidney (series 2 image 248) which measures mildly above simple fluid density. There are also numerous subcentimeter hypoattenuating lesions bilaterally which are too small to accurately characterize but statistically likely reflect cysts as well. STOMACH AND BOWEL: Colonic diverticulosis without findings of acute  diverticulitis. APPENDIX: No findings to suggest appendicitis. ABDOMINOPELVIC CAVITY: No ascites. No pneumoperitoneum. No lymphadenopathy. VESSELS: Atherosclerosis without abdominal aortic aneurysm. Mild narrowing of the proximal super mesenteric artery secondary to atherosclerosis. PELVIS REPRODUCTIVE ORGANS: Enlarged prostate. URINARY BLADDER: Unremarkable. ABDOMINAL WALL/INGUINAL REGIONS: Unremarkable. BONES: No acute fracture or suspicious osseous lesion.     Impression: 1.  Distal esophageal wall thickening with surrounding inflammation and small amount of fluid. No extraluminal gas identified. If there is clinical concern for esophageal perforation this may be evaluated with CT or fluoroscopic esophagram. Correlate with pending biopsy results for etiology of wall thickening. No prior imaging available. 2.  Retained contents in the upper esophagus to the level of the neck. Patient may be at risk for aspiration. 3.  Ill-defined nodular and groundglass opacities greatest in the right lower lobe. Largest nodule measures 1 cm. These may be infectious/inflammatory. Recommend 3-month follow-up chest CT. 4.  No suspicious lymphadenopathy. 5.  Multiple bilateral simple renal cysts. A 2.8 cm right renal lesion measuring slightly above simple fluid density also likely reflects a cyst. This can be monitored on follow-up imaging or confirmed with outpatient renal ultrasound. The study was marked in EPIC for immediate notification. Workstation performed: [id number]     XR chest 1 view portable  Result Date: 11/16/2024  Narrative: XR CHEST PORTABLE INDICATION: difficulty swallowing. History of esophageal stricture. Unable to pass any food or liquids since yesterday. COMPARISON: None FINDINGS: Clear lungs. No pneumothorax or pleural effusion. Normal cardiomediastinal silhouette. Bones are unremarkable for age. Normal upper abdomen.     Impression: No acute cardiopulmonary disease. Workstation performed: [id number]      EGD  Result Date: 11/8/2024  Narrative: Table formatting from the original result was not included. [facility] [address] [facility] [id number] [phone] DATE OF SERVICE: 11/08/24 PHYSICIAN(S): Attending: Felicia Betancourt MD INDICATION: Follow up Grade D esophagitis HISTORY: Previous EGD bx negative for malignancy. Has been on PPI BID. Previous CT C/A/P 9/2024 showed irreg thickening of the distal esophagus. POST-OP DIAGNOSIS: See the impression below. PREPROCEDURE: Informed consent was obtained for the procedure, including sedation.  Risks of perforation, hemorrhage, adverse drug reaction and aspiration were discussed. The patient was placed in the left lateral decubitus position. Patient was explained about the risks and benefits of the procedure. Risks including but not limited to bleeding, infection, and perforation were explained in detail. Also explained about less than 100% sensitivity with the exam and other alternatives. PROCEDURE: EGD DETAILS OF PROCEDURE: Patient was taken to the procedure room where a time out was performed to confirm correct patient and correct procedure. The patient underwent monitored anesthesia care, which was administered by an anesthesia professional. The patient's blood pressure, heart rate, level of consciousness, respirations, oxygen, ECG and ETCO2 were monitored throughout the procedure. The scope was introduced through the mouth and advanced to the lower third of the esophagus. The patient experienced no blood loss. The procedure was not difficult. The patient tolerated the procedure well. There were no apparent adverse events. ANESTHESIA INFORMATION: ASA: II Anesthesia Type: IV Sedation with Anesthesia MEDICATIONS: No administrations occurring from 1303 to 1315 on 11/08/24 FINDINGS: Severe, localized edematous, erythematous, friable and nodular mucosa with exudate measuring 3 mm in the lower third of the esophagus and GE junction  (36 cm from the incisorsGE junction); performed cold forceps biopsy Indeterminate and inflamed intrinsic stricture (not traversable) in the esophagus (36 cm from the incisors) SPECIMENS: ID Type Source Tests Collected by Time Destination 1 : distal esophagus biopsy mucosa Tissue Esophagus TISSUE EXAM Felicia Betancourt MD 11/8/2024  1:10 PM      Impression: Worsening lower esophageal stricture, inflamed, nodular, unable to be traversed. Biopsies taken. RECOMMENDATION: Resume home meds. Cont omeprazole 40mg twice a day. Will add carafate suspension four times a day for 14 days. Pending biopsy results, consider referral for EUS. Resume previous diet. The biopsy results will be available in [place] in 1-2 weeks. Follow up with your primary care provider as previously scheduled.   Felicia Betancourt MD     I have personally reviewed pertinent imaging study reports.      Denise Coley DO PGY-2  [facility]  Division of Hematology & Oncology  Available on Piggybackrt    ** Please Note: This note is constructed using a voice recognition dictation system. **

## 2024-11-26 NOTE — UTILIZATION REVIEW
"NOTIFICATION OF INPATIENT ADMISSION   AUTHORIZATION REQUEST   SERVICING FACILITY:   [facility]  Address: [address] [place] [address]  Tax ID: [id number]  NPI: [id number] ATTENDING PROVIDER:  Attending Name and NPI#: Horace Sheffield Md [[id number]]  Address: [address] [place] [address]  Phone: [phone]   ADMISSION INFORMATION:  Place of Service: Inpatient [facility]  Place of Service Code: 21  Inpatient Admission Date/Time: 11/25/24  9:33 PM  Discharge Date/Time: No discharge date for patient encounter.  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     PHYSICIAN ADVISORY SERVICES:  Medical Necessity Denial & Dakl-jj-Igsj Review  Phone: [phone]  Fax: [phone]  Email: [email or web address]     DISCHARGE SUPPORT TEAM:  For Patients Discharge Needs & Updates  Phone: [phone] opt. 2 Fax: [phone]  Email: [email or web address]     "

## 2024-11-27 LAB
ALBUMIN SERPL BCG-MCNC: 3.6 G/DL (ref 3.5–5)
ALP SERPL-CCNC: 67 U/L (ref 34–104)
ALT SERPL W P-5'-P-CCNC: 18 U/L (ref 7–52)
ANION GAP SERPL CALCULATED.3IONS-SCNC: 9 MMOL/L (ref 4–13)
AST SERPL W P-5'-P-CCNC: 15 U/L (ref 13–39)
BASOPHILS # BLD AUTO: 0.01 THOUSANDS/ΜL (ref 0–0.1)
BASOPHILS NFR BLD AUTO: 0 % (ref 0–1)
BILIRUB SERPL-MCNC: 0.81 MG/DL (ref 0.2–1)
BUN SERPL-MCNC: 10 MG/DL (ref 5–25)
CALCIUM SERPL-MCNC: 8.5 MG/DL (ref 8.4–10.2)
CHLORIDE SERPL-SCNC: 104 MMOL/L (ref 96–108)
CO2 SERPL-SCNC: 27 MMOL/L (ref 21–32)
CREAT SERPL-MCNC: 0.62 MG/DL (ref 0.6–1.3)
EOSINOPHIL # BLD AUTO: 0.04 THOUSAND/ΜL (ref 0–0.61)
EOSINOPHIL NFR BLD AUTO: 1 % (ref 0–6)
ERYTHROCYTE [DISTWIDTH] IN BLOOD BY AUTOMATED COUNT: 12.8 % (ref 11.6–15.1)
GFR SERPL CREATININE-BSD FRML MDRD: 97 ML/MIN/1.73SQ M
GLUCOSE SERPL-MCNC: 123 MG/DL (ref 65–140)
HCT VFR BLD AUTO: 38.9 % (ref 36.5–49.3)
HGB BLD-MCNC: 12.3 G/DL (ref 12–17)
IMM GRANULOCYTES # BLD AUTO: 0.02 THOUSAND/UL (ref 0–0.2)
IMM GRANULOCYTES NFR BLD AUTO: 0 % (ref 0–2)
LYMPHOCYTES # BLD AUTO: 0.43 THOUSANDS/ΜL (ref 0.6–4.47)
LYMPHOCYTES NFR BLD AUTO: 8 % (ref 14–44)
MCH RBC QN AUTO: 28.4 PG (ref 26.8–34.3)
MCHC RBC AUTO-ENTMCNC: 31.6 G/DL (ref 31.4–37.4)
MCV RBC AUTO: 90 FL (ref 82–98)
MONOCYTES # BLD AUTO: 0.54 THOUSAND/ΜL (ref 0.17–1.22)
MONOCYTES NFR BLD AUTO: 10 % (ref 4–12)
NEUTROPHILS # BLD AUTO: 4.58 THOUSANDS/ΜL (ref 1.85–7.62)
NEUTS SEG NFR BLD AUTO: 81 % (ref 43–75)
NRBC BLD AUTO-RTO: 0 /100 WBCS
PLATELET # BLD AUTO: 153 THOUSANDS/UL (ref 149–390)
PMV BLD AUTO: 11.4 FL (ref 8.9–12.7)
POTASSIUM SERPL-SCNC: 3.4 MMOL/L (ref 3.5–5.3)
PROT SERPL-MCNC: 5.9 G/DL (ref 6.4–8.4)
RBC # BLD AUTO: 4.33 MILLION/UL (ref 3.88–5.62)
SODIUM SERPL-SCNC: 140 MMOL/L (ref 135–147)
THYROGLOB AB SERPL-ACNC: <1 IU/ML (ref 0–0.9)
THYROGLOB SERPL-MCNC: 54.7 NG/ML (ref 1.4–29.2)
WBC # BLD AUTO: 5.62 THOUSAND/UL (ref 4.31–10.16)

## 2024-11-27 PROCEDURE — 88305 TISSUE EXAM BY PATHOLOGIST: CPT | Performed by: PATHOLOGY

## 2024-11-27 PROCEDURE — 88342 IMHCHEM/IMCYTCHM 1ST ANTB: CPT | Performed by: PATHOLOGY

## 2024-11-27 PROCEDURE — 85025 COMPLETE CBC W/AUTO DIFF WBC: CPT | Performed by: INTERNAL MEDICINE

## 2024-11-27 PROCEDURE — 99232 SBSQ HOSP IP/OBS MODERATE 35: CPT | Performed by: STUDENT IN AN ORGANIZED HEALTH CARE EDUCATION/TRAINING PROGRAM

## 2024-11-27 PROCEDURE — 99232 SBSQ HOSP IP/OBS MODERATE 35: CPT | Performed by: INTERNAL MEDICINE

## 2024-11-27 PROCEDURE — 80053 COMPREHEN METABOLIC PANEL: CPT | Performed by: INTERNAL MEDICINE

## 2024-11-27 PROCEDURE — 88341 IMHCHEM/IMCYTCHM EA ADD ANTB: CPT | Performed by: PATHOLOGY

## 2024-11-27 RX ORDER — AMLODIPINE BESYLATE 10 MG/1
10 TABLET ORAL DAILY
Status: DISCONTINUED | OUTPATIENT
Start: 2024-11-27 | End: 2024-11-28 | Stop reason: HOSPADM

## 2024-11-27 RX ORDER — NYSTATIN 100000 [USP'U]/ML
500000 SUSPENSION ORAL 2 TIMES DAILY
Status: DISCONTINUED | OUTPATIENT
Start: 2024-11-27 | End: 2024-11-28 | Stop reason: HOSPADM

## 2024-11-27 RX ORDER — ENALAPRILAT 1.25 MG/ML
2.5 INJECTION INTRAVENOUS EVERY 6 HOURS
Status: DISCONTINUED | OUTPATIENT
Start: 2024-11-27 | End: 2024-11-27

## 2024-11-27 RX ORDER — LISINOPRIL 10 MG/1
10 TABLET ORAL DAILY
Status: DISCONTINUED | OUTPATIENT
Start: 2024-11-27 | End: 2024-11-28 | Stop reason: HOSPADM

## 2024-11-27 RX ADMIN — ENALAPRILAT 1.25 MG: 1.25 INJECTION INTRAVENOUS at 05:59

## 2024-11-27 RX ADMIN — POTASSIUM CHLORIDE, DEXTROSE MONOHYDRATE AND SODIUM CHLORIDE 125 ML/HR: 150; 5; 450 INJECTION, SOLUTION INTRAVENOUS at 15:20

## 2024-11-27 RX ADMIN — ACETAMINOPHEN 650 MG: 325 TABLET, FILM COATED ORAL at 16:44

## 2024-11-27 RX ADMIN — PANTOPRAZOLE SODIUM 40 MG: 40 INJECTION, POWDER, FOR SOLUTION INTRAVENOUS at 20:45

## 2024-11-27 RX ADMIN — ENALAPRILAT 1.25 MG: 1.25 INJECTION INTRAVENOUS at 00:03

## 2024-11-27 RX ADMIN — LISINOPRIL 10 MG: 10 TABLET ORAL at 09:44

## 2024-11-27 RX ADMIN — POTASSIUM CHLORIDE, DEXTROSE MONOHYDRATE AND SODIUM CHLORIDE 125 ML/HR: 150; 5; 450 INJECTION, SOLUTION INTRAVENOUS at 22:38

## 2024-11-27 RX ADMIN — NYSTATIN 500000 UNITS: 100000 SUSPENSION ORAL at 20:44

## 2024-11-27 RX ADMIN — NYSTATIN 500000 UNITS: 100000 SUSPENSION ORAL at 09:45

## 2024-11-27 RX ADMIN — ENOXAPARIN SODIUM 40 MG: 40 INJECTION SUBCUTANEOUS at 08:16

## 2024-11-27 RX ADMIN — AMLODIPINE BESYLATE 10 MG: 10 TABLET ORAL at 09:44

## 2024-11-27 RX ADMIN — PANTOPRAZOLE SODIUM 40 MG: 40 INJECTION, POWDER, FOR SOLUTION INTRAVENOUS at 08:16

## 2024-11-27 RX ADMIN — POTASSIUM CHLORIDE, DEXTROSE MONOHYDRATE AND SODIUM CHLORIDE 125 ML/HR: 150; 5; 450 INJECTION, SOLUTION INTRAVENOUS at 05:55

## 2024-11-27 NOTE — ASSESSMENT & PLAN NOTE
Newly diagnosed as above, based on pathology report  S/p stent placement on 11/19, replacement as above on 11/26  Gi following, surg onc following  Med onc following   Recommending outpatient f/u and outpatient thyroid U/S

## 2024-11-27 NOTE — ASSESSMENT & PLAN NOTE
Typically on norvasc and lisinopril however both were initially placed on hold given NPO status/dysphagia  Placed on IV enalapril q6h scheduled, will now dc and attempt home BP pills 11/27

## 2024-11-27 NOTE — PLAN OF CARE
Problem: Nutrition/Hydration-ADULT  Goal: Nutrient/Hydration intake appropriate for improving, restoring or maintaining nutritional needs  Description: Monitor and assess patient's nutrition/hydration status for malnutrition. Collaborate with interdisciplinary team and initiate plan and interventions as ordered.  Monitor patient's weight and dietary intake as ordered or per policy. Utilize nutrition screening tool and intervene as necessary. Determine patient's food preferences and provide high-protein, high-caloric foods as appropriate.     INTERVENTIONS:  - Monitor oral intake, urinary output, labs, and treatment plans  - Assess nutrition and hydration status and recommend course of action  - Evaluate amount of meals eaten  - Assist patient with eating if necessary   - Allow adequate time for meals  - Recommend/ encourage appropriate diets, oral nutritional supplements, and vitamin/mineral supplements  - Order, calculate, and assess calorie counts as needed  - Recommend, monitor, and adjust tube feedings and TPN/PPN based on assessed needs  - Assess need for intravenous fluids  - Provide specific nutrition/hydration education as appropriate  - Include patient/family/caregiver in decisions related to nutrition  Outcome: Progressing     Problem: PAIN - ADULT  Goal: Verbalizes/displays adequate comfort level or baseline comfort level  Description: Interventions:  - Encourage patient to monitor pain and request assistance  - Assess pain using appropriate pain scale  - Administer analgesics based on type and severity of pain and evaluate response  - Implement non-pharmacological measures as appropriate and evaluate response  - Consider cultural and social influences on pain and pain management  - Notify physician/advanced practitioner if interventions unsuccessful or patient reports new pain  Outcome: Progressing     Problem: INFECTION - ADULT  Goal: Absence or prevention of progression during  hospitalization  Description: INTERVENTIONS:  - Assess and monitor for signs and symptoms of infection  - Monitor lab/diagnostic results  - Monitor all insertion sites, i.e. indwelling lines, tubes, and drains  - Monitor endotracheal if appropriate and nasal secretions for changes in amount and color  - [place] appropriate cooling/warming therapies per order  - Administer medications as ordered  - Instruct and encourage patient and family to use good hand hygiene technique  - Identify and instruct in appropriate isolation precautions for identified infection/condition  Outcome: Progressing  Goal: Absence of fever/infection during neutropenic period  Description: INTERVENTIONS:  - Monitor WBC    Outcome: Progressing     Problem: SAFETY ADULT  Goal: Patient will remain free of falls  Description: INTERVENTIONS:  - Educate patient/family on patient safety including physical limitations  - Instruct patient to call for assistance with activity   - Consult OT/PT to assist with strengthening/mobility   - Keep Call bell within reach  - Keep bed low and locked with side rails adjusted as appropriate  - Keep care items and personal belongings within reach  - Initiate and maintain comfort rounds  - Make Fall Risk Sign visible to staff  - Offer Toileting every 2 Hours, in advance of need  - Initiate/Maintain alarm  - Obtain necessary fall risk management equipment:   - Apply yellow socks and bracelet for high fall risk patients  - Consider moving patient to room near nurses station  Outcome: Progressing  Goal: Maintain or return to baseline ADL function  Description: INTERVENTIONS:  -  Assess patient's ability to carry out ADLs; assess patient's baseline for ADL function and identify physical deficits which impact ability to perform ADLs (bathing, care of mouth/teeth, toileting, grooming, dressing, etc.)  - Assess/evaluate cause of self-care deficits   - Assess range of motion  - Assess patient's mobility; develop plan if  impaired  - Assess patient's need for assistive devices and provide as appropriate  - Encourage maximum independence but intervene and supervise when necessary  - Involve family in performance of ADLs  - Assess for home care needs following discharge   - Consider OT consult to assist with ADL evaluation and planning for discharge  - Provide patient education as appropriate  Outcome: Progressing  Goal: Maintains/Returns to pre admission functional level  Description: INTERVENTIONS:  - Perform AM-PAC 6 Click Basic Mobility/ Daily Activity assessment daily.  - Set and communicate daily mobility goal to care team and patient/family/caregiver.   - Collaborate with rehabilitation services on mobility goals if consulted  - Perform Range of Motion 3 times a day.  - Reposition patient every 2 hours.  - Dangle patient 3 times a day  - Stand patient 3 times a day  - Ambulate patient 3 times a day  - Out of bed to chair 3 times a day   - Out of bed for meals 3 times a day  - Out of bed for toileting  - Record patient progress and toleration of activity level   Outcome: Progressing     Problem: DISCHARGE PLANNING  Goal: Discharge to home or other facility with appropriate resources  Description: INTERVENTIONS:  - Identify barriers to discharge w/patient and caregiver  - Arrange for needed discharge resources and transportation as appropriate  - Identify discharge learning needs (meds, wound care, etc.)  - Arrange for interpretive services to assist at discharge as needed  - Refer to Case Management Department for coordinating discharge planning if the patient needs post-hospital services based on physician/advanced practitioner order or complex needs related to functional status, cognitive ability, or social support system  Outcome: Progressing     Problem: Knowledge Deficit  Goal: Patient/family/caregiver demonstrates understanding of disease process, treatment plan, medications, and discharge instructions  Description:  Complete learning assessment and assess knowledge base.  Interventions:  - Provide teaching at level of understanding  - Provide teaching via preferred learning methods  Outcome: Progressing

## 2024-11-27 NOTE — PROGRESS NOTES
Progress Note - Oncology-Surgical   Name: Hector Carrera 73 y.o. 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  He has undergone multiple EGDs with findings of esophageal stricture s/p esophageal stent placement on 11/19 with updated biopsy findings now concerning for esophageal malignancy.  He reports difficulty with keeping down fluids     S/p EGD with stent exchange on 11/27.  The previous fully covered metal stent was removed and replaced with 18 mm x 12 cm fully covered metal stent  which was secured with over the scope clip.    VS vital signs stable on room air     Plan  - Continue full liquids  - F/u pathology  - OK for discharge per primary team     Oncology-Surgical service will follow.    Subjective   Patient seen at bedside, not in acute distress.  Patient reports some mild throat irritation but however it is controlled.  Tolerating full liquids had 3 meals.  Voiding without difficulty.  No bowel movements or flatus.  Yet to be out of bed.  No nausea vomiting fevers chills chest pain or shortness of breath.    Objective :  Temp:  [98.3 °F (36.8 °C)-98.8 °F (37.1 °C)] 98.4 °F (36.9 °C)  HR:  [76-81] 80  BP: (151-156)/(70-80) 156/80  Resp:  [16-18] 18  SpO2:  [96 %-97 %] 97 %  O2 Device: None (Room air)    I/O         11/25 0701  11/26 0700 11/26 0701  11/27 0700 11/27 0701  11/28 0700    P.O.  240     I.V. (mL/kg)  3537.5 (46)     IV Piggyback 1000      Total Intake(mL/kg) 1000 3777.5 (49.1)     Urine (mL/kg/hr) 300      Total Output 300      Net +700 +3777.5            Unmeasured Urine Occurrence  3 x             Physical Exam  Vitals and nursing note reviewed.   Constitutional:       General: He is not in  acute distress.  HENT:      Head: Normocephalic.   Cardiovascular:      Rate and Rhythm: Normal rate.   Pulmonary:      Effort: Pulmonary effort is normal. No respiratory distress.   Abdominal:      Comments: Soft, nontender, nondistended   Neurological:      Mental Status: He is alert.           Lab Results: I have reviewed the following results:  Recent Labs     11/27/24  0455   WBC 5.62   HGB 12.3   HCT 38.9      SODIUM 140   K 3.4*      CO2 27   BUN 10   CREATININE 0.62   GLUC 123   AST 15   ALT 18   ALB 3.6   TBILI 0.81   ALKPHOS 67       Imaging Results Review: No pertinent imaging studies reviewed.  Other Study Results Review: No additional pertinent studies reviewed.    VTE Pharmacologic Prophylaxis: VTE covered by:  enoxaparin, Subcutaneous, 40 mg at 11/27/24 0816      VTE Mechanical Prophylaxis: sequential compression device

## 2024-11-27 NOTE — ASSESSMENT & PLAN NOTE
73-year-old male with a history of tobacco use, progressive dysphagia and weight loss over the last few months.  He has undergone multiple EGDs with findings of esophageal stricture s/p esophageal stent placement on 11/19 with updated biopsy findings now concerning for esophageal malignancy.  He reports difficulty with keeping down fluids     S/p EGD with stent exchange on 11/27.  The previous fully covered metal stent was removed and replaced with 18 mm x 12 cm fully covered metal stent  which was secured with over the scope clip.    VS vital signs stable on room air     Plan  - Continue full liquids  - F/u pathology  - OK for discharge per primary team

## 2024-11-27 NOTE — ASSESSMENT & PLAN NOTE
"Patient recently discharged on 11/22 after admission for progressive dysphagia and recent EGD showing \"worsening lower esophageal stricture that was inflamed, nodular and unable to be traversed.\" S/P EGD with stent placement on 11/19.  Presented to ER as still was unable to tolerate much by mouth despite stent.  With concern for stent migration on CXR  Appreciate GI input, s/p repeat EGD 11/26 with stent exchange/replacement  [facility], monitor for ability to tolerate for at least 24 hours per patient preference given readmission   Nystatin swish and spit added for thrush  Surg onc additionally consulted, agree with above  "

## 2024-11-27 NOTE — UTILIZATION REVIEW
"NOTIFICATION OF INPATIENT ADMISSION   AUTHORIZATION REQUEST   SERVICING FACILITY:   [facility]  Address: [address] [place] [address]  Tax ID: [id number]  NPI: [id number] ATTENDING PROVIDER:  Attending Name and NPI#: Stephanie Osman Do [[id number]]  Address: [address] [place] [address]  Phone: [phone]   ADMISSION INFORMATION:  Place of Service: Inpatient [facility]  Place of Service Code: 21  Inpatient Admission Date/Time: 11/25/24  9:33 PM  Discharge Date/Time: No discharge date for patient encounter.  Admitting Diagnosis Code/Description:  Malignant neoplasm of upper third of esophagus (HCC) [C15.3]  Esophageal stricture [K22.2]  Vomiting [R11.10]  Esophageal cancer (HCC) [C15.9]  Dysphagia [R13.10]     UTILIZATION REVIEW CONTACT:  Lakesha Oh"Ramiro Gore Utilization   Network Utilization Review Department  Phone: [phone]  Fax: [phone]  Email: [email or web address]  Contact for approvals/pending authorizations, clinical reviews, and discharge.     PHYSICIAN ADVISORY SERVICES:  Medical Necessity Denial & Sjfk-cu-Wqts Review  Phone: [phone]  Fax: [phone]  Email: [email or web address]     DISCHARGE SUPPORT TEAM:  For Patients Discharge Needs & Updates  Phone: [phone] opt. 2 Fax: [phone]  Email: [email or web address]      "

## 2024-11-27 NOTE — ASSESSMENT & PLAN NOTE
Briefly, this is a 73-year-old male with a history of tobacco use, progressive dysphagia and weight loss over the last few months.  He has undergone multiple EGDs with findings of esophageal stricture s/p esophageal stent placement on 11/19 with updated biopsy findings now concerning for esophageal malignancy.  He reports difficulty with keeping down fluids    S/p EGD with stent exchange on 11/27.  The previous fully covered metal stent was removed and replaced with 18 mm x 12 cm fully covered metal stent  which was secured with over the scope clip.    Plan  - He feels much better this morning and was able to tolerate clear liquid diet.  Okay to advance to full liquids.  - Fluconazole swish and spit for thrush x7 more days.  Completed 7-day course during his last hospital admission  - Surgical oncology consulted appreciate recommendations.  Plan for outpatient PET  - Will follow-up biopsy results from EGD on 11/19  - Okay for discharge home from a GI perspective  -Has outpatient follow-up with GI on 01/06/2025

## 2024-11-27 NOTE — PROGRESS NOTES
Progress Note - Gastroenterology   Name: Hector Carrera 73 y.o. male I MRN: [id number]  Unit/Bed#: -01 I Date of Admission: 11/25/2024   Date of Service: 11/27/2024 I Hospital Day: 2    Assessment & Plan  Dysphagia  Briefly, this is a 73-year-old male with a history of tobacco use, progressive dysphagia and weight loss over the last few months.  He has undergone multiple EGDs with findings of esophageal stricture s/p esophageal stent placement on 11/19 with updated biopsy findings now concerning for esophageal malignancy.  He reports difficulty with keeping down fluids    S/p EGD with stent exchange on 11/27.  The previous fully covered metal stent was removed and replaced with 18 mm x 12 cm fully covered metal stent  which was secured with over the scope clip.    Plan  - He feels much better this morning and was able to tolerate clear liquid diet.  Okay to advance to full liquids.  - Fluconazole swish and spit for thrush x7 more days.  Completed 7-day course during his last hospital admission  - Surgical oncology consulted appreciate recommendations.  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  No nausea, vomiting or regurgitation.  He was able to eat a clear liquid breakfast.    Objective :  Temp:  [98.1 °F (36.7 °C)-100.5 °F (38.1 °C)] 98.3 °F (36.8 °C)  HR:  [] 76  BP: (154-202)/() 156/80  Resp:  [16-20] 16  SpO2:  [94 %-98 %] 96 %  O2 Device: None (Room air)    Physical Exam  Vitals and nursing note reviewed.   Constitutional:       General: He is not in acute distress.     Appearance: He is well-developed.   HENT:      Head: Normocephalic and atraumatic.   Eyes:       Conjunctiva/sclera: Conjunctivae normal.   Cardiovascular:      Rate and Rhythm: Normal rate and regular rhythm.      Heart sounds: No murmur heard.  Pulmonary:      Effort: Pulmonary effort is normal. No respiratory distress.      Breath sounds: Normal breath sounds.   Abdominal:      Palpations: Abdomen is soft.      Tenderness: There is no abdominal tenderness.   Musculoskeletal:         General: No swelling.      Cervical back: Neck supple.   Skin:     General: Skin is warm and dry.      Capillary Refill: Capillary refill takes less than 2 seconds.   Neurological:      Mental Status: He is alert.   Psychiatric:         Mood and Affect: Mood normal.           Lab Results: I have reviewed the following results:CBC/BMP:   .     11/27/24  0455   WBC 5.62   HGB 12.3   HCT 38.9      SODIUM 140   K 3.4*      CO2 27   BUN 10   CREATININE 0.62   GLUC 123        Imaging Results Review: I reviewed radiology reports from this admission including: procedure reports.  Other Study Results Review: No additional pertinent studies reviewed.    Ade Bowen MD  Gastroenterology Fellow, PGY- 4  Available on EPIC Secure Chat  11/27/2024 1:03 PM

## 2024-11-27 NOTE — ASSESSMENT & PLAN NOTE
73-year-old male with a history of tobacco use, progressive dysphagia and weight loss over the last few months.  He has undergone multiple EGDs with findings of esophageal stricture s/p esophageal stent placement on 11/19 with updated biopsy findings now concerning for esophageal malignancy.  He reports difficulty with keeping down fluids     S/p EGD with stent exchange on 11/27.  The previous fully covered metal stent was removed and replaced with 18 mm x 12 cm fully covered metal stent  which was secured with over the scope clip.     Plan  - Trial of clear liquid diet  - Fluconazole swish and spit for thrush  -Surgical oncology consulted appreciate recommendations

## 2024-11-27 NOTE — PROGRESS NOTES
Progress Note - Oncology-Surgical   Name: Hector Carrera 73 y.o. 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  He has undergone multiple EGDs with findings of esophageal stricture s/p esophageal stent placement on 11/19 with updated biopsy findings now concerning for esophageal malignancy.  He reports difficulty with keeping down fluids     S/p EGD with stent exchange on 11/27.  The previous fully covered metal stent was removed and replaced with 18 mm x 12 cm fully covered metal stent  which was secured with over the scope clip.     Plan  - Trial of clear liquid diet  - Fluconazole swish and spit for thrush  -Surgical oncology consulted appreciate recommendations    Oncology-Surgical service will follow.    Subjective   Patient seen at bedside, not in acute distress.  Patient reports some mild chest and back discomfort.  Patient did not try clear liquids after EGD due to fear of pain and dysphagia.  No nausea vomiting fevers chills chest pain or shortness of breath.  Patient has been out of bed and ambulating.  Patient voiding without difficulty.  Patient denies bowel movement or flatus.    Objective :  Temp:  [98.1 °F (36.7 °C)-100.5 °F (38.1 °C)] 98.1 °F (36.7 °C)  HR:  [] 71  BP: (154-202)/() 162/76  Resp:  [16-20] 17  SpO2:  [94 %-98 %] 94 %  O2 Device: None (Room air)    I/O         11/25 0701  11/26 0700 11/26 0701  11/27 0700 11/27 0701  11/28 0700    P.O.  240     I.V. (mL/kg)  3537.5 (46)     IV Piggyback 1000      Total Intake(mL/kg) 1000 3777.5 (49.1)     Urine (mL/kg/hr) 300      Total Output 300      Net +700 +3777.5            Unmeasured Urine Occurrence  3 x             Physical  Exam  Vitals and nursing note reviewed.   Constitutional:       General: He is not in acute distress.  HENT:      Head: Normocephalic.   Cardiovascular:      Rate and Rhythm: Normal rate.   Pulmonary:      Effort: Pulmonary effort is normal. No respiratory distress.   Abdominal:      Comments: Soft, nontender, nondistended   Neurological:      Mental Status: He is alert.           Lab Results: I have reviewed the following results:  Recent Labs     11/27/24  0455   WBC 5.62   HGB 12.3   HCT 38.9      SODIUM 140   K 3.4*      CO2 27   BUN 10   CREATININE 0.62   GLUC 123   AST 15   ALT 18   ALB 3.6   TBILI 0.81   ALKPHOS 67       Imaging Results Review: No pertinent imaging studies reviewed.  Other Study Results Review: No additional pertinent studies reviewed.    VTE Pharmacologic Prophylaxis: VTE covered by:  enoxaparin, Subcutaneous, 40 mg at 11/26/24 0859      VTE Mechanical Prophylaxis: sequential compression device

## 2024-11-27 NOTE — PROGRESS NOTES
"Progress Note - Hospitalist   Name: Hector Carrera 73 y.o. male I MRN: [id number]  Unit/Bed#: -01 I Date of Admission: 11/25/2024   Date of Service: 11/27/2024 I Hospital Day: 2    Assessment & Plan  Dysphagia  Patient recently discharged on 11/22 after admission for progressive dysphagia and recent EGD showing \"worsening lower esophageal stricture that was inflamed, nodular and unable to be traversed.\" S/P EGD with stent placement on 11/19.  Presented to ER as still was unable to tolerate much by mouth despite stent.  With concern for stent migration on CXR  Appreciate GI input, s/p repeat EGD 11/26 with stent exchange/replacement  Diet advanced to full liuqid AM 11/27, monitor for ability to tolerate for at least 24 hours per patient preference given readmission   Nystatin swish and spit added for thrush  Surg onc additionally consulted, agree with above  Hypertension  Typically on norvasc and lisinopril however both were initially placed on hold given NPO status/dysphagia  Placed on IV enalapril q6h scheduled, will now dc and attempt home BP pills 11/27  GERD (gastroesophageal reflux disease)  Continue PPI IV BID  Weight loss  Likely 2/2 poor oral intake in setting of malignancy  Appreciate GI and oncology inputs  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    Mobility:   Basic Mobility Inpatient Raw Score: 24  JH-HLM Goal: 8: Walk 250 feet or more  JH-HLM Achieved: 8: Walk 250 feet ot more  JH-HLM Goal achieved. Continue to encourage appropriate mobility.    Patient Centered Rounds: I performed bedside rounds " with nursing staff today.   Discussions with Specialists or Other Care Team Provider: [facility] and surg onc    Education and Discussions with Family / Patient: Updated  (wife) at bedside.    Current Length of Stay: 2 day(s)  Current Patient Status: Inpatient   Certification Statement: The patient will continue to require additional inpatient hospital stay due to pending ability to tolerate diet over next 24 hours   Discharge Plan: Anticipate discharge in 24-48 hrs to home.    Code Status: Level 1 - Full Code    Subjective   Pt reports feeling okay today. He was able to have clears for breakfast. He will try full liquids for lunch. He is hesitant to be dc too soon given readmission. He has no vomiting, nausea or pain. Was able to tolerate BP medications.     Objective :  Temp:  [98.1 °F (36.7 °C)-100.5 °F (38.1 °C)] 98.3 °F (36.8 °C)  HR:  [] 81  BP: (154-202)/() 156/70  Resp:  [16-20] 16  SpO2:  [94 %-98 %] 96 %  O2 Device: None (Room air)    Body mass index is 23.66 kg/m².     Input and Output Summary (last 24 hours):     Intake/Output Summary (Last 24 hours) at 11/27/2024 0938  Last data filed at 11/27/2024 0455  Gross per 24 hour   Intake 3777.5 ml   Output --   Net 3777.5 ml       Physical Exam  Vitals and nursing note reviewed.   Constitutional:       General: He is not in acute distress.     Comments: Underweight    Cardiovascular:      Rate and Rhythm: Normal rate.   Pulmonary:      Effort: No respiratory distress.   Abdominal:      General: Bowel sounds are normal. There is no distension.      Palpations: Abdomen is soft.   Neurological:      Mental Status: He is alert.   Psychiatric:         Mood and Affect: Mood normal.           Lines/Drains:              Lab Results: I have reviewed the following results:   Results from last 7 days   Lab Units 11/27/24  0455   WBC Thousand/uL 5.62   HEMOGLOBIN g/dL 12.3   HEMATOCRIT % 38.9   PLATELETS Thousands/uL 153   SEGS PCT % 81*   LYMPHO PCT  % 8*   MONO PCT % 10   EOS PCT % 1     Results from last 7 days   Lab Units 11/27/24  0455   SODIUM mmol/L 140   POTASSIUM mmol/L 3.4*   CHLORIDE mmol/L 104   CO2 mmol/L 27   BUN mg/dL 10   CREATININE mg/dL 0.62   ANION GAP mmol/L 9   CALCIUM mg/dL 8.5   ALBUMIN g/dL 3.6   TOTAL BILIRUBIN mg/dL 0.81   ALK PHOS U/L 67   ALT U/L 18   AST U/L 15   GLUCOSE RANDOM mg/dL 123                       Recent Cultures (last 7 days):         Imaging Results Review: No pertinent imaging studies reviewed.  Other Study Results Review: No additional pertinent studies reviewed.    Last 24 Hours Medication List:     Current Facility-Administered Medications:     acetaminophen (TYLENOL) tablet 650 mg, Q4H PRN    amLODIPine (NORVASC) tablet 10 mg, Daily    dextrose 5 % and sodium chloride 0.45 % with KCl 20 mEq/L infusion, Continuous, Last Rate: 125 mL/hr (11/27/24 0555)    enoxaparin (LOVENOX) subcutaneous injection 40 mg, Daily    HYDROmorphone HCl (DILAUDID) injection 0.2 mg, Q4H PRN    labetalol (NORMODYNE) injection 10 mg, Q6H PRN    lactated ringers infusion, Continuous    lisinopril (ZESTRIL) tablet 10 mg, Daily    nystatin (MYCOSTATIN) oral suspension 500,000 Units, BID    pantoprazole (PROTONIX) injection 40 mg, Q12H MARTHA    Administrative Statements   Today, Patient Was Seen By: Stacey Gorman PA-C      **Please Note: This note may have been constructed using a voice recognition system.**

## 2024-11-27 NOTE — PLAN OF CARE
Problem: Nutrition/Hydration-ADULT  Goal: Nutrient/Hydration intake appropriate for improving, restoring or maintaining nutritional needs  Description: Monitor and assess patient's nutrition/hydration status for malnutrition. Collaborate with interdisciplinary team and initiate plan and interventions as ordered.  Monitor patient's weight and dietary intake as ordered or per policy. Utilize nutrition screening tool and intervene as necessary. Determine patient's food preferences and provide high-protein, high-caloric foods as appropriate.     INTERVENTIONS:  - Monitor oral intake, urinary output, labs, and treatment plans  - Assess nutrition and hydration status and recommend course of action  - Evaluate amount of meals eaten  - Assist patient with eating if necessary   - Allow adequate time for meals  - Recommend/ encourage appropriate diets, oral nutritional supplements, and vitamin/mineral supplements  - Order, calculate, and assess calorie counts as needed  - Recommend, monitor, and adjust tube feedings and TPN/PPN based on assessed needs  - Assess need for intravenous fluids  - Provide specific nutrition/hydration education as appropriate  - Include patient/family/caregiver in decisions related to nutrition  Outcome: Progressing     Problem: PAIN - ADULT  Goal: Verbalizes/displays adequate comfort level or baseline comfort level  Description: Interventions:  - Encourage patient to monitor pain and request assistance  - Assess pain using appropriate pain scale  - Administer analgesics based on type and severity of pain and evaluate response  - Implement non-pharmacological measures as appropriate and evaluate response  - Consider cultural and social influences on pain and pain management  - Notify physician/advanced practitioner if interventions unsuccessful or patient reports new pain  Outcome: Progressing     Problem: INFECTION - ADULT  Goal: Absence or prevention of progression during  hospitalization  Description: INTERVENTIONS:  - Assess and monitor for signs and symptoms of infection  - Monitor lab/diagnostic results  - Monitor all insertion sites, i.e. indwelling lines, tubes, and drains  - Monitor endotracheal if appropriate and nasal secretions for changes in amount and color  - [place] appropriate cooling/warming therapies per order  - Administer medications as ordered  - Instruct and encourage patient and family to use good hand hygiene technique  - Identify and instruct in appropriate isolation precautions for identified infection/condition  Outcome: Progressing  Goal: Absence of fever/infection during neutropenic period  Description: INTERVENTIONS:  - Monitor WBC    Outcome: Progressing     Problem: SAFETY ADULT  Goal: Patient will remain free of falls  Description: INTERVENTIONS:  - Educate patient/family on patient safety including physical limitations  - Instruct patient to call for assistance with activity   - Consult OT/PT to assist with strengthening/mobility   - Keep Call bell within reach  - Keep bed low and locked with side rails adjusted as appropriate  - Keep care items and personal belongings within reach  - Initiate and maintain comfort rounds  - Make Fall Risk Sign visible to staff  - Offer Toileting every 2 Hours, in advance of need  - Initiate/Maintain alarm  - Obtain necessary fall risk management equipment:   - Apply yellow socks and bracelet for high fall risk patients  - Consider moving patient to room near nurses station  Outcome: Progressing  Goal: Maintain or return to baseline ADL function  Description: INTERVENTIONS:  -  Assess patient's ability to carry out ADLs; assess patient's baseline for ADL function and identify physical deficits which impact ability to perform ADLs (bathing, care of mouth/teeth, toileting, grooming, dressing, etc.)  - Assess/evaluate cause of self-care deficits   - Assess range of motion  - Assess patient's mobility; develop plan if  impaired  - Assess patient's need for assistive devices and provide as appropriate  - Encourage maximum independence but intervene and supervise when necessary  - Involve family in performance of ADLs  - Assess for home care needs following discharge   - Consider OT consult to assist with ADL evaluation and planning for discharge  - Provide patient education as appropriate  Outcome: Progressing  Goal: Maintains/Returns to pre admission functional level  Description: INTERVENTIONS:  - Perform AM-PAC 6 Click Basic Mobility/ Daily Activity assessment daily.  - Set and communicate daily mobility goal to care team and patient/family/caregiver.   - Collaborate with rehabilitation services on mobility goals if consulted  - Perform Range of Motion 3 times a day.  - Reposition patient every 2 hours.  - Dangle patient 3 times a day  - Stand patient 3 times a day  - Ambulate patient 3 times a day  - Out of bed to chair 3 times a day   - Out of bed for meals 3 times a day  - Out of bed for toileting  - Record patient progress and toleration of activity level   Outcome: Progressing     Problem: DISCHARGE PLANNING  Goal: Discharge to home or other facility with appropriate resources  Description: INTERVENTIONS:  - Identify barriers to discharge w/patient and caregiver  - Arrange for needed discharge resources and transportation as appropriate  - Identify discharge learning needs (meds, wound care, etc.)  - Arrange for interpretive services to assist at discharge as needed  - Refer to Case Management Department for coordinating discharge planning if the patient needs post-hospital services based on physician/advanced practitioner order or complex needs related to functional status, cognitive ability, or social support system  Outcome: Progressing     Problem: Knowledge Deficit  Goal: Patient/family/caregiver demonstrates understanding of disease process, treatment plan, medications, and discharge instructions  Description:  Complete learning assessment and assess knowledge base.  Interventions:  - Provide teaching at level of understanding  - Provide teaching via preferred learning methods  Outcome: Progressing

## 2024-11-27 NOTE — CASE MANAGEMENT
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) 2024     Thrush 2024     Hyperthyroidism 2024     Weight loss 2024     Hypertension      GERD (gastroesophageal reflux disease)      Hyperlipidemia      Dysphagia 2024       LOS (days): 2  Geometric Mean LOS (GMLOS) (days): 2.5  Days to GMLOS:0.8     OBJECTIVE:  PATIENT READMITTED TO HOSPITAL  Risk of Unplanned Readmission Score: 13.14         Current admission status: Inpatient       Preferred Pharmacy:   [id number] - [place] - [phone]   [place] [id number]  Phone: [phone] Fax: [phone]    Primary Care Provider: Nella Hernandez MD    Primary Insurance: [employer]  Secondary Insurance:     ASSESSMENT:  Active Health Care Proxies    There are no active Health Care Proxies on file.                      Patient Information  Admitted from:: Home  Mental Status: Alert  Primary Caregiver: Self  Support Systems: Self, Spouse/significant other  Living Arrangements: Lives w/ Spouse/significant other    Activities of Daily Living Prior to Admission  Functional Status: Independent  Completes ADLs independently?: Yes  Ambulates independently?: Yes  Does patient use assisted devices?: No  Does patient currently own DME?: No  Does patient have a history of Outpatient Therapy (PT/OT)?: No  Does the patient have a history of Short-Term Rehab?: No  Does patient have a history of HHC?: No  Does patient currently have HHC?: No         DISCHARGE DETAILS:    Discharge planning discussed with:: Patient  Freedom of Choice: Yes  Comments - Freedom of Choice: Discussed FOC       CM attempted to see pt in AM but pt was on his way to bathroom & asked CM to return when his wife arrived.      CM met w/pt & pt's wife Mitzi at bedside to introduce self & CM role.  Pt IADL's PTH. Pt does not use or own any DME. Does not use O2 or CPAP.  Per pt's wife - pt will need PET scan and f/u OP.  Wife will transport home upon dc.  Pt and wife deny any CM needs at this time.

## 2024-11-28 VITALS
WEIGHT: 169.62 LBS | DIASTOLIC BLOOD PRESSURE: 68 MMHG | SYSTOLIC BLOOD PRESSURE: 147 MMHG | RESPIRATION RATE: 18 BRPM | HEART RATE: 90 BPM | HEIGHT: 71 IN | BODY MASS INDEX: 23.75 KG/M2 | TEMPERATURE: 98.6 F | OXYGEN SATURATION: 95 %

## 2024-11-28 PROBLEM — E43 SEVERE PROTEIN-CALORIE MALNUTRITION (HCC): Status: ACTIVE | Noted: 2024-11-28

## 2024-11-28 PROBLEM — E43 SEVERE PROTEIN-CALORIE MALNUTRITION (GOMEZ: LESS THAN 60% OF STANDARD WEIGHT) (HCC): Status: ACTIVE | Noted: 2024-11-28

## 2024-11-28 LAB
ANION GAP SERPL CALCULATED.3IONS-SCNC: 9 MMOL/L (ref 4–13)
BUN SERPL-MCNC: 5 MG/DL (ref 5–25)
CALCIUM SERPL-MCNC: 8.2 MG/DL (ref 8.4–10.2)
CHLORIDE SERPL-SCNC: 102 MMOL/L (ref 96–108)
CO2 SERPL-SCNC: 23 MMOL/L (ref 21–32)
CREAT SERPL-MCNC: 0.56 MG/DL (ref 0.6–1.3)
GFR SERPL CREATININE-BSD FRML MDRD: 101 ML/MIN/1.73SQ M
GLUCOSE SERPL-MCNC: 138 MG/DL (ref 65–140)
POTASSIUM SERPL-SCNC: 3.6 MMOL/L (ref 3.5–5.3)
SODIUM SERPL-SCNC: 134 MMOL/L (ref 135–147)

## 2024-11-28 PROCEDURE — 99232 SBSQ HOSP IP/OBS MODERATE 35: CPT | Performed by: INTERNAL MEDICINE

## 2024-11-28 PROCEDURE — 99239 HOSP IP/OBS DSCHRG MGMT >30: CPT | Performed by: NURSE PRACTITIONER

## 2024-11-28 PROCEDURE — 80048 BASIC METABOLIC PNL TOTAL CA: CPT | Performed by: INTERNAL MEDICINE

## 2024-11-28 PROCEDURE — NC001 PR NO CHARGE: Performed by: STUDENT IN AN ORGANIZED HEALTH CARE EDUCATION/TRAINING PROGRAM

## 2024-11-28 RX ORDER — METHIMAZOLE 5 MG/1
5 TABLET ORAL DAILY
Qty: 30 TABLET | Refills: 0 | Status: SHIPPED | OUTPATIENT
Start: 2024-11-28

## 2024-11-28 RX ADMIN — POTASSIUM CHLORIDE, DEXTROSE MONOHYDRATE AND SODIUM CHLORIDE 125 ML/HR: 150; 5; 450 INJECTION, SOLUTION INTRAVENOUS at 05:51

## 2024-11-28 RX ADMIN — PANTOPRAZOLE SODIUM 40 MG: 40 INJECTION, POWDER, FOR SOLUTION INTRAVENOUS at 08:40

## 2024-11-28 RX ADMIN — ACETAMINOPHEN 650 MG: 325 TABLET, FILM COATED ORAL at 08:40

## 2024-11-28 RX ADMIN — LISINOPRIL 10 MG: 10 TABLET ORAL at 08:40

## 2024-11-28 RX ADMIN — NYSTATIN 500000 UNITS: 100000 SUSPENSION ORAL at 08:40

## 2024-11-28 RX ADMIN — ENOXAPARIN SODIUM 40 MG: 40 INJECTION SUBCUTANEOUS at 08:40

## 2024-11-28 RX ADMIN — AMLODIPINE BESYLATE 10 MG: 10 TABLET ORAL at 08:40

## 2024-11-28 NOTE — MALNUTRITION/BMI
This medical record reflects one or more clinical indicators suggestive of malnutrition and/or morbid obesity.    Malnutrition Findings:   Adult Malnutrition type: Acute illness  Adult Degree of Malnutrition: Other severe protein calorie malnutrition  Malnutrition Characteristics: Inadequate energy, Weight loss                360 Statement: Acute severe pro, shelly malnutrition d/t dysphagia related to esophageal malignancy as evidence by <50% energy intake > 5 days, significant weight loss 8/1/24 203lbs, 9/18/24 193lbs, 11/26/24 169lbs, 34lbs/16.7% wt. loss x 3 months, treated with full liquid diet, oral nutrition supplements, recommend Ensure Plus TID, RD also provided education on full liquid diet and how to maximize pro, shelly intake    BMI Findings:           Body mass index is 23.66 kg/m².     See Nutrition note dated 11/28/24 for additional details.  Completed nutrition assessment is viewable in the nutrition documentation.

## 2024-11-28 NOTE — ASSESSMENT & PLAN NOTE
Newly diagnosed as above, based on pathology report  S/p stent placement on 11/19, replacement as above on 11/26  Gi following, surg onc following  Med onc following   Recommending outpatient f/u   Pt has completed outpt thyroid ultrasound recommending ultrasound guided biopsy of the right nodule 2  Remaining nodules to be followed up in 1,2,3 and 5 years

## 2024-11-28 NOTE — PROGRESS NOTES
Progress Note - Gastroenterology   Name: Hector Carrera 73 y.o. male I MRN: [id number]  Unit/Bed#: -01 I Date of Admission: 11/25/2024   Date of Service: 11/28/2024 I Hospital Day: 3     Assessment & Plan  Dysphagia  Patient with progressive dysphagia and weight loss over the last few months.  He has undergone multiple EGDs with findings of esophageal stricture s/p esophageal stent placement on 11/19 with updated biopsy findings now concerning for esophageal malignancy.  He reports ongoing dysphagia.     S/p EGD with stent exchange on 11/27.  The previous fully covered metal stent was removed and replaced with 18 mm x 12 cm fully covered metal stent  which was secured with over the scope clip. Today, patient reports slight increase in odynophagia rating pain as a 5/10 compared to 4/10 yesterday. No chest pain, N/V or other GI complaints. No crepitus on exam and he is otherwise tolerating full liquids.    Plan  - Continue full liquid diet through tomorrow. Patient and family provided with stent diet information via email and discussed at bedside   - Fluconazole swish and spit for thrush x7 more days.  Completed 7-day course during his last hospital admission  - Surgical oncology consulted appreciate recommendations.  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  He points to his upper neck.  Denies any chest pain, nausea, vomiting, or other GI complaints at this time.  He is able to tolerate liquids at this time.    Objective :  Temp:  [98.4 °F (36.9 °C)-98.8 °F (37.1 °C)] 98.6 °F (37 °C)  HR:  [78-90] 90  BP: (147-156)/(68-80) 147/68  Resp:  [18] 18  SpO2:  [95  %-97 %] 95 %  O2 Device: None (Room air)    Physical Exam    Physical Exam:  General: No apparent distress, resting comfortably   Head: Normocephalic, atraumatic  Eyes: Anicteric, no conjunctival erythema  ENT: External ear normal, no nasal discharge  Neck: Trachea midline, no visible lymphadenopathy or goiter  Respiratory:  Non-labored respirations, symmetric thorax expansion  Chest: No crepitus on palpation.  Extremities appear well-perfused  Abdomen: Non-distended  Extremities: Moves extremities spontaneously, no peripheral edema  Skin: No visible rashes, wounds, or jaundice  Neuro: A&O x 3, no gross focal deficits, no aphasia

## 2024-11-28 NOTE — DISCHARGE INSTR - DIET
Full Liquid Diet    The full liquid diet includes mostly liquids (including milk) and some foods with small amounts of fiber. The full liquid diet can provide many of the nutrients your body needs, but it may not give enough vitamins, minerals, and fiber.  A fluid is anything that is liquid or anything that would melt if left at room temperature.  You will need to count these foods and liquids--including any liquid used to take medication--as part of your daily fluid intake. Some examples are:  Coffee, tea, and other hot beverages  Gelatin   Gravy  Ice cream, sherbet, sorbet  Ice cubes, ice chips  Milk, liquid creamer  Nutritional supplements  Popsicles  Vegetable and fruit juices; fluid in canned fruit (fruit itself is not allowed)  Yogurt (without nuts, seeds, or fruit)  Soft drinks, lemonade, limeade  Soups (strained)  Syrup  This diet should only be used temporarily during your recovery until it is safe for you to eat regular foods. Your registered dietitian nutritionist can help establish a nutritionally-balanced full liquid meal plan, if needed.       Tips  Determine which foods/fluids from the list are most appealing to you.  Eat or drink your favorite flavors to help you better enjoy this diet.  Include milk-based fluids and juices. If you are lactose intolerant, choose plant-based milks.  Eat 3 full liquid meals throughout the day and include a snack time between each meal.  Drink nutritional shakes in 6-8 ounce servings as part of a meal or between meals to make sure you’re taking in enough calories. You can make fortified shakes for yourself or buy them premade at a store.    Foods Recommended  Food Group Foods Recommended   Grains Thin hot cereal, such as cream of wheat   Dairy Milk: Nonfat, 1%, 2%, whole  Soy milk, almond milk, rice milk, coconut milk, cashew milk  Milkshakes  Yogurt  Custard  Pudding   Vegetables Vegetable juice with or without pulp  Thin, pureed vegetable soups   Fruits Translucent  fruit juices without pulp (apple, cranberry, grape)   Oils Oils: Ocate, avocado, canola, cashew, corn, grapeseed, olive, safflower, sesame, soybean, sunflower  Butter (melted)  Margarine (melted)    Other Flavored gelatin (any flavor)  Strained cream soups  Chicken, beef, or vegetable broths  Popsicle   Beverages Water  Ice  Soda  Tea  Coffee  Nutritional supplements or shakes      A high-protein shake should contain at least 8-10 grams of protein per serving. Read product labels to find a shake that is high in protein. If you are preparing the shake at home, you can increase the amount of protein by adding protein powder, non-fat dry milk powder, yogurt, or low-fat milk.         Foods Not Recommended  Food Group Foods Not Recommended   Grains All grain foods including whole grains, processed grains such as pasta, rice, cold cereals, bread, snacks, and sweets that are flour-based (cakes, cookies)   Protein Foods Beef and pork   Chicken and turkey   Fish   Nuts and nut butters   Eggs   Meat substitutes  Cold cuts or lunch meat   Sausage    Dairy Hard cheese  Yogurt with fruit chunks   Vegetables Whole, frozen, fresh, canned varieties    Fruits Whole, frozen, fresh, canned varieties

## 2024-11-28 NOTE — ASSESSMENT & PLAN NOTE
Malnutrition Findings:   Adult Malnutrition type: Acute illness  Adult Degree of Malnutrition: Other severe protein calorie malnutrition  Malnutrition Characteristics: Inadequate energy, Weight loss       Limited options of eating   Now increased to full liquid diet , discussed with both nutrition and GI and Surgical oncology   Options provided to patient and his wife who will work to provide pt with appropriate nutrition   Pt to fu otu pt with surgery and gi      360 Statement: Acute severe pro, shelly malnutrition d/t    BMI Findings:     Body mass index is 23.66 kg/m².

## 2024-11-28 NOTE — ASSESSMENT & PLAN NOTE
Patient with progressive dysphagia and weight loss over the last few months.  He has undergone multiple EGDs with findings of esophageal stricture s/p esophageal stent placement on 11/19 with updated biopsy findings now concerning for esophageal malignancy.  He reports ongoing dysphagia.     S/p EGD with stent exchange on 11/27.  The previous fully covered metal stent was removed and replaced with 18 mm x 12 cm fully covered metal stent  which was secured with over the scope clip. Today, patient reports slight increase in odynophagia rating pain as a 5/10 compared to 4/10 yesterday. No chest pain, N/V or other GI complaints. No crepitus on exam and he is otherwise tolerating full liquids.    Plan  - Continue full liquid diet through tomorrow. Patient and family provided with stent diet information via email and discussed at bedside   - Fluconazole swish and spit for thrush x7 more days.  Completed 7-day course during his last hospital admission  - Surgical oncology consulted appreciate recommendations.  Plan for outpatient PET  - Will follow-up biopsy results from EGD on 11/19  - Okay for discharge home from a GI perspective  - Has outpatient follow-up with GI on 01/06/2025

## 2024-11-28 NOTE — ASSESSMENT & PLAN NOTE
"Patient recently discharged on 11/22 after admission for progressive dysphagia and recent EGD showing \"worsening lower esophageal stricture that was inflamed, nodular and unable to be traversed.\" S/P EGD with stent placement on 11/19.  Presented to ER as still was unable to tolerate much by mouth despite stent.  With concern for stent migration on CXR  Appreciate GI input, s/p repeat EGD 11/26 with stent exchange/replacement  Diet advanced to full liquid AM 11/27, monitor for ability to tolerate for at least 24 hours per patient preference given readmission   Nystatin swish and spit added for thrush  Surg onc additionally consulted, agree with above and will plan for surgical management in the near future  "

## 2024-11-28 NOTE — ASSESSMENT & PLAN NOTE
"Patient recently discharged on 11/22 after admission for progressive dysphagia and recent EGD showing \"worsening lower esophageal stricture that was inflamed, nodular and unable to be traversed.\" S/P EGD with stent placement on 11/19.  Presented to ER as still was unable to tolerate much by mouth despite stent.  With concern for stent migration on CXR  Appreciate GI input, s/p repeat EGD 11/26 with stent exchange/replacement  [facility] 11[facility]27, monitor for ability to tolerate for at least 24 hours per patient preference given readmission   Nystatin swish and spit added for thrush  Surg onc additionally consulted, agree with above  "

## 2024-11-28 NOTE — DISCHARGE SUMMARY
"Discharge Summary - Hospitalist   Name: Hector Carrera 73 y.o. male I MRN: [id number]  Unit/Bed#: -01 I Date of Admission: 11/25/2024   Date of Service: 11/28/2024 I Hospital Day: 3     Assessment & Plan  Dysphagia  Patient recently discharged on 11/22 after admission for progressive dysphagia and recent EGD showing \"worsening lower esophageal stricture that was inflamed, nodular and unable to be traversed.\" S/P EGD with stent placement on 11/19.  Presented to ER as still was unable to tolerate much by mouth despite stent.  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)  Newly diagnosed as above, based on pathology report  S/p stent placement on 11/19, replacement as above on 11/26  Gi following, surg onc following  Med onc following   Recommending outpatient f/u   Pt has completed outpt thyroid ultrasound recommending ultrasound guided biopsy of the right nodule 2  Remaining nodules to be followed up in 1,2,3 and 5 years   Hypertension  Typically on norvasc and lisinopril however both were initially placed on hold given NPO status/dysphagia  Placed on IV enalapril q6h scheduled, will now dc and attempt home BP pills 11/27  GERD (gastroesophageal reflux disease)  Continue PPI IV BID  Weight loss  Likely 2/2 poor oral intake in setting of malignancy  Appreciate GI and oncology inputs  Severe protein-calorie malnutrition (HCC)  Malnutrition Findings:   Adult Malnutrition type: Acute illness  Adult Degree of Malnutrition: Other severe protein calorie malnutrition  Malnutrition " Characteristics: Inadequate energy, Weight loss       Limited options of eating   Now increased to full liquid diet , discussed with both nutrition and GI and Surgical oncology   Options provided to patient and his wife who will work to provide pt with appropriate nutrition   Pt to fu otu pt with surgery and gi      360 Statement: Acute severe pro, shelly malnutrition d/t    BMI Findings:     Body mass index is 23.66 kg/m².       Medical Problems       Resolved Problems  Date Reviewed: 11/27/2024   None       Discharging Physician / Practitioner: JEANNE Suarez  PCP: Nella Hernandez MD  Admission Date:   Admission Orders (From admission, onward)       Ordered        11/25/24 2133  Inpatient Admission  Once                          Discharge Date: 11/28/24    Consultations During Hospital Stay:  GI - Dr Altamirano   Surgical oncology - Dr Alonso  Medical oncology - Dr Mario    Procedures Performed:   11/26: Fully covered metal stent was visualized in the upper third of the esophagus. The stent was removed. The stent was replaced with a fully covered metal stent measuring 18 mm x 12 cm.  The stomach, duodenal bulb and 2nd part of the duodenum appeared normal.  11/27/2024 x-ray abdomen: For procedure  11/26/2024: Ultrasound thyroid:The following meet current ACR criteria for recommending ultrasound guided biopsy: Right nodule 2 .   The left nodules 1 and 2 meet criteria for ultrasound follow-up in 1, 2, 3 and 5 years.   Chest xray with stent projecting over the proximal esophagus and no evidence of acute cardiopulmonary abnormality     Significant Findings / Test Results:   See above    Incidental Findings:   none       Test Results Pending at Discharge (will require follow up):   none     Outpatient Tests Requested:  None     Complications:  none    Reason for Admission: Inability to tolerate p.o.    Hospital Course:   Hector Carrera is a 73 y.o. male patient who originally presented to the hospital on 11/25/2024 due to  inability to tolerate p.o.  Patient has past medical history of prehypertension, GERD recent diagnosis of esophageal cancer status post stent placement in the past 48 hours.  Patient now presents with inability to tolerate p.o.  Patient otherwise has been rather healthy up until the past 2 to 3 months where he noticed decreased ability to tolerate p.o. for solids and then over the last 3 to 4 days liquids as well.  Patient came to the hospital.  Patient had recently seen GI where he underwent an EGD which showed an esophageal mass and stricture and a stent was placed.  Patient's has since been doing better but still very nervous and afraid.  Currently has discomfort when swallowing but has ability to swallow full liquids.  Extensive discussion had with patient's wife dietary and myself in regard to high-protein nutritional meals moving forward, in the form of full liquids.  Patient should eat slowly and hydrate appropriately.  Patient will follow-up with surgical oncology as an outpatient.  And his primary care physician within the next week.  Also noted on thyroid ultrasound performed on 11/26/2024 is recommendation for ultrasound-guided biopsy on the right nodule 2.  All other nodules can be followed up in the 1, 2, 3 and 5-year recommendations.  Patient is medically stable for discharge.     Please see above list of diagnoses and related plan for additional information.     Condition at Discharge: fair    Discharge Day Visit / Exam:   Subjective: Patient sitting in chair appears very apprehensive.  We discussed his results and nutrition moving forward.  Patient reports that he is having discomfort when he swallows.  Reassured patient that this is likely normal as per my discussion with surgery and GI.  He does not report any difficulty swallowing or inability to swallow does not report any reflux at this time.  We discussed eating slowly and making sure that his food is fully made him to a full liquid type  "consistency.  Patient will follow-up outpatient with various providers as listed on discharge summary   Vitals: Blood Pressure: 147/68 (11/28/24 0740)  Pulse: 90 (11/28/24 0740)  Temperature: 98.6 °F (37 °C) (11/28/24 0740)  Temp Source: Tympanic (11/26/24 1700)  Respirations: 18 (11/27/24 1557)  Height: 5' 11\" (180.3 cm) (11/26/24 1436)  Weight - Scale: 76.9 kg (169 lb 9.9 oz) (11/26/24 1436)  SpO2: 95 % (11/28/24 0740)  Physical Exam  Constitutional:       General: He is not in acute distress.     Appearance: He is ill-appearing. He is not toxic-appearing or diaphoretic.      Comments: Cachectic    HENT:      Head: Normocephalic and atraumatic.      Nose: No congestion.   Eyes:      General:         Right eye: No discharge.         Left eye: No discharge.   Cardiovascular:      Rate and Rhythm: Normal rate.      Heart sounds: No murmur heard.     No friction rub. No gallop.   Pulmonary:      Effort: No respiratory distress.      Breath sounds: No stridor. No wheezing, rhonchi or rales.   Chest:      Chest wall: No tenderness.   Abdominal:      General: Abdomen is flat. There is no distension.      Palpations: There is no mass.      Tenderness: There is no abdominal tenderness. There is no guarding or rebound.      Hernia: No hernia is present.   Musculoskeletal:         General: No swelling, tenderness, deformity or signs of injury.      Right lower leg: No edema.      Left lower leg: No edema.   Skin:     Coloration: Skin is not jaundiced or pale.      Findings: No bruising, erythema, lesion or rash.   Neurological:      Mental Status: He is alert and oriented to person, place, and time.   Psychiatric:         Behavior: Behavior normal.          Discussion with Family: Updated  (wife) via phone.    Discharge instructions/Information to patient and family:   See after visit summary for information provided to patient and family.      Provisions for Follow-Up Care:  See after visit summary for " information related to follow-up care and any pertinent home health orders.      Mobility at time of Discharge:   Basic Mobility Inpatient Raw Score: 24  JH-HLM Goal: 8: Walk 250 feet or more  JH-HLM Achieved: 8: Walk 250 feet ot more  HLM Goal achieved. Continue to encourage appropriate mobility.     Disposition:   Home    Planned Readmission: no plan for readmission but high risk for readmission    Discharge Medications:  See after visit summary for reconciled discharge medications provided to patient and/or family.      Administrative Statements   Discharge Statement:  I have spent a total time of 62 minutes in caring for this patient on the day of the visit/encounter. >30 minutes of time was spent on: Risks and benefits of tx options, Patient and family education, Documenting in the medical record, and Communicating with other healthcare professionals .    **Please Note: This note may have been constructed using a voice recognition system**

## 2024-11-28 NOTE — PLAN OF CARE
Problem: Nutrition/Hydration-ADULT  Goal: Nutrient/Hydration intake appropriate for improving, restoring or maintaining nutritional needs  Description: Monitor and assess patient's nutrition/hydration status for malnutrition. Collaborate with interdisciplinary team and initiate plan and interventions as ordered.  Monitor patient's weight and dietary intake as ordered or per policy. Utilize nutrition screening tool and intervene as necessary. Determine patient's food preferences and provide high-protein, high-caloric foods as appropriate.     INTERVENTIONS:  - Monitor oral intake, urinary output, labs, and treatment plans  - Assess nutrition and hydration status and recommend course of action  - Evaluate amount of meals eaten  - Assist patient with eating if necessary   - Allow adequate time for meals  - Recommend/ encourage appropriate diets, oral nutritional supplements, and vitamin/mineral supplements  - Order, calculate, and assess calorie counts as needed  - Recommend, monitor, and adjust tube feedings and TPN/PPN based on assessed needs  - Assess need for intravenous fluids  - Provide specific nutrition/hydration education as appropriate  - Include patient/family/caregiver in decisions related to nutrition  Outcome: Progressing     Problem: PAIN - ADULT  Goal: Verbalizes/displays adequate comfort level or baseline comfort level  Description: Interventions:  - Encourage patient to monitor pain and request assistance  - Assess pain using appropriate pain scale  - Administer analgesics based on type and severity of pain and evaluate response  - Implement non-pharmacological measures as appropriate and evaluate response  - Consider cultural and social influences on pain and pain management  - Notify physician/advanced practitioner if interventions unsuccessful or patient reports new pain  Outcome: Progressing     Problem: INFECTION - ADULT  Goal: Absence or prevention of progression during  hospitalization  Description: INTERVENTIONS:  - Assess and monitor for signs and symptoms of infection  - Monitor lab/diagnostic results  - Monitor all insertion sites, i.e. indwelling lines, tubes, and drains  - Monitor endotracheal if appropriate and nasal secretions for changes in amount and color  - [place] appropriate cooling/warming therapies per order  - Administer medications as ordered  - Instruct and encourage patient and family to use good hand hygiene technique  - Identify and instruct in appropriate isolation precautions for identified infection/condition  Outcome: Progressing  Goal: Absence of fever/infection during neutropenic period  Description: INTERVENTIONS:  - Monitor WBC    Outcome: Progressing     Problem: SAFETY ADULT  Goal: Patient will remain free of falls  Description: INTERVENTIONS:  - Educate patient/family on patient safety including physical limitations  - Instruct patient to call for assistance with activity   - Consult OT/PT to assist with strengthening/mobility   - Keep Call bell within reach  - Keep bed low and locked with side rails adjusted as appropriate  - Keep care items and personal belongings within reach  - Initiate and maintain comfort rounds  - Make Fall Risk Sign visible to staff  - Offer Toileting every 2 Hours, in advance of need  - Initiate/Maintain alarm  - Obtain necessary fall risk management equipment:   - Apply yellow socks and bracelet for high fall risk patients  - Consider moving patient to room near nurses station  Outcome: Progressing  Goal: Maintain or return to baseline ADL function  Description: INTERVENTIONS:  -  Assess patient's ability to carry out ADLs; assess patient's baseline for ADL function and identify physical deficits which impact ability to perform ADLs (bathing, care of mouth/teeth, toileting, grooming, dressing, etc.)  - Assess/evaluate cause of self-care deficits   - Assess range of motion  - Assess patient's mobility; develop plan if  impaired  - Assess patient's need for assistive devices and provide as appropriate  - Encourage maximum independence but intervene and supervise when necessary  - Involve family in performance of ADLs  - Assess for home care needs following discharge   - Consider OT consult to assist with ADL evaluation and planning for discharge  - Provide patient education as appropriate  Outcome: Progressing  Goal: Maintains/Returns to pre admission functional level  Description: INTERVENTIONS:  - Perform AM-PAC 6 Click Basic Mobility/ Daily Activity assessment daily.  - Set and communicate daily mobility goal to care team and patient/family/caregiver.   - Collaborate with rehabilitation services on mobility goals if consulted  - Perform Range of Motion 3 times a day.  - Reposition patient every 2 hours.  - Dangle patient 3 times a day  - Stand patient 3 times a day  - Ambulate patient 3 times a day  - Out of bed to chair 3 times a day   - Out of bed for meals 3 times a day  - Out of bed for toileting  - Record patient progress and toleration of activity level   Outcome: Progressing     Problem: DISCHARGE PLANNING  Goal: Discharge to home or other facility with appropriate resources  Description: INTERVENTIONS:  - Identify barriers to discharge w/patient and caregiver  - Arrange for needed discharge resources and transportation as appropriate  - Identify discharge learning needs (meds, wound care, etc.)  - Arrange for interpretive services to assist at discharge as needed  - Refer to Case Management Department for coordinating discharge planning if the patient needs post-hospital services based on physician/advanced practitioner order or complex needs related to functional status, cognitive ability, or social support system  Outcome: Progressing     Problem: Knowledge Deficit  Goal: Patient/family/caregiver demonstrates understanding of disease process, treatment plan, medications, and discharge instructions  Description:  Complete learning assessment and assess knowledge base.  Interventions:  - Provide teaching at level of understanding  - Provide teaching via preferred learning methods  Outcome: Progressing

## 2024-11-28 NOTE — QUICK NOTE
Primary team reached out as patient was again admitted for dysphagia with difficulty for both solids and liquids.   He was seen by our service for abnormal thyroid functions testing during his prior admission as he was noted to have low TSH, elevated free T4, low T3 and elevated rT3.   Differential at the time included euthyroid sick syndrome vs thyrotoxicosis in the setting of thyroiditis vs hyperthyroidism although TSI came back negative.   Given patient being clinically euthyroid, plan was for outpatient testing and imaging studies.     Per primary team today, patient has had ongoing weight loss. Pathology report from EGDs consistent with proximal esophageal adenocarcinoma. Patient seen by heme-onc while in the  hospital and thyroid ultrasound was ordered by the heme-onc team and performed on 11/26 demonstrating miltinodular goiter. With a 2.8x1.6x1.4cm right mid-lower pole solid hyper/isoechoic nodule with macrocalcifications meeting criteria for biopsy.   As patient is in the process of being discharged today, primary team was asking whether we would consider starting anything for hyperthyroidism in the setting of ongoing weight loss.     Discussed case with Dr. Boucher.   Recommend starting methimazole 5mg daily.   Recommend repeating TSH, free T4 and T3 in 4-6 weeks.   Has upcoming appointment with Dr. Guerrier on 1/20/25

## 2024-11-28 NOTE — PLAN OF CARE
Problem: Nutrition/Hydration-ADULT  Goal: Nutrient/Hydration intake appropriate for improving, restoring or maintaining nutritional needs  Description: Monitor and assess patient's nutrition/hydration status for malnutrition. Collaborate with interdisciplinary team and initiate plan and interventions as ordered.  Monitor patient's weight and dietary intake as ordered or per policy. Utilize nutrition screening tool and intervene as necessary. Determine patient's food preferences and provide high-protein, high-caloric foods as appropriate.     INTERVENTIONS:  - Monitor oral intake, urinary output, labs, and treatment plans  - Assess nutrition and hydration status and recommend course of action  - Evaluate amount of meals eaten  - Assist patient with eating if necessary   - Allow adequate time for meals  - Recommend/ encourage appropriate diets, oral nutritional supplements, and vitamin/mineral supplements  - Order, calculate, and assess calorie counts as needed  - Recommend, monitor, and adjust tube feedings and TPN/PPN based on assessed needs  - Assess need for intravenous fluids  - Provide specific nutrition/hydration education as appropriate  - Include patient/family/caregiver in decisions related to nutrition  Outcome: Progressing     Problem: PAIN - ADULT  Goal: Verbalizes/displays adequate comfort level or baseline comfort level  Description: Interventions:  - Encourage patient to monitor pain and request assistance  - Assess pain using appropriate pain scale  - Administer analgesics based on type and severity of pain and evaluate response  - Implement non-pharmacological measures as appropriate and evaluate response  - Consider cultural and social influences on pain and pain management  - Notify physician/advanced practitioner if interventions unsuccessful or patient reports new pain  Outcome: Progressing     Problem: INFECTION - ADULT  Goal: Absence or prevention of progression during  hospitalization  Description: INTERVENTIONS:  - Assess and monitor for signs and symptoms of infection  - Monitor lab/diagnostic results  - Monitor all insertion sites, i.e. indwelling lines, tubes, and drains  - Monitor endotracheal if appropriate and nasal secretions for changes in amount and color  - [place] appropriate cooling/warming therapies per order  - Administer medications as ordered  - Instruct and encourage patient and family to use good hand hygiene technique  - Identify and instruct in appropriate isolation precautions for identified infection/condition  Outcome: Progressing  Goal: Absence of fever/infection during neutropenic period  Description: INTERVENTIONS:  - Monitor WBC    Outcome: Progressing     Problem: SAFETY ADULT  Goal: Patient will remain free of falls  Description: INTERVENTIONS:  - Educate patient/family on patient safety including physical limitations  - Instruct patient to call for assistance with activity   - Consult OT/PT to assist with strengthening/mobility   - Keep Call bell within reach  - Keep bed low and locked with side rails adjusted as appropriate  - Keep care items and personal belongings within reach  - Initiate and maintain comfort rounds  - Make Fall Risk Sign visible to staff  - Offer Toileting every 2 Hours, in advance of need  - Initiate/Maintain alarm  - Obtain necessary fall risk management equipment:   - Apply yellow socks and bracelet for high fall risk patients  - Consider moving patient to room near nurses station  Outcome: Progressing  Goal: Maintain or return to baseline ADL function  Description: INTERVENTIONS:  -  Assess patient's ability to carry out ADLs; assess patient's baseline for ADL function and identify physical deficits which impact ability to perform ADLs (bathing, care of mouth/teeth, toileting, grooming, dressing, etc.)  - Assess/evaluate cause of self-care deficits   - Assess range of motion  - Assess patient's mobility; develop plan if  impaired  - Assess patient's need for assistive devices and provide as appropriate  - Encourage maximum independence but intervene and supervise when necessary  - Involve family in performance of ADLs  - Assess for home care needs following discharge   - Consider OT consult to assist with ADL evaluation and planning for discharge  - Provide patient education as appropriate  Outcome: Progressing  Goal: Maintains/Returns to pre admission functional level  Description: INTERVENTIONS:  - Perform AM-PAC 6 Click Basic Mobility/ Daily Activity assessment daily.  - Set and communicate daily mobility goal to care team and patient/family/caregiver.   - Collaborate with rehabilitation services on mobility goals if consulted  - Perform Range of Motion 3 times a day.  - Reposition patient every 2 hours.  - Dangle patient 3 times a day  - Stand patient 3 times a day  - Ambulate patient 3 times a day  - Out of bed to chair 3 times a day   - Out of bed for meals 3 times a day  - Out of bed for toileting  - Record patient progress and toleration of activity level   Outcome: Progressing     Problem: DISCHARGE PLANNING  Goal: Discharge to home or other facility with appropriate resources  Description: INTERVENTIONS:  - Identify barriers to discharge w/patient and caregiver  - Arrange for needed discharge resources and transportation as appropriate  - Identify discharge learning needs (meds, wound care, etc.)  - Arrange for interpretive services to assist at discharge as needed  - Refer to Case Management Department for coordinating discharge planning if the patient needs post-hospital services based on physician/advanced practitioner order or complex needs related to functional status, cognitive ability, or social support system  Outcome: Progressing     Problem: Knowledge Deficit  Goal: Patient/family/caregiver demonstrates understanding of disease process, treatment plan, medications, and discharge instructions  Description:  Complete learning assessment and assess knowledge base.  Interventions:  - Provide teaching at level of understanding  - Provide teaching via preferred learning methods  Outcome: Progressing

## 2024-11-28 NOTE — PLAN OF CARE
Problem: Nutrition/Hydration-ADULT  Goal: Nutrient/Hydration intake appropriate for improving, restoring or maintaining nutritional needs  Description: Monitor and assess patient's nutrition/hydration status for malnutrition. Collaborate with interdisciplinary team and initiate plan and interventions as ordered.  Monitor patient's weight and dietary intake as ordered or per policy. Utilize nutrition screening tool and intervene as necessary. Determine patient's food preferences and provide high-protein, high-caloric foods as appropriate.     INTERVENTIONS:  - Monitor oral intake, urinary output, labs, and treatment plans  - Assess nutrition and hydration status and recommend course of action  - Evaluate amount of meals eaten  - Assist patient with eating if necessary   - Allow adequate time for meals  - Recommend/ encourage appropriate diets, oral nutritional supplements, and vitamin/mineral supplements  - Order, calculate, and assess calorie counts as needed  - Recommend, monitor, and adjust tube feedings and TPN/PPN based on assessed needs  - Assess need for intravenous fluids  - Provide specific nutrition/hydration education as appropriate  - Include patient/family/caregiver in decisions related to nutrition  11/28/2024 1112 by Denise Salmeron RN  Outcome: Adequate for Discharge       Problem: PAIN - ADULT  Goal: Verbalizes/displays adequate comfort level or baseline comfort level  Description: Interventions:  - Encourage patient to monitor pain and request assistance  - Assess pain using appropriate pain scale  - Administer analgesics based on type and severity of pain and evaluate response  - Implement non-pharmacological measures as appropriate and evaluate response  - Consider cultural and social influences on pain and pain management  - Notify physician/advanced practitioner if interventions unsuccessful or patient reports new pain  11/28/2024 1112 by Denise Salmeron RN  Outcome: Adequate for Discharge        Problem: INFECTION - ADULT  Goal: Absence or prevention of progression during hospitalization  Description: INTERVENTIONS:  - Assess and monitor for signs and symptoms of infection  - Monitor lab/diagnostic results  - Monitor all insertion sites, i.e. indwelling lines, tubes, and drains  - Monitor endotracheal if appropriate and nasal secretions for changes in amount and color  - [place] appropriate cooling/warming therapies per order  - Administer medications as ordered  - Instruct and encourage patient and family to use good hand hygiene technique  - Identify and instruct in appropriate isolation precautions for identified infection/condition  11/28/2024 1112 by Denise Salmeron RN  Outcome: Adequate for Discharge    Goal: Absence of fever/infection during neutropenic period  Description: INTERVENTIONS:  - Monitor WBC    11/28/2024 1112 by Denise Salmeron RN  Outcome: Adequate for Discharge       Problem: SAFETY ADULT  Goal: Patient will remain free of falls  Description: INTERVENTIONS:  - Educate patient/family on patient safety including physical limitations  - Instruct patient to call for assistance with activity   - Consult OT/PT to assist with strengthening/mobility   - Keep Call bell within reach  - Keep bed low and locked with side rails adjusted as appropriate  - Keep care items and personal belongings within reach  - Initiate and maintain comfort rounds  - Make Fall Risk Sign visible to staff  - Offer Toileting every 2 Hours, in advance of need  - Initiate/Maintain alarm  - Obtain necessary fall risk management equipment:   - Apply yellow socks and bracelet for high fall risk patients  - Consider moving patient to room near nurses station  11/28/2024 1112 by Denise Salmeron RN  Outcome: Adequate for Discharge    Goal: Maintain or return to baseline ADL function  Description: INTERVENTIONS:  -  Assess patient's ability to carry out ADLs; assess patient's baseline for ADL function and identify physical  deficits which impact ability to perform ADLs (bathing, care of mouth/teeth, toileting, grooming, dressing, etc.)  - Assess/evaluate cause of self-care deficits   - Assess range of motion  - Assess patient's mobility; develop plan if impaired  - Assess patient's need for assistive devices and provide as appropriate  - Encourage maximum independence but intervene and supervise when necessary  - Involve family in performance of ADLs  - Assess for home care needs following discharge   - Consider OT consult to assist with ADL evaluation and planning for discharge  - Provide patient education as appropriate  11/28/2024 1112 by Denise Salmeron RN  Outcome: Adequate for Discharge    Goal: Maintains/Returns to pre admission functional level  Description: INTERVENTIONS:  - Perform AM-PAC 6 Click Basic Mobility/ Daily Activity assessment daily.  - Set and communicate daily mobility goal to care team and patient/family/caregiver.   - Collaborate with rehabilitation services on mobility goals if consulted  - Perform Range of Motion 3 times a day.  - Reposition patient every 2 hours.  - Dangle patient 3 times a day  - Stand patient 3 times a day  - Ambulate patient 3 times a day  - Out of bed to chair 3 times a day   - Out of bed for meals 3 times a day  - Out of bed for toileting  - Record patient progress and toleration of activity level   11/28/2024 1112 by Denise Salmeron RN  Outcome: Adequate for Discharge       Problem: DISCHARGE PLANNING  Goal: Discharge to home or other facility with appropriate resources  Description: INTERVENTIONS:  - Identify barriers to discharge w/patient and caregiver  - Arrange for needed discharge resources and transportation as appropriate  - Identify discharge learning needs (meds, wound care, etc.)  - Arrange for interpretive services to assist at discharge as needed  - Refer to Case Management Department for coordinating discharge planning if the patient needs post-hospital services based on  physician/advanced practitioner order or complex needs related to functional status, cognitive ability, or social support system  11/28/2024 1112 by Denise Salmeron RN  Outcome: Adequate for Discharge       Problem: Knowledge Deficit  Goal: Patient/family/caregiver demonstrates understanding of disease process, treatment plan, medications, and discharge instructions  Description: Complete learning assessment and assess knowledge base.  Interventions:  - Provide teaching at level of understanding  - Provide teaching via preferred learning methods  11/28/2024 1112 by Denise Salmeron RN  Outcome: Adequate for Discharge

## 2024-11-29 ENCOUNTER — TELEPHONE (OUTPATIENT)
Dept: HEMATOLOGY ONCOLOGY | Facility: CLINIC | Age: 74
End: 2024-11-29

## 2024-11-29 DIAGNOSIS — C80.1 MALIGNANT NEOPLASM METASTATIC TO LOWER THIRD OF ESOPHAGUS WITH UNKNOWN PRIMARY SITE (HCC): Primary | ICD-10-CM

## 2024-11-29 DIAGNOSIS — C78.89 MALIGNANT NEOPLASM METASTATIC TO LOWER THIRD OF ESOPHAGUS WITH UNKNOWN PRIMARY SITE (HCC): Primary | ICD-10-CM

## 2024-11-29 NOTE — TELEPHONE ENCOUNTER
"Soft Intake Form   Patient Details   Hector Carrera     1950     Reason For Appointment   Who is Calling? Nikki Mcgarry   If not patient, Name?  NA   DID YOU CONFIRM INSURANCE WITH PATIENT? E verified, Routed to finance   Who is the Referring Doctor? Dr. Mario   What is the diagnosis?  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    If yes, what is the date it was done? Esophageal bxs taken on 11/8 and 11/19.   Biopsy done at [facility]?  If not, where was it done? Yes   Was imaging done, and was it done at [facility]?  If not, where was it done? Yes-[facility]   Have you been seen by another Oncologist?  If so, who and where (name of facility, city and state) No   For 2nd Opinions Only: Are you currently undergoing treatment, or are you scheduled to start treatment?  If yes, name of facility, city and state No   For \"History Of\" only: Have you completed treatment? NA    Have you had Genetic Testing done in the past?  If so, advise to bring test results to their visit NA   Record Gathering Information   Did you advise to have records faxed to [phone]?  NA   Did you advise to have disks sent to the proper address with imaging? (\"History of\" Patients)  5 years of imaging for breast patients-Mammos, US etc NA   Scheduling Information   Did you send new patient paperwork?  Email or mail? NA   What is the best call back number?   (If the [facility] is calling, please use their number) NA   Miscellaneous Information      The patient is scheduled for his HFU appointment with Dr. Johnson on 12/6 at 0840 in the [facility].      "

## 2024-12-02 ENCOUNTER — TELEPHONE (OUTPATIENT)
Age: 74
End: 2024-12-02

## 2024-12-02 NOTE — TELEPHONE ENCOUNTER
Pt's wife named Mitzi requesting to speak with someone regarding an appt to see Dr. Brady for a follow up. Warm transferred over to clerical team for further assistance

## 2024-12-03 ENCOUNTER — PATIENT OUTREACH (OUTPATIENT)
Dept: HEMATOLOGY ONCOLOGY | Facility: CLINIC | Age: 74
End: 2024-12-03

## 2024-12-03 NOTE — PROGRESS NOTES
"Initial outreach. Spoke to Hector and Mitzi (spouse). Introduced myself and explained the role of an Oncology Nurse Navigator to assist with coordination of cancer care, preparation for upcoming appointment, be a point of contact prior to oncology consult, provide support and connect them with available resources. Explained I will be their main contact until they are established with their team and a treatment plan is established.     Introduced Fabienne, and explained she will be his patient navigator, who will reach out after the first consult to introduce themselves. The PN will provide support, make sure he has a good understanding of the plan and schedule and to connect with additional resources if/when needed.     GI Symptoms:   \"Eating soft foods and not coming back up, not ready to try thicker foods yet, thickest have gotten is yogurt. Feeling nervous about advancing more.\"  LBM 12/28, per wife they got senna and will try miralax. Passing gas.   Staying hydrated and drinking fluids, drinking about one ensure a day.  Consuming about 750 calories on average.   Per spouse she spoke with, Francine, per spouse nutritionist in hospital-- instructed that when he feels ready for thicker foods to advance as tolerated.  Current weight 169#, per wife has been the same since being home from hospital last week.    General assessment completed & MST completed recommending nutrition referral, pt/spouse declined at this time d/t having a lot going on right now but would be open to it in future if needed.     Reviewed upcoming appointments including date, time and location.  Assessed for barriers of care. My direct contact information provided. Patient and spouse are aware that they can call me should they need any further assistance. Patient and spouse were appreciative of assistance.     Future Appointments   Date Time Provider Department Center   12/6/2024  8:40 AM Erika Johnson MD HEM ONC  [facility]   12/13/2024 10:00 " AM BE [facility] PET RM 3 BE [facility] NM BE [place]   12/18/2024 11:00 AM Brandy Alonso MD SURG ONC [facility]   1/6/2025  9:20 AM Elijah Crocker DO GI B[facility]   1/20/2025 10:10 AM Tyrese Guerrier DO DIAB [facility]

## 2024-12-04 ENCOUNTER — TELEPHONE (OUTPATIENT)
Age: 74
End: 2024-12-04

## 2024-12-04 NOTE — TELEPHONE ENCOUNTER
Patients spouse, Mitzi, calling in regards to patients rescheduled appt 12/18/24 @ 2:40pm with Dr. Johnson at [facility].  She states it's not showing in his my chart.      Patients chart is still reflecting 12/6/24 appt.  Patient needs to be seen after pet scan 12/13/24.  She received a call from the office offering 12/18/24 @ 2:40pm.  Please call back and confim appt has been rescheduled [phone]

## 2024-12-13 ENCOUNTER — HOSPITAL ENCOUNTER (OUTPATIENT)
Dept: RADIOLOGY | Age: 74
Discharge: HOME | End: 2024-12-13
Payer: COMMERCIAL

## 2024-12-13 DIAGNOSIS — C78.89 MALIGNANT NEOPLASM METASTATIC TO LOWER THIRD OF ESOPHAGUS WITH UNKNOWN PRIMARY SITE (HCC): ICD-10-CM

## 2024-12-13 DIAGNOSIS — C80.1 MALIGNANT NEOPLASM METASTATIC TO LOWER THIRD OF ESOPHAGUS WITH UNKNOWN PRIMARY SITE (HCC): ICD-10-CM

## 2024-12-13 LAB — GLUCOSE SERPL-MCNC: 107 MG/DL (ref 65–140)

## 2024-12-13 PROCEDURE — 82948 REAGENT STRIP/BLOOD GLUCOSE: CPT

## 2024-12-13 PROCEDURE — A9552 F18 FDG: HCPCS

## 2024-12-13 PROCEDURE — 78815 PET IMAGE W/CT SKULL-THIGH: CPT

## 2024-12-15 NOTE — PROGRESS NOTES
HEMATOLOGY / ONCOLOGY CLINIC FOLLOW UP NOTE    Patient Hector Carrera  MRN: [id number]  : 1950  Date of Encounter 2024      Referring Provider: Felicia Betancourt    Reason for Encounter Consult          Discussion    Although ??rger? is the primary curative modality for ?GJ and gastric ?ar?i? adenocarcinoma, long-term outcomes are not satisfactory with resection alone, even if microscopically complete (R0). This poor long-term outcome has prompted an evaluation of neoadjuvant (preoperative) and adjuvant (postoperative) combined modality therapy.       Data from the CROSS trial was discussed; this was a [place] randomized sutyd of 363 patients  with potentially resectable esophageal and GEJ cancer SCC 86 patients/adeno 273 were 50 mg/m2 Taxol and Carboplatin AUC 2 with 41.4 Gy RT was compared.  Toxicities were managed Median OS at 32 months was greater in the CRT arm; 3 year OS was 58 vs 44% with SCC doing better.  The durability of survival remained intact at 5 years at 47 vs 33 %.  Locoregional failure was significantly decreased as well as distant failure although similar in both groups.  [place] analysis of trimodality therapy suggests benefit for CRT with an absolute survival benefit of almost 9%.       Thus, we had a discussion regarding the use of the chemotherapy backbone for Carboplatin and Taxol; risks/benefits and toxicities were discussed at length as individual agents and in combination with radiation.  Combined treatment effects were discussed including esophagitis, potential pneumonitis due to internal scatter, inflammation and the effects of Carboplatin/Taxol     For patients with clinical T3-4 or node-positive resectable thoracic esophageal adenocarcinoma who are younger with good performance status and minimal comorbidities, key opinion leaders suggest the use of  perioperative ?h?m?therap? with fluorouracil, leucovorin, oxaliplatin, and docetaxel (FLOT) rather than neoadjuvant ?R?.     For older  patients, those with multiple comorbidities, and/or those who are unlikely to tolerate the toxicities of FLOT (eg, neuropathy), neoadjuvant CRT is an appropriate alternative. Patients treated with neoadjuvant ?R? followed by surgery should be evaluated for adjuvant therapy (eg, adjuvant immunotherapy for those without a pCR).    As the patient was seen at [facility] with the hospitals CT surgeons suggesting preop FLOT based on the ESOPEC trial, the following was discussed:     In a phase III trial (ESOPEC) that included patients with thoracic esophageal and GEJ adenocarcinoma, perioperative FLOT improved OS relative to neoadjuvant ?R? with good compliance rate and similar postoperative morbidity and mortality     In an open-label phase III trial (ESOPE), 438 patients with locally advanced (cT1, N1-3, M0 or cT2-4a, N0-3, M0) adenocarcinoma of the esophagus, including Siewert type I ?G? tumors (ie, tumor epicenter within 5 cm of the ?GJ and extension into the esophagus) , were randomly assigned to either eight cycles of perioperative ?h?m?ther?py with FL?? (four preoperative [neoadjuvant] cycles, followed by surg?ry and four postoperative [adjuvant] cycles) or neoadjuvant ?RT (41.4 Gy of R? with concurrent carboplatin plus paclitaxel) followed by ?urger? . Patients who received neoadjuvant ?RT and ?urg?ry did not receive adjuvant therapy. This study also excluded patients with gastric cancer.     In preliminary results available in abstract form only, at median follow-up of 55 months, relative to neoadjuvant ?RT, perioperative FL?? improved OS (median 66 versus 37 months; five-year OS 51 versus 39 percent, HR 0.70, 95% CI 0.53-0.92) and PFS (median 38 versus 16 months, five-year PFS 44 versus 31 percent, HR 0.66, 95% CI 0.51-0.85)]. Clinical OS benefit for perioperative F?O? was seen in all clinically relevant subgroups, including sex, age, ECOG performance status, clinical tumor (T) stage, and clinical node (N) stage.    In the  371 patients who underwent ??rgery, relative to neoadjuvant ?R?, perioperative F?O? demonstrated similar rates of R0 resection (94 versus 95 percent) and R1 resection (5 versus 4 percent); similar postoperative daniela stage (ypN0 51 versus 54 percent) and a higher pathologic complete remission rate (ypT0 ypN0; 17 versus 10 percent).    For those patients treated with F???, most completed neoadjuvant treatment (87 percent) and received both neoadjuvant treatment and ??rg?ry (86 percent), but a lesser proportion received or completed adjuvant therapy (63 and 53 percent, respectively). Among those treated with neoadjuvant ?R?, 68 percent completed all neoadjuvant treatment (with 98 percent completing all 41.4 Gy of RT) and 83 percent received neoadjuvant treatment plus surgery. Postoperative morbidity and mortality were similar between the two treatment arms.    However, the OS benefit seen with perioperative ?h?m?th?r??y in the ESOPEC trial must be interpreted in the context of several points, which suggest the comparator treatment (neoadjuvant CR?) might have been at a disadvantage. First, the ESOPEC trial did not include the use of adjuvant immunotherapy in those treated with neoadjuvant ?RT who had residual disease (ie, lacked a pCR) at surg?ry; this approach improved disease-free survival in a phase III trial (CheckMate 577) and became standard of care while the ESOPEC trial was being conducted     The use of maintenance IO after completion of CRT, surgery with residual disease  based on the Checkmate 577 study with Nivolumab was discussed.  There is no concrete data for the addition of IO to post operative FLOT    Benefit for nivolumab was shown in the CheckMate 577 trial, in which 794 patients who had received neoadjuvant CRT for esophageal or EGJ cancer (70 percent adenocarcinoma) and had residual pathologic disease at the time of ??rgery were randomly assigned to nivolumab (240 mg) or placebo every 2 weeks for  16 weeks followed by nivolumab 480 mg or placebo every 4 weeks; the maximum treatment duration was one year [     Enrollment was irrespective of programmed death receptor-1 ligand 1 (PD-L1) overexpression. Tumor site was esophagus in 60 percent and ?G? in 40 percent; histology was adenocarcinoma in 71 percent and S?? in 29 percent. At a median follow-up of 24.4 months, median disease-free survival, the primary endpoint, was twice as long with nivolumab (22.4 versus 11 months, HR for disease progression or death was 0.69, 95% CI 0.56-0.86), and the benefits were seen across all patient subgroups (histology, location, initial and post-treatment disease stage, PD-L1 overexpression or not). OS data were not mature. Although treatment-related adverse effects were frequent, most were grade 1 or 2 and only 9 percent of patients discontinued adjuvant nivolumab because of adverse effects. The benefits were gained without any significant decline in patient-reported health-related quality of life over the year of nivolumab treatment.    Based on these results, the [place] Food and Drug Administration has approved nivolumab as adjuvant therapy for patients who previously received neoadjuvant CR? following complete resection of esophageal or gastroesophageal junction cancer with residual pathologic disease.              Assessment / Plan:      Esophageal Cancer/adenocarcinoma [id number] Stage III      EGD x 2 done but no EGD/EUS for staging      PET scan  12/13/2024       1. Patchy uptake at the level of the distal esophagus compatible with the known primary lesion. FDG activity appears to extend into the surrounding soft tissues suggesting local spread of disease into the posterior mediastinum.  2. Mild uptake at a small precarinal lymph node on the right, SUV max of 2.6. Metastasis not excluded.  3. No findings for hypermetabolic metastasis in the neck, abdomen or pelvis.  4. Previously noted ill-defined nodular opacities in the  right lower lobe have significantly improved.    Rad Onc consult    Nutrition consult    Stent in place; concern with stent and RT     Rad Onc will discuss; can consider PEG which unfortunately can alter anatomy    Had long discussion as to the need for nutrition during this process and neither PEG or stent are ideal but necessary     Will need PORT certainly if FLOT chosen but also with Carbo/Taxol/CRT    Would prefer to be treated in the [facility] system; seeing [facility] at [place]    Will make decision as to treatment    Concern is the significant toxicity with FLOT, his age and ability to tolerate vs CRT    Follow up    Will call         History 12/18/2024    Mr Hector Carrera is a 74 year old with hx HTN, hyperthyroidism, HLD who is here for consult regarding newly diagnosed Stage III cT3    Per the patient, he was an active  otherwise healthy 74 year old but noted about t 3-4 months ago with solid/liquid food dysphagia.  Initially was predominantly solid foods.  He also noted weight loss and in total lost about 50 pounds; he is currently 171 pounds.  He sought medical attention and underwent EGD for evaluation of his dysphagia in September, which noted significant erosive esophagitis.  Biopsies were negative for malignancy.     He was prescribed an aggressive course of PPI therapy and underwent repeat EGD in November.  This noted a worsening appearance of a distal esophageal stricture, and biopsies were positive for adenocarcinoma.  He then presented to the hospital with an inability to tolerate  anything orally  and a repeat EGD with stent was performed.  While he was initially tolerating p.o. intake, he presented again to the hospital shortly thereafter and a replacement of the stent was performed due to non-optimal tolerance of p.o. intake.  He was discharged to home with better tolerance of p.o. intake.    During the staging workup, found to have a right thyroid nodule with US recommendation of biopsy  11/26/2024    PET scan for esophageal cancer staging 12/13/2024 with multinodular thyroid with no FDG avidity, right lobe 1.7 cm.  He had no evidence of metastatic disease.    Since stent placement, able to tolerate pureed foods.  Was seen by nutrition in house but not since discharge.  Had has issues with GERD in past but has not been on chronic treatment.  Did smoke but more than 40 years ago and not significant.  No other GI issues.      Family history significant for brother with lung cancer/tob abuse, sister with breast cancer and another sister with Hodgkins lymphoma               REVIEW OF SYSTEMS: as above   Please note that a 14-point review of systems was performed to include Constitutional, HEENT, Respiratory, CVS, GI, , Musculoskeletal, Integumentary, Neurologic, Rheumatologic, Endocrinologic, Psychiatric, Lymphatic, and Hematologic/Oncologic systems were reviewed and are negative unless otherwise stated in HPI. Positive and negative findings pertinent to this evaluation are incorporated into the history of present illness.      ECOG PS: 1    Oncologic Evaluation:    EGD 10/16/2024      Severe, localized edematous, erythematous, friable and nodular mucosa with exudate measuring 3 mm in the lower third of the esophagus and GE junction (36 cm from the incisorsGE junction); performed cold forceps biopsy  Indeterminate and inflamed intrinsic stricture (not traversable) in the esophagus (36 cm from the incisors)       No staging EGD/EUS    EGD 11/19/2024      Indeterminate stricture (traversable) in the GE junction; performed cold forceps biopsy; successfully placed 18 mm x 6 cm fully covered stent using guidewire. The stricture was only traversable with the slim XP endoscope.  Biopsies were obtained with pediatric biopsy forceps.  A wire was advanced into the gastric lumen.  Initially a double lumen opposing stent was deployed thinking the stricture was approximately 1 cm in length however this did not  fully expand due to length of stricture.  This was then exchanged for fully covered agile stent.  This was deployed successfully.  There was significant narrowing in the distal esophagus at the area of the stent as well.  The stomach, duodenal bulb and 2nd part of the duodenum appeared normal.        SPECIMENS:  ID Type Source Tests Collected by Time Destination   1 : esophageal stricture bx Tissue Esophagus TISSUE EXAM Bárbara Brady MD 11/19/2024  3:30 PM              IMPRESSION:  Successful placement of esophageal stent in the distal esophagus area of the diaphyseal stricture.  Biopsies were obtained.        Stent 11/28/2024         FINDINGS:  Fully covered metal stent was visualized in the upper third of the esophagus. The stent was removed using forceps. The stent was replaced with a Scint-X Wallflex fully covered metal stent measuring 18 mm x 12 cm and secured with over-the-scope clips. The slim XP scope was used to advance the scope into the stomach and a wire was advanced into the stomach. This was placed using fluoroscopic guidance with guidewire.  The stomach, duodenal bulb and 2nd part of the duodenum appeared normal.        SPECIMENS:  * No specimens in log *        IMPRESSION:  Fully covered metal stent was visualized in the upper third of the esophagus. The stent was removed. The stent was replaced with a fully covered metal stent measuring 18 mm x 12 cm.  The stomach, duodenal bulb and 2nd part of the duodenum appeared normal.       Pathology  11/19/2024       Esophagus, esophageal stricture biopsy:  - Focal intramucosal adenocarcinoma arising in a background of low and high-grade dysplasia     and intestinal metaplasia in cardiac type mucosa.      -- Wild-type expression on p53 immunostain.    -- Confirmed by Pankeratin (CKAE1/3) immunostain.    PET scan       HEAD/NECK:  There is a physiologic distribution of FDG. No FDG avid cervical adenopathy is seen.  CT images: Multinodular thyroid  without focal uptake and  with the largest nodule in the right lobe measuring 1.7 x 1.3 cm (series 3 image 54). This was further characterized on 11/26/2024 with ultrasound.     CHEST: Interval distal esophageal stent placement.     Patchy uptake at the level of the distal esophagus SUV max 4.1 SUV. See for example image 95 series 1200. Based on PET activity this is approximately 3 cm in length. On axial images this measures up to 2.6 cm. FDG activity appears to extend into the   surrounding soft tissues.     Also subjacent soft tissue fullness in the posterior mediastinum with patchy uptake, SUV max of 3.7. See for example image 84 series 1200.     Mild uptake at a precarinal lymph node on the right, SUV max of 2.6. This measures 1.0 cm short axis image 72 series 3.     Curvilinear FDG uptake at the superior aspect of the esophageal stent, SUV max of 5.7 with slight wall thickening suggested on CT probably inflammatory.        CT images: Coronary artery calcifications. Atherosclerotic calcifications of the aorta. Calcified subcarinal lymph node (series 3 image 83-90) measuring 2.1 x 1.5 cm.     Right lower lobe linear atelectasis. Previously noted ill-defined nodular opacities in the right lower lobe have significantly improved.     ABDOMEN:  No FDG avid soft tissue lesions are seen.  CT images: Multiple bilateral renal cysts measuring up to 5.5 x 4.6 cm (series 3 image 127). Redemonstrated 5 mm left lower pole nonobstructing renal calculus (series 3 image 148). There is colonic diverticulosis without acute diverticulitis.   Atherosclerotic calcifications of the abdominal pelvic vessels. No aneurysm.     PELVIS:  No FDG avid soft tissue lesions are seen.  CT images: Enlarged prostate without focal FDG activity.     OSSEOUS STRUCTURES:  No FDG avid lesions are seen.  CT images: Multilevel degenerative changes of the spine.     IMPRESSION:     1. Patchy uptake at the level of the distal esophagus compatible with the  known primary lesion. FDG activity appears to extend into the surrounding soft tissues suggesting local spread of disease into the posterior mediastinum.  2. Mild uptake at a small precarinal lymph node on the right, SUV max of 2.6. Metastasis not excluded.  3. No findings for hypermetabolic metastasis in the neck, abdomen or pelvis.  4. Previously noted ill-defined nodular opacities in the right lower lobe have significantly improved.         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    PAST SURGICAL HISTORY:   has a past surgical history that includes No past surgeries.    CURRENT MEDICATIONS  Current Outpatient Medications   Medication Sig Dispense Refill    amLODIPine (NORVASC) 10 mg tablet Take 10 mg by mouth daily      lisinopril (ZESTRIL) 10 mg tablet take 1 tablet by mouth twice a day for 90 days      methimazole (TAPAZOLE) 5 mg tablet Take 1 tablet (5 mg total) by mouth in the morning 30 tablet 0    nystatin (MYCOSTATIN) 500,000 units/5 mL suspension Swish and swallow 5 mL (500,000 Units total) 4 (four) times a day 600 mL 0    pantoprazole (PROTONIX) 40 mg tablet Take 1 tablet (40 mg total) by mouth 2 (two) times a day 60 tablet 0    simvastatin (ZOCOR) 20 mg tablet Take 20 mg by mouth daily       No current facility-administered medications for this visit.     [unfilled]    SOCIAL HISTORY:   reports that he has quit smoking. His smoking use included cigarettes. He has been exposed to tobacco smoke. He has never used smokeless tobacco. He reports that he does not currently use alcohol. He reports  that he does not use drugs.     FAMILY HISTORY:  family history includes No Known Problems in his father, maternal grandfather, maternal grandmother, mother, paternal grandfather, and paternal grandmother.     ALLERGIES:  is allergic to nuts - food allergy.      Physical Exam:  Vital Signs:   Visit Vitals  Smoking Status Former     There is no height or weight on file to calculate BMI.  There is no height or weight on file to calculate BSA.    GEN: Alert, awake oriented x3, in no acute distress  HEENT- No pallor, icterus, cyanosis, no oral mucosal lesions,   LAD - no palpable cervical, clavicle, axillary, inguinal LAD  Heart- normal S1 S2, regular rate and rhythm, No murmur, rubs.   Lungs- clear breathing sound bilateral.   Abdomen- soft, Non tender, bowel sounds present  Extremities- No cyanosis, clubbing, edema  Neuro- No focal neurological deficit    Labs:  Lab Results   Component Value Date    WBC 5.62 11/27/2024    HGB 12.3 11/27/2024    HCT 38.9 11/27/2024    MCV 90 11/27/2024     11/27/2024     Lab Results   Component Value Date    SODIUM 134 (L) 11/28/2024    K 3.6 11/28/2024     11/28/2024    CO2 23 11/28/2024    AGAP 9 11/28/2024    BUN 5 11/28/2024    CREATININE 0.56 (L) 11/28/2024    GLUC 138 11/28/2024    CALCIUM 8.2 (L) 11/28/2024    AST 15 11/27/2024    ALT 18 11/27/2024    ALKPHOS 67 11/27/2024    TP 5.9 (L) 11/27/2024    TBILI 0.81 11/27/2024    EGFR 101 11/28/2024           I spent 60 minutes on chart review, face to face counseling time, coordination of care and documentation.    Erika Johnson MD PhD

## 2024-12-16 ENCOUNTER — TELEPHONE (OUTPATIENT)
Age: 74
End: 2024-12-16

## 2024-12-16 NOTE — TELEPHONE ENCOUNTER
Patients GI provider:  Dr. Crocker      Reason for call: [facility] called and asked to please fax over pathology results from 09/20 11/08 and 11/19 to fax #  [phone] thank you     Scheduled procedure/appointment date if applicable: n/a

## 2024-12-18 ENCOUNTER — OFFICE VISIT (OUTPATIENT)
Age: 74
End: 2024-12-18
Payer: COMMERCIAL

## 2024-12-18 ENCOUNTER — OFFICE VISIT (OUTPATIENT)
Dept: SURGICAL ONCOLOGY | Facility: CLINIC | Age: 74
End: 2024-12-18
Payer: COMMERCIAL

## 2024-12-18 VITALS
HEIGHT: 71 IN | OXYGEN SATURATION: 99 % | SYSTOLIC BLOOD PRESSURE: 116 MMHG | HEART RATE: 81 BPM | WEIGHT: 170 LBS | BODY MASS INDEX: 23.8 KG/M2 | DIASTOLIC BLOOD PRESSURE: 60 MMHG | TEMPERATURE: 97.5 F | RESPIRATION RATE: 18 BRPM

## 2024-12-18 VITALS
OXYGEN SATURATION: 98 % | WEIGHT: 171 LBS | HEART RATE: 71 BPM | RESPIRATION RATE: 18 BRPM | DIASTOLIC BLOOD PRESSURE: 62 MMHG | TEMPERATURE: 97.4 F | SYSTOLIC BLOOD PRESSURE: 132 MMHG | HEIGHT: 71 IN | BODY MASS INDEX: 23.94 KG/M2

## 2024-12-18 DIAGNOSIS — C15.5 MALIGNANT NEOPLASM OF LOWER THIRD OF ESOPHAGUS (HCC): ICD-10-CM

## 2024-12-18 DIAGNOSIS — C78.89 MALIGNANT NEOPLASM METASTATIC TO LOWER THIRD OF ESOPHAGUS WITH UNKNOWN PRIMARY SITE (HCC): Primary | ICD-10-CM

## 2024-12-18 DIAGNOSIS — C80.1 MALIGNANT NEOPLASM METASTATIC TO LOWER THIRD OF ESOPHAGUS WITH UNKNOWN PRIMARY SITE (HCC): Primary | ICD-10-CM

## 2024-12-18 DIAGNOSIS — C15.9 ESOPHAGEAL ADENOCARCINOMA (HCC): ICD-10-CM

## 2024-12-18 DIAGNOSIS — R63.4 WEIGHT LOSS: ICD-10-CM

## 2024-12-18 PROCEDURE — 99205 OFFICE O/P NEW HI 60 MIN: CPT | Performed by: INTERNAL MEDICINE

## 2024-12-18 PROCEDURE — 99215 OFFICE O/P EST HI 40 MIN: CPT | Performed by: STUDENT IN AN ORGANIZED HEALTH CARE EDUCATION/TRAINING PROGRAM

## 2024-12-18 RX ORDER — ALBUTEROL SULFATE 90 UG/1
INHALANT RESPIRATORY (INHALATION)
COMMUNITY

## 2024-12-18 RX ORDER — ASPIRIN 81 MG/1
TABLET ORAL
COMMUNITY

## 2024-12-18 RX ORDER — OMEPRAZOLE 40 MG/1
1 CAPSULE, DELAYED RELEASE ORAL EVERY 12 HOURS
COMMUNITY

## 2024-12-18 NOTE — ASSESSMENT & PLAN NOTE
I discussed with the patient and his family today that current staging suggests a T3 versus T4 tumor with a mediastinal node that is likely involved.  I would like to obtain a CT scan of the chest with contrast to try to better delineate involvement of mediastinal structures.  I discussed neoadjuvant chemo RT with the patient and family today.  They discussed a second opinion received from [facility], who suggested that newer data suggests perioperative FLOT is better for long-term outcomes.  I discussed my concern about his ability to tolerate FLOT at his age, as well as my concern for the locally aggressive nature of his specific tumor.  He was meeting with Dr. Johnson this afternoon and will hopefully see radiation oncology in the near future to discuss options for neoadjuvant treatment.  We also discussed a referral to nutrition given his decreased p.o. intake.  We discussed intake of high-calorie high-protein calorie dense foods.  He does appear to be swallowing effectively at this time.  The patient will call our office should he decide to pursue surgical treatment at our facility.  All questions answered today.

## 2024-12-18 NOTE — PROGRESS NOTES
SURGICAL ONCOLOGY CONSULT    Hector Carrera  1950  [id number]  [facility] [address] [id number] [phone]      ASSESSMENT AND PLAN    1. 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      -- Wild-type expression on p53 immunostain.     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 Patient otherwise had been rather healthy up until the past 3-4 months where he noticed decreased tolerance for p.o. intake of solids which progressed to difficulty tolerating liquids.  He underwent EGD for evaluation of his dysphagia in September, which noted significant erosive esophagitis.  Biopsies were negative for malignancy.  He was prescribed an aggressive course of PPI therapy and underwent repeat EGD in November.  This noted a worsening appearance of a distal esophageal stricture, and biopsies were positive for adenocarcinoma.  He then presented to the hospital with an inability to tolerate p.o. intake, and a repeat EGD with stent was performed.  While he was initially tolerating p.o. intake, he presented again to the hospital shortly thereafter and a replacement of the stent was performed due to non-optimal tolerance of p.o. intake.  He was discharged to home with better tolerance of p.o.  intake.  He has been taking in puréed foods and high-protein supplements.  He states he is not having trouble swallowing but he does feel full easily.  He has lost about 20 pounds since initial diagnosis.  He saw [place] [facility] in consultation yesterday, who recommended perioperative FLOT with resection.  He is going to see Dr. Her shock today    Review of Systems  Complete ROS Surg Onc:   Constitutional: The patient endorses new or recent history of general fatigue, recent weight loss, somewhat poor appetite.   Eyes: No complaints of visual problems, no scleral icterus.   ENT: no complaints of ear pain, no hoarseness, improvement in his difficulty swallowing,  no tinnitus and no new masses in head, oral cavity, or neck.   Cardiovascular: No complaints of chest pain, no palpitations, no ankle edema.   Respiratory: No complaints of shortness of breath, no cough.   Gastrointestinal: No complaints of jaundice, no bloody stools, no pale stools.   Genitourinary: No complaints of dysuria, no hematuria, no nocturia, no frequent urination, no urethral discharge.   Musculoskeletal: No complaints of weakness, paralysis, joint stiffness or arthralgias.  Integumentary: No complaints of rash, no new lesions.   Neurological: No complaints of convulsions, no seizures, no dizziness.   Hematologic/Lymphatic: No complaints of easy bruising.   Endocrine:  No hot or cold intolerance.  No polydipsia, polyphagia, or polyuria.  Allergy/immunology:  No environmental allergies.  No food allergies.  Not immunocompromised.  Skin:  No pallor or rash.  No wound.      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)    Severe protein-calorie malnutrition (HCC)     Past Medical History:   Diagnosis Date    GERD  (gastroesophageal reflux disease)     Hyperlipidemia     Hypertension      Past Surgical History:   Procedure Laterality Date    NO PAST SURGERIES       Family History   Problem Relation Age of Onset    No Known Problems Mother     No Known Problems Father     No Known Problems Maternal Grandmother     No Known Problems Maternal Grandfather     No Known Problems Paternal Grandmother     No Known Problems Paternal Grandfather     Colon cancer Neg Hx     Esophageal cancer Neg Hx      Social History     Socioeconomic History    Marital status: /Civil Union     Spouse name: Not on file    Number of children: Not on file    Years of education: Not on file    Highest education level: Not on file   Occupational History    Not on file   Tobacco Use    Smoking status: Former     Types: Cigarettes     Passive exposure: Past    Smokeless tobacco: Never   Vaping Use    Vaping status: Never Used   Substance and Sexual Activity    Alcohol use: Not Currently    Drug use: Never    Sexual activity: Not on file   Other Topics Concern    Not on file   Social History Narrative    Not on file     Social Drivers of Health     Financial Resource Strain: Not on file   Food Insecurity: No Food Insecurity (11/26/2024)    Nursing - Inadequate Food Risk Classification     Worried About Running Out of Food in the Last Year: Not on file     Ran Out of Food in the Last Year: Not on file     Ran Out of Food in the Last Year: 1   Transportation Needs: No Transportation Needs (11/26/2024)    Nursing - Transportation Risk Classification     Lack of Transportation: Not on file     Lack of Transportation: 2   Physical Activity: Not on file   Stress: Not on file   Social Connections: Not on file   Intimate Partner Violence: Unknown (11/26/2024)    Nursing IPS     Feels Physically and Emotionally Safe: Not on file     Physically Hurt by Someone: Not on file     Humiliated or Emotionally Abused by Someone: Not on file     Physically Hurt by  Someone: 2     Hurt or Threatened by Someone: 2   Housing Stability: Unknown (2024)    Nursing: Inadequate Housing Risk Classification     Has Housing: Not on file     Worried About Losing Housing: Not on file     Unable to Get Utilities: Not on file     Unable to Pay for Housing in the Last Year: 2     Has Housin       Current Outpatient Medications:     albuterol (PROVENTIL HFA,VENTOLIN HFA) 90 mcg/act inhaler, 1 puff if needed Inhalation every 4 hrs for 30 days, Disp: , Rfl:     albuterol (PROVENTIL HFA,VENTOLIN HFA) 90 mcg/act inhaler, 6 (six) times a day, Disp: , Rfl:     amLODIPine (NORVASC) 10 mg tablet, Take 10 mg by mouth daily, Disp: , Rfl:     aspirin (ECOTRIN LOW STRENGTH) 81 mg EC tablet, 1 tablet Orally every other day, Disp: , Rfl:     lisinopril (ZESTRIL) 10 mg tablet, take 1 tablet by mouth twice a day for 90 days, Disp: , Rfl:     methimazole (TAPAZOLE) 5 mg tablet, Take 1 tablet (5 mg total) by mouth in the morning, Disp: 30 tablet, Rfl: 0    nystatin (MYCOSTATIN) 500,000 units/5 mL suspension, Swish and swallow 5 mL (500,000 Units total) 4 (four) times a day, Disp: 600 mL, Rfl: 0    omeprazole (PriLOSEC) 40 MG capsule, 1 capsule Every 12 hours, Disp: , Rfl:     simvastatin (ZOCOR) 20 mg tablet, Take 20 mg by mouth daily, Disp: , Rfl:     Ascorbic Acid 500 MG CAPS, every 24 hours (Patient not taking: Reported on 2024), Disp: , Rfl:     pantoprazole (PROTONIX) 40 mg tablet, Take 1 tablet (40 mg total) by mouth 2 (two) times a day (Patient not taking: Reported on 2024), Disp: 60 tablet, Rfl: 0  Allergies   Allergen Reactions    Nuts - Food Allergy Throat Swelling     Tree nuts         Vitals:    24 1125   BP: 116/60   Pulse: 81   Resp: 18   Temp: 97.5 °F (36.4 °C)   SpO2: 99%       Physical Exam   Constitutional: General appearance: The Patient is thin who appears the stated age in no acute distress. Patient is pleasant and talkative.     HEENT:  Normocephalic.  Sclerae  are anicteric. Mucous membranes are moist. Neck is supple without adenopathy. No JVD.     Chest: Easy WOB.     Cardiac: Heart is regular rate.     Abdomen: Abdomen is soft, non-tender, non-distended and without masses.     Extremities: There is no clubbing or cyanosis. There is no edema.  Symmetric.  Neuro: Grossly nonfocal. Gait is normal.     Lymphatic: No evidence of cervical adenopathy bilaterally.   No evidence of axillary adenopathy bilaterally. No evidence of inguinal adenopathy bilaterally.     Skin: Warm, anicteric.    Psych:  Patient is pleasant and talkative.      Imaging  NM PET CT skull base to mid thigh  Result Date: 12/16/2024  Narrative: PET/CT SCAN INDICATION: C78.89: Secondary malignant neoplasm of other digestive organs C80.1: Malignant (primary) neoplasm, unspecified. Newly diagnosed esophageal cancer, initial staging. MODIFIER: PS COMPARISON: CT chest abdomen pelvis 11/17/2024 CELL TYPE:  Focal intramucosal adenocarcinoma TECHNIQUE:   8.4 mCi F-18-FDG administered IV. Multiplanar attenuation corrected and non attenuation corrected PET images are available for interpretation, and contiguous, low dose, axial CT sections were obtained from the skull base through the femurs. Intravenous contrast material was not utilized. This examination, like all CT scans performed in the [facility], was performed utilizing techniques to minimize radiation dose exposure, including the use of iterative reconstruction and automated exposure control. Fasting serum glucose: 107 mg/dl FINDINGS: VISUALIZED BRAIN: No acute abnormalities are seen. HEAD/NECK: There is a physiologic distribution of FDG. No FDG avid cervical adenopathy is seen. CT images: Multinodular thyroid without focal uptake and  with the largest nodule in the right lobe measuring 1.7 x 1.3 cm (series 3 image 54). This was further characterized on 11/26/2024 with ultrasound. CHEST: Interval distal esophageal stent placement. Patchy  uptake at the level of the distal esophagus SUV max 4.1 SUV. See for example image 95 series 1200. Based on PET activity this is approximately 3 cm in length. On axial images this measures up to 2.6 cm. FDG activity appears to extend into the surrounding soft tissues. Also subjacent soft tissue fullness in the posterior mediastinum with patchy uptake, SUV max of 3.7. See for example image 84 series 1200. Mild uptake at a precarinal lymph node on the right, SUV max of 2.6. This measures 1.0 cm short axis image 72 series 3. Curvilinear FDG uptake at the superior aspect of the esophageal stent, SUV max of 5.7 with slight wall thickening suggested on CT probably inflammatory. CT images: Coronary artery calcifications. Atherosclerotic calcifications of the aorta. Calcified subcarinal lymph node (series 3 image 83-90) measuring 2.1 x 1.5 cm. Right lower lobe linear atelectasis. Previously noted ill-defined nodular opacities in the right lower lobe have significantly improved. ABDOMEN: No FDG avid soft tissue lesions are seen. CT images: Multiple bilateral renal cysts measuring up to 5.5 x 4.6 cm (series 3 image 127). Redemonstrated 5 mm left lower pole nonobstructing renal calculus (series 3 image 148). There is colonic diverticulosis without acute diverticulitis. Atherosclerotic calcifications of the abdominal pelvic vessels. No aneurysm. PELVIS: No FDG avid soft tissue lesions are seen. CT images: Enlarged prostate without focal FDG activity. OSSEOUS STRUCTURES: No FDG avid lesions are seen. CT images: Multilevel degenerative changes of the spine.     Impression: 1. Patchy uptake at the level of the distal esophagus compatible with the known primary lesion. FDG activity appears to extend into the surrounding soft tissues suggesting local spread of disease into the posterior mediastinum. 2. Mild uptake at a small precarinal lymph node on the right, SUV max of 2.6. Metastasis not excluded. 3. No findings for  hypermetabolic metastasis in the neck, abdomen or pelvis. 4. Previously noted ill-defined nodular opacities in the right lower lobe have significantly improved. Resident: PEGGY WALTER I, the attending radiologist, have reviewed the images and agree with the final report above. Workstation performed: [id number]     XR abdomen 1 view kub  Result Date: 11/27/2024  Narrative: C-ARM - XR ABDOMEN 1 VIEW KUB INDICATION: Esophageal cancer. Procedure guidance. TECHNIQUE: Fluoroscopic guidance provided. COMPARISON: CT scan 11/17/2024 FLUOROSCOPY TIME: 49 seconds 4 FLUOROSCOPIC IMAGES FINDINGS: Fluoroscopy provided for procedure guidance. Esophageal stent placement is noted Osseous and soft tissue detail limited by technique.     Impression: Fluoroscopy provided for procedure guidance. Please refer to the separate procedure note for additional details. Workstation performed: [id number]     EGD Fluoro  Result Date: 11/26/2024  Narrative: Table formatting from the original result was not included. [facility] [address] [facility] [address] [phone] DATE OF SERVICE: 11/26/24 PHYSICIAN(S): Attending: Bárbaar Brady MD Fellow: No Staff Documented INDICATION: Esophageal cancer (HCC) POST-OP DIAGNOSIS: See the impression below. PREPROCEDURE: Informed consent was obtained for the procedure, including sedation.  Risks of perforation, hemorrhage, adverse drug reaction and aspiration were discussed. The patient was placed in the left lateral decubitus position. Patient was explained about the risks and benefits of the procedure. Risks including but not limited to bleeding, infection, and perforation were explained in detail. Also explained about less than 100% sensitivity with the exam and other alternatives. PROCEDURE: EGD DETAILS OF PROCEDURE: Patient was taken to the procedure room where a time out was performed to confirm correct patient and correct procedure. The patient underwent general  anesthesia, which was administered by an anesthesia professional. The patient's blood pressure, heart rate, level of consciousness, respirations, oxygen, ECG and ETCO2 were monitored throughout the procedure. The scope was introduced through the mouth and advanced to the second part of the duodenum. Retroflexion was performed in the fundus. The patient experienced no blood loss. The procedure was not difficult. The patient tolerated the procedure well. There were no apparent adverse events. ANESTHESIA INFORMATION: ASA: II Anesthesia Type: General MEDICATIONS: No administrations occurring from 1623 to 1658 on 11/26/24 FINDINGS: Fully covered metal stent was visualized in the upper third of the esophagus. The stent was removed using forceps. The stent was replaced with a PageFreezer Wallflex fully covered metal stent measuring 18 mm x 12 cm and secured with over-the-scope clips. The slim XP scope was used to advance the scope into the stomach and a wire was advanced into the stomach. This was placed using fluoroscopic guidance with guidewire. The stomach, duodenal bulb and 2nd part of the duodenum appeared normal. SPECIMENS: * No specimens in log *     Impression: Fully covered metal stent was visualized in the upper third of the esophagus. The stent was removed. The stent was replaced with a fully covered metal stent measuring 18 mm x 12 cm. The stomach, duodenal bulb and 2nd part of the duodenum appeared normal. RECOMMENDATION: Follow clinically and calorie counts Consider PEG tube placement if not able to tolerate PO  Bárbara Brady MD     US thyroid  Result Date: 11/26/2024  Narrative: THYROID ULTRASOUND INDICATION: Thyroid nodule seen on prior CT.. COMPARISON: CT CAP 11/17/2024 TECHNIQUE: Ultrasound of the thyroid was performed with a high frequency linear transducer in transverse and sagittal planes including volumetric imaging sweeps as well as traditional still imaging technique. FINDINGS: Thyroid  texture: Normal homogeneous smooth echotexture. Right lobe: 5.0 x 1.6 x 1.7 cm. Volume 6.4 mL Nodule #1. Image 14. RIGHT upper pole nodule measuring 1.0 x 0.8 x 0.9 cm.  No priors for comparison. COMPOSITION: 1 point, mixed cystic and solid. ECHOGENICITY: 1 point, hyperechoic or isoechoic. SHAPE: 0 points, wider-than-tall. MARGIN: 0 points, smooth. ECHOGENIC FOCI: 0 points, none or large comet-tail artifacts. TI-RADS Classification: TR 2 (2 points), Not suspicious. No FNA. Nodule #2. Image 18. Right mid to lower measuring 2.8 x 1.6 x 1.4 cm.  No priors for comparison. COMPOSITION: 2 points, solid or almost completely solid. ECHOGENICITY: 1 point, hyperechoic or isoechoic. SHAPE: 0 points, wider-than-tall. MARGIN: 0 points, smooth. ECHOGENIC FOCI: 1 point, macrocalcifications. TI-RADS Classification: TR 4 (4-6 points), FNA if > 1.5 cm. Follow if > 1cm. Left lobe: 4.5 x 1.9 x 1.4 cm. Volume 5.8 mL Nodule #1. Image 43. LEFT upper pole nodule measuring 1.2 x 0.6 x 0.9 cm.  No priors for comparison. COMPOSITION: 2 points, solid or almost completely solid. ECHOGENICITY: 1 point, hyperechoic or isoechoic. SHAPE: 0 points, wider-than-tall. MARGIN: 0 points, ill-defined. ECHOGENIC FOCI: 3 points, punctate echogenic foci. TI-RADS Classification: TR 4 (4-6 points), FNA if > 1.5 cm. Follow if > 1cm. Nodule #2. Image 49. LEFT lower pole nodule measuring 1.1 x 0.9 x 0.8 cm. COMPOSITION: 2 points, solid or almost completely solid. ECHOGENICITY: 1 point, hyperechoic or isoechoic. SHAPE: 0 points, wider-than-tall. MARGIN: 0 points, ill-defined. ECHOGENIC FOCI: 1 point, macrocalcifications. TI-RADS Classification: TR 4 (4-6 points), FNA if > 1.5 cm. Follow if > 1cm. Isthmus: 0.3 cm.     Impression: The following meet current ACR criteria for recommending ultrasound guided biopsy: Right nodule 2 The left nodules 1 and 2 meet criteria for ultrasound follow-up in 1, 2, 3 and 5 years. Reference: ACR Thyroid Imaging, Reporting and Data  System (TI-RADS): White Paper of the ACR TI-RADS Committee. J AM Bambi Radiol 2017;14:587-595. Additional recommendations based on American Thyroid Association 2015 guidelines. Workstation performed: [id number]     XR chest pa and lateral  Result Date: 11/26/2024  Narrative: XR CHEST PA AND LATERAL INDICATION: evaluate for esophageal stent migration. COMPARISON: Chest radiograph 11/16/2024 FINDINGS: Stent projects over the proximal esophagus. Clear lungs. No pneumothorax or pleural effusion. Normal cardiomediastinal silhouette. Bones are unremarkable for age. Normal upper abdomen.     Impression: Stent projects over the proximal esophagus. No evidence of acute cardiopulmonary abnormality. Resident: Luis Santos I, the attending radiologist, have reviewed the images and agree with the final report above. Workstation performed: [id number]     EGD Fluoro  Result Date: 11/19/2024  Narrative: Table formatting from the original result was not included. [facility] [address] [facility] [address] [phone] DATE OF SERVICE: 11/19/24 PHYSICIAN(S): Attending: Bárbara Brady MD Fellow: No Staff Documented INDICATION: Esophageal stricture POST-OP DIAGNOSIS: See the impression below. PREPROCEDURE: Informed consent was obtained for the procedure, including sedation.  Risks of perforation, hemorrhage, adverse drug reaction and aspiration were discussed. The patient was placed in the left lateral decubitus position. Patient was explained about the risks and benefits of the procedure. Risks including but not limited to bleeding, infection, and perforation were explained in detail. Also explained about less than 100% sensitivity with the exam and other alternatives. PROCEDURE: EGD DETAILS OF PROCEDURE: Patient was taken to the procedure room where a time out was performed to confirm correct patient and correct procedure. The patient underwent monitored anesthesia care, which was administered by an  anesthesia professional. The patient's blood pressure, heart rate, level of consciousness, respirations, oxygen, ECG and ETCO2 were monitored throughout the procedure. The scope was introduced through the mouth and advanced to the second part of the duodenum. Retroflexion was performed in the fundus. The patient experienced no blood loss. The procedure was not difficult. The patient tolerated the procedure well. There were no apparent adverse events. ANESTHESIA INFORMATION: ASA: II Anesthesia Type: General MEDICATIONS: No administrations occurring from 1512 to 1553 on 11/19/24 FINDINGS: Indeterminate stricture (traversable) in the GE junction; performed cold forceps biopsy; successfully placed 18 mm x 6 cm fully covered stent using guidewire. The stricture was only traversable with the slim XP endoscope.  Biopsies were obtained with pediatric biopsy forceps.  A wire was advanced into the gastric lumen.  Initially a double lumen opposing stent was deployed thinking the stricture was approximately 1 cm in length however this did not fully expand due to length of stricture.  This was then exchanged for fully covered agile stent.  This was deployed successfully.  There was significant narrowing in the distal esophagus at the area of the stent as well. The stomach, duodenal bulb and 2nd part of the duodenum appeared normal. SPECIMENS: ID Type Source Tests Collected by Time Destination 1 : esophageal stricture bx Tissue Esophagus TISSUE EXAM Bárbara Brady MD 11/19/2024  3:30 PM      Impression: Successful placement of esophageal stent in the distal esophagus area of the diaphyseal stricture.  Biopsies were obtained. RECOMMENDATION: Await pathology.  Further recommendations to follow   Bárbara Brady MD         I personally reviewed and interpreted the available history, laboratory and imaging data, including: Hospital course, EGD x 3, pathology, labs, cross-sectional imaging, PET scan.  Discussed with   Alex.

## 2024-12-19 ENCOUNTER — DOCUMENTATION (OUTPATIENT)
Dept: HEMATOLOGY ONCOLOGY | Facility: CLINIC | Age: 74
End: 2024-12-19

## 2024-12-23 ENCOUNTER — TELEPHONE (OUTPATIENT)
Age: 74
End: 2024-12-23

## 2024-12-23 ENCOUNTER — CONSULT (OUTPATIENT)
Facility: HOSPITAL | Age: 74
End: 2024-12-23
Attending: INTERNAL MEDICINE
Payer: COMMERCIAL

## 2024-12-23 VITALS
HEART RATE: 81 BPM | SYSTOLIC BLOOD PRESSURE: 124 MMHG | TEMPERATURE: 97.4 F | OXYGEN SATURATION: 99 % | RESPIRATION RATE: 18 BRPM | DIASTOLIC BLOOD PRESSURE: 68 MMHG

## 2024-12-23 DIAGNOSIS — C15.5 MALIGNANT NEOPLASM OF LOWER THIRD OF ESOPHAGUS (HCC): ICD-10-CM

## 2024-12-23 DIAGNOSIS — C15.9 ESOPHAGEAL ADENOCARCINOMA (HCC): Primary | ICD-10-CM

## 2024-12-23 PROBLEM — C78.89: Status: RESOLVED | Noted: 2024-11-25 | Resolved: 2024-12-23

## 2024-12-23 PROBLEM — C80.1: Status: RESOLVED | Noted: 2024-11-25 | Resolved: 2024-12-23

## 2024-12-23 PROCEDURE — 99205 OFFICE O/P NEW HI 60 MIN: CPT | Performed by: RADIOLOGY

## 2024-12-23 PROCEDURE — 99211 OFF/OP EST MAY X REQ PHY/QHP: CPT | Performed by: RADIOLOGY

## 2024-12-23 PROCEDURE — G0463 HOSPITAL OUTPT CLINIC VISIT: HCPCS | Performed by: RADIOLOGY

## 2024-12-23 NOTE — PROGRESS NOTES
Consultation Visit   Name: Hector Carrera      : 1950      MRN: [id number]  Encounter Provider: Sea Schwartz MD  Encounter Date: 2024   Encounter department: [facility] RADIATION [facility]COL[facility]  :  Assessment & Plan  Esophageal adenocarcinoma (HCC)  Mr. Carrera is a 74 year old man with recently daignosed stage III cT3N1 esophageal adenocarcinoma.  He had significant swallowing difficulty and is now status post stent placement.  PET/CT indicates involvement of a mediastinal lymph node but not distant disease.      I reviewed the diagnosis of locally advanced distal esophageal adenocarcinoma.  I discussed treatment approaches including trimodality therapy with neoadjuvant chemoradiation followed by surgical resection, which is the established standard of care for those without metastatic disease who are operative candidates.  Outcomes with neoadjuvant chemoradiation are superior to surgical resection alone.  For patients who are not operative candidates, definitive chemoradiation can be considered. Radiation alone is generally not curative.  FLOT is also a supported regimen as investigated on the recently reported ESOPEC trial.  This may have improved survival, although the regimen may also be less tolerable and the radiation comparison arm was not to 50.4 Gy.     I reviewed the logistics of treatment including an anticipated 5-6 week treatment course with concurrent chemotherapy.  I discussed the need for CT simulation and maintaining NPO status 4-6 hours prior to treatment.     I reviewed acute and long-term side effects of esophageal/chest and upper abdominal radiation, which include, but are not limited to, fatigue, dermatitis, esophagitis, dysphagia or odynophagia, dehydration, weight loss, cough with shortness of breath, bone marrow depression, reflux or heartburn, nausea/vomiting, pneumonitis, pericarditis and increased risk for coronary artery disease, esophageal stricture,  kidney/liver damage, rarely fistula/ perforation, spinal cord injury, or secondary maligancy.   I discussed that even despite radiation therapy and chemotherapy, there is still the possibility of progression or recurrence. The patient understood these considerations.  He wishes to proceed with neoadjuvant chemoradiation as a component of trimodality treatment.  Informed consent was obtained today and CT simulation will be arranged in the near future.    I reviewed the possibility of increased toxicity when delivering chemoradiation with an esophageal stent in place (Mitchell et al IJROBP 2017).     Going forward, he will require supportive care.  If he elects to pursue chemoradiation, referrals would be placed to palliative care and nutrition.  He is seeking a second opinion at an outside health system and will inform this office of his decision.     All questions were answered to his satisfaction. He will return for CT simulation at our [facility] if he elects to pursue treatment with [facility].              History of Present Illness   Mr. Carrera is a 74 year old man who presented for evaluation of difficulty swallowing and history of esophageal stricture.  He eventually could not swallow any food or liquid.    EGD on 9/20/2024 demonstrated severe, localized grade D esophaitis with mucosal breaks measuring 5 mm or more, continuous between folds, covering 75% or more of the circumference appearing erythematous, friable and nodular in the lower third of the esophagus.  There was also moderate, generalized edematous mucosa in the stomach and moderate, localized erythematous mucosa with erosion in the duodenal bulb, 1st part of the duodenum and 2nd part of the duodenum.    Path demonstrated esophageal and adjacent cardiac mucosa with acute esophagitis, active chornic gastritis, reactive atypia and features suggestive of mucosal prolapse.    EGD on 11/8/2024 demonstrated severe, localized edematous, erythematous,  friable and nodular mucosa with exudate measuring 3 mm in the lower third of the esophagus and GE junction (36 cm from the incisors/GE junction); performed cold forceps biopsy.  Indeterminate and inflamed intrinsic stricture int he esophagus.    Path from distal esophagus biopsy on 11/8/2024 demonstrated focal intramucosal carcinoma in a background of high-grade columnar epithelial dysplasia in cardiac type mucosa.  There was adjoining reactive squamous mucosa.      CT chest/abdomen/pelvis on 11/17/24 demonstrated distal esophageal wall thickening with surrounding inflammation and small amount of fluid, no extraluminal gas.  There were retained contents in the upper esophagus to the level of the neck.  There were ill-defined nodular and groundglass opacities greatest in the right lower lobe.  The largest nodule measured 1 cm.  There was no reported suspicious lymphadenopathy.      EGD on 11/19/2024 demonstrated and indeterminate traversable stricture in the GE junction, biopsied.  There was placement of a 18 x 6 cm fully covered stent using guidewire.  The stomach, duodenal bulb and the 2nd part of the duodenum appeared normal.    Path from 11/19/2024 demonstrated focal intramucosal adenocarcinoma in a background of low and high-grade dysplasia and intestinal metaplasia in cardiac type mucosa.    EGD on 11/26/2024 demonstrated a fully covered metal stent in the upper third of the esophagus.  The stent was removed using forceps and replaced with a fully covered metal stent measuring 18 x 12 cm and secured with over-the-scope clips.      PET/CT on 12/13/2024 demonstrated patchy uptake at the level of the distal esophagus compatible with the known primary lesion.  The FDG activity appeared to extend to the surrounding soft tissues suggesting local spread of disease into the posterior mediastinum.  There was mild uptake at a small precarinal lymph node on the right, SUV 2.6.  There were no additional findins of  hypermetabolic disease int he neck, abdomen or pelvis.  There was improvement of previous ill-defined nodular opacities.    He has been evaluated by surgical oncology and medical oncology.  He presents for consideration of radiotherapy treatment options.    He has improved swallowing status post stent placement.  He does have some coughing after drinking fluids but denies fevers/chills.  He has lost 20 lbs over his disease course.  He denies pain today.  He is without additional acute concerns.    Oncology History   Oncology History   Malignant neoplasm metastatic to lower third of esophagus with unknown primary site (HCC)   11/8/2024 Biopsy    Final Diagnosis  A. Distal esophagus, biopsy:  - Focal intramucosal carcinoma in the background of high-grade columnar epithelial dysplasia in cardiac type mucosa. Adjoining reactive squamous mucosa. Please see consult report from [facility] below.      11/19/2024 Biopsy    Final Diagnosis  Esophagus, esophageal stricture biopsy:  - Focal intramucosal adenocarcinoma arising in a background of low and high-grade dysplasia     and intestinal metaplasia in cardiac type mucosa.      -- Wild-type expression on p53 immunostain.     11/25/2024 Initial Diagnosis    Malignant neoplasm metastatic to lower third of esophagus with unknown primary site (HCC)     Esophageal adenocarcinoma (HCC)   12/18/2024 Initial Diagnosis    Esophageal adenocarcinoma (HCC)     12/18/2024 -  Cancer Staged    Staging form: Esophagus - Adenocarcinoma, AJCC 8th Edition  - Clinical: Stage III (cT3, cN1, cM0) - Signed by Brandy Alonso MD on 12/18/2024          Review of Systems Refer to nursing note.    Current Outpatient Medications on File Prior to Visit   Medication Sig Dispense Refill    albuterol (PROVENTIL HFA,VENTOLIN HFA) 90 mcg/act inhaler 1 puff if needed Inhalation every 4 hrs for 30 days      albuterol (PROVENTIL HFA,VENTOLIN HFA) 90 mcg/act inhaler 6 (six) times a day      amLODIPine  (NORVASC) 10 mg tablet Take 10 mg by mouth daily      lisinopril (ZESTRIL) 10 mg tablet take 1 tablet by mouth twice a day for 90 days      methimazole (TAPAZOLE) 5 mg tablet Take 1 tablet (5 mg total) by mouth in the morning 30 tablet 0    nystatin (MYCOSTATIN) 500,000 units/5 mL suspension Swish and swallow 5 mL (500,000 Units total) 4 (four) times a day 600 mL 0    pantoprazole (PROTONIX) 40 mg tablet Take 1 tablet (40 mg total) by mouth 2 (two) times a day 60 tablet 0    simvastatin (ZOCOR) 20 mg tablet Take 20 mg by mouth daily      Ascorbic Acid 500 MG CAPS every 24 hours (Patient not taking: Reported on 12/23/2024)      aspirin (ECOTRIN LOW STRENGTH) 81 mg EC tablet 1 tablet Orally every other day (Patient not taking: Reported on 12/23/2024)      omeprazole (PriLOSEC) 40 MG capsule 1 capsule Every 12 hours (Patient not taking: Reported on 12/23/2024)       No current facility-administered medications on file prior to visit.         Objective   /68   Pulse 81   Temp (!) 97.4 °F (36.3 °C)   Resp 18   SpO2 99%     Pain Screening:  Pain Score: 0-No pain  ECOG  0  Physical Exam  HENT:      Head: Normocephalic and atraumatic.   Eyes:      General: No scleral icterus.     Extraocular Movements: Extraocular movements intact.   Cardiovascular:      Rate and Rhythm: Normal rate.   Pulmonary:      Effort: Pulmonary effort is normal.   Abdominal:      General: Abdomen is flat. There is no distension.   Musculoskeletal:         General: Normal range of motion.   Lymphadenopathy:      Cervical: No cervical adenopathy.   Skin:     General: Skin is warm and dry.   Neurological:      General: No focal deficit present.      Mental Status: He is alert.   Psychiatric:         Mood and Affect: Mood normal.          Radiology Results: I have reviewed radiology images/reports described above.     Administrative Statements   I have spent a total time of 55 minutes in caring for this patient on the day of the  visit/encounter including Diagnostic results, Prognosis, Risks and benefits of tx options, Patient and family education, Importance of tx compliance, Impressions, Counseling / Coordination of care, Documenting in the medical record, Reviewing / ordering tests, medicine, procedures  , Obtaining or reviewing history  , and Communicating with other healthcare professionals .

## 2024-12-23 NOTE — ASSESSMENT & PLAN NOTE
Mr. Carrera is a 74 year old man with recently daignosed stage III cT3N1 esophageal adenocarcinoma.  He had significant swallowing difficulty and is now status post stent placement.  PET/CT indicates involvement of a mediastinal lymph node but not distant disease.      I reviewed the diagnosis of locally advanced distal esophageal adenocarcinoma.  I discussed treatment approaches including trimodality therapy with neoadjuvant chemoradiation followed by surgical resection, which is the established standard of care for those without metastatic disease who are operative candidates.  Outcomes with neoadjuvant chemoradiation are superior to surgical resection alone.  For patients who are not operative candidates, definitive chemoradiation can be considered. Radiation alone is generally not curative.  FLOT is also a supported regimen as investigated on the recently reported ESOPEC trial.  This may have improved survival, although the regimen may also be less tolerable and the radiation comparison arm was not to 50.4 Gy.     I reviewed the logistics of treatment including an anticipated 5-6 week treatment course with concurrent chemotherapy.  I discussed the need for CT simulation and maintaining NPO status 4-6 hours prior to treatment.     I reviewed acute and long-term side effects of esophageal/chest and upper abdominal radiation, which include, but are not limited to, fatigue, dermatitis, esophagitis, dysphagia or odynophagia, dehydration, weight loss, cough with shortness of breath, bone marrow depression, reflux or heartburn, nausea/vomiting, pneumonitis, pericarditis and increased risk for coronary artery disease, esophageal stricture, kidney/liver damage, rarely fistula/ perforation, spinal cord injury, or secondary maligancy.   I discussed that even despite radiation therapy and chemotherapy, there is still the possibility of progression or recurrence. The patient understood these considerations.  He wishes to  proceed with neoadjuvant chemoradiation as a component of trimodality treatment.  Informed consent was obtained today and CT simulation will be arranged in the near future.    I reviewed the possibility of increased toxicity when delivering chemoradiation with an esophageal stent in place (Mitchell et al IJROBP 2017).     Going forward, he will require supportive care.  If he elects to pursue chemoradiation, referrals would be placed to palliative care and nutrition.  He is seeking a second opinion at an outside health system and will inform this office of his decision.     All questions were answered to his satisfaction. He will return for CT simulation at our [facility] if he elects to pursue treatment with [facility].

## 2024-12-23 NOTE — TELEPHONE ENCOUNTER
Patient was seen by Dr Johnson on 12/18 and her note stated - NEEDS STAT RAD/ONC APPTS and nothing is currently scheduled that I see.  I also went over to RAD/ONC last wk and they told me it goes to a pool and it showed they were working on it. Advised the nurse of situation on 12/23.

## 2024-12-23 NOTE — PROGRESS NOTES
Hector Carrera 1950 is a 74 y.o. maleWith newly diagnosed adenocarcinoma of the esophagus. He presents today for radiation oncology consult.    He noticed dysphagia for 3-4 months which prompted an EGD in September. This showed esophagitis, biopsies negative for malignancy. He received PPI therapy. Repeat EGD 11/8/24 showed worsening lower esophageal stricture, biopsy showed focal intramucosal carcinoma. He presented to the hospital with dysphagia 11/16/24, stent placed 11/19/24 with biopsy showing adenocarinoma. He was readmitted with dysphagia 11/25/24, repeat EGD with stent exchange done 11/26/24.      9/20/24 EGD-  Grade D esophagitis in the lower third of the esophagus; performed cold forceps biopsy  Moderate edematous mucosa, consistent with gastritis in the stomach  Moderate erythematous mucosa with erosion in the duodenal bulb, 1st part of the duodenum and 2nd part of the duodenum      11/8/24 EGD-  Worsening lower esophageal stricture, inflamed, nodular, unable to be traversed. Biopsies taken.     Final Diagnosis  A. Distal esophagus, biopsy:  - Focal intramucosal carcinoma in the background of high-grade columnar epithelial dysplasia in cardiac type mucosa. Adjoining reactive squamous mucosa. Please see consult report from [facility] below.       11/17/24 CT C/A/P-  1.  Distal esophageal wall thickening with surrounding inflammation and small amount of fluid. No extraluminal gas identified. If there is clinical concern for esophageal perforation this may be evaluated with CT or fluoroscopic esophagram. Correlate   with pending biopsy results for etiology of wall thickening. No prior imaging available.  2.  Retained contents in the upper esophagus to the level of the neck. Patient may be at risk for aspiration.  3.  Ill-defined nodular and groundglass opacities greatest in the right lower lobe. Largest nodule measures 1 cm. These may be infectious/inflammatory. Recommend 3-month follow-up chest  CT.  4.  No suspicious lymphadenopathy.  5.  Multiple bilateral simple renal cysts. A 2.8 cm right renal lesion measuring slightly above simple fluid density also likely reflects a cyst. This can be monitored on follow-up imaging or confirmed with outpatient renal ultrasound.      11/19/24 EGD  Successful placement of esophageal stent in the distal esophagus area of the diaphyseal stricture. Biopsies were obtained.     Final Diagnosis  Esophagus, esophageal stricture biopsy:  - Focal intramucosal adenocarcinoma arising in a background of low and high-grade dysplasia     and intestinal metaplasia in cardiac type mucosa.      -- Wild-type expression on p53 immunostain.      11/26/24 EGD-  Fully covered metal stent was visualized in the upper third of the esophagus. The stent was removed. The stent was replaced with a fully covered metal stent measuring 18 mm x 12 cm.  The stomach, duodenal bulb and 2nd part of the duodenum appeared normal.      12/13/24 PET scan-  1. Patchy uptake at the level of the distal esophagus compatible with the known primary lesion. FDG activity appears to extend into the surrounding soft tissues suggesting local spread of disease into the posterior mediastinum.  2. Mild uptake at a small precarinal lymph node on the right, SUV max of 2.6. Metastasis not excluded.  3. No findings for hypermetabolic metastasis in the neck, abdomen or pelvis.  4. Previously noted ill-defined nodular opacities in the right lower lobe have significantly improved.      12/17/24 Dr. Go-[facility] Thoracic surgery  Plan   Proceed with chemotherapy  RTC after interval imaging to review response to treatment and surgical plan     12/17/24 Dr. Catherine- [facility] GI Surgery      12/18/24 Dr. Alonso- Taking in pureed foods and high-protein supplements. Saw [place] surg onc yesterday who recommended perioperative FLOT with resection.    12/18/24 Dr. Johnson- Treatment options  discussed. Stent in place, concern with stent and RT. Rad onc will discuss. Can consider PEG which can alter anatomy. Will need port if FLOT chosen but also with Carbo/Taxol/CRT. Would prefer to be treated at [place]. Concern is significant toxicity with FLOT, his age and ability to tolerate vs CRT.      Upcomin24 CT chest  25 GI f/u    Oncology History   Malignant neoplasm metastatic to lower third of esophagus with unknown primary site (HCC)   2024 Biopsy    Final Diagnosis  A. Distal esophagus, biopsy:  - Focal intramucosal carcinoma in the background of high-grade columnar epithelial dysplasia in cardiac type mucosa. Adjoining reactive squamous mucosa. Please see consult report from [facility] below.      2024 Biopsy    Final Diagnosis  Esophagus, esophageal stricture biopsy:  - Focal intramucosal adenocarcinoma arising in a background of low and high-grade dysplasia     and intestinal metaplasia in cardiac type mucosa.      -- Wild-type expression on p53 immunostain.     2024 Initial Diagnosis    Malignant neoplasm metastatic to lower third of esophagus with unknown primary site (HCC)     Esophageal adenocarcinoma (HCC)   2024 Initial Diagnosis    Esophageal adenocarcinoma (HCC)     2024 -  Cancer Staged    Staging form: Esophagus - Adenocarcinoma, AJCC 8th Edition  - Clinical: Stage III (cT3, cN1, cM0) - Signed by Brandy Alonso MD on 2024           Review of Systems:  Review of Systems   Constitutional:  Positive for fatigue.   HENT: Negative.  Negative for trouble swallowing.         Eating pureed foods. Unable to solid foods. Supplementing with oragain protein drinks   Eyes: Negative.         Wears glasses   Respiratory:  Positive for cough (dry cough for last week and a half).    Cardiovascular: Negative.    Gastrointestinal:  Positive for constipation. Negative for abdominal pain, diarrhea, nausea and vomiting.   Endocrine: Negative.     Genitourinary: Negative.    Musculoskeletal: Negative.    Skin:  Positive for rash (on buttocks).   Allergic/Immunologic: Positive for environmental allergies.   Neurological: Negative.    Hematological:  Does not bruise/bleed easily.   Psychiatric/Behavioral:  Positive for sleep disturbance.          Prior Radiation no    Teaching NCI Rt packet given     MST done    Implantable Devices (Port, pacemaker, pain stimulator) no    Hip Replacement no    Health Maintenance   Topic Date Due    Hepatitis C Screening  Never done    Medicare Annual Wellness Visit (AWV)  Never done    Zoster Vaccine (1 of 2) Never done    Fall Risk  Never done    RSV Vaccine Age 60+ Years (1 - 1-dose 75+ series) Never done    COVID-19 Vaccine (5 - 2024-25 season) 09/01/2024    BMI: Adult  12/18/2025    Depression Screening  12/23/2025    Pneumococcal Vaccine: 65+ Years  Completed    Influenza Vaccine  Completed    Meningococcal B Vaccine  Aged Out    RSV Vaccine age 0-20 Months  Aged Out    HIB Vaccine  Aged Out    IPV Vaccine  Aged Out    Hepatitis A Vaccine  Aged Out    Meningococcal ACWY Vaccine  Aged Out    HPV Vaccine  Aged Out       Past Medical History:   Diagnosis Date    GERD (gastroesophageal reflux disease)     Hyperlipidemia     Hypertension        Past Surgical History:   Procedure Laterality Date    NO PAST SURGERIES         Family History   Problem Relation Age of Onset    No Known Problems Mother     No Known Problems Father     Breast cancer Sister     Hodgkin's lymphoma Sister     Lung cancer Brother     No Known Problems Maternal Grandmother     No Known Problems Maternal Grandfather     No Known Problems Paternal Grandmother     No Known Problems Paternal Grandfather     Colon cancer Neg Hx     Esophageal cancer Neg Hx        Social History     Tobacco Use    Smoking status: Former     Types: Cigarettes     Passive exposure: Past    Smokeless tobacco: Never   Vaping Use    Vaping status: Never Used   Substance Use Topics     Alcohol use: Not Currently    Drug use: Never          Current Outpatient Medications:     albuterol (PROVENTIL HFA,VENTOLIN HFA) 90 mcg/act inhaler, 1 puff if needed Inhalation every 4 hrs for 30 days, Disp: , Rfl:     albuterol (PROVENTIL HFA,VENTOLIN HFA) 90 mcg/act inhaler, 6 (six) times a day, Disp: , Rfl:     amLODIPine (NORVASC) 10 mg tablet, Take 10 mg by mouth daily, Disp: , Rfl:     lisinopril (ZESTRIL) 10 mg tablet, take 1 tablet by mouth twice a day for 90 days, Disp: , Rfl:     methimazole (TAPAZOLE) 5 mg tablet, Take 1 tablet (5 mg total) by mouth in the morning, Disp: 30 tablet, Rfl: 0    nystatin (MYCOSTATIN) 500,000 units/5 mL suspension, Swish and swallow 5 mL (500,000 Units total) 4 (four) times a day, Disp: 600 mL, Rfl: 0    pantoprazole (PROTONIX) 40 mg tablet, Take 1 tablet (40 mg total) by mouth 2 (two) times a day, Disp: 60 tablet, Rfl: 0    simvastatin (ZOCOR) 20 mg tablet, Take 20 mg by mouth daily, Disp: , Rfl:     Ascorbic Acid 500 MG CAPS, every 24 hours (Patient not taking: Reported on 12/23/2024), Disp: , Rfl:     aspirin (ECOTRIN LOW STRENGTH) 81 mg EC tablet, 1 tablet Orally every other day (Patient not taking: Reported on 12/23/2024), Disp: , Rfl:     omeprazole (PriLOSEC) 40 MG capsule, 1 capsule Every 12 hours (Patient not taking: Reported on 12/23/2024), Disp: , Rfl:     Allergies   Allergen Reactions    Nuts - Food Allergy Throat Swelling     Tree nuts        Vitals:    12/23/24 0956   BP: 124/68   Pulse: 81   Resp: 18   Temp: (!) 97.4 °F (36.3 °C)   SpO2: 99%       Pain Score: 0-No pain

## 2024-12-24 ENCOUNTER — HOSPITAL ENCOUNTER (OUTPATIENT)
Dept: CT IMAGING | Facility: HOSPITAL | Age: 74
Discharge: HOME | End: 2024-12-24
Attending: STUDENT IN AN ORGANIZED HEALTH CARE EDUCATION/TRAINING PROGRAM
Payer: COMMERCIAL

## 2024-12-24 DIAGNOSIS — C15.5 MALIGNANT NEOPLASM OF LOWER THIRD OF ESOPHAGUS (HCC): ICD-10-CM

## 2024-12-24 PROCEDURE — 71260 CT THORAX DX C+: CPT

## 2024-12-24 RX ADMIN — IOHEXOL 85 ML: 350 INJECTION, SOLUTION INTRAVENOUS at 07:59

## 2024-12-27 ENCOUNTER — DOCUMENTATION (OUTPATIENT)
Dept: NUTRITION | Facility: HOSPITAL | Age: 74
End: 2024-12-27

## 2024-12-27 ENCOUNTER — TELEPHONE (OUTPATIENT)
Age: 74
End: 2024-12-27

## 2024-12-27 NOTE — TELEPHONE ENCOUNTER
Izzy from Pts Primary Care office [phone] called to let us know to have the pt schedule an OV as they have spoken with Dr. Betancourt he need the appt with Oncology. Pt is already scheduled for an ov with GI on 1/6/2025

## 2024-12-27 NOTE — PROGRESS NOTES
Received notification by radiation RN (Opal Garner) on 12/23 that pt is appropriate for oncology nutrition care (reason for referral: Esophageal CA dx).     Chart review complete. Patient met with radiation oncology on 12/23 but has not yet decided on if he will get treatment here or at [facility]. Discussed with RN and she said she will let me know the plan. If pt getting tx at [facility] this RD will reach out to establish care with nutrition services accordingly.

## 2025-01-02 ENCOUNTER — HOSPITAL ENCOUNTER (OUTPATIENT)
Dept: HOSPITAL 99 - RADI | Age: 75
End: 2025-01-02
Payer: COMMERCIAL

## 2025-01-02 ENCOUNTER — PATIENT OUTREACH (OUTPATIENT)
Dept: HEMATOLOGY ONCOLOGY | Facility: CLINIC | Age: 75
End: 2025-01-02

## 2025-01-02 VITALS — DIASTOLIC BLOOD PRESSURE: 59 MMHG

## 2025-01-02 VITALS — DIASTOLIC BLOOD PRESSURE: 64 MMHG

## 2025-01-02 VITALS — BODY MASS INDEX: 24 KG/M2

## 2025-01-02 DIAGNOSIS — C15.5: Primary | ICD-10-CM

## 2025-01-02 PROCEDURE — C1788 PORT, INDWELLING, IMP: HCPCS

## 2025-01-02 RX ADMIN — CEFAZOLIN 10: 10 INJECTION, POWDER, FOR SOLUTION INTRAVENOUS at 11:11

## 2025-01-02 NOTE — PROGRESS NOTES
I reached out and spoke with JOSE JUAN (pts daughter) now that consults have been completed with the oncology teams to review for any barriers to care and offer supportive services as needed. The distress thermometer was not completed during this call. Jose Juan stated her father would be seeking care with [facility] and saw no need to complete further documentation. I wished them much success with his future cancer cares.  Nothing further from Navigation at this time

## 2025-01-06 ENCOUNTER — HOSPITAL ENCOUNTER (OUTPATIENT)
Dept: HOSPITAL 99 - OIDL | Age: 75
End: 2025-01-06
Payer: COMMERCIAL

## 2025-01-06 DIAGNOSIS — C15.5: Primary | ICD-10-CM

## 2025-01-06 LAB
ALBUMIN SERPL-MCNC: 4.1 G/DL (ref 3.5–5)
ALP SERPL-CCNC: 314 U/L (ref 38–126)
ALT SERPL-CCNC: 114 U/L (ref 0–50)
AST SERPL-CCNC: 68 U/L (ref 17–59)
BUN SERPL-MCNC: 20 MG/DL (ref 9–20)
CALCIUM SERPL-MCNC: 9.1 MG/DL (ref 8.4–10.2)
CHLORIDE SERPL-SCNC: 98 MMOL/L (ref 98–107)
CO2 SERPL-SCNC: 27 MMOL/L (ref 22–30)
EGFR: > 60
ERYTHROCYTE [DISTWIDTH] IN BLOOD BY AUTOMATED COUNT: 13.9 % (ref 11.5–14.5)
GLUCOSE SERPL-MCNC: 99 MG/DL (ref 70–99)
HCT VFR BLD AUTO: 37.8 % (ref 39–52)
HGB BLD-MCNC: 12.1 G/DL (ref 13–18)
MCHC RBC AUTO-ENTMCNC: 32 G/DL (ref 33–37)
MCV RBC AUTO: 88.7 FL (ref 80–94)
PLATELET # BLD AUTO: 299 10^3/UL (ref 130–400)
POTASSIUM SERPL-SCNC: 4.8 MMOL/L (ref 3.5–5.1)
PROT SERPL-MCNC: 6.7 G/DL (ref 6.3–8.2)
SODIUM SERPL-SCNC: 134 MMOL/L (ref 135–145)

## 2025-01-13 ENCOUNTER — TELEPHONE (OUTPATIENT)
Age: 75
End: 2025-01-13

## 2025-01-13 NOTE — TELEPHONE ENCOUNTER
Pt called to inquire if he should keep his endo appt that is scheduled on 01/20. Pt also reported that he is receiving chemotherapy with [facility]. Pt was unsure of the treatment is receiving and provided [facility] center number ([phone]). Called [facility] who will be faxing over nursing note with treatment details to fax number [phone], [facility] notified via teams. Pt called back to confirm to come to follow up and thyroid labs needed. Pt confirmed wanting labs to be fax to [facility].  Labs were faxed .

## 2025-01-17 VITALS — DIASTOLIC BLOOD PRESSURE: 69 MMHG | RESPIRATION RATE: 20 BRPM | SYSTOLIC BLOOD PRESSURE: 155 MMHG

## 2025-01-17 VITALS — RESPIRATION RATE: 21 BRPM

## 2025-01-17 VITALS — RESPIRATION RATE: 18 BRPM | SYSTOLIC BLOOD PRESSURE: 163 MMHG | DIASTOLIC BLOOD PRESSURE: 76 MMHG

## 2025-01-17 VITALS — RESPIRATION RATE: 24 BRPM

## 2025-01-17 LAB
ALBUMIN SERPL-MCNC: 4.4 G/DL (ref 3.5–5)
ALP SERPL-CCNC: 175 U/L (ref 38–126)
ALT SERPL-CCNC: 27 U/L (ref 0–50)
AST SERPL-CCNC: 28 U/L (ref 17–59)
BUN SERPL-MCNC: 22 MG/DL (ref 9–20)
CALCIUM SERPL-MCNC: 9.6 MG/DL (ref 8.4–10.2)
CHLORIDE SERPL-SCNC: 90 MMOL/L (ref 98–107)
CO2 SERPL-SCNC: 31 MMOL/L (ref 22–30)
EGFR: > 60
ERYTHROCYTE [DISTWIDTH] IN BLOOD BY AUTOMATED COUNT: 13.9 % (ref 11.5–14.5)
GLUCOSE SERPL-MCNC: 129 MG/DL (ref 70–99)
HCT VFR BLD AUTO: 37.6 % (ref 39–52)
HGB BLD-MCNC: 12.7 G/DL (ref 13–18)
LIPASE SERPL-CCNC: 137 U/L (ref 23–300)
MCHC RBC AUTO-ENTMCNC: 33.8 G/DL (ref 33–37)
MCV RBC AUTO: 85.8 FL (ref 80–94)
NRBC BLD AUTO-RTO: 0 %
PLATELET # BLD AUTO: 263 10^3/UL (ref 130–400)
POTASSIUM SERPL-SCNC: 4.2 MMOL/L (ref 3.5–5.1)
PROT SERPL-MCNC: 7.2 G/DL (ref 6.3–8.2)
SODIUM SERPL-SCNC: 132 MMOL/L (ref 135–145)

## 2025-01-17 RX ADMIN — SODIUM CHLORIDE 1000: 900 INJECTION, SOLUTION INTRAVENOUS at 23:43

## 2025-01-17 RX ADMIN — ONDANSETRON HYDROCHLORIDE 4 MG: 2 SOLUTION INTRAMUSCULAR; INTRAVENOUS at 23:44

## 2025-01-18 ENCOUNTER — TELEPHONE (OUTPATIENT)
Dept: OTHER | Facility: OTHER | Age: 75
End: 2025-01-18

## 2025-01-18 ENCOUNTER — HOSPITAL ENCOUNTER (INPATIENT)
Dept: HOSPITAL 99 - 3 WEST ACU | Age: 75
LOS: 3 days | Discharge: HOME HEALTH | DRG: 375 | End: 2025-01-21
Payer: COMMERCIAL

## 2025-01-18 VITALS — SYSTOLIC BLOOD PRESSURE: 145 MMHG | RESPIRATION RATE: 19 BRPM | DIASTOLIC BLOOD PRESSURE: 63 MMHG

## 2025-01-18 VITALS — DIASTOLIC BLOOD PRESSURE: 64 MMHG | RESPIRATION RATE: 21 BRPM | SYSTOLIC BLOOD PRESSURE: 135 MMHG

## 2025-01-18 VITALS — RESPIRATION RATE: 24 BRPM

## 2025-01-18 VITALS — DIASTOLIC BLOOD PRESSURE: 100 MMHG | RESPIRATION RATE: 31 BRPM | SYSTOLIC BLOOD PRESSURE: 133 MMHG

## 2025-01-18 VITALS — DIASTOLIC BLOOD PRESSURE: 65 MMHG | SYSTOLIC BLOOD PRESSURE: 148 MMHG | RESPIRATION RATE: 27 BRPM

## 2025-01-18 VITALS — SYSTOLIC BLOOD PRESSURE: 135 MMHG | DIASTOLIC BLOOD PRESSURE: 64 MMHG | RESPIRATION RATE: 20 BRPM

## 2025-01-18 VITALS — RESPIRATION RATE: 21 BRPM | SYSTOLIC BLOOD PRESSURE: 156 MMHG | DIASTOLIC BLOOD PRESSURE: 68 MMHG

## 2025-01-18 VITALS — RESPIRATION RATE: 20 BRPM

## 2025-01-18 VITALS — SYSTOLIC BLOOD PRESSURE: 145 MMHG | RESPIRATION RATE: 19 BRPM | DIASTOLIC BLOOD PRESSURE: 71 MMHG

## 2025-01-18 VITALS — SYSTOLIC BLOOD PRESSURE: 130 MMHG | RESPIRATION RATE: 19 BRPM | DIASTOLIC BLOOD PRESSURE: 68 MMHG

## 2025-01-18 VITALS — DIASTOLIC BLOOD PRESSURE: 53 MMHG | RESPIRATION RATE: 18 BRPM | SYSTOLIC BLOOD PRESSURE: 138 MMHG

## 2025-01-18 VITALS — RESPIRATION RATE: 22 BRPM | DIASTOLIC BLOOD PRESSURE: 66 MMHG | SYSTOLIC BLOOD PRESSURE: 150 MMHG

## 2025-01-18 VITALS — DIASTOLIC BLOOD PRESSURE: 66 MMHG | SYSTOLIC BLOOD PRESSURE: 124 MMHG | RESPIRATION RATE: 20 BRPM

## 2025-01-18 VITALS — RESPIRATION RATE: 22 BRPM

## 2025-01-18 VITALS — RESPIRATION RATE: 21 BRPM

## 2025-01-18 VITALS — RESPIRATION RATE: 19 BRPM

## 2025-01-18 VITALS — RESPIRATION RATE: 16 BRPM | SYSTOLIC BLOOD PRESSURE: 133 MMHG | DIASTOLIC BLOOD PRESSURE: 67 MMHG

## 2025-01-18 VITALS — RESPIRATION RATE: 26 BRPM

## 2025-01-18 VITALS — RESPIRATION RATE: 21 BRPM | SYSTOLIC BLOOD PRESSURE: 138 MMHG | DIASTOLIC BLOOD PRESSURE: 69 MMHG

## 2025-01-18 VITALS — RESPIRATION RATE: 18 BRPM

## 2025-01-18 VITALS — BODY MASS INDEX: 23.6 KG/M2

## 2025-01-18 VITALS — SYSTOLIC BLOOD PRESSURE: 151 MMHG | DIASTOLIC BLOOD PRESSURE: 69 MMHG | RESPIRATION RATE: 15 BRPM

## 2025-01-18 DIAGNOSIS — K21.9: ICD-10-CM

## 2025-01-18 DIAGNOSIS — L89.322: ICD-10-CM

## 2025-01-18 DIAGNOSIS — I10: ICD-10-CM

## 2025-01-18 DIAGNOSIS — Z87.891: ICD-10-CM

## 2025-01-18 DIAGNOSIS — K92.0: ICD-10-CM

## 2025-01-18 DIAGNOSIS — L89.151: ICD-10-CM

## 2025-01-18 DIAGNOSIS — E87.1: ICD-10-CM

## 2025-01-18 DIAGNOSIS — Z11.52: ICD-10-CM

## 2025-01-18 DIAGNOSIS — J91.0: ICD-10-CM

## 2025-01-18 DIAGNOSIS — K56.609: ICD-10-CM

## 2025-01-18 DIAGNOSIS — C16.0: Primary | ICD-10-CM

## 2025-01-18 DIAGNOSIS — T85.838A: ICD-10-CM

## 2025-01-18 DIAGNOSIS — Z79.899: ICD-10-CM

## 2025-01-18 DIAGNOSIS — E78.00: ICD-10-CM
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
## 2025-01-18 DIAGNOSIS — E05.90: ICD-10-CM

## 2025-01-18 LAB
HCT VFR BLD AUTO: 33.9 % (ref 39–52)
HCT VFR BLD AUTO: 34.7 % (ref 39–52)
HGB BLD-MCNC: 10.9 G/DL (ref 13–18)
HGB BLD-MCNC: 11.5 G/DL (ref 13–18)

## 2025-01-18 PROCEDURE — 88305 TISSUE EXAM BY PATHOLOGIST: CPT

## 2025-01-18 RX ADMIN — DIPHENHYDRAMINE HYDROCHLORIDE 25 MG: 50 INJECTION, SOLUTION INTRAMUSCULAR; INTRAVENOUS at 00:55

## 2025-01-18 RX ADMIN — PHENOL 1 SPRAY: 1.5 LIQUID ORAL at 20:05

## 2025-01-18 RX ADMIN — PHENOL 1 SPRAY: 1.5 LIQUID ORAL at 15:26

## 2025-01-18 RX ADMIN — Medication 1 FLUSH: at 03:29

## 2025-01-18 RX ADMIN — PANTOPRAZOLE SODIUM 40 MG: 40 INJECTION, POWDER, LYOPHILIZED, FOR SOLUTION INTRAVENOUS at 11:14

## 2025-01-18 RX ADMIN — METOCLOPRAMIDE HYDROCHLORIDE 10 MG: 5 INJECTION INTRAMUSCULAR; INTRAVENOUS at 00:59

## 2025-01-18 RX ADMIN — PHENOL 1 SPRAY: 1.5 LIQUID ORAL at 11:16

## 2025-01-18 RX ADMIN — SODIUM CHLORIDE 1000: 900 INJECTION, SOLUTION INTRAVENOUS at 20:05

## 2025-01-18 RX ADMIN — DIAZEPAM 2 MG: 5 INJECTION INTRAMUSCULAR; INTRAVENOUS at 02:25

## 2025-01-18 RX ADMIN — SODIUM CHLORIDE 1000: 900 INJECTION, SOLUTION INTRAVENOUS at 11:28

## 2025-01-18 RX ADMIN — PHENOL 1 SPRAY: 1.5 LIQUID ORAL at 13:26

## 2025-01-18 RX ADMIN — SODIUM CHLORIDE 10 ML: 9 INJECTION, SOLUTION INTRAMUSCULAR; INTRAVENOUS; SUBCUTANEOUS at 20:06

## 2025-01-18 RX ADMIN — SODIUM CHLORIDE 10 ML: 9 INJECTION, SOLUTION INTRAMUSCULAR; INTRAVENOUS; SUBCUTANEOUS at 11:13

## 2025-01-18 RX ADMIN — LIDOCAINE HYDROCHLORIDE 1 SYRINGE: 20 JELLY TOPICAL at 02:43

## 2025-01-18 RX ADMIN — SODIUM CHLORIDE 1000: 900 INJECTION, SOLUTION INTRAVENOUS at 03:46

## 2025-01-18 RX ADMIN — PANTOPRAZOLE SODIUM 40 MG: 40 INJECTION, POWDER, LYOPHILIZED, FOR SOLUTION INTRAVENOUS at 20:07

## 2025-01-18 RX ADMIN — PANTOPRAZOLE SODIUM 80 MG: 40 INJECTION, POWDER, LYOPHILIZED, FOR SOLUTION INTRAVENOUS at 03:29

## 2025-01-18 RX ADMIN — DEXAMETHASONE SODIUM PHOSPHATE 10 MG: 4 INJECTION, SOLUTION INTRA-ARTICULAR; INTRALESIONAL; INTRAMUSCULAR; INTRAVENOUS; SOFT TISSUE at 02:18

## 2025-01-18 RX ADMIN — PHENOL 1 SPRAY: 1.5 LIQUID ORAL at 17:40

## 2025-01-18 NOTE — W.PN.HOSP.TC
"Addendum entered and electronically signed by Damon Levy MD  01/19/25 15:03: "~"Correction, patient has hyperthyroidism. "~"Original Note:"~"Today's Communication/Plan"~"-"~"-: "~"see bold "~"Assessment / Plan"~"Assessment / Plan"~"-: "~"HPI: 74-year-old with history of esophageal cancer currently on FLOT x 1 (docetaxel, oxaliplatin, leucovorin, FU) about 1 week ago presents to the emergency department with acute episode of intractable nausea vomiting and was found to have a small "~"bowel obstruction at the level of the duodenal jejunal junction.  He appears to have blood-tinged emesis status post NG tube placement.  Is not clear whether this is secondary to traumatic placement of the NG tube versus ongoing upper GI bleed.  He "~"is hemodynamically stable, hemoglobin was 12.7.  Platelet counts are normal.  Vomiting assistance placement of NG tube."~"A/P:"~"Acute SBO "~"- SBO at level of duodenal-jejunal junction concerning for mechanical obstruction from advanced GE junction cancer.  No surgical history.  "~"- Appreciate GI, continue n.p.o., NG tube, IV fluids"~"- Obtain records from [facility], check upper GI series/small bowel follow-through "~"Upper GI bleed "~"- Possible upper GI bleed from duodenal cause of obstruction versus traumatic Ng placement "~"- Appreciate GI input, continue Protonix 40 mg IV twice daily, trend hemoglobin"~"Locally advanced GE junction cancer, w/ esophageal stent, s/p cycle #1 of FLOT chemotherapy"~"-Appreciate oncology input, further chemo plans to be determined"~"Hypothyroidism"~"- Hold methimazole as patient is n.p.o."~"Essential hypertension"~"- Hold po lisinopril and amlodipine "~"- Will order IV hydralazine as needed as needed while patient is n.p.o."~"Hyperlipidemia"~"-Hold statin"~"DVT PPX - SCDs "~"Code status - Full code"~"Physical Exam"~"General: No acute distress"~"HEENT: Normocephalic, Atraumatic, EOMI, MMM"~"Respiratory: Clear to Auscultation bilaterally"~"Cardiac: Normal S1/S2, Regular Rate and Rhythm"~"GI: Soft, Nontender, +distended, Normal Bowel Sounds"~"Extremities: No Clubbing, Cyanosis, or Edema"~"Neuro: Nonfocal/Grossly Intact"~"Psych: Calm, Cooperative"~"Anticipated Discharge: &gt; 48 hours"~"Subjective/Interval History"~"-"~"-: "~"Date of Service:  January 18, 2025"~"Patient complains of sore throat from the NG tube.  Denies abdominal pain.  No gas.  Last bowel movement was yesterday morning prior to admission.  No fever."~"Objective Data"~"-"~"Labs: "~"Laboratory Results"~" 	01/17/25"~" 	21:50"~"WBC	 15.9 H"~"Hgb	 12.7 L"~"Hct	 37.6 L"~"Plt Count	 263"~"Sodium	 132 L"~"Potassium	 4.2"~"Chloride	 90 L"~"Carbon Dioxide	 31 H"~"BUN	 22 H"~"Creatinine	 0.7"~"Glucose	 129 H"~"Calcium	 9.6"~"Total Bilirubin	 0.7"~"AST	 28"~"ALT	 27"~"Alkaline Phosphatase	 175 H"~"Vital Signs: "~"Vital Signs"~"Temp	Pulse	Resp	BP	Pulse Ox"~" 98.4 F 	 103 	 18 	 145/63 	 93 "~" 01/17/25 23:20	 01/18/25 07:15	 01/18/25 07:15	 01/18/25 07:00	 01/18/25 07:00"~"I&O"~"	01/17/25	01/18/25	01/19/25"~"	06:59	06:59	06:59"~"Intake Total		300 / 300	"~"Output Total		400 / 400	"~"Balance		-100 / -100	"

## 2025-01-18 NOTE — HPS.HSE
"Family Physician"~"-"~"-: "~"Family Physician:  Domi Quezada"~"Chief Complaint"~"-"~"-: "~"Intractable nausea vomiting"~"History of Present Illness"~"-: "~"This is a 74-year-old with past medical history of hypertension, hyperlipidemia, hypothyroid and a history of esophageal cancer status post 2 EGD and stent placement in the distal esophagus, was recently started on chemotherapy about 1 week ago "~"presents to the emergency department with sudden onset of severe nausea and vomiting that started about 4 hours prior to arrival."~"Patient reports tolerating the chemo well at the time.  He did have stimulation factor given yesterday and also well-tolerated.  He reports sudden onset of nausea then vomiting of dark brown material.  Denying any antecedent constipation or "~"diarrhea.  Denied any fevers or chills.  Denied any antecedent abdominal pain or discomfort.  Denied feeling dizzy or lightheaded.  Patient denies any recent episodes of melena or hematochezia.  Denies any recent hematemesis."~"On arrival in the emergency department he had continuous vomiting and imaging showed obstruction.  NG tube was placed.  After placement of an NG tube the patient did have some bloody oropharyngeal discharge and then had copious amount of "~"blood-tinged vomitus produced.  Vomiting has now ceased."~"He was afebrile with a temp of 98.4, blood pressure was 150/60 with a pulse of 88.  He was satting 98% on room air.  Hemoglobin was 12.1, white count of 15.6 with platelet count of 263.  His sodium was 132 bicarb 31 with a normal BUN of 22 and a "~"creatinine of 0.7.  A CT of the abdomen pelvis showed mild duodenal dilation with transition point at the duodenal jejunal junction which may indicate a partial obstruction or possible duodenitis.  The distal bowels is decompressed.  A distal "~"esophageal stent was noted with soft tissue protruding into the distal ostium of the stent.  Moderate fluid distention of the stomach noted."~"Medical History"~"Past Medical History"~"Past Medical History: Reports Cancer (Esophageal cancer, status post esophageal stenting), HTN, Hyperthyroidism and Other (Recent oropharyngeal thrush)"~"Past Surgical History: Reports None"~"Social History"~"Tobacco: Former Smoker"~"Alcohol: None"~"Drug: None"~"Personal: "~"Living: With Family"~"Employment: Retired"~"Family History"~"Family History: Not pertinent"~"Allergies / Home Medications"~"-: "~"Allergies reflects when Allergies were last updated in [facility]."~"Home Medications with original date entered in [facility] "~"Allergy/Medication List: "~"Allergies"~"Allergy/AdvReac	Type	Severity	Reaction	Status	Date / Time"~"tree nut	Allergy		Unknown	Verified	01/17/25 23:19"~"Home Medications"~"albuterol sulfate 90 mcg/actuation aerosol inhaler 2 puff inhalation QID PRN wheeze 01/02/25 "~"amlodipine 10 mg tablet 10 mg PO DAILY 01/02/25 "~"lisinopril 10 mg tablet 10 mg PO BID 01/02/25 "~"methimazole 5 mg tablet 5 mg PO DAILY 01/02/25 "~"nystatin 500,000 unit tablet 500,000 unit PO TID 01/02/25 "~"simvastatin 20 mg tablet 20 mg PO HS 01/02/25 "~"Review of Systems"~"-"~"History Source: Patient and Family"~"Constitutional: Reports No Symptoms"~"EENT: Reports No Symptoms"~"Respiratory: Reports No Symptoms"~"Abdomen/GI: Reports Nausea and Vomiting"~": Reports No Symptoms"~"Musculoskeletal: Reports No Symptoms"~"Skin: Reports No Symptoms"~"Neurological: Reports No Symptoms"~"Endocrine: Reports No Symptoms"~"Hematologic/Lymphatic: Reports No Symptoms"~"Psych: Reports No Symptoms"~"Physical Exam"~"Vital Signs"~"-: "~"Vital Signs"~"Temp	Pulse	Resp	BP	Pulse Ox"~" 98.4 F 	 98 	 26 	 156/68 	 96 "~" 01/17/25 23:20	 01/18/25 02:30	 01/18/25 02:30	 01/18/25 02:00	 01/18/25 02:30"~"Physical Exam"~"General: Well Developed and Appears in Distress"~"HEENT: NormoCephalic, Anicteric, Moist mucous membranes, PERRLA, Pink Conjunctivae and No Ptosis"~"Respiratory: Clear"~"Cardiac: S1/S2 and Regular Rhythm"~"Breast: Deferred by me"~"GI: Soft, Non Tender, Non Distended and Other (NG tube in place with elevation of slightly red-tinged brown material)"~"Rectal: Deferred by Provider"~"Genito-urinary: Deferred by me"~"Musculoskeletal: No Clubbing, No Cyanosis and No Edema"~"Skin: Warm"~"Neuro: Nonfocal/grossly intact"~"Hematologic/Lymphatic: No Lymphadenopathy"~"Psych: Calm"~"Laboratory Results"~"-"~"-: "~"01/17/25 21:50 "~"01/17/25 21:50 "~"Laboratory Results"~"Total Bilirubin	 0.7 mg/dl (0.2-1.3)  	01/17/25  21:50    "~"AST	 28 U/L (17-59)  	01/17/25  21:50    "~"ALT	 27 U/L (0-50)  	01/17/25  21:50    "~"Alkaline Phosphatase	 175 U/L ()  H 	01/17/25  21:50    "~"Lipase	 137 U/L ()  	01/17/25  21:50    "~"Data Reviewed"~"-"~"Diagnostic Radiology: Image Personally Visualized and interpreted"~"CT Scan: Report Reviewed by me"~"Lab Data: Labs Reviewed by me"~"Old Records: Reviewed"~"Impression/Plan"~"-"~"-: "~"IMPRESSION:"~"74-year-old with history of esophageal cancer currently on FLOT x 1 (docetaxel, oxaliplatin, leucovorin, FU) about 1 week ago presents to the emergency department with acute episode of intractable nausea vomiting and was found to have a small bowel "~"obstruction at the level of the duodenal jejunal junction.  He appears to have blood-tinged emesis status post NG tube placement.  Is not clear whether this is secondary to traumatic placement of the NG tube versus ongoing upper GI bleed.  He is "~"hemodynamically stable, hemoglobin was 12.7.  Platelet counts are normal.  Vomiting assistance placement of NG tube."~"PLAN: "~"1. SBO - SBO at level of duodenal-jejunal junction.  No surgical history.  "~"- admit to telemetry"~"- s/p NG decompression, slow intermittent suction "~"- NPO for now "~"- IV fluids with d5 NS "~"- GI consulted and notified. "~"2. Upper GI bleed - Possible upper GI bleed from duodenal cause of obstruction versus traumatic Ng placement "~"- NPO as above "~"- type and screen and consented"~"- H&H q 8 "~"- PPI IV 40mg bid for now "~"- antiemetics "~"- GI consult"~"3. HYpothyroid"~"- hold methmazole for now "~"4. HTN "~"- hold lisinopril and amlodipine "~"DVT PPX - SCDs"~"Code status - full code"

## 2025-01-18 NOTE — TELEPHONE ENCOUNTER
Patient is calling regarding cancelling an appointment.    Date/Time: 1/20/25 10:10am    Patient was rescheduled: YES [] NO [x]    Patient requesting call back to reschedule: YES [] NO [x]      Patient is currently in hospital and will not be attending scheduled appointment.

## 2025-01-19 ENCOUNTER — TELEPHONE (OUTPATIENT)
Dept: OTHER | Facility: OTHER | Age: 75
End: 2025-01-19

## 2025-01-19 VITALS — DIASTOLIC BLOOD PRESSURE: 70 MMHG | RESPIRATION RATE: 20 BRPM | SYSTOLIC BLOOD PRESSURE: 140 MMHG

## 2025-01-19 VITALS — DIASTOLIC BLOOD PRESSURE: 74 MMHG | SYSTOLIC BLOOD PRESSURE: 162 MMHG | RESPIRATION RATE: 18 BRPM

## 2025-01-19 VITALS — SYSTOLIC BLOOD PRESSURE: 143 MMHG | DIASTOLIC BLOOD PRESSURE: 84 MMHG | RESPIRATION RATE: 19 BRPM

## 2025-01-19 VITALS — SYSTOLIC BLOOD PRESSURE: 145 MMHG | DIASTOLIC BLOOD PRESSURE: 72 MMHG | RESPIRATION RATE: 19 BRPM

## 2025-01-19 VITALS — RESPIRATION RATE: 19 BRPM | SYSTOLIC BLOOD PRESSURE: 137 MMHG | DIASTOLIC BLOOD PRESSURE: 69 MMHG

## 2025-01-19 VITALS — SYSTOLIC BLOOD PRESSURE: 143 MMHG | DIASTOLIC BLOOD PRESSURE: 63 MMHG | RESPIRATION RATE: 18 BRPM

## 2025-01-19 LAB
APTT PPP: 29.5 SEC (ref 23.4–35)
BUN SERPL-MCNC: 21 MG/DL (ref 9–20)
CALCIUM SERPL-MCNC: 8.6 MG/DL (ref 8.4–10.2)
CHLORIDE SERPL-SCNC: 102 MMOL/L (ref 98–107)
CO2 SERPL-SCNC: 24 MMOL/L (ref 22–30)
EGFR: > 60
ERYTHROCYTE [DISTWIDTH] IN BLOOD BY AUTOMATED COUNT: 14.5 % (ref 11.5–14.5)
ESTIMATED CREATININE CLEARANCE: 112 ML/MIN
GLUCOSE SERPL-MCNC: 94 MG/DL (ref 70–99)
HCT VFR BLD AUTO: 32.7 % (ref 39–52)
HCT VFR BLD AUTO: 33.5 % (ref 39–52)
HGB BLD-MCNC: 10.8 G/DL (ref 13–18)
HGB BLD-MCNC: 10.8 G/DL (ref 13–18)
INR PPP: 1.11
MCHC RBC AUTO-ENTMCNC: 32.2 G/DL (ref 33–37)
MCV RBC AUTO: 89.3 FL (ref 80–94)
NRBC BLD AUTO-RTO: 0 %
PLATELET # BLD AUTO: 244 10^3/UL (ref 130–400)
POTASSIUM SERPL-SCNC: 4.2 MMOL/L (ref 3.5–5.1)
PROTHROMBIN TIME: 14.6 SEC (ref 11.4–14.6)
SODIUM SERPL-SCNC: 134 MMOL/L (ref 135–145)

## 2025-01-19 RX ADMIN — SODIUM CHLORIDE 10 ML: 9 INJECTION, SOLUTION INTRAMUSCULAR; INTRAVENOUS; SUBCUTANEOUS at 19:48

## 2025-01-19 RX ADMIN — PHENOL 1 SPRAY: 1.5 LIQUID ORAL at 10:38

## 2025-01-19 RX ADMIN — METHIMAZOLE 5 MG: 5 TABLET ORAL at 15:55

## 2025-01-19 RX ADMIN — HEPARIN SODIUM (PORCINE) LOCK FLUSH IV SOLN 100 UNIT/ML 500 UNIT: 100 SOLUTION at 15:55

## 2025-01-19 RX ADMIN — SODIUM CHLORIDE 1000: 900 INJECTION, SOLUTION INTRAVENOUS at 13:34

## 2025-01-19 RX ADMIN — IOHEXOL 50 ML: 240 INJECTION, SOLUTION INTRATHECAL; INTRAVASCULAR; INTRAVENOUS; ORAL at 10:38

## 2025-01-19 RX ADMIN — HEPARIN SODIUM (PORCINE) LOCK FLUSH IV SOLN 100 UNIT/ML 500 UNIT: 100 SOLUTION at 12:43

## 2025-01-19 RX ADMIN — PANTOPRAZOLE SODIUM 40 MG: 40 INJECTION, POWDER, LYOPHILIZED, FOR SOLUTION INTRAVENOUS at 19:48

## 2025-01-19 RX ADMIN — DIPHENHYDRAMINE HYDROCHLORIDE 25 MG: 50 INJECTION, SOLUTION INTRAMUSCULAR; INTRAVENOUS at 00:09

## 2025-01-19 RX ADMIN — SODIUM CHLORIDE 1000: 900 INJECTION, SOLUTION INTRAVENOUS at 04:16

## 2025-01-19 RX ADMIN — SODIUM CHLORIDE 1000: 900 INJECTION, SOLUTION INTRAVENOUS at 23:56

## 2025-01-19 RX ADMIN — SODIUM CHLORIDE 10 ML: 9 INJECTION, SOLUTION INTRAMUSCULAR; INTRAVENOUS; SUBCUTANEOUS at 10:38

## 2025-01-19 RX ADMIN — PANTOPRAZOLE SODIUM 40 MG: 40 INJECTION, POWDER, LYOPHILIZED, FOR SOLUTION INTRAVENOUS at 10:38

## 2025-01-19 NOTE — PTCARENOTE
"NG tube clamped this afternoon for pt to drink contrast dye for CT scan. Pt tolerated contrast dye without N/V and sent to CT. After returning, order to remove NG tube. NG tube removed, pt still denies N/V, states he feels better with NG tube out. "~"Pt's cough is worse today than yesterday and wheezing heard on assessment. MD notifed. Ultrasound ordered and pt sent to scan. Call bell within reach, will await test results and continue to monitor pt. "

## 2025-01-19 NOTE — W.PN.GI.CBS2
"Today's Communication / Plan"~"-"~"-: "~"CT ordered by Surgery to assess for ongoing obstruction or resolution- await results"~"Hgb stable 10.8, no further bleeding"~"Continue protonix BID"~"Assessment / Plan"~"-"~"-: "~"Summary: 73yo male presents with n/v and CT showing nonspecific obstruction at junction of duodenum and jejunum with abrupt change in caliber and fluid distention of duodenum and stomach.  He was recently dx'd with esophageal CA and had EGD/stent x "~"2 at [facility].  Rec'd first chemo week prior to admission.  NGT placed in ER with blood return, likely from traversing esophageal tumor in stent, as seen on CT."~"CT AP-"~"- tissue attenuation within the esophageal stent, most pronounced at the distal stent margin suspicious for tumor infiltration. Ingested material felt to be less likely."~"-Nonspecific obstruction at the junction of the duodenum and jejunum, where there is an abrupt change in caliber, and associated fluid distention of the duodenum and stomach."~"-Diverticulosis without acute diverticulitis."~"-Nonobstructing 5 mm calculus in the lower pole left kidney."~"-Renal cysts."~"-Moderate right pleural effusion."~"Impression:"~"n/v due to obstruction at duodenal/jejunal junction"~"Esophageal CA s/p stent, began neoadjuvant FLOT chemo week PTA"~"hematemesis due to NGT trauma through known esophageal tumor/stent"~"Subjective"~"Subjective"~"-: "~"Date of Service:  January 19, 2025"~"No complaints.  400cc NGT drainage dark brown.  Some flatus"~"Objective"~"Data Reviewed"~"Laboratory Data: "~"Laboratory Results"~"01/19/25 05:08 "~"01/19/25 05:08 "~"Laboratory Results"~"PT	 14.6 Sec (11.4-14.6)  	01/19/25  05:08    "~"INR	 1.11  	01/19/25  05:08    "~"APTT	 29.5 Sec (23.4-35.0)  	01/19/25  05:08    "~"Total Bilirubin	 0.7 mg/dl (0.2-1.3)  	01/17/25  21:50    "~"AST	 28 U/L (17-59)  	01/17/25  21:50    "~"ALT	 27 U/L (0-50)  	01/17/25  21:50    "~"Alkaline Phosphatase	 175 U/L ()  H 	01/17/25  21:50    "~"Lipase	 137 U/L ()  	01/17/25  21:50    "~"Vital Signs and I&O: "~"Vital Signs"~"Temp	Pulse	Resp	BP	Pulse Ox"~" 98.8 F 	 73 	 19 	 145/72 	 98 "~" 01/19/25 11:45	 01/19/25 11:45	 01/19/25 11:45	 01/19/25 11:45	 01/19/25 11:45"~"I&O"~"	01/18/25	01/19/25	01/20/25"~"	06:59	06:59	06:59"~"Intake Total	300 / 300		"~"Output Total	400 / 400	560 / 560	"~"Balance	-100 / -100	-560 / -560	"~"Physical Exam"~"Physical Exam"~"GI: Soft, Non Distended and Non Tender"

## 2025-01-19 NOTE — TELEPHONE ENCOUNTER
Patient is calling regarding cancelling an appointment.    Date/Time: 1- @ 10:10 am    Patient was rescheduled: YES [] NO [x]    Patient requesting call back to reschedule: YES [] NO [x]

## 2025-01-19 NOTE — W.PN.HOSP.TC
"Addendum entered and electronically signed by Damon Levy MD  01/19/25 15:03: "~"Correction, patient has hyperthyroidism. "~"Original Note:"~"Today's Communication/Plan"~"-"~"-: "~"see bold "~"Assessment / Plan"~"Assessment / Plan"~"-: "~"HPI: 74-year-old with history of esophageal cancer currently on FLOT x 1 (docetaxel, oxaliplatin, leucovorin, FU) about 1 week ago presents to the emergency department with acute episode of intractable nausea vomiting and was found to have a small "~"bowel obstruction at the level of the duodenal jejunal junction.  He appears to have blood-tinged emesis status post NG tube placement.  Is not clear whether this is secondary to traumatic placement of the NG tube versus ongoing upper GI bleed.  He "~"is hemodynamically stable, hemoglobin was 12.7.  Platelet counts are normal.  Vomiting assistance placement of NG tube."~"A/P:"~"Acute SBO "~"- SBO at level of duodenal-jejunal junction concerning for mechanical obstruction from advanced GE junction cancer.  No surgical history.  "~"- Currently with NG tube, n.p.o., IV fluids"~"- Appreciate general surgery input, plan for CT of the abdomen and pelvis with contrast, possible removal of NGT depending on results and if there is resolution"~"Upper GI bleed "~"- Possible upper GI bleed from duodenal cause of obstruction versus traumatic Ng placement "~"- Appreciate GI input, resolved, no further bleeding, continue Protonix 40 mg IV twice daily, trend hemoglobin"~"Locally advanced GE junction cancer, w/ esophageal stent, s/p cycle #1 of FLOT chemotherapy"~"-Appreciate oncology input, further chemo plans to be determined"~"Moderate right pleural effusion on CT"~"-Patient is not hypoxic, he is satting well on room air"~"-Check chest ultrasound to see if there is enough to drain"~"Leukocytosis"~"-Patient is afebrile"~"-Monitor off antibiotics"~"Hypothyroidism"~"- Hold methimazole as patient is n.p.o."~"Essential hypertension"~"- Hold po lisinopril and amlodipine "~"- Will order IV hydralazine as needed as needed while patient is n.p.o."~"Hyperlipidemia"~"-Hold statin"~"DVT PPX - SCDs "~"Code status - Full code"~"Total time spent to see the patient on the floor, examine the patient, review data and lab results, discuss treatment plan with patient,  nursing staff  around 51 minutes."~"Physical Exam"~"General: No acute distress"~"HEENT: Normocephalic, Atraumatic, EOMI, MMM"~"Respiratory: Clear to Auscultation bilaterally"~"Cardiac: Normal S1/S2, Regular Rate and Rhythm"~"GI: Soft, Nontender, +distended, Normal Bowel Sounds"~"Extremities: No Clubbing, Cyanosis, or Edema"~"Neuro: Nonfocal/Grossly Intact"~"Psych: Calm, Cooperative"~"Anticipated Discharge: &gt; 48 hours"~"Subjective/Interval History"~"-"~"-: "~"Date of Service:  January 19, 2025"~"Patient is coughing.  Denies abdominal pain.  No nausea.  No gas, no stools.  No fever, no chest pain, no shortness of breath"~"Objective Data"~"-"~"Labs: "~"Laboratory Results"~" 	01/18/25	01/19/25"~" 	23:35	05:08"~"WBC		 13.0 H"~"Hgb	 10.8 L	 10.8 L"~"Hct	 32.7 L	 33.5 L"~"Plt Count		 244"~"PT		 14.6"~"INR		 1.11"~"APTT		 29.5"~"Sodium		 134 L"~"Potassium		 4.2"~"Chloride		 102"~"Carbon Dioxide		 24"~"BUN		 21 H"~"Creatinine		 0.6 L"~"Glucose		 94"~"Calcium		 8.6"~"Vital Signs: "~"Vital Signs"~"Temp	Pulse	Resp	BP	Pulse Ox"~" 99.0 F 	 78 	 20 	 140/70 	 95 "~" 01/19/25 03:51	 01/19/25 03:51	 01/19/25 03:51	 01/19/25 03:51	 01/19/25 03:51"~"I&O"~"	01/18/25	01/19/25	01/20/25"~"	06:59	06:59	06:59"~"Intake Total	300 / 300		"~"Output Total	400 / 400	560 / 560	"~"Balance	-100 / -100	-560 / -560	"

## 2025-01-19 NOTE — W.PN.GS2
"Today's Communication / Plan"~"-"~"-: "~"`"~"Assessment / Plan"~"-"~"-: "~"Assessment: 34-year-old male presenting with CT imaging suggestive of high-grade proximal small bowel obstruction with transition point at the first portion of the jejunum/ligament of Treitz but without radiographic apparent mass or adenopathy on "~"initial CT imaging.  Alternatively nausea vomiting gastric distention could have been secondary to initiation of chemotherapy   Without underlying mechanical obstruction."~"History of esophageal CA status post stent and recent initiation of neoadjuvant chemotherapy"~"AFVSS"~"Clinically improving"~"Plan: Contrast CT imaging abdomen pelvis for follow-up evaluation regarding possible obstruction"~"Possible removal of NG tube CT imaging demonstrates resolution of obstruction"~"Subjective Data"~"-"~"-: "~"Date of Service:  January 19, 2025"~"Patient seen and examined."~"Wife and daughter at bedside."~"No abdominal pain."~"No nausea with NG tube in place."~"Sore throat from NG tube"~"Objective Data"~"-"~"-: "~"Intake and Output"~"	01/18/25	01/19/25	01/20/25"~"	06:59	06:59	06:59"~"Intake Total	300 / 300		"~"Output Total	400 / 400	560 / 560	"~"Balance	-100 / -100	-560 / -560	"~"Intake:			"~"  IV fluids (Total)	300 / 300		"~"    Nss 1,000 ml @ 125 mls/hr IV .	300 / 300		"~"    [facility] Rx#:[id number]			"~"Output:			"~"  Gastrointestinal tube output (	400 / 400		"~"  Total)			"~"  Urine, Voided		560 / 560	"~"Vital Signs"~"Temp	Pulse	Resp	BP	Pulse Ox"~" 98.6 F 	 75 	 19 	 143/84 	 96 "~" 01/19/25 08:02	 01/19/25 08:02	 01/19/25 08:02	 01/19/25 08:02	 01/19/25 08:02"~"Lab Results"~"01/19/25 05:08 "~"01/19/25 05:08 "~"Calcium	 8.6 mg/dl (8.4-10.2)  	01/19/25  05:08    "~"Total Bilirubin	 0.7 mg/dl (0.2-1.3)  	01/17/25  21:50    "~"AST	 28 U/L (17-59)  	01/17/25  21:50    "~"ALT	 27 U/L (0-50)  	01/17/25  21:50    "~"Alkaline Phosphatase	 175 U/L ()  H 	01/17/25  21:50    "~"Total Protein	 7.2 g/dl (6.3-8.2)  	01/17/25  21:50    "~"Albumin	 4.4 g/dl (3.5-5.0)  	01/17/25  21:50    "~"Physical Exam"~"-"~"-: "~"NAD AAOx3"~"NG tube with clear saliva colored fluid in tubing line; nonbilious"

## 2025-01-20 VITALS — DIASTOLIC BLOOD PRESSURE: 60 MMHG | RESPIRATION RATE: 18 BRPM | SYSTOLIC BLOOD PRESSURE: 130 MMHG

## 2025-01-20 VITALS — RESPIRATION RATE: 16 BRPM | SYSTOLIC BLOOD PRESSURE: 74 MMHG | DIASTOLIC BLOOD PRESSURE: 67 MMHG

## 2025-01-20 VITALS — DIASTOLIC BLOOD PRESSURE: 76 MMHG | RESPIRATION RATE: 18 BRPM | SYSTOLIC BLOOD PRESSURE: 165 MMHG

## 2025-01-20 VITALS — SYSTOLIC BLOOD PRESSURE: 143 MMHG | RESPIRATION RATE: 15 BRPM | DIASTOLIC BLOOD PRESSURE: 66 MMHG

## 2025-01-20 VITALS — RESPIRATION RATE: 16 BRPM | DIASTOLIC BLOOD PRESSURE: 65 MMHG | SYSTOLIC BLOOD PRESSURE: 151 MMHG

## 2025-01-20 VITALS — DIASTOLIC BLOOD PRESSURE: 66 MMHG

## 2025-01-20 VITALS — RESPIRATION RATE: 16 BRPM | SYSTOLIC BLOOD PRESSURE: 146 MMHG | DIASTOLIC BLOOD PRESSURE: 67 MMHG

## 2025-01-20 VITALS — DIASTOLIC BLOOD PRESSURE: 72 MMHG | RESPIRATION RATE: 18 BRPM | SYSTOLIC BLOOD PRESSURE: 147 MMHG

## 2025-01-20 VITALS — DIASTOLIC BLOOD PRESSURE: 64 MMHG | SYSTOLIC BLOOD PRESSURE: 140 MMHG | RESPIRATION RATE: 19 BRPM

## 2025-01-20 LAB
BODY FLUID SECOND TECH: (no result)
BUN SERPL-MCNC: 14 MG/DL (ref 9–20)
CALCIUM SERPL-MCNC: 8.6 MG/DL (ref 8.4–10.2)
CHLORIDE SERPL-SCNC: 99 MMOL/L (ref 98–107)
CO2 SERPL-SCNC: 23 MMOL/L (ref 22–30)
EGFR: > 60
ERYTHROCYTE [DISTWIDTH] IN BLOOD BY AUTOMATED COUNT: 14.4 % (ref 11.5–14.5)
ESTIMATED CREATININE CLEARANCE: 112 ML/MIN
GLUCOSE SERPL-MCNC: 81 MG/DL (ref 70–99)
HCT VFR BLD AUTO: 33.5 % (ref 39–52)
HGB BLD-MCNC: 11.2 G/DL (ref 13–18)
LDH SERPL P TO L-CCNC: 159 U/L (ref 120–246)
MAGNESIUM SERPL-MCNC: 2 MG/DL (ref 1.6–2.3)
MCHC RBC AUTO-ENTMCNC: 33.4 G/DL (ref 33–37)
MCV RBC AUTO: 86.3 FL (ref 80–94)
PLATELET # BLD AUTO: 235 10^3/UL (ref 130–400)
POTASSIUM SERPL-SCNC: 4 MMOL/L (ref 3.5–5.1)
PROT SERPL-MCNC: 5.6 G/DL (ref 6.3–8.2)
SODIUM SERPL-SCNC: 131 MMOL/L (ref 135–145)

## 2025-01-20 PROCEDURE — 0W993ZZ DRAINAGE OF RIGHT PLEURAL CAVITY, PERCUTANEOUS APPROACH: ICD-10-PCS | Performed by: RADIOLOGY

## 2025-01-20 RX ADMIN — LISINOPRIL 10 MG: 10 TABLET ORAL at 09:50

## 2025-01-20 RX ADMIN — AMLODIPINE BESYLATE 10 MG: 10 TABLET ORAL at 09:50

## 2025-01-20 RX ADMIN — PANTOPRAZOLE SODIUM 40 MG: 40 INJECTION, POWDER, LYOPHILIZED, FOR SOLUTION INTRAVENOUS at 08:24

## 2025-01-20 RX ADMIN — TBO-FILGRASTIM 480 MCG: 480 INJECTION, SOLUTION SUBCUTANEOUS at 10:53

## 2025-01-20 RX ADMIN — ATORVASTATIN CALCIUM 10 MG: 10 TABLET, FILM COATED ORAL at 21:06

## 2025-01-20 RX ADMIN — SODIUM CHLORIDE 10 ML: 9 INJECTION, SOLUTION INTRAMUSCULAR; INTRAVENOUS; SUBCUTANEOUS at 08:25

## 2025-01-20 RX ADMIN — METHIMAZOLE 5 MG: 5 TABLET ORAL at 08:24

## 2025-01-20 RX ADMIN — LISINOPRIL 10 MG: 10 TABLET ORAL at 20:35

## 2025-01-20 RX ADMIN — SODIUM CHLORIDE 1000: 900 INJECTION, SOLUTION INTRAVENOUS at 09:47

## 2025-01-20 RX ADMIN — PANTOPRAZOLE SODIUM 40 MG: 40 INJECTION, POWDER, LYOPHILIZED, FOR SOLUTION INTRAVENOUS at 20:29

## 2025-01-20 RX ADMIN — SODIUM CHLORIDE 10 ML: 9 INJECTION, SOLUTION INTRAMUSCULAR; INTRAVENOUS; SUBCUTANEOUS at 20:30

## 2025-01-20 RX ADMIN — SODIUM CHLORIDE 1000: 900 INJECTION, SOLUTION INTRAVENOUS at 20:44

## 2025-01-20 NOTE — PN.CDI
"CDI"~"- -"~"CDI: "~"Physician Documentation Request"~"Admit Date: 01/18/25 04:08"~"Dear Doctor Gomez,"~"Patient admitted with acute SBO."~"1/18 Nursing skin assessment, 'Stage 1 sacral pressure injury, POA....Stage 2 left buttock pressure injury, POA.'"~"Physician documentation of the type and location of wounds is required for compliant documentation. Based on the above clinical findings and your assessment, please provide the following in your progress note:  "~" Type (etiology) of ulcer/wound:"~"	"~"	- Pressure (decubitus) ulcer"~"	- Other"~"	- Unable to determine "~"For a pressure ulcer, please also include the stage* of the ulcer:"~"	- Stage 1 - Skin intact, non-blanchable redness"~"	- Stage 2 - Partial thickness loss of dermis, includes intact or open blister"~"	- Stage 3 - Full thickness tissue not including bone, tendon or muscle"~"	- Stage 4 - Full thickness tissue loss, including exposed bone, tendon or muscle"~"	- Unstageable - Full thickness loss in which the base of the ulcer is covered by slough (yellow, tan, gray, green or brown) and/or eschar (tan, brown or black) in the wound bed."~"	- Unable to determine"~"Use of terms such as suspected, likely, concern for, or probable (associated with a specific diagnosis that is being evaluated, monitored, or treated as if it exists) are acceptable and can be coded in the inpatient setting, when documented at the "~"time of discharge. "~"Thank you, "~"Selena NORRIS,RN,CCDS"~"CDI Specialist"~"Available via tiger text"~"Please use your independent medical judgment in providing your response."~"*Source: National Pressure Ulcer Advisory Panel (NPUAP)"

## 2025-01-20 NOTE — W.PN.ONC2
"Today's Communication / Plan"~"-"~"-: "~"- granix 480 mcg daily today and tomorrow. "~"- plan for thoracentesis today. "~"- CBC daily. "~"- oncology follow up as scheduled. "~"Impression"~"Impression"~"-: "~"small bowel obstruction"~"Locally advanced GE junction cancer, w/ esophageal stent, s/p cycle #1 of FLOT chemotherapy"~"Plan"~"Plan"~"-: "~"Mgmt of SBO per GI and general surgery. Repeat CT 1/19 showed resolution of obstruction. NGT removed and tolerated liquid diet this am. ADAT. "~"Monitor CBC w/ diff daily. Pt was due to complete course of granix on 1/21, ordered for last 2 doses today to ensure counts adequate for next cycle scheduled 1/23 assuming clinical status stable. "~"scheduled for diagnostic/therapeutic thoracentesis for new right pleural effusion. will follow up cytology. "~"Subjective/Objective"~"Chief Complaint"~"-: "~"n/v, SBO"~"Subjective"~"-: "~"pt feeling better today. He was able to tolerate liquid meal this am. Denies abdominal pain, bloating. He notes cough without significant worsening SOB.  "~"Vital Signs: "~"Vital Signs"~"Temp	Pulse	Resp	BP	Pulse Ox"~" 97.7 F 	 78 	 18 	 165/76 	 98 "~" 01/20/25 07:46	 01/20/25 09:50	 01/20/25 07:46	 01/20/25 09:50	 01/20/25 07:46"~"Lab Results: "~"Laboratory Data"~"WBC	 9.5 10^3/uL (4.8-10.8)  	01/20/25  05:09    "~"Hgb	 11.2 g/dL (13.0-18.0)  L 	01/20/25  05:09    "~"Plt Count	 235 10^3/uL (130-400)  	01/20/25  05:09    "~"PT	 14.6 Sec (11.4-14.6)  	01/19/25  05:08    "~"INR	 1.11  	01/19/25  05:08    "~"APTT	 29.5 Sec (23.4-35.0)  	01/19/25  05:08    "~"eGFR	 &gt; 60.00  	01/20/25  05:08    "~"Physical Exam"~"HEENT: No Jaundice"~"Cardiology: Normal Sinus Rhythm"~"Pulmonary: Other (decreased BS in right mid to lower lung fields )"~"GI: Soft; No Normal Bowel Sounds (decreased) or Distended"~"Neuro: Non Focal"~"Review of Systems"~"Review of Systems"~"Constitutional: Denies Fever"~"Respiratory: Reports Cough; Denies Dyspnea"~"Cardiovascular: Denies Chest Pain"~"Gastrointestinal: Denies Nausea/Vomiting"~"Neurological: Denies Headache"~"Orders"~"Orders"~"-: "~"Orders From Last 24 Hours"~"01/20/25 10:15"~"Tbo-Filgrastim [Granix]   480 mcg SC DAILY "

## 2025-01-20 NOTE — W.PN.HOSP.TC
"Addendum entered and electronically signed by Rajan Dyer MD  01/20/25 14:37: "~"Stage 1 sacral pressure injury, POA"~"Stage 2 left buttock pressure injury, POA"~"high-grade proximal small bowel obstruction (POA) - documentation is complete"~"Original Note:"~"Today's Communication/Plan"~"-"~"-: "~"see bold"~"Assessment / Plan"~"Assessment / Plan"~"-: "~"Gen: NAD, AAOx3, appears chronically ill."~"Eyes: EOMI, PERRLA, no scleral icterus."~"Neck: supple."~"CV: RRR, +S1/S2, no m/r/g."~"Resp: dec BS R base"~"Abd: +BS, soft, NT, ND"~"Skin: No rashes."~"Neuro: CN 2-12 intact, non-focal."~"Psych: Normal mood and affect."~"Chest U/S 1/19/25: Moderate right pleural effusion"~"CT A/P: No SBO. Duodenal dilatation present on the previous examination has resolved. Soft tissue density at the lower margin of the esophageal stent worrisome for tumor infiltration. Moderate right pleural effusion with compressive atelectasis at "~"the right lung base."~"Acute SBO:"~"-had NGT, removed 1/19/25. Had been NPO with IVFs. Now on clears."~"-SBO resolved per imaging"~"-GI/surgery following"~"UGIB:"~"-Possible UGIB from duodenal cause of obstruction vs traumatic NGT placement "~"-Appreciate GI input, UGIB resolved, no further bleeding"~"-cont continue PPI BID"~"-Hb stable"~"Locally advanced GE junction cancer, w/ esophageal stent, s/p cycle #1 of FLOT chemotherapy"~"-Appreciate oncology input, further chemo plans to be determined"~"Moderate right pleural effusion:"~"-c/s IR for Dx/Tx R thoracentesis"~"Other problems:"~"Leukocytosis, resolved"~"Hyperthyroidism: cont methimazole"~"Essential hypertension: resume lisinopril/amlodipine "~"Hyperlipidemia: resume statin"~"Hyponatremia, mild"~"FULL/SCDs "~"Total time spent on today's encounter was 50 minutes which included time spent in counseling the patient/family regarding diagnosis and treatment plan as listed above, goals of care, and symptom management. Case was discussed with nursing staff, "~"specialists, and care coordinators/case management. All labs and imaging personally reviewed by me. Remainder the time spent in detailed review of previous records, lab data, imaging, and other medical provider documentation."~"Anticipated Discharge: 24 - 48 hours"~"Subjective/Interval History"~"-"~"-: "~"Date of Service:  January 20, 2025"~"Objective Data"~"-"~"Labs: "~"Laboratory Results"~" 	01/20/25	01/20/25"~" 	05:08	05:09"~"WBC		 9.5"~"Hgb		 11.2 L"~"Hct		 33.5 L"~"Plt Count		 235"~"Sodium	 131 L	"~"Potassium	 4.0	"~"Chloride	 99	"~"Carbon Dioxide	 23	"~"BUN	 14	"~"Creatinine	 0.6 L	"~"Glucose	 81	"~"Calcium	 8.6	"~"Vital Signs: "~"Vital Signs"~"Temp	Pulse	Resp	BP	Pulse Ox"~" 97.7 F 	 78 	 18 	 165/76 	 98 "~" 01/20/25 07:46	 01/20/25 07:46	 01/20/25 07:46	 01/20/25 07:46	 01/20/25 07:46"~"I&O"~"	01/19/25	01/20/25	01/21/25"~"	06:59	06:59	06:59"~"Intake Total		1620 / 1620	"~"Output Total	560 / 560	200 / 200	"~"Balance	-560 / -560	1420 / 1420	"

## 2025-01-20 NOTE — W.PN.GI.CBS2
"Today's Communication / Plan"~"-"~"-: "~"Advance diet per surgery"~"Hgb stable, no further bleeding"~"Will sign off. Please call back if needed"~"Assessment / Plan"~"-"~"-: "~"Summary: 73yo male presents with n/v and CT showing nonspecific obstruction at junction of duodenum and jejunum with abrupt change in caliber and fluid distention of duodenum and stomach.  He was recently dx'd with esophageal CA and had EGD/stent x "~"2 at [facility].  Rec'd first chemo week prior to admission.  NGT placed in ER with blood return, likely from traversing esophageal tumor in stent, as seen on CT."~"CT AP-"~"- tissue attenuation within the esophageal stent, most pronounced at the distal stent margin suspicious for tumor infiltration. Ingested material felt to be less likely."~"-Nonspecific obstruction at the junction of the duodenum and jejunum, where there is an abrupt change in caliber, and associated fluid distention of the duodenum and stomach."~"-Diverticulosis without acute diverticulitis."~"-Nonobstructing 5 mm calculus in the lower pole left kidney."~"-Renal cysts."~"-Moderate right pleural effusion."~"Impression:"~"n/v due to obstruction at duodenal/jejunal junction"~"Esophageal CA s/p stent, began neoadjuvant FLOT chemo week PTA"~"hematemesis due to NGT trauma through known esophageal tumor/stent"~"Subjective"~"Subjective"~"-: "~"Date of Service:  January 20, 2025"~"NGT d/c'd.  Had BM.  Denies complaints"~"Objective"~"Data Reviewed"~"Laboratory Data: "~"Laboratory Results"~"01/20/25 05:09 "~"01/20/25 05:08 "~"Laboratory Results"~"PT	 14.6 Sec (11.4-14.6)  	01/19/25  05:08    "~"INR	 1.11  	01/19/25  05:08    "~"APTT	 29.5 Sec (23.4-35.0)  	01/19/25  05:08    "~"Phosphorus	 2.9 mg/dl (2.5-4.5)  	01/20/25  05:08    "~"Magnesium	 2.0 mg/dl (1.6-2.3)  	01/20/25  05:08    "~"Total Bilirubin	 0.7 mg/dl (0.2-1.3)  	01/17/25  21:50    "~"AST	 28 U/L (17-59)  	01/17/25  21:50    "~"ALT	 27 U/L (0-50)  	01/17/25  21:50    "~"Alkaline Phosphatase	 175 U/L ()  H 	01/17/25  21:50    "~"Lipase	 137 U/L ()  	01/17/25  21:50    "~"Vital Signs and I&O: "~"Vital Signs"~"Temp	Pulse	Resp	BP	Pulse Ox"~" 97.7 F 	 78 	 18 	 165/76 	 98 "~" 01/20/25 07:46	 01/20/25 09:50	 01/20/25 07:46	 01/20/25 09:50	 01/20/25 07:46"~"I&O"~"	01/19/25	01/20/25	01/21/25"~"	06:59	06:59	06:59"~"Intake Total		1620 / 1620	"~"Output Total	560 / 560	200 / 200	"~"Balance	-560 / -560	1420 / 1420	"~"Physical Exam"~"Physical Exam"~"GI: Soft, Non Distended and Non Tender"

## 2025-01-20 NOTE — PN.CDI
"CDI"~"- -"~"CDI: "~"Physician Documentation Request"~"Admit Date: 01/18/25 04:08"~"Dear Doctor Gomez,"~"Patient admitted with acute SBO."~"1/20 PN,'...had NGT, removed 1/19/25. Had been NPO with IVFs. Now on clears. -SBO resolved per imaging.....CT A/P: No SBO. Duodenal dilatation present on the previous examination has resolved. Soft tissue density at the lower margin of the "~"esophageal stent worrisome for tumor infiltration. Moderate right pleural effusion with compressive atelectasis at the right lung base.'"~"Please provide in your note the likely type of documented SBO:"~"	Partial  SBO"~"	Complete SBO"~"	No SBO"~"	Other"~"Use of terms such as suspected, likely, concern for, or probable (associated with a specific diagnosis that is being evaluated, monitored, or treated as if it exists) are acceptable and can be coded in the inpatient setting, when documented at the "~"time of discharge. "~"Thank you, "~"Selena NORRIS,RN,CCDS"~"CDI Specialist"~"Available via Tiger text"~"Please use your independent medical judgment in providing your response."

## 2025-01-20 NOTE — W.PN.GS2
"Today's Communication / Plan"~"-"~"-: "~"Full liquids"~"Assessment / Plan"~"-"~"-: "~"Assessment: 74-year-old male with h/o esophageal CA status post stent and recent initiation of neoadjuvant chemotherapy, on soft diet at baseline presenting with nausea/vomiting with CT imaging suggestive of high-grade proximal small bowel "~"obstruction with transition point at the first portion of the jejunum/ligament of Treitz but without radiographic apparent mass or adenopathy on initial CT imaging.  Alternatively nausea vomiting gastric distention could have been secondary to "~"initiation of chemotherapy   Without underlying mechanical obstruction."~"AFVSS"~"Follow up CT imaging on 1/19 with relief of obstruction"~"NGT removed on 1/19, tolerating clears"~"Plan:"~"OK for Full liquid diet/supplements. Previously on fulls/soft diet prior to admission"~"Hematology following"~"Thoracentesis planned today with cytology"~"Subjective Data"~"-"~"-: "~"Date of Service:  January 20, 2025"~"Patient seen and examined at bedside with Dr. Gold. Denies n/v. Tolerating clears. Denies pain. "~"Objective Data"~"-"~"-: "~"Intake and Output"~"	01/19/25	01/20/25	01/21/25"~"	06:59	06:59	06:59"~"Intake Total		1620 / 1620	"~"Output Total	560 / 560	200 / 200	"~"Balance	-560 / -560	1420 / 1420	"~"Intake:			"~"  Oral fluids		420 / 420	"~"  IV fluids (Total)		1200 / 1200	"~"Output:			"~"  Gastrointestinal tube output (		200 / 200	"~"  Total)			"~"    LoÃ­za Sump		200 / 200	"~"  Urine, Voided	560 / 560		"~"Vital Signs"~"Temp	Pulse	Resp	BP	Pulse Ox"~" 97.7 F 	 78 	 18 	 165/76 	 98 "~" 01/20/25 07:46	 01/20/25 09:50	 01/20/25 07:46	 01/20/25 09:50	 01/20/25 07:46"~"Lab Results"~"01/20/25 05:09 "~"01/20/25 05:08 "~"Calcium	 8.6 mg/dl (8.4-10.2)  	01/20/25  05:08    "~"Phosphorus	 2.9 mg/dl (2.5-4.5)  	01/20/25  05:08    "~"Magnesium	 2.0 mg/dl (1.6-2.3)  	01/20/25  05:08    "~"Total Bilirubin	 0.7 mg/dl (0.2-1.3)  	01/17/25  21:50    "~"AST	 28 U/L (17-59)  	01/17/25  21:50    "~"ALT	 27 U/L (0-50)  	01/17/25  21:50    "~"Alkaline Phosphatase	 175 U/L ()  H 	01/17/25  21:50    "~"Total Protein	 Cancelled  	01/20/25  08:54    "~"Albumin	 4.4 g/dl (3.5-5.0)  	01/17/25  21:50    "~"Physical Exam"~"-"~"-: "~"NAD AAOx3"~"ABD soft, nt, nd"

## 2025-01-20 NOTE — WOUNDNOTE
"HODA RN note: Patient admitted with small bowel obstruction.  "~"See H&P for complete history."~"PMH: Esophageal cancer-on chemo, HTN, L buttock PI. "~"Wound Location and type/assessment:  Patient admitted with: L buttock cluster of 2 small shallow ulcers, suspect stage 2 PI. Patient reports he has had sores there for about 2 weeks. Confirmed he does sit allot at home. Heels intact."~"Appetite: Poor. Eats pureed diet at home patient states. Encourage protein in diet.   "~"Pressure redistribution devices in place: On [place] air, patient turns self.    "~"Plan: Changed foam on L buttock. Will order honey gel to start tomorrow, called [facility] for gel and placed in .  "~"Will confirm orders with hospitalist and updated nurse Bhupinder.  "~"Updated care plan and will follow as needed.  "~"Note to case management of equipment requested for discharge: None. "~"Recommend follow up at  wound care center upon discharge.  "

## 2025-01-21 VITALS — HEART RATE: 80 BPM | DIASTOLIC BLOOD PRESSURE: 70 MMHG | SYSTOLIC BLOOD PRESSURE: 139 MMHG | OXYGEN SATURATION: 95 %

## 2025-01-21 VITALS — DIASTOLIC BLOOD PRESSURE: 61 MMHG | SYSTOLIC BLOOD PRESSURE: 128 MMHG | RESPIRATION RATE: 18 BRPM

## 2025-01-21 VITALS — RESPIRATION RATE: 18 BRPM | DIASTOLIC BLOOD PRESSURE: 67 MMHG | SYSTOLIC BLOOD PRESSURE: 139 MMHG

## 2025-01-21 VITALS — RESPIRATION RATE: 18 BRPM | DIASTOLIC BLOOD PRESSURE: 65 MMHG | SYSTOLIC BLOOD PRESSURE: 130 MMHG

## 2025-01-21 LAB
BUN SERPL-MCNC: 7 MG/DL (ref 9–20)
CALCIUM SERPL-MCNC: 8.3 MG/DL (ref 8.4–10.2)
CHLORIDE SERPL-SCNC: 98 MMOL/L (ref 98–107)
CO2 SERPL-SCNC: 26 MMOL/L (ref 22–30)
EGFR: > 60
ERYTHROCYTE [DISTWIDTH] IN BLOOD BY AUTOMATED COUNT: 14.1 % (ref 11.5–14.5)
ESTIMATED CREATININE CLEARANCE: 112 ML/MIN
GLUCOSE SERPL-MCNC: 92 MG/DL (ref 70–99)
HCT VFR BLD AUTO: 34.4 % (ref 39–52)
HGB BLD-MCNC: 11.4 G/DL (ref 13–18)
MCHC RBC AUTO-ENTMCNC: 33.1 G/DL (ref 33–37)
MCV RBC AUTO: 85.4 FL (ref 80–94)
PLATELET # BLD AUTO: 252 10^3/UL (ref 130–400)
POTASSIUM SERPL-SCNC: 3.6 MMOL/L (ref 3.5–5.1)
SODIUM SERPL-SCNC: 131 MMOL/L (ref 135–145)

## 2025-01-21 RX ADMIN — SODIUM CHLORIDE 10 ML: 9 INJECTION, SOLUTION INTRAMUSCULAR; INTRAVENOUS; SUBCUTANEOUS at 09:52

## 2025-01-21 RX ADMIN — HEPARIN SODIUM (PORCINE) LOCK FLUSH IV SOLN 100 UNIT/ML 500 UNIT: 100 SOLUTION at 05:13

## 2025-01-21 RX ADMIN — LISINOPRIL 10 MG: 10 TABLET ORAL at 09:51

## 2025-01-21 RX ADMIN — Medication 5 MG: at 01:35

## 2025-01-21 RX ADMIN — PANTOPRAZOLE SODIUM 40 MG: 40 INJECTION, POWDER, LYOPHILIZED, FOR SOLUTION INTRAVENOUS at 09:52

## 2025-01-21 RX ADMIN — HEPARIN SODIUM (PORCINE) LOCK FLUSH IV SOLN 100 UNIT/ML 500 UNIT: 100 SOLUTION at 13:42

## 2025-01-21 RX ADMIN — AMLODIPINE BESYLATE 10 MG: 10 TABLET ORAL at 09:51

## 2025-01-21 RX ADMIN — TBO-FILGRASTIM 480 MCG: 480 INJECTION, SOLUTION SUBCUTANEOUS at 09:49

## 2025-01-21 RX ADMIN — POLYETHYLENE GLYCOL 3350 17 GRAMS: 17 POWDER, FOR SOLUTION ORAL at 09:50

## 2025-01-21 RX ADMIN — METHIMAZOLE 5 MG: 5 TABLET ORAL at 09:54

## 2025-01-21 NOTE — CM
"Addendum entered by Radha Caruso  01/21/25 16:14: "~"Per PT,  No Skilled PT needed "~"Discharge on hold; Attending spoke with patient's wife via phone; ordering abdominal x-ray"~"Plan: Discharge to home with VN when stable"~"Addendum entered by Radha Caruso  01/21/25 16:04: "~"Patient reported that he has abdominal pain; Attending notified"~"PT Assessment ordered; PT at bedside now"~"Original Note:"~"Initial assessment completed"~"IMM benefit explained; form signed @ 1529"~"Pharmacy verified: [facility] @ [address] [place] [address]"~" Lives with wife and son in a multilevel home; 1 step to enter; 14 steps to 2nd floor bed/bath; railing on stairs; bath has tub w/shower"~" Reported he is independent with ambulation, stairs and ADLs; drives"~" No SNF or Home health utilization"~" Wife will transport"~" Plan: Discharge to home today with [facility] home health services"

## 2025-01-21 NOTE — PTCARENOTE
"During assessment, this RN noticed an area of soft, movable tissue inferior to the thoracentesis puncture site. IR notified and saw pt at bedside: stated it was a lipoma that was there prior to procedure yesterday. pt was also complaining of "~"increased stomach discomfort. MD notified and Abdominal x-ray ordered. Pt had questions about discharge. MD notified and spoke with pt over the phone. Discharge ordered and pt left with his wife at this time.  "

## 2025-01-21 NOTE — W.PN.UPDATE
"Addendum entered and electronically signed by Rajan Dyer MD  01/21/25 17:00: "~"No obstruction on abdominal x-ray.  Also, patient has been seen by physical therapy and has no skilled needs.  He remains medically stable for discharge at this time. RN and case management updated."~"Original Note:"~"Update Note"~"Progress Note Update"~"-: "~"Abd Xray: No radiographic evidence for small bowel or colonic obstruction."~"Moderate to severe diverticulosis in the sigmoid colon."~"4 mm left intrarenal calculus."~"Small calcified splenic granulomas."~"Severe multilevel discogenic degenerative disease in the lumbar spine."

## 2025-01-21 NOTE — W.DCSUMMARY
"Discharge Summary"~"Discharge Data"~"Date of Admission: 01/18/25"~"Date of Discharge: 01/21/25"~"-"~"Pending Results: No"~"Hospital Course"~"-: "~"Primary diagnoses:"~"Acute high-grade proximal small bowel obstruction"~"Upper gastrointestinal bleeding"~"Moderate right pleural effusion"~"Secondary diagnoses:"~"Locally advanced gastroesophageal junction cancer, w/ esophageal stent, s/p cycle #1 of FLOT chemotherapy"~"Leukocytosis"~"Hyperthyroidism"~"Essential hypertension"~"Hyperlipidemia"~"Hyponatremia"~"Stage 1 sacral pressure injury, POA"~"Stage 2 left buttock pressure injury, POA"~"Consultants:"~"Oncology"~"General Surgery"~"Gastroenterology"~"Pulmonary"~"Imaging:"~"Chest U/S 1/19/25: Moderate right pleural effusion"~"CT A/P 1/18/25: Soft tissue attenuation within the esophageal stent, most pronounced at the distal stent margin suspicious for tumor infiltration. Ingested material felt to be less likely. Nonspecific obstruction at the junction of the duodenum and "~"jejunum, where there is an abrupt change in caliber, and associated fluid distention of the duodenum and stomach. Diverticulosis without acute diverticulitis. Nonobstructing 5 mm calculus in the lower pole left kidney. Renal cysts. Moderate right "~"pleural effusion."~"CT A/P: No SBO. Duodenal dilatation present on the previous examination has resolved. Soft tissue density at the lower margin of the esophageal stent worrisome for tumor infiltration. Moderate right pleural effusion with compressive atelectasis at "~"the right lung base."~"CXR 1/20/25: Decreased right pleural fluid status post thoracentesis. No appreciable pneumothorax."~"74-year-old male who presented with chief complaint of intractable vomiting as outlined in H&P done on admission.  Hospital course by problem list:"~"Acute high-grade proximal small bowel obstruction: The patient had an NGT placed which was removed 1/19/25.  He was initially NPO with IVF support.  He was successfully advanced to full liquids.  His small bowel obstruction resolved per imaging as "~"above.  He was seen by GI and surgery."~"UGIB: This was a possible UGIB from duodenal cause of obstruction vs traumatic NGT placement .  Patient was seen in consultation by GI. UGIB resolved, no further bleeding.  Patient was continued on PPI BID. Hb was stable."~"Moderate right pleural effusion: The patient underwent R thoracentesis for 1400cc on 1/20/25, exudative by lights criteria. Suspect malignant pleural effusion. Cytology pending. FCx NGTD.  Pulmonary saw in consultation prior to discharge."~"Discharge Plan"~"-"~"Patient Disposition: Home (Routine Discharge)"~"Discharge Diagnosis/Procedures: Acute high-grade proximal small bowel obstruction "~"Condition: Good"~"Diet: Other diet"~"Additional Diets: pureed diet as prior to admission "~"Activity: As tolerated"~"Driving Restrictions: As prior to admission"~"Activity Restrictions/Additional Instructions:"~"Wound Care Instructions         "~"L buttock: clean with soap and water, smear of honey gel, dry dressing change daily and prn soilage."~"Air cushion to use when sitting, *can take upon discharge"~"frequent shifts in position when sitting"~"Increase protein in diet as tolerated."~"Follow up at  wound care center if not healing, call [phone] for an appointment."~"Referrals:"~"Jasmin Quevedo DO [Active] - in two to four weeks (PFTs)"~"Domi Quezada MD [Family Provider] - in less than 1 week"~"Prescriptions:"~"New"~"  pantoprazole [Protonix] 40 mg tablet,delayed release (DR/EC) "~"   40 mg PO BID Qty: 60 0RF"~"Continued"~"  nystatin 500,000 unit Tablet "~"   500,000 unit PO TID "~"  amlodipine 10 mg Tablet "~"   10 mg PO DAILY "~"  simvastatin 20 mg Tablet "~"   20 mg PO HS "~"  lisinopril 10 mg Tablet "~"   10 mg PO BID "~"  methimazole 5 mg Tablet "~"   5 mg PO DAILY "~"  albuterol sulfate 90 mcg/actuation Hfa Aerosol Inhaler "~"   2 puff INHALATION QID PRN (Reason: wheeze) "~"Discharge Orders:"~"Discharge Patient  (As Directed); Ordered 01/21/25"~"   Ordered By:  Rajan Dyer"~"Discharge Date and Time"~"Print Language: ENGLISH"

## 2025-01-21 NOTE — W.PN.HOSP.TC
"Addendum entered and electronically signed by Rajan Dyer MD  01/21/25 14:46: "~"Total time spent on d/c = 62 min. This included today's physical exam, progress note, review of laboratory and diagnostic data, preparation of discharge documents and prescriptions, and discussions about the pt's hospital course and discharge plan "~"with the patient and other medical professionals involved in the patient's care."~"Original Note:"~"Today's Communication/Plan"~"-"~"-: "~"see bold"~"Assessment / Plan"~"Assessment / Plan"~"-: "~"Gen: remains NAD, AAOx3, appears chronically ill."~"Eyes: EOMI, PERRLA, no scleral icterus."~"Neck: supple."~"CV: RRR, +S1/S2, no m/r/g."~"Resp: CTAB anteriorly"~"Abd: +BS, soft, NT, ND"~"Skin: No rashes."~"Neuro: remains CN 2-12 intact, non-focal."~"Psych: Normal mood and affect."~"Chest U/S 1/19/25: Moderate right pleural effusion"~"CT A/P: No SBO. Duodenal dilatation present on the previous examination has resolved. Soft tissue density at the lower margin of the esophageal stent worrisome for tumor infiltration. Moderate right pleural effusion with compressive atelectasis at "~"the right lung base."~"Acute high-grade proximal small bowel obstruction:"~"-had NGT, removed 1/19/25. Had been NPO with IVFs. Now on advanced to full liquids."~"-SBO resolved per imaging"~"-GI/surgery following"~"UGIB:"~"-Possible UGIB from duodenal cause of obstruction vs traumatic NGT placement "~"-Appreciate GI input, UGIB resolved, no further bleeding"~"-cont continue PPI BID"~"-Hb stable"~"Locally advanced GE junction cancer, w/ esophageal stent, s/p cycle #1 of FLOT chemotherapy"~"-Appreciate oncology input, further chemo plans to be determined"~"Moderate right pleural effusion:"~"-s/p Dx/Tx R thoracentesis for 1400cc on 1/20/25, exudative by lights criteria. Suspect malignancy pleural effusion. Cytology pending. FCx NGTD."~"-reasonable to c/s pulm prior to discharge"~"Other problems:"~"Leukocytosis, resolved"~"Hyperthyroidism: cont methimazole"~"Essential hypertension: resume lisinopril/amlodipine "~"Hyperlipidemia: resume statin"~"Hyponatremia, mild"~"Stage 1 sacral pressure injury, POA"~"Stage 2 left buttock pressure injury, POA"~"Pt's wife updated at bedside."~"FULL/SCDs "~"Total time spent on today's encounter was 51 minutes which included time spent in counseling the patient/family regarding diagnosis and treatment plan as listed above, goals of care, and symptom management. Case was discussed with nursing staff, "~"specialists, and care coordinators/case management. All labs and imaging personally reviewed by me. Remainder the time spent in detailed review of previous records, lab data, imaging, and other medical provider documentation."~"Anticipated Discharge: Within 24 hours"~"Subjective/Interval History"~"-"~"-: "~"Date of Service:  January 21, 2025"~"Objective Data"~"-"~"Labs: "~"Laboratory Results"~" 	01/21/25"~" 	04:36"~"WBC	 25.3 H"~"Hgb	 11.4 L"~"Hct	 34.4 L"~"Plt Count	 252"~"Sodium	 131 L"~"Potassium	 3.6"~"Chloride	 98"~"Carbon Dioxide	 26"~"BUN	 7 L"~"Creatinine	 0.6 L"~"Glucose	 92"~"Calcium	 8.3 L"~"Vital Signs: "~"Vital Signs"~"Temp	Pulse	Resp	BP	Pulse Ox"~" 97.8 F 	 77 	 18 	 139/67 	 97 "~" 01/21/25 07:05	 01/21/25 09:51	 01/21/25 07:05	 01/21/25 09:51	 01/21/25 07:05"~"I&O"~"	01/20/25	01/21/25	01/22/25"~"	06:59	06:59	06:59"~"Intake Total	1620 / 1620	600 / 600	"~"Output Total	200 / 200	1400 / 1400	"~"Balance	1420 / 1420	-800 / -800	"

## 2025-01-21 NOTE — W.PN.ONC
"Today's Communication / Plan"~"-"~"-: "~"Transient SBO has resolved with conservative measures"~"Extra dose of Granix today for WBC support"~"For cycle #2 of FLOT chemo on 1/23; okay to proceed as long as no new issues "~"Cytology pending from right thoracentesis"~"d/c planning"~"Impression"~"Impression"~"-: "~"small bowel obstruction, resolved, possibly enteritis from chemo"~"Locally advanced GE junction cancer, w/ esophageal stent, s/p cycle #1 of FLOT chemotherapy"~"pleural effusion, s/p thora on 1/20/25"~"Plan"~"Plan"~"-: "~"Transient SBO has resolved with conservative measures"~"Extra dose of Granix today for WBC support"~"For cycle #2 of FLOT chemo on 1/23; okay to proceed as long as no new issues "~"Cytology pending from right thoracentesis"~"d/c planning"~"Subjective/Objective"~"Subjective/Objective"~"-: "~"tolerating liquid diet well, feels better"~"s/p thoracentesis yesterday, no issues"~"eager to go home, eager to stay on schedule for chemo on 1/23/25"~"Vital Signs: "~"Vital Signs"~"Temp	Pulse	Resp	BP	Pulse Ox"~" 97.8 F 	 77 	 18 	 139/67 	 97 "~" 01/21/25 07:05	 01/21/25 07:05	 01/21/25 07:05	 01/21/25 07:05	 01/21/25 07:05"~"Lab Results: "~"Laboratory Data"~"WBC	 25.3 10^3/uL (4.8-10.8)  H 	01/21/25  04:36    "~"Hgb	 11.4 g/dL (13.0-18.0)  L 	01/21/25  04:36    "~"Plt Count	 252 10^3/uL (130-400)  	01/21/25  04:36    "~"PT	 14.6 Sec (11.4-14.6)  	01/19/25  05:08    "~"INR	 1.11  	01/19/25  05:08    "~"APTT	 29.5 Sec (23.4-35.0)  	01/19/25  05:08    "~"eGFR	 &gt; 60.00  	01/21/25  04:36    "

## 2025-01-22 VITALS — RESPIRATION RATE: 21 BRPM

## 2025-01-22 VITALS — DIASTOLIC BLOOD PRESSURE: 81 MMHG | RESPIRATION RATE: 20 BRPM | SYSTOLIC BLOOD PRESSURE: 150 MMHG

## 2025-01-22 VITALS — RESPIRATION RATE: 18 BRPM

## 2025-01-22 VITALS — RESPIRATION RATE: 21 BRPM | SYSTOLIC BLOOD PRESSURE: 159 MMHG | DIASTOLIC BLOOD PRESSURE: 82 MMHG

## 2025-01-22 VITALS — SYSTOLIC BLOOD PRESSURE: 156 MMHG | DIASTOLIC BLOOD PRESSURE: 80 MMHG | RESPIRATION RATE: 23 BRPM

## 2025-01-22 VITALS — DIASTOLIC BLOOD PRESSURE: 80 MMHG | RESPIRATION RATE: 19 BRPM | SYSTOLIC BLOOD PRESSURE: 157 MMHG

## 2025-01-22 VITALS — RESPIRATION RATE: 24 BRPM

## 2025-01-22 VITALS — RESPIRATION RATE: 26 BRPM

## 2025-01-22 VITALS — SYSTOLIC BLOOD PRESSURE: 155 MMHG | RESPIRATION RATE: 18 BRPM | DIASTOLIC BLOOD PRESSURE: 90 MMHG

## 2025-01-22 VITALS — RESPIRATION RATE: 22 BRPM

## 2025-01-22 VITALS — RESPIRATION RATE: 19 BRPM

## 2025-01-22 VITALS — RESPIRATION RATE: 25 BRPM

## 2025-01-22 LAB
ALBUMIN SERPL-MCNC: 4 G/DL (ref 3.5–5)
ALP SERPL-CCNC: 232 U/L (ref 38–126)
ALT SERPL-CCNC: 15 U/L (ref 0–50)
AMYLASE SERPL-CCNC: 40 U/L (ref 30–110)
AST SERPL-CCNC: 23 U/L (ref 17–59)
BUN SERPL-MCNC: 13 MG/DL (ref 9–20)
CALCIUM SERPL-MCNC: 9.1 MG/DL (ref 8.4–10.2)
CHLORIDE SERPL-SCNC: 92 MMOL/L (ref 98–107)
CO2 SERPL-SCNC: 27 MMOL/L (ref 22–30)
EGFR: > 60
ERYTHROCYTE [DISTWIDTH] IN BLOOD BY AUTOMATED COUNT: 14.3 % (ref 11.5–14.5)
ESTIMATED CREATININE CLEARANCE: 112 ML/MIN
GLUCOSE SERPL-MCNC: 132 MG/DL (ref 70–99)
HCT VFR BLD AUTO: 38.4 % (ref 39–52)
HGB BLD-MCNC: 13 G/DL (ref 13–18)
LIPASE SERPL-CCNC: 53 U/L (ref 23–300)
MCHC RBC AUTO-ENTMCNC: 33.9 G/DL (ref 33–37)
MCV RBC AUTO: 84.6 FL (ref 80–94)
NRBC BLD AUTO-RTO: 0 %
PLATELET # BLD AUTO: 279 10^3/UL (ref 130–400)
POTASSIUM SERPL-SCNC: 3.6 MMOL/L (ref 3.5–5.1)
PROT SERPL-MCNC: 6.3 G/DL (ref 6.3–8.2)
SODIUM SERPL-SCNC: 130 MMOL/L (ref 135–145)

## 2025-01-22 RX ADMIN — ONDANSETRON HYDROCHLORIDE 4 MG: 2 SOLUTION INTRAMUSCULAR; INTRAVENOUS at 21:04

## 2025-01-22 RX ADMIN — ONDANSETRON HYDROCHLORIDE 4 MG: 2 SOLUTION INTRAMUSCULAR; INTRAVENOUS at 22:22

## 2025-01-22 RX ADMIN — SODIUM CHLORIDE 500: 900 INJECTION, SOLUTION INTRAVENOUS at 21:03

## 2025-01-22 RX ADMIN — MORPHINE SULFATE 4 MG: 4 INJECTION INTRAVENOUS at 21:11

## 2025-01-22 RX ADMIN — IOHEXOL 50 ML: 240 INJECTION, SOLUTION INTRATHECAL; INTRAVASCULAR; INTRAVENOUS; ORAL at 23:56

## 2025-01-23 ENCOUNTER — HOSPITAL ENCOUNTER (INPATIENT)
Dept: HOSPITAL 99 - ICU | Age: 75
LOS: 22 days | DRG: 326 | End: 2025-02-14
Payer: COMMERCIAL

## 2025-01-23 VITALS — RESPIRATION RATE: 20 BRPM

## 2025-01-23 VITALS — RESPIRATION RATE: 23 BRPM | DIASTOLIC BLOOD PRESSURE: 79 MMHG | SYSTOLIC BLOOD PRESSURE: 162 MMHG

## 2025-01-23 VITALS — SYSTOLIC BLOOD PRESSURE: 144 MMHG | RESPIRATION RATE: 23 BRPM | DIASTOLIC BLOOD PRESSURE: 78 MMHG

## 2025-01-23 VITALS — RESPIRATION RATE: 17 BRPM | DIASTOLIC BLOOD PRESSURE: 65 MMHG | SYSTOLIC BLOOD PRESSURE: 140 MMHG

## 2025-01-23 VITALS — RESPIRATION RATE: 27 BRPM

## 2025-01-23 VITALS — RESPIRATION RATE: 22 BRPM | DIASTOLIC BLOOD PRESSURE: 67 MMHG | SYSTOLIC BLOOD PRESSURE: 142 MMHG

## 2025-01-23 VITALS — RESPIRATION RATE: 20 BRPM | SYSTOLIC BLOOD PRESSURE: 132 MMHG | DIASTOLIC BLOOD PRESSURE: 67 MMHG

## 2025-01-23 VITALS — RESPIRATION RATE: 18 BRPM | DIASTOLIC BLOOD PRESSURE: 69 MMHG | SYSTOLIC BLOOD PRESSURE: 135 MMHG

## 2025-01-23 VITALS — DIASTOLIC BLOOD PRESSURE: 68 MMHG | RESPIRATION RATE: 16 BRPM | SYSTOLIC BLOOD PRESSURE: 140 MMHG

## 2025-01-23 VITALS
BODY MASS INDEX: 22 KG/M2 | BODY MASS INDEX: 20.7 KG/M2 | BODY MASS INDEX: 22.1 KG/M2 | BODY MASS INDEX: 20.4 KG/M2 | BODY MASS INDEX: 23.4 KG/M2 | BODY MASS INDEX: 22.3 KG/M2 | BODY MASS INDEX: 21.1 KG/M2 | BODY MASS INDEX: 21.7 KG/M2 | BODY MASS INDEX: 21.5 KG/M2 | BODY MASS INDEX: 21.4 KG/M2 | BODY MASS INDEX: 20.6 KG/M2 | BODY MASS INDEX: 21.2 KG/M2 | BODY MASS INDEX: 22.7 KG/M2 | BODY MASS INDEX: 21 KG/M2 | BODY MASS INDEX: 20.3 KG/M2 | BODY MASS INDEX: 21.6 KG/M2 | BODY MASS INDEX: 22.4 KG/M2

## 2025-01-23 VITALS — RESPIRATION RATE: 17 BRPM | SYSTOLIC BLOOD PRESSURE: 133 MMHG | DIASTOLIC BLOOD PRESSURE: 62 MMHG

## 2025-01-23 VITALS — RESPIRATION RATE: 16 BRPM

## 2025-01-23 VITALS — SYSTOLIC BLOOD PRESSURE: 137 MMHG | DIASTOLIC BLOOD PRESSURE: 70 MMHG | RESPIRATION RATE: 18 BRPM

## 2025-01-23 VITALS — DIASTOLIC BLOOD PRESSURE: 70 MMHG | SYSTOLIC BLOOD PRESSURE: 135 MMHG | RESPIRATION RATE: 20 BRPM

## 2025-01-23 VITALS — RESPIRATION RATE: 19 BRPM

## 2025-01-23 VITALS — RESPIRATION RATE: 22 BRPM

## 2025-01-23 VITALS — RESPIRATION RATE: 23 BRPM

## 2025-01-23 VITALS — DIASTOLIC BLOOD PRESSURE: 67 MMHG | RESPIRATION RATE: 21 BRPM | SYSTOLIC BLOOD PRESSURE: 132 MMHG

## 2025-01-23 VITALS — RESPIRATION RATE: 18 BRPM

## 2025-01-23 VITALS — RESPIRATION RATE: 24 BRPM

## 2025-01-23 VITALS — RESPIRATION RATE: 12 BRPM

## 2025-01-23 VITALS — RESPIRATION RATE: 14 BRPM

## 2025-01-23 VITALS — RESPIRATION RATE: 21 BRPM

## 2025-01-23 VITALS — RESPIRATION RATE: 28 BRPM

## 2025-01-23 DIAGNOSIS — C78.2: ICD-10-CM

## 2025-01-23 DIAGNOSIS — Z11.52: ICD-10-CM

## 2025-01-23 DIAGNOSIS — N17.9: ICD-10-CM

## 2025-01-23 DIAGNOSIS — E87.0: ICD-10-CM

## 2025-01-23 DIAGNOSIS — E87.5: ICD-10-CM

## 2025-01-23 DIAGNOSIS — R65.21: ICD-10-CM

## 2025-01-23 DIAGNOSIS — E05.90: ICD-10-CM

## 2025-01-23 DIAGNOSIS — K22.2: ICD-10-CM

## 2025-01-23 DIAGNOSIS — I48.91: ICD-10-CM

## 2025-01-23 DIAGNOSIS — E87.6: ICD-10-CM

## 2025-01-23 DIAGNOSIS — E43: ICD-10-CM

## 2025-01-23 DIAGNOSIS — E78.00: ICD-10-CM

## 2025-01-23 DIAGNOSIS — C78.6: ICD-10-CM

## 2025-01-23 DIAGNOSIS — D63.0: ICD-10-CM

## 2025-01-23 DIAGNOSIS — L89.152: ICD-10-CM

## 2025-01-23 DIAGNOSIS — Z87.891: ICD-10-CM

## 2025-01-23 DIAGNOSIS — I5A: ICD-10-CM

## 2025-01-23 DIAGNOSIS — N40.0: ICD-10-CM

## 2025-01-23 DIAGNOSIS — R59.0: ICD-10-CM

## 2025-01-23 DIAGNOSIS — J91.0: ICD-10-CM

## 2025-01-23 DIAGNOSIS — I10: ICD-10-CM

## 2025-01-23 DIAGNOSIS — I47.20: ICD-10-CM

## 2025-01-23 DIAGNOSIS — J96.01: ICD-10-CM

## 2025-01-23 DIAGNOSIS — Z79.899: ICD-10-CM

## 2025-01-23 DIAGNOSIS — K21.9: ICD-10-CM

## 2025-01-23 DIAGNOSIS — J18.9: ICD-10-CM

## 2025-01-23 DIAGNOSIS — Z51.5: ICD-10-CM

## 2025-01-23 DIAGNOSIS — J69.0: ICD-10-CM

## 2025-01-23 DIAGNOSIS — E88.09: ICD-10-CM

## 2025-01-23 DIAGNOSIS — K31.89: ICD-10-CM

## 2025-01-23 DIAGNOSIS — Z53.31: ICD-10-CM

## 2025-01-23 DIAGNOSIS — A41.9: ICD-10-CM

## 2025-01-23 DIAGNOSIS — E87.1: ICD-10-CM

## 2025-01-23 DIAGNOSIS — K56.690: Primary | ICD-10-CM

## 2025-01-23 DIAGNOSIS — F41.9: ICD-10-CM

## 2025-01-23 DIAGNOSIS — C16.0: ICD-10-CM
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
## 2025-01-23 LAB
BUN SERPL-MCNC: 16 MG/DL (ref 9–20)
CALCIUM SERPL-MCNC: 8.8 MG/DL (ref 8.4–10.2)
CHLORIDE SERPL-SCNC: 91 MMOL/L (ref 98–107)
CO2 SERPL-SCNC: 35 MMOL/L (ref 22–30)
EGFR: > 60
ERYTHROCYTE [DISTWIDTH] IN BLOOD BY AUTOMATED COUNT: 14.2 % (ref 11.5–14.5)
ESTIMATED CREATININE CLEARANCE: 96 ML/MIN
GLUCOSE SERPL-MCNC: 113 MG/DL (ref 70–99)
HCT VFR BLD AUTO: 37.2 % (ref 39–52)
HGB BLD-MCNC: 12.3 G/DL (ref 13–18)
MCHC RBC AUTO-ENTMCNC: 33.1 G/DL (ref 33–37)
MCV RBC AUTO: 85.3 FL (ref 80–94)
PLATELET # BLD AUTO: 271 10^3/UL (ref 130–400)
POTASSIUM SERPL-SCNC: 3.3 MMOL/L (ref 3.5–5.1)
SODIUM SERPL-SCNC: 136 MMOL/L (ref 135–145)

## 2025-01-23 PROCEDURE — 88305 TISSUE EXAM BY PATHOLOGIST: CPT

## 2025-01-23 PROCEDURE — C1776 JOINT DEVICE (IMPLANTABLE): HCPCS

## 2025-01-23 RX ADMIN — DEXTROSE MONOHYDRATE, SODIUM CHLORIDE, AND POTASSIUM CHLORIDE 1000: 50; 4.5; 2.98 INJECTION, SOLUTION INTRAVENOUS at 23:44

## 2025-01-23 RX ADMIN — ENOXAPARIN SODIUM 40 MG: 40 INJECTION SUBCUTANEOUS at 19:50

## 2025-01-23 RX ADMIN — DEXTROSE MONOHYDRATE, SODIUM CHLORIDE, AND POTASSIUM CHLORIDE: 50; 4.5; 2.98 INJECTION, SOLUTION INTRAVENOUS at 23:53

## 2025-01-23 RX ADMIN — DEXTROSE MONOHYDRATE, SODIUM CHLORIDE, AND POTASSIUM CHLORIDE 1000: 50; 4.5; 2.98 INJECTION, SOLUTION INTRAVENOUS at 13:10

## 2025-01-23 RX ADMIN — DEXTROSE MONOHYDRATE AND SODIUM CHLORIDE 1000: 5; .45 INJECTION, SOLUTION INTRAVENOUS at 06:10

## 2025-01-23 NOTE — W.PN.HOSP.TC
"Today's Communication/Plan"~"-"~"-: "~"See plan"~"Assessment / Plan"~"Assessment / Plan"~"-: "~"Impression: "~"Acute high-grade proximal small bowel obstruction"~"Hyponatremia"~"Hypokalemia"~"Leukocytosis likely reactive"~"Other conditions"~"Locally advanced GE junction carcinoma with esophageal stent, status post first cycle of FLOT chemotherapy"~"Right pleural effusion status post large-volume thoracentesis 1/20 pending path, exudate, likely malignant"~"Hypothyroidism"~"Essential hypertension"~"Dyslipidemia"~"Plan:"~"Recurrent high-grade proximal small bowel obstruction"~"NG tube placed in ED"~"CT scan"~"1). The fluid-filled stomach is markedly distended and the fluid-filled duodenum is dilated to 4 cm with abrupt transition point to decompressed small bowel at the ligament of Treitz suggesting partial obstruction"~"2). Small bilateral pleural effusions with associated compressive atelectasis at the posterior lung bases."~"3). Distal esophageal stent"~"4). Bilateral renal cysts"~"5). Diverticuli are present in the colon with no CT evidence of diverticulitis"~"N.p.o."~"Maintain NG tube to wall suction"~"IV fluids replete electrolytes"~"GI/surgery/oncology consultation"~"If benefit from endoscopic evaluation"~"Reactive leukocytosis"~"Trending down."~"Monitor for fever."~"Aspiration precautions"~"Bilateral small pleural effusions."~"Status post right thoracentesis 1/20."~"Monitor for reaccumulation."~"Essential hypertension."~"Hold amlodipine and lisinopril acutely."~"Hyperthyroidism"~"Hold methimazole"~"Full code"~"DVT prophylaxis Lovenox."~"Anticipated Discharge: &gt; 48 hours"~"Subjective/Interval History"~"-"~"-: "~"Date of Service:  January 23, 2025"~"Objective Data"~"-"~"Labs: "~"Laboratory Results"~" 	01/22/25	01/23/25	01/23/25"~" 	20:57	05:18	05:19"~"WBC	 43.9 H*		 31.6 H"~"Hgb	 13.0		 12.3 L"~"Hct	 38.4 L		 37.2 L"~"Plt Count	 279		 271"~"Sodium	 130 L	 136	"~"Potassium	 3.6	 3.3 L	"~"Chloride	 92 L	 91 L	"~"Carbon Dioxide	 27	 35 H	"~"BUN	 13	 16	"~"Creatinine	 0.6 L	 0.7	"~"Glucose	 132 H	 113 H	"~"Calcium	 9.1	 8.8	"~"Total Bilirubin	 0.4		"~"AST	 23		"~"ALT	 15		"~"Alkaline Phosphatase	 232 H		"~"Vital Signs: "~"Vital Signs"~"Temp	Pulse	Resp	BP	Pulse Ox"~" 98.6 F 	 86 	 20 	 144/78 	 94 "~" 01/22/25 20:24	 01/23/25 08:45	 01/23/25 08:45	 01/23/25 08:00	 01/23/25 07:30"~"I&O"~"	01/22/25	01/23/25	01/24/25"~"	06:59	06:59	06:59"~"Output Total		600 / 600	"~"Balance		-600 / -600	"~"Physical Exam"~"-"~"General: Well Developed and No Apparent Distress"~"HEENT: Normocephalic, Atraumatic and Moist Mucous Membranes"~"Respiratory: Clear to Auscultation"~"Cardiac: Regular Rhythm and S1/S2; Negative Murmur, Rub or Gallop"~"GI: Soft, Nontender, Nondistended, Normal Bowel Sounds and Other (NG tube in place); Negative Organomegaly"~"Rectal: Deferred by Provider"~"Musculoskeletal: No Clubbing, No Cyanosis and No Edema"~"Skin: Negative Rash"~"Neuro: Nonfocal/Grossly Intact"

## 2025-01-23 NOTE — HPS.HSE
"Family Physician"~"-"~"-: "~"Family Physician:  Domi Quezada"~"Chief Complaint"~"-"~"-: "~"Intractable vomiting and abdominal pain"~"History of Present Illness"~"-: "~"This is a 74-year-old with past medical history of hypertension, hyperlipidemia, hypothyroid and a history of esophageal cancer status post EGD and stent placement in the distal esophagus, was recently started on chemotherapy complicated by "~"intractable vomiting and seen in ED 4 days, admitted and discharged on 1/20 now back with intractable vomiting. "~"He was admitted with acute high grade proximal small bowel obstruction and NG tube was placed.  There was good resolution of symptoms with removal of the NGT the following day.  The transition point was at the ligament of Trietz.  It was also "~"possible that the intractable vomiting was secondary to chemotherapy.  He had a moderate right pleural effusion s/p thoracentesis for 1400 ml that was exudative and possibly malignant.  Cytology pending and cultures negative.  "~"He reported that he felt well after discharge.  He ate breakfast and started feeling full.  By lunch time he was not able to tolerate PO and started vomiting.  He continued vomiting till 5 pm when he decided to come to the ED.  There was associated "~"abdominal pain.  Denies any fevers or chills.  Denies urinary symptoms.  Denies any headache.  Next chemotherapy is in a day. "~"In the emergency department he was afebrile, blood pressure was 160/80 with a pulse of 97 and satting 96% on room air.  ECG showed a normal sinus rhythm with rate of 90.  CBC was notable for a white count of 44 hemoglobin was stable at 13 with a "~"better count of 280.  Electrolytes BUN and creatinine were in the normal range.  LFTs were unremarkable.  He had a CT of the abdomen pelvis without oral contrast which showed distal esophageal stent, moderate fluid distention of the stomach duodenum "~"and proximal jejunum, small bowel distal to the proximal segment of the jejunum is decompressed as is the colon.  "~"Fluid distention of the stomach duodenum and proximal jejunum may be due to small bowel obstruction alternative, recent ingestions of liquids or ileus."~"Medical History"~"Past Medical History"~"Past Medical History: Reports Cancer (Esophageal cancer, status post esophageal stenting, on chemo), HTN and Hyperthyroidism"~"Past Surgical History: Reports None"~"Social History"~"Tobacco: Former Smoker"~"Alcohol: None"~"Drug: None"~"Personal: "~"Living: With Family"~"Employment: Retired"~"Family History"~"Family History: Not pertinent"~"Allergies / Home Medications"~"-: "~"Allergies reflects when Allergies were last updated in [employer]."~"Home Medications with original date entered in [employer] "~"Allergy/Medication List: "~"Allergies"~"Allergy/AdvReac	Type	Severity	Reaction	Status	Date / Time"~"tree nut	Allergy		Unknown	Verified	01/22/25 17:51"~"Home Medications"~"albuterol sulfate 90 mcg/actuation aerosol inhaler 2 puff inhalation R Q6HPRN PRN wheeze 01/02/25 "~"amlodipine 10 mg tablet 10 mg PO DAILY Blood Pressure 01/02/25 "~"lisinopril 10 mg tablet 10 mg PO BID Blood Pressure 01/02/25 "~"methimazole 5 mg tablet 5 mg PO DAILY Thyroid 01/02/25 "~"simvastatin 20 mg tablet 20 mg PO HS High Cholesterol 01/02/25 "~"pantoprazole 40 mg tablet,delayed release (Protonix) 40 mg PO BID #60 tabs 01/21/25 "~"nystatin 100,000 unit/mL oral suspension 1 ml PO DAILYPRN PRN tongue growth 01/22/25 "~"Review of Systems"~"-"~"History Source: Patient"~"Constitutional: Reports No Symptoms"~"EENT: Reports No Symptoms"~"Respiratory: Reports No Symptoms"~"Cardiac: Reports No Symptoms"~"Abdomen/GI: Reports Abdominal Pain and Vomiting"~": Reports No Symptoms"~"Musculoskeletal: Reports No Symptoms"~"Skin: Reports No Symptoms"~"Neurological: Reports No Symptoms"~"Endocrine: Reports No Symptoms"~"Hematologic/Lymphatic: Reports No Symptoms"~"Psych: Reports No Symptoms"~"Physical Exam"~"Vital Signs"~"-: "~"Vital Signs"~"Temp	Pulse	Resp	BP	Pulse Ox"~" 98.6 F 	 97 	 23 	 162/79 	 91 "~" 01/22/25 20:24	 01/23/25 01:45	 01/23/25 01:45	 01/23/25 01:01	 01/23/25 01:01"~"Physical Exam"~"General: Well Developed, Well Nourished and Conversant"~"HEENT: NormoCephalic, Anicteric, Moist mucous membranes, Atraumatic and PERRLA"~"Respiratory: Clear"~"Cardiac: S1/S2 and Regular Rhythm"~"Breast: Deferred by me"~"GI: Soft, Non Tender, Non Distended and Normal Bowel Sounds"~"Rectal: Deferred by Provider"~"Genito-urinary: No costovertebral tender"~"Musculoskeletal: No Clubbing, No Cyanosis and No Edema"~"Skin: Warm"~"Neuro: AO x 3 and Nonfocal/grossly intact"~"Hematologic/Lymphatic: No Lymphadenopathy"~"Psych: Calm"~"Laboratory Results"~"-"~"-: "~"01/22/25 20:57 "~"01/22/25 20:57 "~"Laboratory Results"~"Lactic Acid	 0.9 mmol/L (0.7-2.0)  	01/22/25  20:57    "~"Total Bilirubin	 0.4 mg/dl (0.2-1.3)  	01/22/25  20:57    "~"AST	 23 U/L (17-59)  	01/22/25  20:57    "~"ALT	 15 U/L (0-50)  	01/22/25  20:57    "~"Alkaline Phosphatase	 232 U/L ()  H 	01/22/25  20:57    "~"Lipase	 53 U/L ()  	01/22/25  20:57    "~"Data Reviewed"~"-"~"CT Scan: Report Reviewed by me"~"Lab Data: Labs Reviewed by me"~"Old Records: Reviewed"~"Impression/Plan"~"-"~"-: "~"IMPRESSION:"~"74-year-old with history of esophageal cancer currently on chemo who had episode of intractable emesis requiring NGT and found to have proximal small bowel obstruction with improvement over time presents to the emergency department after discharge "~"again with acute episode of intractable vomiting and abdominal pain.  Could not tolerate oral contrast, CT showed a stomach and dudoneal fluid filled dilated lumen consistent with small bowel obstruction at the level of the proximal jejunum.  Due to "~"pain and vomiting NG tube placed with removal of 1900 ml of brown fluid immediately with improvement in symptoms.  "~"PLAN: "~"1. Intractable vomiting- Unresponsive to zofran and with associated abdominal pain.  Possible SBO versus ileus.  Could be related to chemo induced emesis"~"- admit to med surg"~"- s/p NGT slow intermittent suction, for comfort and to avoid aspiration "~"- NPO for now "~"- IV fluids with d5 1/2NS "~"- antiemesis, consider aprepitant"~"- GI consult"~"2. Hypothyroid"~"- continue methimazole"~"3. HTN "~"- continue amlodipine & lisinopril"~"4. Leukocytosis - reports getting stimulating agent yesterday.  No signs of infection.  Pleural fluid without growth "~"- monitor "~"DVT PPX - lovenox sq"~"Code status - full code"

## 2025-01-23 NOTE — PTCARENOTE
Pt received from ED with NGT in place to LIWS. NPO. Pt assisted to chair with minimal assistance. D5 1/2 NSS with KCL 40meq infusing @ 100ml/hr through right SubQ Port.

## 2025-01-24 VITALS — RESPIRATION RATE: 12 BRPM | SYSTOLIC BLOOD PRESSURE: 136 MMHG | DIASTOLIC BLOOD PRESSURE: 65 MMHG

## 2025-01-24 VITALS — SYSTOLIC BLOOD PRESSURE: 126 MMHG | DIASTOLIC BLOOD PRESSURE: 64 MMHG | RESPIRATION RATE: 14 BRPM

## 2025-01-24 VITALS — SYSTOLIC BLOOD PRESSURE: 138 MMHG | RESPIRATION RATE: 16 BRPM | DIASTOLIC BLOOD PRESSURE: 66 MMHG

## 2025-01-24 LAB
BUN SERPL-MCNC: 21 MG/DL (ref 9–20)
CALCIUM SERPL-MCNC: 9.1 MG/DL (ref 8.4–10.2)
CHLORIDE SERPL-SCNC: 89 MMOL/L (ref 98–107)
CO2 SERPL-SCNC: 35 MMOL/L (ref 22–30)
EGFR: > 60
ERYTHROCYTE [DISTWIDTH] IN BLOOD BY AUTOMATED COUNT: 14.3 % (ref 11.5–14.5)
ESTIMATED CREATININE CLEARANCE: 91 ML/MIN
GLUCOSE SERPL-MCNC: 146 MG/DL (ref 70–99)
HCT VFR BLD AUTO: 38.5 % (ref 39–52)
HGB BLD-MCNC: 12.5 G/DL (ref 13–18)
MCHC RBC AUTO-ENTMCNC: 32.5 G/DL (ref 33–37)
MCV RBC AUTO: 86.7 FL (ref 80–94)
NRBC BLD AUTO-RTO: 0 %
PLATELET # BLD AUTO: 247 10^3/UL (ref 130–400)
POTASSIUM SERPL-SCNC: 3.7 MMOL/L (ref 3.5–5.1)
SODIUM SERPL-SCNC: 136 MMOL/L (ref 135–145)

## 2025-01-24 RX ADMIN — DEXTROSE MONOHYDRATE, SODIUM CHLORIDE, AND POTASSIUM CHLORIDE 1000: 50; 4.5; 2.98 INJECTION, SOLUTION INTRAVENOUS at 18:02

## 2025-01-24 RX ADMIN — DIPHENHYDRAMINE HYDROCHLORIDE 12.5 MG: 50 INJECTION, SOLUTION INTRAMUSCULAR; INTRAVENOUS at 00:51

## 2025-01-24 RX ADMIN — ENOXAPARIN SODIUM: 40 INJECTION SUBCUTANEOUS at 17:21

## 2025-01-24 RX ADMIN — PANTOPRAZOLE SODIUM 40 MG: 40 INJECTION, POWDER, LYOPHILIZED, FOR SOLUTION INTRAVENOUS at 20:22

## 2025-01-24 RX ADMIN — BENZOCAINE 1 APPLIC: 200 SPRAY DENTAL; ORAL; PERIODONTAL at 12:12

## 2025-01-24 RX ADMIN — SODIUM CHLORIDE 10 ML: 9 INJECTION, SOLUTION INTRAMUSCULAR; INTRAVENOUS; SUBCUTANEOUS at 09:49

## 2025-01-24 RX ADMIN — DEXTROSE MONOHYDRATE, SODIUM CHLORIDE, AND POTASSIUM CHLORIDE 1000: 50; 4.5; 2.98 INJECTION, SOLUTION INTRAVENOUS at 10:01

## 2025-01-24 RX ADMIN — PANTOPRAZOLE SODIUM 40 MG: 40 INJECTION, POWDER, LYOPHILIZED, FOR SOLUTION INTRAVENOUS at 09:49

## 2025-01-24 RX ADMIN — SODIUM CHLORIDE 10 ML: 9 INJECTION, SOLUTION INTRAMUSCULAR; INTRAVENOUS; SUBCUTANEOUS at 20:23

## 2025-01-24 NOTE — W.PN.HOSP.TC
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 The fluid-filled stomach is markedly distended and the fluid-filled duodenum is dilated to 4 cm with abrupt transition point to decompressed small bowel at the ligament of Treitz suggesting partial obstruction"~"2). Small bilateral pleural effusions with associated compressive atelectasis at the posterior lung bases."~"3). Distal esophageal stent"~"4). Bilateral renal cysts"~"5). Diverticuli are present in the colon with no CT evidence of diverticulitis"~"Upper GI series/small bowel follow-through today"~"Plan is for push enteroscopy to evaluate esophageal stent as well as possible area of obstruction at level of ligament of Treitz early next week."~"N.p.o."~"Maintain NG tube to wall suction"~"IV fluids replete electrolytes"~"GI/surgery/oncology consultation"~"IV PPI twice daily"~"Reactive leukocytosis"~"Trending down."~"Monitor for fever."~"Aspiration precautions"~"Bilateral small pleural effusions."~"Status post right thoracentesis 1/20."~"Monitor for reaccumulation."~"Essential hypertension."~"Hold amlodipine and lisinopril acutely."~"Hyperthyroidism"~"Hold methimazole"~"Full code"~"DVT prophylaxis Lovenox."~"Anticipated Discharge: 24 - 48 hours"~"Subjective/Interval History"~"-"~"-: "~"Date of Service:  January 24, 2025"~"Objective Data"~"-"~"Labs: "~"Laboratory Results"~" 	01/24/25"~" 	05:30"~"WBC	 19.0 H"~"Hgb	 12.5 L"~"Hct	 38.5 L"~"Plt Count	 247"~"Sodium	 136"~"Potassium	 3.7"~"Chloride	 89 L"~"Carbon Dioxide	 35 H"~"BUN	 21 H"~"Creatinine	 0.7"~"Glucose	 146 H"~"Calcium	 9.1"~"Vital Signs: "~"Vital Signs"~"Temp	Pulse	Resp	BP	Pulse Ox"~" 98.4 F 	 88 	 14 	 126/64 	 92 "~" 01/24/25 07:23	 01/24/25 07:23	 01/24/25 07:23	 01/24/25 07:23	 01/24/25 07:23"~"I&O"~"	01/23/25	01/24/25	01/25/25"~"	06:59	06:59	06:59"~"Intake Total		1320 / 1320	30 / 30"~"Output Total	600 / 600	3075 / 3075	250 / 250"~"Balance	-600 / -600	-1755 / -1755	-220 / -220"~"Physical Exam"~"-"~"General: Well Developed and No Apparent Distress"~"HEENT: Normocephalic, Atraumatic and Moist Mucous Membranes"~"Respiratory: Clear to Auscultation"~"Cardiac: Regular Rhythm and S1/S2; Negative Murmur, Rub or Gallop"~"GI: Soft, Nontender, Nondistended, Normal Bowel Sounds and Other (NG tube in place); Negative Organomegaly"~"Rectal: Deferred by Provider"~"Musculoskeletal: No Clubbing, No Cyanosis and No Edema"~"Skin: Negative Rash"~"Neuro: Nonfocal/Grossly Intact"

## 2025-01-24 NOTE — W.PN.ONC2
"Documented by User:  DANILO Mota  01/24/25 13:35"~"Today's Communication / Plan"~"-"~"-: "~"recurrent SBO management per surgery/GI"~"symptoms support"~"Optimize PS,  PT/OT, encourage OOB, ambulate as tolerated"~"OP follow up for next steps in malignancy management upon discharge"~"Impression"~"Impression"~"-: "~"p/w Intractable vomiting- Unresponsive to zofran and with associated abdominal pain.  Possible SBO versus ileus"~"Locally advanced GE junction cancer, w/ esophageal stent, s/p cycle #1 of FLOT chemotherapy"~"pleural effusion, s/p thora on 1/20/25, malignant -discussed with pt and wife today"~"Plan"~"Plan"~"-: "~"chemotherapy that was scheduled for today will be rescheduled upon discharge"~"leukocytosis likely reflects GCSF, monitor for infection"~"f/u UGIS w/ SBFT"~"possible push enteroscopy"~"GI and surgery following"~"Subjective/Objective"~"Subjective"~"-: "~"no new complaints"~"Vital Signs: "~"Vital Signs"~"Temp	Pulse	Resp	BP	Pulse Ox"~" 98.4 F 	 88 	 14 	 126/64 	 92 "~" 01/24/25 07:23	 01/24/25 07:23	 01/24/25 07:23	 01/24/25 07:23	 01/24/25 07:23"~"Lab Results: "~"Laboratory Data"~"WBC	 19.0 10^3/uL (4.8-10.8)  H 	01/24/25  05:30    "~"Hgb	 12.5 g/dL (13.0-18.0)  L 	01/24/25  05:30    "~"Plt Count	 247 10^3/uL (130-400)  	01/24/25  05:30    "~"eGFR	 &gt; 60.00  	01/24/25  05:30    "~"Physical Exam"~"-: "~"General: Well Developed, Well Nourished and No Apparent Distress"~"HEENT: anicteric, NGT"~"Cardiology: Normal Sinus Rhythm"~"Pulmonary: Clear"~"GI: Soft and Normal Bowel Sounds"~"Musculoskeletal: No Clubbing, No Cyanosis and No Edema"~"Extremities: No C/C/E"~"Neurology: Non Focal"~"Skin: Warm and Dry"~"Psych: Calm and Intact Judgement/Insight"~"___________________________________________________________________________"~"Documented by User:  Jesus Gilliam MD  01/24/25 13:42"~"Plan"~"Plan"~"-: "~"chemotherapy that was scheduled for today will be rescheduled upon discharge"~"leukocytosis likely reflects GCSF, monitor for infection"~"f/u UGIS w/ SBFT"~"possible push enteroscopy"~"GI and surgery following"~"Oncology Addendum:"~"Patient seen and evaluated and agree w/ CRNP note and plan as outlined"~"-bowel obstruction - w/ continued high NGT output"~"-GI/surgery managing - for SBFT today potentially"~"-cont supportive care"

## 2025-01-24 NOTE — PN.CDI
"Addendum entered and electronically signed by Faheem Olson MD  01/27/25 17:01: "~"No clinical concern for sepsis or SIRS"~"Original Note:"~"CDI"~"- -"~"CDI: "~"Physician Documentation Request"~"Admit Date: 01/23/25 03:18"~"Dear Doctor Wesley, "~"Please review the following and provide your response in the progress notes. "~"Clinical Indicators: "~"PN, 1/23"~"#Acute high-grade proximal small bowel obstruction"~"#Leukocytosis likely reactive"~"Selected Entries"~"	01/22/25"~"17:52"~"Temp	98.3 F"~"Pulse	91"~"Resp Rate	18"~"Blood pressure	155/90"~"Selected Entries"~"	01/22/25"~"20:24	01/22/25"~"20:30	01/22/25"~"20:31"~"Pulse	98		98"~"Resp Rate		25	23"~"Laboratory Tests"~" 	01/22/25	01/23/25	01/24/25"~" 	20:57	05:19	05:30"~"WBC	 43.9 H*	 31.6 H	 19.0 H"~"Based on the above and your clinical assessment, please clarify which most accurately describes the patient:"~"	SIRS due to a non-infectious source"~"          -Indicate the known or suspected etiology"~"          -Indicate if there is associated organ dysfunction, such as renal or respiratory failure"~"	Other(please specify)"~"#...2 or more SIRS criteria which include:"~"    Fever &gt; 100.4 degrees F or hypothermia &lt; 96.8 degrees F"~"    Leukocytosis - WBC &gt; 12,000 or leukopenia, WBC &lt; 4,000 or &gt; 10% bands"~"    Tachycardia - &gt; 90 beats per minute"~"    Tachypnea - RR &gt; 20 breaths per minute or PaCO2 &lt; 32 mmHg"~"		Source: Merck Manual 2013"~"    Indicate if there is associated organ dysfunction, such as renal or respiratory failure"~"Use of terms such as suspected, likely, concern for, or probable (associated with a specific diagnosis that is being evaluated, monitored, or treated as if it exists) are acceptable and can be coded in the inpatient setting, when documented at the "~"time of discharge. "~"Thank you, "~"Sarahi Barry RN BSN CCDS"~"CDI Specialist"~"please contact via tiger text"~"Please use your independent medical judgment in providing your response."

## 2025-01-24 NOTE — W.PN.GS2
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 "~"Presenting with recurrent obstruction at ligament of treitz."~"NGT in place with high bilious outputs"~"Low grade temp of 100.0, VSS"~"Leukocytosis was significant on admit and now improved"~"--Will need eventual UGI study; however, outputs still quite elevated from NGT so will hold off this am and reeval in the afternoon"~"--Start IV PPI 40mg BID"~"--GI following with plan for possible push endoscopy once records obtained from [facility] regarding stent"~"--NPO with ice chips for comfort"~"--Medical management as per primary team"~"Subjective Data"~"-"~"-: "~"Date of Service:  January 24, 2025"~"Patient seen and examined at bedside with Dr. Meza. Denies n/v. Not yet passing flatus. "~"Objective Data"~"-"~"-: "~"Intake and Output"~"	01/23/25	01/24/25	01/25/25"~"	06:59	06:59	06:59"~"Intake Total		1320 / 1320	30 / 30"~"Output Total	600 / 600	3075 / 3075	250 / 250"~"Balance	-600 / -600	-1755 / -1755	-220 / -220"~"Intake:			"~"  Oral fluids		0 / 0	"~"  IV fluids (Total)		1200 / 1200	"~"  Amount instilled into GI Tube (		120 / 120	30 / 30"~"  Total)			"~"    [place] Sump		120 / 120	30 / 30"~"Output:			"~"  Gastrointestinal tube output (	600 / 600	2450 / 2450	250 / 250"~"  Total)			"~"    [place] Sump		2450 / 2450	250 / 250"~"  Urine, Voided		625 / 625	"~"Vital Signs"~"Temp	Pulse	Resp	BP	Pulse Ox"~" 98.4 F 	 88 	 14 	 126/64 	 92 "~" 01/24/25 07:23	 01/24/25 07:23	 01/24/25 07:23	 01/24/25 07:23	 01/24/25 07:23"~"Lab Results"~"01/24/25 05:30 "~"01/24/25 05:30 "~"Calcium	 9.1 mg/dl (8.4-10.2)  	01/24/25  05:30    "~"Total Bilirubin	 0.4 mg/dl (0.2-1.3)  	01/22/25  20:57    "~"AST	 23 U/L (17-59)  	01/22/25  20:57    "~"ALT	 15 U/L (0-50)  	01/22/25  20:57    "~"Alkaline Phosphatase	 232 U/L ()  H 	01/22/25  20:57    "~"Total Protein	 6.3 g/dl (6.3-8.2)  	01/22/25  20:57    "~"Albumin	 4.0 g/dl (3.5-5.0)  	01/22/25  20:57    "~"Physical Exam"~"-"~"-: "~"Gen: NAD"~"Abd: soft, NT/ND, non-peritoneal"~"NGT with bilious outputs, some blood tinging in tube itself"~"Patient has a mehta catheter: No"

## 2025-01-24 NOTE — W.PN.GI.CBS2
"Today's Communication / Plan"~"-"~"-: "~"Plan for UGIS w/ SBFT today. Continue NGT decompression as per surgery. Would likely benefit from a push-enteroscopy this admission. Rest of care as outlined below."~"Assessment / Plan"~"-"~"-: "~"Mr Santo is a 75yo male presents with admission 1/18/25- 1/21/25 with  n/v and CT showing nonspecific obstruction at junction of duodenum and jejunum with abrupt change in caliber and fluid distention of duodenum and stomach.  He was recently dx'd in "~"September with esophageal CA (path with intramuscular adeno CA per [facility]'s)  and had EGD/stent x 2 at [facility]. He also had surgical eval [facility].   Rec'd first chemo prior to that admission around 2nd week in January.    During that admission  NGT "~"placed in ER with blood return, likely from traversing esophageal tumor in stent. Pt completed repeat CT with IV and oral contrast 1/19 with no obstruction and improved duodenal dilation with also noted soft tissue density of esophageal stent "~"worrisome for tumor infiltration, moderate right effusion.  He had effusion drained and noted clinical improvement and was discharge 1/21.  He now returns 1/22 PM with recurrent symptoms. Repeat imaging with obs series without signs of obstruction "~"but CT with fluid filled stomach with distention and fluid filled duodenum dilated up to 4 cm with abrupt transition point with concern for partial SB obstruction and continued distal esophageal stent in place.  WBC 43, 900 on admission. "~"#Recurrent SBO .  Second admission.  Previously manage medically.  Imaging showing transition point at the ligament of Treitz"~"#Locally advanced esophageal CA with stent in place -- dx sept 2024, stent x 2 Nov 2024 then chem started 2nd week January"~"#Recent PET with locally advanced malignancy with some extension into surrounding soft tissue likely T3/4 with evidence for precarinal nodes enlargement"~"#Leukocytosis "~"#Hypokalemia"~"#Pleural effusion with drainage "~"Recommendations: "~"- Keep NPO, continue NGT for decompression"~"- Ordered UGIS w/ SBFT today"~"- Defer further plans for pursuing an EGD at this time pending UGIS. However, may benefit from a push-enteroscopy given location of transition point at the ligament of Treitz (as could not reach with a standard EGD scope) given concern for malignancy"~"- However, given known esophageal stent will need to obtain prior records along with considering passage of a push-enteroscopy with Dr. Cooley given known esophageal stent. Would consider pursuing this early next week"~"- Await records from [facility] (EGD records/repot)"~"- Surgery following, appreciate recs"~"- Rest of care per primary team"~"Discussed with primary internal medicine team. GI team will continue to follow. Please call with any questions or concerns."~"Subjective"~"Subjective"~"-: "~"Date of Service:  January 24, 2025"~"- NGT remains in place, (-) 1.755 L reported output"~"- Otherwise, no acute events overnight"~"Comfortable appearing in chair, denies any further nausea/vomiting or abdominal pain. Denies passing any flatus, does note small BM yesterday and day prior. Otherwise, no fevers/chills or other constitutional symptoms. Currently NPO and plan for "~"UGIS today."~"Objective"~"Data Reviewed"~"Laboratory Data: "~"Laboratory Results"~"01/24/25 05:30 "~"01/24/25 05:30 "~"Laboratory Results"~"Total Bilirubin	 0.4 mg/dl (0.2-1.3)  	01/22/25  20:57    "~"AST	 23 U/L (17-59)  	01/22/25  20:57    "~"ALT	 15 U/L (0-50)  	01/22/25  20:57    "~"Alkaline Phosphatase	 232 U/L ()  H 	01/22/25  20:57    "~"Amylase	 40 U/L ()  	01/22/25  20:57    "~"Lipase	 53 U/L ()  	01/22/25  20:57    "~"Vital Signs and I&O: "~"Vital Signs"~"Temp	Pulse	Resp	BP	Pulse Ox"~" 98.4 F 	 88 	 14 	 126/64 	 92 "~" 01/24/25 07:23	 01/24/25 07:23	 01/24/25 07:23	 01/24/25 07:23	 01/24/25 07:23"~"I&O"~"	01/23/25	01/24/25	01/25/25"~"	06:59	06:59	06:59"~"Intake Total		1320 / 1320	"~"Output Total	600 / 600	3075 / 3075	"~"Balance	-600 / -600	-1755 / -1755	"~"Physical Exam"~"Physical Exam"~"HEENT: Anicteric, Moist mucous membranes and Other (NGT in place with bilious output)"~"Cardiology: Normal Sinus Rhythm"~"Pulmonary: Other (Normal WOB on room air)"~"GI: Soft, Non Distended and Non Tender"~"Extremities: No Edema"~"Neuro: Non Focal"

## 2025-01-24 NOTE — PN.CDI
"CDI"~"- -"~"CDI: "~"Physician Documentation Request"~"Admit Date: 01/23/25 03:18"~"Dear Doctor Wesley, "~"Please review the following and provide your response in the progress notes. "~"Clinical Indicators: "~"Registered Dietician, 1/24 "~"#Compared to UBW of 220 lbs (in September,  pt with a 62 lbs, (28%) "~"   #...weight loss in 4 months, signficant. "~"#...clavical, apparent ribs, temporal wasting and calf muscle wasting."~"#With &gt; 10% weight loss in 6 months, &lt; 75% estimated needs &gt; 1 month and "~"   #...observed muscle and fat wasting pt meets AND/ASPEN criteria for "~"   #...severe protein calorie malnutrition of chronic illness.  "~"To ensure the quality of the medical record, based on the above information and the recognized standards for malnutrition , please verify in the progress notes which of the following responses best reflects the patient's nutritional status:"~"	Severe Protein Calorie Malnutrition of chronic illness is/was present and is a clinical diagnosis (please provide additional support in the medical record)"~"	Other (please specify)"~"[place] Criteria ([facility] Hospitalist 2017)	"~"2 or more criteria must be present for either"~" non severe or severe malnutrition	"~"Note that the criteria differs related to the 	"~"presence of an acute or chronic illness	"~"	"~"	Chronic Illness         "~"Energy Intake	Non Severe: &lt;75% for &gt;1 month"~"	Severe: &lt;75% for &gt;1 month"~"	"~"Weight Loss	Non Severe: 5% over 1 month"~"	                 7.5% over 3 months"~"	                 10% over 6 months"~"	                 20% over 1 year"~"	Severe: &gt;5% over 1 month"~"	           &gt;7.5% over 3 months"~"	           &gt;10% over 6 months"~"	           &gt;20% over 1 year"~"	"~"Body Fat	Non Severe: Mild Loss"~"	Severe: Severe Loss"~"	"~"Muscle Mass	Non Severe: Mild Loss"~"	Severe: Severe Loss"~"Use of terms such as suspected, likely, concern for, or probable (associated with a specific diagnosis that is being evaluated, monitored, or treated as if it exists) are acceptable and can be coded in the inpatient setting, when documented at the "~"time of discharge."~"Thank you, "~"Sarahi Barry RN BSN CCDS"~"CDI Specialist"~"please contact via tiger text"~"Please use your independent medical judgment in providing your response."

## 2025-01-25 VITALS — RESPIRATION RATE: 18 BRPM | SYSTOLIC BLOOD PRESSURE: 166 MMHG | DIASTOLIC BLOOD PRESSURE: 71 MMHG

## 2025-01-25 VITALS — DIASTOLIC BLOOD PRESSURE: 69 MMHG | RESPIRATION RATE: 18 BRPM | SYSTOLIC BLOOD PRESSURE: 133 MMHG

## 2025-01-25 VITALS — RESPIRATION RATE: 16 BRPM | SYSTOLIC BLOOD PRESSURE: 152 MMHG | DIASTOLIC BLOOD PRESSURE: 73 MMHG

## 2025-01-25 LAB
ALBUMIN SERPL-MCNC: 3.4 G/DL (ref 3.5–5)
ALP SERPL-CCNC: 127 U/L (ref 38–126)
ALT SERPL-CCNC: < 10 U/L (ref 0–50)
AST SERPL-CCNC: 17 U/L (ref 17–59)
BUN SERPL-MCNC: 19 MG/DL (ref 9–20)
CALCIUM SERPL-MCNC: 8.6 MG/DL (ref 8.4–10.2)
CHLORIDE SERPL-SCNC: 97 MMOL/L (ref 98–107)
CO2 SERPL-SCNC: 34 MMOL/L (ref 22–30)
EGFR: > 60
ERYTHROCYTE [DISTWIDTH] IN BLOOD BY AUTOMATED COUNT: 14.3 % (ref 11.5–14.5)
ESTIMATED CREATININE CLEARANCE: 107 ML/MIN
GLUCOSE SERPL-MCNC: 122 MG/DL (ref 70–99)
HCT VFR BLD AUTO: 35.8 % (ref 39–52)
HGB BLD-MCNC: 11.8 G/DL (ref 13–18)
MAGNESIUM SERPL-MCNC: 2.4 MG/DL (ref 1.6–2.3)
MCHC RBC AUTO-ENTMCNC: 33 G/DL (ref 33–37)
MCV RBC AUTO: 86.7 FL (ref 80–94)
NRBC BLD AUTO-RTO: 0 %
PLATELET # BLD AUTO: 196 10^3/UL (ref 130–400)
POTASSIUM SERPL-SCNC: 4.2 MMOL/L (ref 3.5–5.1)
PREALB SERPL-MCNC: 10.4 MG/DL (ref 17.6–36)
PROT SERPL-MCNC: 5.8 G/DL (ref 6.3–8.2)
SODIUM SERPL-SCNC: 135 MMOL/L (ref 135–145)
TRIGL SERPL-MCNC: 161 MG/DL (ref 10–149)

## 2025-01-25 RX ADMIN — DEXTROSE MONOHYDRATE, SODIUM CHLORIDE, AND POTASSIUM CHLORIDE 1000: 50; 4.5; 2.98 INJECTION, SOLUTION INTRAVENOUS at 19:26

## 2025-01-25 RX ADMIN — DEXTROSE MONOHYDRATE, SODIUM CHLORIDE, AND POTASSIUM CHLORIDE: 50; 4.5; 2.98 INJECTION, SOLUTION INTRAVENOUS at 15:50

## 2025-01-25 RX ADMIN — DEXTROSE MONOHYDRATE, SODIUM CHLORIDE, AND POTASSIUM CHLORIDE 1000: 50; 4.5; 2.98 INJECTION, SOLUTION INTRAVENOUS at 04:23

## 2025-01-25 RX ADMIN — SODIUM CHLORIDE 10 ML: 9 INJECTION, SOLUTION INTRAMUSCULAR; INTRAVENOUS; SUBCUTANEOUS at 07:43

## 2025-01-25 RX ADMIN — DIPHENHYDRAMINE HYDROCHLORIDE 12.5 MG: 50 INJECTION, SOLUTION INTRAMUSCULAR; INTRAVENOUS at 00:40

## 2025-01-25 RX ADMIN — PANTOPRAZOLE SODIUM 40 MG: 40 INJECTION, POWDER, LYOPHILIZED, FOR SOLUTION INTRAVENOUS at 07:43

## 2025-01-25 RX ADMIN — ENOXAPARIN SODIUM: 40 INJECTION SUBCUTANEOUS at 17:58

## 2025-01-25 RX ADMIN — SODIUM CHLORIDE 10 ML: 9 INJECTION, SOLUTION INTRAMUSCULAR; INTRAVENOUS; SUBCUTANEOUS at 20:16

## 2025-01-25 RX ADMIN — PANTOPRAZOLE SODIUM 40 MG: 40 INJECTION, POWDER, LYOPHILIZED, FOR SOLUTION INTRAVENOUS at 20:15

## 2025-01-25 NOTE — W.PN.GS2
"Addendum entered and electronically signed by Anival Meza MD  01/25/25 14:53: "~"I saw and examined the patient."~"The Advanced Practice Professional's note was reviewed and I agree with the note."~"Comment:  SBFT with normal transit time, no dilation of bowel, he passed a BM shortly after the study. Suspect this was ileus. Unclear why this has happened 2x, family tells me each time was immediately following G-CSF injections. Unclear though if "~"this is actually the cause. Will initiate ngt clamp trial. Spent about 60 mins with pt and family over the course of the day, all ?s answered. Discussed the possibility that the malignant infusion may change our plan, and he may benefit from more "~"invasive procedures for staging but this would be decided as a team once his ileus is resolved. 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 "~"Presenting with recurrent obstruction at ligament of treitz."~"NGT in place with decreasing outputs"~"AFVSS"~"CBC pending"~"--UGI study planned today around noon"~"--Continue IV PPI 40mg BID"~"--GI following with plan for possible push endoscopy"~"--NPO with ice chips for comfort"~"--Medical management as per primary team"~"Subjective Data"~"-"~"-: "~"Date of Service:  January 25, 2025"~"Patient seen and examined "~"Objective Data"~"-"~"-: "~"Intake and Output"~"	01/24/25	01/25/25	01/26/25"~"	06:59	06:59	06:59"~"Intake Total	1320 / 1320	2550 / 2550	"~"Output Total	3075 / 3075	1500 / 1500	150 / 150"~"Balance	-1755 / -1755	1050 / 1050	-150 / -150"~"Intake:			"~"  Oral fluids	0 / 0		"~"  IV fluids (Total)	1200 / 1200	2400 / 2400	"~"  Amount instilled into GI Tube (	120 / 120	150 / 150	"~"  Total)			"~"    [place]	120 / 120	150 / 150	"~"Output:			"~"  Gastrointestinal tube output (	2450 / 2450	1200 / 1200	"~"  Total)			"~"    [place]	2450 / 2450	1200 / 1200	"~"  Urine, Voided	625 / 625	300 / 300	150 / 150"~"Vital Signs"~"Temp	Pulse	Resp	BP	Pulse Ox"~" 97.9 F 	 79 	 18 	 166/71 	 95 "~" 01/25/25 07:00	 01/25/25 07:00	 01/25/25 07:00	 01/25/25 07:00	 01/25/25 07:00"~"Lab Results"~"01/25/25 07:50 "~"Calcium	 8.6 mg/dl (8.4-10.2)  	01/25/25  07:50    "~"Phosphorus	 2.8 mg/dl (2.5-4.5)  	01/25/25  07:50    "~"Magnesium	 2.4 mg/dl (1.6-2.3)  H 	01/25/25  07:50    "~"Total Bilirubin	 0.5 mg/dl (0.2-1.3)  	01/25/25  07:50    "~"Direct Bilirubin	 0.1 mg/dl (0.0-0.4)  	01/25/25  07:50    "~"AST	 17 U/L (17-59)  	01/25/25  07:50    "~"ALT	 &lt; 10 U/L (0-50)  	01/25/25  07:50    "~"Alkaline Phosphatase	 127 U/L ()  H 	01/25/25  07:50    "~"Total Protein	 5.8 g/dl (6.3-8.2)  L 	01/25/25  07:50    "~"Albumin	 3.4 g/dl (3.5-5.0)  L 	01/25/25  07:50    "~"Physical Exam"~"-"~"-: "~"Gen: NAD"~"Abd: soft, NT/ND, non-peritoneal"~"NGT with bilious outputs, some blood tinging in tube itself"~"Patient has a mehta catheter: No"

## 2025-01-25 NOTE — W.PN.HOSP.TC
"Today's Communication/Plan"~"-"~"-: "~"Continue IV PPI"~"Maintain NGT to low wall suction"~"NPO for push enteroscopy"~"Trend CBC and BMP"~"Assessment / Plan"~"Assessment / Plan"~"-: "~"#Acute high-grade proximal SBO"~"#History of recurrent SBO"~"#GE junction carcinoma s/p esophageal stent on FLOT chemotherapy"~"-Patient currently n.p.o. with NGT in place to intermittent LWS"~"-Plan today for push enteroscopy to evaluate stent and possible obstruction of the ligament of Treitz"~"-Remains on supportive IV fluids and IV PPI twice daily"~"-Will follow-up results of enteroscopy, continue PPI"~"-Continue with NGT for now"~"-GI, surgery, oncology following"~"#Reactive leukocytosis"~"-Trend CBC, monitor temperature curve"~"#Small bilateral pleural effusions"~"-S/p thoracentesis on 1/20"~"-Monitor for reaccumulation"~"-Follow cytology for neoplastic finding"~"#Primary hypertension"~"-Amlodipine and lisinopril held"~"-No known history of hypertensive systemic disease"~"-Plan resume antihypertensives when not NPO and hemodynamic stable"~"#Hyperthyroidism"~"-Unclear cause, no signs of Graves' orbitopathy"~"-Home medications include methimazole which is currently on hold"~"-Monitor hemodynamics and clinically for signs of hypothyroid state"~"-Plan to resume methimazole when tolerating diet"~"DVT prophylaxis: Lovenox"~"Diet: NPO with NGT"~"CODE STATUS: Full code"~"Anticipated Discharge: &gt; 48 hours"~"Subjective/Interval History"~"-"~"-: "~"Date of Service:  January 25, 2025"~"Seen and examined at the bedside.  No acute events reported overnight.  AFVSS this morning"~"Remains with NGT in place to intermittent low wall suction.  Plan for push enteroscopy later today"~"He denies any acute complaints.  Denies abdomen pain, nausea or vomiting as of this morning.  Has not had bowel movement or flatus"~"Objective Data"~"-"~"Labs: "~"Laboratory Results"~" 	01/25/25"~" 	07:50"~"WBC	 13.5 H"~"Hgb	 11.8 L"~"Hct	 35.8 L"~"Plt Count	 196  D"~"Sodium	 135"~"Potassium	 4.2"~"Chloride	 97 L"~"Carbon Dioxide	 34 H"~"BUN	 19"~"Creatinine	 0.6 L"~"Glucose	 122 H"~"Calcium	 8.6"~"Total Bilirubin	 0.5"~"AST	 17"~"ALT	 &lt; 10"~"Alkaline Phosphatase	 127 H"~"Vital Signs: "~"Vital Signs"~"Temp	Pulse	Resp	BP	Pulse Ox"~" 97.9 F 	 79 	 18 	 166/71 	 95 "~" 01/25/25 07:00	 01/25/25 07:00	 01/25/25 07:00	 01/25/25 07:00	 01/25/25 10:09"~"I&O"~"	01/24/25	01/25/25	01/26/25"~"	06:59	06:59	06:59"~"Intake Total	1320 / 1320	2550 / 2550	"~"Output Total	3075 / 3075	1500 / 1500	150 / 150"~"Balance	-1755 / -1755	1050 / 1050	-150 / -150"~"Physical Exam"~"-"~"General: Well Developed, No Apparent Distress, Comfortable and Other (Thin male)"~"HEENT: Normocephalic, Atraumatic, Moist Mucous Membranes and Anicteric"~"Respiratory: Clear to Auscultation and Non Labored Respirations"~"Cardiac: Regular Rhythm and S1/S2; Negative Murmur, Rub or Gallop"~"GI: Soft, Nontender, Nondistended, Normal Bowel Sounds and Other (NGT in place)"~"Musculoskeletal: No Clubbing, No Cyanosis and No Edema"~"Skin: Warm, Dry and Normal Turgor; Negative Rash"~"Neuro: AO x 3 and Nonfocal/Grossly Intact"~"Psych: Calm"

## 2025-01-25 NOTE — PTCARENOTE
Dr Meza clamped NGT @ 1430. 4hrs later @ 1630 residuals checked, &gt; 200ml gastric fluid removed, NGT placed back to LIS. Pt to remain NPO.

## 2025-01-26 VITALS — DIASTOLIC BLOOD PRESSURE: 76 MMHG | RESPIRATION RATE: 18 BRPM | SYSTOLIC BLOOD PRESSURE: 145 MMHG

## 2025-01-26 VITALS — SYSTOLIC BLOOD PRESSURE: 150 MMHG | DIASTOLIC BLOOD PRESSURE: 79 MMHG | RESPIRATION RATE: 18 BRPM

## 2025-01-26 VITALS — SYSTOLIC BLOOD PRESSURE: 151 MMHG | RESPIRATION RATE: 16 BRPM | DIASTOLIC BLOOD PRESSURE: 77 MMHG

## 2025-01-26 LAB
BUN SERPL-MCNC: 23 MG/DL (ref 9–20)
CALCIUM SERPL-MCNC: 9.1 MG/DL (ref 8.4–10.2)
CHLORIDE SERPL-SCNC: 100 MMOL/L (ref 98–107)
CO2 SERPL-SCNC: 30 MMOL/L (ref 22–30)
EGFR: > 60
ERYTHROCYTE [DISTWIDTH] IN BLOOD BY AUTOMATED COUNT: 14.4 % (ref 11.5–14.5)
ESTIMATED CREATININE CLEARANCE: 91 ML/MIN
GLUCOSE SERPL-MCNC: 113 MG/DL (ref 70–99)
HCT VFR BLD AUTO: 36.4 % (ref 39–52)
HGB BLD-MCNC: 11.8 G/DL (ref 13–18)
MCHC RBC AUTO-ENTMCNC: 32.4 G/DL (ref 33–37)
MCV RBC AUTO: 87.7 FL (ref 80–94)
NRBC BLD AUTO-RTO: 0 %
PLATELET # BLD AUTO: 208 10^3/UL (ref 130–400)
POTASSIUM SERPL-SCNC: 4.3 MMOL/L (ref 3.5–5.1)
SODIUM SERPL-SCNC: 137 MMOL/L (ref 135–145)

## 2025-01-26 RX ADMIN — PANTOPRAZOLE SODIUM 40 MG: 40 INJECTION, POWDER, LYOPHILIZED, FOR SOLUTION INTRAVENOUS at 07:52

## 2025-01-26 RX ADMIN — DEXTROSE MONOHYDRATE, SODIUM CHLORIDE, AND POTASSIUM CHLORIDE: 50; 4.5; 1.49 INJECTION, SOLUTION INTRAVENOUS at 07:52

## 2025-01-26 RX ADMIN — SODIUM CHLORIDE 10 ML: 9 INJECTION, SOLUTION INTRAMUSCULAR; INTRAVENOUS; SUBCUTANEOUS at 20:36

## 2025-01-26 RX ADMIN — DEXTROSE MONOHYDRATE, SODIUM CHLORIDE, AND POTASSIUM CHLORIDE 1000: 50; 4.5; 1.49 INJECTION, SOLUTION INTRAVENOUS at 04:57

## 2025-01-26 RX ADMIN — PANTOPRAZOLE SODIUM 40 MG: 40 INJECTION, POWDER, LYOPHILIZED, FOR SOLUTION INTRAVENOUS at 20:36

## 2025-01-26 RX ADMIN — ENOXAPARIN SODIUM: 40 INJECTION SUBCUTANEOUS at 17:04

## 2025-01-26 RX ADMIN — SODIUM CHLORIDE 10 ML: 9 INJECTION, SOLUTION INTRAMUSCULAR; INTRAVENOUS; SUBCUTANEOUS at 07:52

## 2025-01-26 RX ADMIN — DIPHENHYDRAMINE HYDROCHLORIDE 12.5 MG: 50 INJECTION, SOLUTION INTRAMUSCULAR; INTRAVENOUS at 00:36

## 2025-01-26 RX ADMIN — DEXTROSE MONOHYDRATE, SODIUM CHLORIDE, AND POTASSIUM CHLORIDE 1000: 50; 4.5; 1.49 INJECTION, SOLUTION INTRAVENOUS at 14:30

## 2025-01-26 RX ADMIN — DIPHENHYDRAMINE HYDROCHLORIDE 12.5 MG: 50 INJECTION, SOLUTION INTRAMUSCULAR; INTRAVENOUS at 22:28

## 2025-01-26 NOTE — W.PN.GS2
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 and initiation of neoadjuvant (FLOT) chemo on 1/9/2025, missed his second dose yesterday. "~"Presenting with recurrent obstruction at ligament of treitz."~"NGT in place with decreasing outputs"~"AFVSS"~"SBFT with 1:15 transit time, no dilation, no mucosal abnormality apparent; unclear why he continues to have such high residuals"~"-- GI planning upper endoscopy either tomorrow or Tues"~"-- Would maintain NPO/IVF/NGT until then"~"-- If scope is delayed tomorrow, would consider starting TPN"~"--Continue IV PPI 40mg BID"~"--NPO with ice chips for comfort"~"--Medical management as per primary team"~"Subjective Data"~"-"~"-: "~"Date of Service:  January 26, 2025"~"Clamp trial with high residual yesterday, returned to suction. Passed liquid BM. Denies abd pain. "~"Objective Data"~"-"~"-: "~"Intake and Output"~"	01/25/25	01/26/25	01/27/25"~"	06:59	06:59	06:59"~"Intake Total	2550 / 2550	1100 / 1100	"~"Output Total	1500 / 1500	1420 / 1420	"~"Balance	1050 / 1050	-320 / -320	"~"Intake:			"~"  Oral fluids		150 / 150	"~"  IV fluids (Total)	2400 / 2400	800 / 800	"~"  Amount instilled into GI Tube (	150 / 150	150 / 150	"~"  Total)			"~"    [place]	150 / 150	150 / 150	"~"Output:			"~"  Gastrointestinal tube output (	1200 / 1200	1120 / 1120	"~"  Total)			"~"    [place]	1200 / 1200	1120 / 1120	"~"  Urine, Voided	300 / 300	300 / 300	"~"Other:			"~"  How many times incontinent		1	"~"  MODERATE amount urine			"~"  Number of unmeasured liquid			"~"  stools			"~"    Rectum		2	"~"Vital Signs"~"Temp	Pulse	Resp	BP	Pulse Ox"~" 97.9 F 	 72 	 18 	 150/79 	 97 "~" 01/26/25 07:00	 01/26/25 07:00	 01/26/25 07:00	 01/26/25 07:00	 01/26/25 07:00"~"Lab Results"~"01/26/25 06:51 "~"01/26/25 06:51 "~"Calcium	 9.1 mg/dl (8.4-10.2)  	01/26/25  06:51    "~"Phosphorus	 2.8 mg/dl (2.5-4.5)  	01/25/25  07:50    "~"Magnesium	 2.4 mg/dl (1.6-2.3)  H 	01/25/25  07:50    "~"Total Bilirubin	 0.5 mg/dl (0.2-1.3)  	01/25/25  07:50    "~"Direct Bilirubin	 0.1 mg/dl (0.0-0.4)  	01/25/25  07:50    "~"AST	 17 U/L (17-59)  	01/25/25  07:50    "~"ALT	 &lt; 10 U/L (0-50)  	01/25/25  07:50    "~"Alkaline Phosphatase	 127 U/L ()  H 	01/25/25  07:50    "~"Total Protein	 5.8 g/dl (6.3-8.2)  L 	01/25/25  07:50    "~"Albumin	 3.4 g/dl (3.5-5.0)  L 	01/25/25  07:50    "~"Physical Exam"~"-"~"-: "~"Gen: NAD"~"Abd: soft, nt, nd"~"Patient has a mehta catheter: No"~"Patient has a central line: No"

## 2025-01-26 NOTE — W.PN.HOSP.TC
"Today's Communication/Plan"~"-"~"-: "~"Maintain NGT"~"N.p.o. for EGD and push enteroscopy"~"As needed analgesics and antiemetic"~"IV PPI"~"Trend CBC and BMP"~"Assessment / Plan"~"Assessment / Plan"~"-: "~"#Acute high-grade proximal SBO"~"#History of recurrent SBO"~"#GE junction carcinoma s/p esophageal stent on FLOT chemotherapy"~"-Concern for stricture disease versus other neoplastic obstruction of the small bowel"~"-Patient currently n.p.o. with NGT in place to intermittent LWS"~"-Plan today for push enteroscopy to evaluate stent and possible obstruction of the ligament of Treitz"~"-Remains on supportive IV fluids and IV PPI twice daily"~"-Plan for EGD with push enteroscopy on 1/27 versus 1/28"~"-Continue with NGT for now"~"-GI, surgery, oncology following"~"#Reactive leukocytosis"~"-Trend CBC, monitor temperature curve"~"#Small bilateral pleural effusions"~"-S/p thoracentesis on 1/20"~"-Monitor for reaccumulation"~"-Follow cytology for neoplastic findings"~"#Primary hypertension"~"-Amlodipine and lisinopril held"~"-No known history of hypertensive systemic disease"~"-Plan resume antihypertensives when not NPO and hemodynamic stable"~"#Hyperthyroidism"~"-Unclear cause, no signs of Graves' orbitopathy"~"-Home medications include methimazole which is currently on hold"~"-Monitor hemodynamics and clinically for signs of hypothyroid state"~"-Plan to resume methimazole when tolerating diet"~"DVT prophylaxis: Lovenox"~"Diet: NPO with NGT"~"CODE STATUS: Full code"~"Anticipated Discharge: &gt; 48 hours"~"Subjective/Interval History"~"-"~"-: "~"Date of Service:  January 26, 2025"~"Seen and examined at the bedside.  No acute events reported overnight.  AFVSS this morning"~"States he is feeling fairly well and has yet to have abdomen pain today.  States he was able to pass watery bowel movements yesterday"~"Denies any new complaints today.  Plan for push enteroscopy on 1/27 versus 1/28 with Dr. Cooley"~"Objective Data"~"-"~"Labs: "~"Laboratory Results"~" 	01/26/25"~" 	06:51"~"WBC	 11.9 H"~"Hgb	 11.8 L"~"Hct	 36.4 L"~"Plt Count	 208"~"Sodium	 137"~"Potassium	 4.3"~"Chloride	 100"~"Carbon Dioxide	 30"~"BUN	 23 H"~"Creatinine	 0.7"~"Glucose	 113 H"~"Calcium	 9.1"~"Vital Signs: "~"Vital Signs"~"Temp	Pulse	Resp	BP	Pulse Ox"~" 97.9 F 	 72 	 18 	 150/79 	 97 "~" 01/26/25 07:00	 01/26/25 07:00	 01/26/25 07:00	 01/26/25 07:00	 01/26/25 07:00"~"I&O"~"	01/25/25	01/26/25	01/27/25"~"	06:59	06:59	06:59"~"Intake Total	2550 / 2550	1100 / 1100	"~"Output Total	1500 / 1500	1420 / 1420	"~"Balance	1050 / 1050	-320 / -320	"~"Review of Systems"~"-"~"History Source: Patient"~"All other systems: Reviewed and negative"~"Physical Exam"~"-"~"General: Well Developed, No Apparent Distress, Comfortable, Appears Chronically Ill and Other (Thin appearing)"~"HEENT: Normocephalic, Atraumatic, Moist Mucous Membranes and Anicteric"~"Respiratory: Clear to Auscultation and Non Labored Respirations"~"Cardiac: Regular Rhythm and S1/S2; Negative Murmur, Rub or Gallop"~"GI: Soft, Nontender, Nondistended, Normal Bowel Sounds and Other (NGT in place)"~"Musculoskeletal: No Clubbing, No Cyanosis and No Edema"~"Skin: Warm, Dry and Normal Turgor; Negative Rash"~"Neuro: AO x 3 and Nonfocal/Grossly Intact"~"Psych: Calm"~"Data Reviewed"~"-"~"Labs: Labs Reviewed by me, Discussed with Physician (Gastroenterology) and Discussed with Patient"

## 2025-01-26 NOTE — W.PN.GI.CBS2
"Today's Communication / Plan"~"-"~"-: "~"Plan for Push-EGD either Monday or Tuesday. See rest of care as outlined below."~"Assessment / Plan"~"-"~"-: "~"Mr Santo is a 73yo male presents with admission 1/18/25- 1/21/25 with  n/v and CT showing nonspecific obstruction at junction of duodenum and jejunum with abrupt change in caliber and fluid distention of duodenum and stomach.  He was recently dx'd in "~"September with esophageal CA (path with intramuscular adeno CA per [facility])  and had EGD/stent x 2 at [facility]. He also had surgical eval [facility].   Rec'd first chemo prior to that admission around 2nd week in January.    During that admission  NGT "~"placed in ER with blood return, likely from traversing esophageal tumor in stent. Pt completed repeat CT with IV and oral contrast 1/19 with no obstruction and improved duodenal dilation with also noted soft tissue density of esophageal stent "~"worrisome for tumor infiltration, moderate right effusion.  He had effusion drained and noted clinical improvement and was discharge 1/21.  He now returns 1/22 PM with recurrent symptoms. Repeat imaging with obs series without signs of obstruction "~"but CT with fluid filled stomach with distention and fluid filled duodenum dilated up to 4 cm with abrupt transition point with concern for partial SB obstruction and continued distal esophageal stent in place.  WBC 43, 900 on admission. "~"#Recurrent SBO-  Second admission.  Previously manage medically.  Imaging showing transition point at the ligament of Treitz"~"#Locally advanced esophageal CA with stent in place -- dx sept 2024, stent x 2 Nov 2024 then chem started 2nd week January"~"#Recent PET with locally advanced malignancy with some extension into surrounding soft tissue likely T3/4 with evidence for precarinal nodes enlargement"~"#Leukocytosis "~"#Hypokalemia"~"#Pleural effusion with drainage "~"UGIS w/ SBFT 1/25/2025: No focal small bowel abnormality including no findings to suggest small bowel obstruction. No SB dilatation with SB transit of 1 hour and 15 minutes"~"Recommendations: "~"- Remains NPO, NGT management as per surgery"~"- Monitor electrolytes, maintain K &gt; 4.0 and Mg &gt; 2.0"~"- Recent UGIS w/ SBFT without any SB dilatation or other evidence of any other mucosal abnormality or obstruction"~"- Given this his recurrent hospitalizations with prior SB dilatation with imaging suggesting prior SBO, would benefit from a push-enteroscopy for further evaluation to r/o any potential mass or lesion given previous transition point at the ligament "~"of Treitz. Discussed risks and benefits with patient this morning and amenable to proceeding with the procedure"~"- Plan for Push-EGD with Dr. Cooley given his known esophageal stent either Monday or Tuesday pending availability"~"- IV anti-emetics and pain control PRN"~"- Surgery following, appreciate recs"~"- Rest of care per primary team"~"Discussed with primary internal medicine team. GI team will continue to follow. Please call with any questions or concerns."~"Subjective"~"Subjective"~"-: "~"Date of Service:  January 26, 2025"~"- UGIS w/ SBFT 1/25/2025- Impression: No focal small bowel abnormality including no findings to suggest small bowel obstruction. No SB dilatation with SB transit of 1 hour and 15 minutes"~"- Surgery following, underwent NGT clamp trial on1/25"~"- Otherwise, no acute events overnight"~"Resting comfortable in bed, NGT currently on LIWS with (-) 180 cc's of dark, bilious emesis in canister. Otherwise, continues to deny any abdominal pain or distension."~"Objective"~"Data Reviewed"~"Laboratory Data: "~"Laboratory Results"~"Phosphorus	 2.8 mg/dl (2.5-4.5)  	01/25/25  07:50    "~"Magnesium	 2.4 mg/dl (1.6-2.3)  H 	01/25/25  07:50    "~"Total Bilirubin	 0.5 mg/dl (0.2-1.3)  	01/25/25  07:50    "~"AST	 17 U/L (17-59)  	01/25/25  07:50    "~"ALT	 &lt; 10 U/L (0-50)  	01/25/25  07:50    "~"Alkaline Phosphatase	 127 U/L ()  H 	01/25/25  07:50    "~"Amylase	 40 U/L ()  	01/22/25  20:57    "~"Lipase	 53 U/L ()  	01/22/25  20:57    "~"Vital Signs and I&O: "~"Vital Signs"~"Temp	Pulse	Resp	BP	Pulse Ox"~" 97.6 F 	 74 	 16 	 152/73 	 97 "~" 01/25/25 23:13	 01/25/25 23:13	 01/25/25 23:13	 01/25/25 23:13	 01/25/25 23:13"~"I&O"~"	01/24/25	01/25/25	01/26/25"~"	06:59	06:59	06:59"~"Intake Total	1320 / 1320	2550 / 2550	1100 / 1100"~"Output Total	3075 / 3075	1500 / 1500	1420 / 1420"~"Balance	-1755 / -1755	1050 / 1050	-320 / -320"~"Physical Exam"~"Physical Exam"~"HEENT: Anicteric and Moist mucous membranes"~"Pulmonary: Other (Normal WOB on room air)"~"GI: Soft, Non Distended and Non Tender"~"Extremities: No Edema"~"Neuro: Non Focal"

## 2025-01-26 NOTE — W.PN.UPDATE
"Update Note"~"Progress Note Update"~"-: "~"RN reported, IV Fluids is d/c'd automatically. Patient is NPO with NGT, Patient was on D51/2+KCL 40meq at 100ml/hr, lab results noted, ordered D5 1/2 KCL 20meq at 100cc/hr.  "

## 2025-01-27 VITALS — RESPIRATION RATE: 16 BRPM | SYSTOLIC BLOOD PRESSURE: 151 MMHG | DIASTOLIC BLOOD PRESSURE: 77 MMHG

## 2025-01-27 VITALS — SYSTOLIC BLOOD PRESSURE: 147 MMHG | DIASTOLIC BLOOD PRESSURE: 76 MMHG | RESPIRATION RATE: 18 BRPM

## 2025-01-27 VITALS — RESPIRATION RATE: 18 BRPM | DIASTOLIC BLOOD PRESSURE: 76 MMHG | SYSTOLIC BLOOD PRESSURE: 140 MMHG

## 2025-01-27 LAB
ALBUMIN SERPL-MCNC: 3.4 G/DL (ref 3.5–5)
ALP SERPL-CCNC: 113 U/L (ref 38–126)
ALT SERPL-CCNC: < 10 U/L (ref 0–50)
AST SERPL-CCNC: 16 U/L (ref 17–59)
BUN SERPL-MCNC: 20 MG/DL (ref 9–20)
CALCIUM SERPL-MCNC: 8.7 MG/DL (ref 8.4–10.2)
CHLORIDE SERPL-SCNC: 101 MMOL/L (ref 98–107)
CO2 SERPL-SCNC: 26 MMOL/L (ref 22–30)
EGFR: > 60
ERYTHROCYTE [DISTWIDTH] IN BLOOD BY AUTOMATED COUNT: 14.3 % (ref 11.5–14.5)
ESTIMATED CREATININE CLEARANCE: 107 ML/MIN
GLUCOSE SERPL-MCNC: 107 MG/DL (ref 70–99)
HCT VFR BLD AUTO: 35.2 % (ref 39–52)
HGB BLD-MCNC: 11.4 G/DL (ref 13–18)
MAGNESIUM SERPL-MCNC: 2.2 MG/DL (ref 1.6–2.3)
MCHC RBC AUTO-ENTMCNC: 32.4 G/DL (ref 33–37)
MCV RBC AUTO: 88 FL (ref 80–94)
NRBC BLD AUTO-RTO: 0 %
PLATELET # BLD AUTO: 182 10^3/UL (ref 130–400)
POTASSIUM SERPL-SCNC: 4.5 MMOL/L (ref 3.5–5.1)
PROT SERPL-MCNC: 5.9 G/DL (ref 6.3–8.2)
SODIUM SERPL-SCNC: 137 MMOL/L (ref 135–145)
TRIGL SERPL-MCNC: 167 MG/DL (ref 10–149)

## 2025-01-27 PROCEDURE — 3E0436Z INTRODUCTION OF NUTRITIONAL SUBSTANCE INTO CENTRAL VEIN, PERCUTANEOUS APPROACH: ICD-10-PCS | Performed by: INTERNAL MEDICINE

## 2025-01-27 RX ADMIN — SODIUM CHLORIDE 10 ML: 9 INJECTION, SOLUTION INTRAMUSCULAR; INTRAVENOUS; SUBCUTANEOUS at 08:39

## 2025-01-27 RX ADMIN — PANTOPRAZOLE SODIUM 40 MG: 40 INJECTION, POWDER, LYOPHILIZED, FOR SOLUTION INTRAVENOUS at 21:03

## 2025-01-27 RX ADMIN — PANTOPRAZOLE SODIUM 40 MG: 40 INJECTION, POWDER, LYOPHILIZED, FOR SOLUTION INTRAVENOUS at 08:39

## 2025-01-27 RX ADMIN — ENOXAPARIN SODIUM 40 MG: 40 INJECTION SUBCUTANEOUS at 17:27

## 2025-01-27 RX ADMIN — DIPHENHYDRAMINE HYDROCHLORIDE 12.5 MG: 50 INJECTION, SOLUTION INTRAMUSCULAR; INTRAVENOUS at 22:32

## 2025-01-27 RX ADMIN — THIAMINE HYDROCHLORIDE 100 MG: 100 INJECTION, SOLUTION INTRAMUSCULAR; INTRAVENOUS at 17:27

## 2025-01-27 RX ADMIN — DEXTROSE MONOHYDRATE, SODIUM CHLORIDE, AND POTASSIUM CHLORIDE 1000: 50; 4.5; 1.49 INJECTION, SOLUTION INTRAVENOUS at 02:31

## 2025-01-27 RX ADMIN — SODIUM CHLORIDE 10 ML: 9 INJECTION, SOLUTION INTRAMUSCULAR; INTRAVENOUS; SUBCUTANEOUS at 21:03

## 2025-01-27 RX ADMIN — DEXTROSE MONOHYDRATE, SODIUM CHLORIDE, AND POTASSIUM CHLORIDE 1000: 50; 4.5; 1.49 INJECTION, SOLUTION INTRAVENOUS at 12:56

## 2025-01-27 RX ADMIN — INSULIN ASPART: 100 INJECTION, SOLUTION INTRAVENOUS; SUBCUTANEOUS at 18:13

## 2025-01-27 NOTE — W.PN.GI.CBS2
"Today's Communication / Plan"~"-"~"-: "~"Plan for EGD with Dr. Cooley tomorrow, 1/28/2025, given recurrent SB obstruction (at ligament of treitz) and known esophageal stent. See rest of care as outlined below."~"Assessment / Plan"~"-"~"-: "~"Mr Santo is a 75yo male presents with admission 1/18/25- 1/21/25 with  n/v and CT showing nonspecific obstruction at junction of duodenum and jejunum with abrupt change in caliber and fluid distention of duodenum and stomach.  He was recently dx'd in "~"September with esophageal CA (path with intramuscular adeno CA per [facility])  and had EGD/stent x 2 at [facility]. He also had surgical eval [facility].   Rec'd first chemo prior to that admission around 2nd week in January.    During that admission  NGT "~"placed in ER with blood return, likely from traversing esophageal tumor in stent. Pt completed repeat CT with IV and oral contrast 1/19 with no obstruction and improved duodenal dilation with also noted soft tissue density of esophageal stent "~"worrisome for tumor infiltration, moderate right effusion.  He had effusion drained and noted clinical improvement and was discharge 1/21.  He now returns 1/22 PM with recurrent symptoms. Repeat imaging with obs series without signs of obstruction "~"but CT with fluid filled stomach with distention and fluid filled duodenum dilated up to 4 cm with abrupt transition point with concern for partial SB obstruction and continued distal esophageal stent in place.  WBC 43, 900 on admission. "~"#Recurrent SBO-  Second admission.  Previously manage medically.  Imaging showing transition point at the ligament of Treitz"~"#Locally advanced esophageal CA with stent in place -- dx sept 2024, stent x 2 Nov 2024 then chem started 2nd week January"~"#Recent PET with locally advanced malignancy with some extension into surrounding soft tissue likely T3/4 with evidence for precarinal nodes enlargement"~"#Leukocytosis "~"#Hypokalemia"~"#Pleural effusion with drainage "~"UGIS w/ SBFT 1/25/2025: No focal small bowel abnormality including no findings to suggest small bowel obstruction. No SB dilatation with SB transit of 1 hour and 15 minutes"~"Recommendations: "~"- Remains NPO, NGT management as per surgery"~"- Monitor electrolytes, maintain K &gt; 4.0 and Mg &gt; 2.0"~"- Recent UGIS w/ SBFT without any SB dilatation or other evidence of any other mucosal abnormality or obstruction"~"- Empiric IV PPI 40 mg BiD"~"- Given his recurrent hospitalizations with prior SB dilatation with imaging suggesting prior SBO, would benefit from a push-enteroscopy for further evaluation to r/o any potential mass or lesion given previous transition point at the ligament of "~"Treitz. Discussed risks and benefits with patient and patient's wife this afternoon"~"- Plan for EGD with potential push-enteroscopy tomorrow, 1/28/2024, with Dr. Cooley given known esophageal stent"~"- IV anti-emetics and pain control PRN"~"- Surgery following, appreciate recs"~"- Rest of care per primary team"~"Discussed with primary internal medicine team this afternoon. GI team will continue to follow. Please call with any questions or concerns."~"Subjective"~"Subjective"~"-: "~"Date of Service:  January 27, 2025"~"- Still with significant output from NGT, &gt; 1 L in over 24 hrs"~"- Otherwise, no acute events overnight"~"Patient continues to deny any abdominal pain or nausea/vomiting. No other fevers, chills or other constitutional symptoms. Discussed plan to proceed with EGD with possible push-enteroscopy tomorrow with patient and patient's wife at bedside."~"Objective"~"Data Reviewed"~"Laboratory Data: "~"Laboratory Results"~"01/27/25 04:14 "~"01/27/25 04:14 "~"Laboratory Results"~"Phosphorus	 2.8 mg/dl (2.5-4.5)  	01/25/25  07:50    "~"Magnesium	 2.4 mg/dl (1.6-2.3)  H 	01/25/25  07:50    "~"Total Bilirubin	 0.5 mg/dl (0.2-1.3)  	01/25/25  07:50    "~"AST	 17 U/L (17-59)  	01/25/25  07:50    "~"ALT	 &lt; 10 U/L (0-50)  	01/25/25  07:50    "~"Alkaline Phosphatase	 127 U/L ()  H 	01/25/25  07:50    "~"Amylase	 40 U/L ()  	01/22/25  20:57    "~"Lipase	 53 U/L ()  	01/22/25  20:57    "~"Vital Signs and I&O: "~"Vital Signs"~"Temp	Pulse	Resp	BP	Pulse Ox"~" 97.6 F 	 70 	 18 	 140/76 	 96 "~" 01/27/25 07:48	 01/27/25 07:48	 01/27/25 07:48	 01/27/25 07:48	 01/27/25 07:48"~"I&O"~"	01/26/25	01/27/25	01/28/25"~"	06:59	06:59	06:59"~"Intake Total	1100 / 1100	2363 / 2363	"~"Output Total	1420 / 1420	1650 / 1650	"~"Balance	-320 / -320	713 / 713	"~"Physical Exam"~"Physical Exam"~"HEENT: Anicteric, Moist mucous membranes and Other (NGT with yellow, bilious output)"~"Pulmonary: Other (Normal WOB on room air)"~"GI: Soft, Non Distended and Non Tender"~"Neuro: Non Focal"

## 2025-01-27 NOTE — PTCARENOTE
pt aaox3. states no pain or sob. does state he is having congestion and a moist cough.  ngt in place to liws draining green bile.  placement checked and flushed.  pt ambulates to bathroom. pt had loose stool.

## 2025-01-27 NOTE — W.PN.GS2
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 "~"Presenting with recurrent obstruction at ligament of treitz."~"NGT in place but outputs remain &gt;1L over 24h"~"AFVSS"~"SBFT with 1:15 transit time, no dilation, no mucosal abnormality apparent; unclear why he continues to have such high residuals"~"-- GI planning upper endoscopy either today or Tues"~"-- Would maintain NPO/IVF/NGT until then"~"-- May need to start TPN if remains npo, dietary following"~"-- Continue IV PPI 40mg BID as initial outputs blood tinged"~"-- NPO with ice chips for comfort"~"-- Medical management as per primary team"~"Subjective Data"~"-"~"-: "~"Date of Service:  January 27, 2025"~"Patient seen and examined at bedside with Dr. Gerardo Sheffield n/v. Denies pain. "~"Objective Data"~"-"~"-: "~"Intake and Output"~"	01/26/25	01/27/25	01/28/25"~"	06:59	06:59	06:59"~"Intake Total	1100 / 1100	2363 / 2363	"~"Output Total	1420 / 1420	1650 / 1650	"~"Balance	-320 / -320	713 / 713	"~"Intake:			"~"  Oral fluids	150 / 150		"~"  IV fluids (Total)	800 / 800	2183 / 2183	"~"  Amount instilled into GI Tube (	150 / 150	180 / 180	"~"  Total)			"~"    [place] Sump	150 / 150	180 / 180	"~"Output:			"~"  Gastrointestinal tube output (	1120 / 1120	1050 / 1050	"~"  Total)			"~"    [place] Sump	1120 / 1120	1050 / 1050	"~"  Urine, Voided	300 / 300	600 / 600	"~"Other:			"~"  How many times incontinent	1		"~"  MODERATE amount urine			"~"  Number of unmeasured liquid			"~"  stools			"~"    Rectum	2		"~"Vital Signs"~"Temp	Pulse	Resp	BP	Pulse Ox"~" 97.6 F 	 70 	 18 	 140/76 	 96 "~" 01/27/25 07:48	 01/27/25 07:48	 01/27/25 07:48	 01/27/25 07:48	 01/27/25 07:48"~"Lab Results"~"01/27/25 04:14 "~"01/27/25 04:14 "~"Calcium	 8.7 mg/dl (8.4-10.2)  	01/27/25  04:14    "~"Phosphorus	 2.8 mg/dl (2.5-4.5)  	01/25/25  07:50    "~"Magnesium	 2.4 mg/dl (1.6-2.3)  H 	01/25/25  07:50    "~"Total Bilirubin	 0.5 mg/dl (0.2-1.3)  	01/25/25  07:50    "~"Direct Bilirubin	 0.1 mg/dl (0.0-0.4)  	01/25/25  07:50    "~"AST	 17 U/L (17-59)  	01/25/25  07:50    "~"ALT	 &lt; 10 U/L (0-50)  	01/25/25  07:50    "~"Alkaline Phosphatase	 127 U/L ()  H 	01/25/25  07:50    "~"Total Protein	 5.8 g/dl (6.3-8.2)  L 	01/25/25  07:50    "~"Albumin	 3.4 g/dl (3.5-5.0)  L 	01/25/25  07:50    "~"Physical Exam"~"-"~"-: "~"Gen: NAD"~"Abd: soft, nt, nd"~"NGT with bilious outputs"~"Patient has a mehta catheter: No"~"Patient has a central line: No"

## 2025-01-27 NOTE — W.PN.HOSP.TC
"Today's Communication/Plan"~"-"~"-: "~"X ray abdomen"~"Assessment / Plan"~"Assessment / Plan"~"-: "~"74-year-old male admitted with intractable abdominal pain vomiting.  Patient was admitted and was discharged on 1/20/2025.  He was felt to have intractable vomiting secondary to chemotherapy at that time."~"Had a BM"~"CVS: S1-S2 normal"~"Chest: CTA B/L"~"Abdomen: Soft, NT, very diminished BS, NGT"~"Extremities: No edema"~"# Acute high-grade partial small bowel obstruction"~"History of recurrent SBO"~"GE junction carcinoma status post esophageal stent on FLOT chemotherapy"~"Concern for stricture versus neoplastic process"~"N.p.o. with NG tube"~"Push enteroscopy to evaluate stent planned 1/27/2025 or 1/28/2025"~"Continue IV fluids"~"GI and surgery following"~"# Leukocytosis-likely reactive"~"# Locally advanced esophageal cancer diagnosed in September 2024 with stent placement November 2024 chemotherapy started January 2025"~"PET scan showed locally advanced malignancy with some extension into the surrounding tissues"~"Pleural effusion from 1/20/2025-positive for malignant cells."~"Follows with Dr. Martinez"~"Consult oncology"~"# Small bilateral pleural effusions"~"History of thoracentesis done on 1/20/2025"~"# Hypertension-hold amlodipine and lisinopril"~"# Hyperthyroidism-methimazole on hold secondary to n.p.o. status"~"# Hyperlipidemia-hold statin as n.p.o."~"# Ex-smoker"~"# DVT prophylaxis-Lovenox"~"# Full code"~"D/W RN at bed side"~"Anticipated Discharge: 24 - 48 hours"~"Subjective/Interval History"~"-"~"-: "~"Date of Service:  January 27, 2025"~"Objective Data"~"-"~"Labs: "~"Laboratory Results"~" 	01/27/25"~" 	04:14"~"WBC	 11.1 H"~"Hgb	 11.4 L"~"Hct	 35.2 L"~"Plt Count	 182"~"Sodium	 137"~"Potassium	 4.5"~"Chloride	 101"~"Carbon Dioxide	 26"~"BUN	 20"~"Creatinine	 0.6 L"~"Glucose	 107 H"~"Calcium	 8.7"~"Vital Signs: "~"Vital Signs"~"Temp	Pulse	Resp	BP	Pulse Ox"~" 97.6 F 	 70 	 18 	 140/76 	 96 "~" 01/27/25 07:48	 01/27/25 07:48	 01/27/25 07:48	 01/27/25 07:48	 01/27/25 07:48"~"I&O"~"	01/26/25	01/27/25	01/28/25"~"	06:59	06:59	06:59"~"Intake Total	1100 / 1100	2363 / 2363	"~"Output Total	1420 / 1420	1650 / 1650	"~"Balance	-320 / -320	713 / 713	"

## 2025-01-27 NOTE — W.PN.ONC2
"Today's Communication / Plan"~"-"~"-: "~"management per GI and surgery"~"OP follow up with Dr. Martinez upon discharge"~"Pt wife at bedside provided updates and questions answered"~"Impression"~"Impression"~"-: "~"high-grade partial small bowel obstruction, recurrent"~"SBFT with 1:15 transit time, no dilation, no mucosal abnormality apparent"~"advanced GE junction cancer, w/ esophageal stent, s/p cycle #1 of FLOT chemotherapy"~"pleural effusion, s/p thora on 1/20/25, malignant "~"Plan"~"Plan"~"-: "~"chemotherapy that was scheduled for today will be rescheduled upon discharge"~"leukocytosis likely reflects GCSF, monitor for infection"~"Push enteroscopy to evaluate stent planned 1/28/2025"~"GI and surgery following"~"Subjective/Objective"~"Subjective"~"-: "~"no new complaints"~"Vital Signs: "~"Vital Signs"~"Temp	Pulse	Resp	BP	Pulse Ox"~" 97.6 F 	 70 	 18 	 140/76 	 96 "~" 01/27/25 07:48	 01/27/25 07:48	 01/27/25 07:48	 01/27/25 07:48	 01/27/25 07:48"~"Lab Results: "~"Laboratory Data"~"WBC	 11.1 10^3/uL (4.8-10.8)  H 	01/27/25  04:14    "~"Hgb	 11.4 g/dL (13.0-18.0)  L 	01/27/25  04:14    "~"Plt Count	 182 10^3/uL (130-400)  	01/27/25  04:14    "~"eGFR	 &gt; 60.00  	01/27/25  04:14    "~"Physical Exam"~"-: "~"General: Well Developed, Well Nourished and No Apparent Distress"~"HEENT: anicteric, NGT"~"Cardiology: Normal Sinus Rhythm"~"Pulmonary: Clear"~"GI: Soft and Normal Bowel Sounds"~"Musculoskeletal: No Clubbing, No Cyanosis and No Edema"~"Extremities: No C/C/E"~"Neurology: Non Focal"~"Skin: Warm and Dry"~"Psych: Calm and Intact Judgement/Insight"

## 2025-01-28 VITALS — SYSTOLIC BLOOD PRESSURE: 151 MMHG | RESPIRATION RATE: 16 BRPM | DIASTOLIC BLOOD PRESSURE: 78 MMHG

## 2025-01-28 VITALS — OXYGEN SATURATION: 2 % | DIASTOLIC BLOOD PRESSURE: 81 MMHG | RESPIRATION RATE: 14 BRPM

## 2025-01-28 VITALS — DIASTOLIC BLOOD PRESSURE: 82 MMHG | SYSTOLIC BLOOD PRESSURE: 146 MMHG | RESPIRATION RATE: 16 BRPM

## 2025-01-28 VITALS
OXYGEN SATURATION: 2 % | DIASTOLIC BLOOD PRESSURE: 78 MMHG | RESPIRATION RATE: 16 BRPM | SYSTOLIC BLOOD PRESSURE: 149 MMHG

## 2025-01-28 VITALS
SYSTOLIC BLOOD PRESSURE: 144 MMHG | OXYGEN SATURATION: 2 % | DIASTOLIC BLOOD PRESSURE: 89 MMHG | RESPIRATION RATE: 18 BRPM

## 2025-01-28 VITALS — RESPIRATION RATE: 16 BRPM | SYSTOLIC BLOOD PRESSURE: 144 MMHG | DIASTOLIC BLOOD PRESSURE: 79 MMHG

## 2025-01-28 VITALS — DIASTOLIC BLOOD PRESSURE: 79 MMHG | RESPIRATION RATE: 16 BRPM | SYSTOLIC BLOOD PRESSURE: 146 MMHG

## 2025-01-28 VITALS — RESPIRATION RATE: 16 BRPM | DIASTOLIC BLOOD PRESSURE: 80 MMHG | SYSTOLIC BLOOD PRESSURE: 145 MMHG

## 2025-01-28 VITALS — DIASTOLIC BLOOD PRESSURE: 83 MMHG | SYSTOLIC BLOOD PRESSURE: 149 MMHG | RESPIRATION RATE: 16 BRPM

## 2025-01-28 VITALS — SYSTOLIC BLOOD PRESSURE: 152 MMHG | DIASTOLIC BLOOD PRESSURE: 89 MMHG

## 2025-01-28 VITALS — SYSTOLIC BLOOD PRESSURE: 142 MMHG | DIASTOLIC BLOOD PRESSURE: 84 MMHG | RESPIRATION RATE: 16 BRPM

## 2025-01-28 VITALS — SYSTOLIC BLOOD PRESSURE: 144 MMHG | DIASTOLIC BLOOD PRESSURE: 85 MMHG | RESPIRATION RATE: 16 BRPM

## 2025-01-28 VITALS — SYSTOLIC BLOOD PRESSURE: 143 MMHG | DIASTOLIC BLOOD PRESSURE: 78 MMHG | RESPIRATION RATE: 16 BRPM

## 2025-01-28 LAB
BUN SERPL-MCNC: 22 MG/DL (ref 9–20)
CALCIUM SERPL-MCNC: 9.3 MG/DL (ref 8.4–10.2)
CHLORIDE SERPL-SCNC: 100 MMOL/L (ref 98–107)
CO2 SERPL-SCNC: 27 MMOL/L (ref 22–30)
EGFR: > 60
ESTIMATED CREATININE CLEARANCE: 102 ML/MIN
GLUCOSE - POINT OF CARE: 106 MG/DL (ref 70–99)
GLUCOSE - POINT OF CARE: 143 MG/DL (ref 70–99)
GLUCOSE SERPL-MCNC: 122 MG/DL (ref 70–99)
MAGNESIUM SERPL-MCNC: 2.1 MG/DL (ref 1.6–2.3)
POTASSIUM SERPL-SCNC: 4.5 MMOL/L (ref 3.5–5.1)
SODIUM SERPL-SCNC: 136 MMOL/L (ref 135–145)

## 2025-01-28 PROCEDURE — 0DB68ZX EXCISION OF STOMACH, VIA NATURAL OR ARTIFICIAL OPENING ENDOSCOPIC, DIAGNOSTIC: ICD-10-PCS | Performed by: INTERNAL MEDICINE

## 2025-01-28 RX ADMIN — PANTOPRAZOLE SODIUM 40 MG: 40 INJECTION, POWDER, LYOPHILIZED, FOR SOLUTION INTRAVENOUS at 09:53

## 2025-01-28 RX ADMIN — PANTOPRAZOLE SODIUM 40 MG: 40 INJECTION, POWDER, LYOPHILIZED, FOR SOLUTION INTRAVENOUS at 19:48

## 2025-01-28 RX ADMIN — THIAMINE HYDROCHLORIDE 100 MG: 100 INJECTION, SOLUTION INTRAMUSCULAR; INTRAVENOUS at 09:53

## 2025-01-28 RX ADMIN — INSULIN ASPART: 100 INJECTION, SOLUTION INTRAVENOUS; SUBCUTANEOUS at 12:09

## 2025-01-28 RX ADMIN — INSULIN ASPART: 100 INJECTION, SOLUTION INTRAVENOUS; SUBCUTANEOUS at 00:52

## 2025-01-28 RX ADMIN — INSULIN ASPART: 100 INJECTION, SOLUTION INTRAVENOUS; SUBCUTANEOUS at 05:33

## 2025-01-28 RX ADMIN — INSULIN ASPART: 100 INJECTION, SOLUTION INTRAVENOUS; SUBCUTANEOUS at 19:49

## 2025-01-28 RX ADMIN — SODIUM CHLORIDE 10 ML: 9 INJECTION, SOLUTION INTRAMUSCULAR; INTRAVENOUS; SUBCUTANEOUS at 09:53

## 2025-01-28 RX ADMIN — ENOXAPARIN SODIUM 40 MG: 40 INJECTION SUBCUTANEOUS at 19:48

## 2025-01-28 RX ADMIN — SODIUM CHLORIDE 10 ML: 9 INJECTION, SOLUTION INTRAMUSCULAR; INTRAVENOUS; SUBCUTANEOUS at 19:48

## 2025-01-28 NOTE — PTCARENOTE
pt aaox3. states no pain.  room air. ngt to liws draining green.  reviewed pt condition and plan of care with pt and wife.  tpn running as ordered.

## 2025-01-28 NOTE — W.PN.ONC
"Today's Communication / Plan"~"-"~"-: "~"leukocytosis likely reflects GCSF, monitor for infection"~"Push enteroscopy to evaluate stent and obstruction to be done today, 1/28/2025"~"add'l testing pending on cell block from drained pleural effusion - Her2, PDL1, MMR"~"Further options for systemic therapy TBD pending above"~"spoke w/ [place], daughter, with update as above"~"Impression"~"Impression"~"-: "~"high-grade partial small bowel obstruction, recurrent"~"SBFT with 1:15 transit time, no dilation, no mucosal abnormality apparent"~"advanced GE junction cancer, w/ esophageal stent, s/p cycle #1 of FLOT chemotherapy"~"pleural effusion, s/p thora on 1/20/25, malignant "~"Plan"~"Plan"~"-: "~"leukocytosis likely reflects GCSF, monitor for infection"~"Push enteroscopy to evaluate stent and obstruction to be done today, 1/28/2025"~"add'l testing pending on cell block from drained pleural effusion - Her2, PDL1, MMR"~"Further options for systemic therapy TBD pending above"~"spoke w/ [place], daughter, with update as above"~"Subjective/Objective"~"Subjective/Objective"~"-: "~"c/o congestion in throat"~"no belly pain"~"Vital Signs: "~"Vital Signs"~"Temp	Pulse	Resp	BP	Pulse Ox"~" 98.6 F 	 79 	 16 	 149/83 	 96 "~" 01/28/25 07:30	 01/28/25 07:30	 01/28/25 07:30	 01/28/25 07:30	 01/28/25 07:30"~"awake, appropriate"~"NG tube"~"RRR"~"Decreased BS at lung bases, +coarse breath sounds from upper airway congestion"~"abd is soft, NT/ND"~"no LE edema"~"Lab Results: "~"Laboratory Data"~"WBC	 11.1 10^3/uL (4.8-10.8)  H 	01/27/25  04:14    "~"Hgb	 11.4 g/dL (13.0-18.0)  L 	01/27/25  04:14    "~"Plt Count	 182 10^3/uL (130-400)  	01/27/25  04:14    "~"eGFR	 &gt; 60.00  	01/28/25  05:21    "

## 2025-01-28 NOTE — W.PN.GS2
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 "~"Presenting with recurrent obstruction at ligament of treitz."~"NGT in place but outputs remain &gt;1L over 24h"~"AFVSS"~"SBFT with 1:15 transit time, no dilation, no mucosal abnormality apparent; unclear why he continues to have such high residuals"~"-- EGD today"~"-- NPO/IVF/NGT"~"-- May need to start TPN if remains NPO, dietary following, EGD results pending"~"-- Continue IV PPI 40mg BID as initial outputs blood tinged"~"-- Medical management as per primary team"~"Subjective Data"~"-"~"-: "~"Date of Service:  January 28, 2025"~"No new complaints.  Denies nausea or vomiting.  Denies worsening abdominal pain.  Afebrile."~"Objective Data"~"-"~"-: "~"Intake and Output"~"	01/27/25	01/28/25	01/29/25"~"	06:59	06:59	06:59"~"Intake Total	2363 / 2363	1060 / 1060	30 / 30"~"Output Total	1650 / 1650	1775 / 1775	250 / 250"~"Balance	713 / 713	-715 / -715	-220 / -220"~"Intake:			"~"  Oral fluids		240 / 240	"~"  IV fluids (Total)	2183 / 2183	300 / 300	"~"  TPN/PPN		380 / 380	"~"  Amount instilled into GI Tube (	180 / 180	140 / 140	30 / 30"~"  Total)			"~"    [place]	180 / 180	140 / 140	30 / 30"~"Output:			"~"  Gastrointestinal tube output (	1050 / 1050	1350 / 1350	"~"  Total)			"~"    [place]	1050 / 1050	1350 / 1350	"~"  Urine, Voided	600 / 600	425 / 425	250 / 250"~"Vital Signs"~"Temp	Pulse	Resp	BP	Pulse Ox"~" 98.6 F 	 79 	 16 	 149/83 	 96 "~" 01/28/25 07:30	 01/28/25 07:30	 01/28/25 07:30	 01/28/25 07:30	 01/28/25 07:30"~"Lab Results"~"01/27/25 04:14 "~"01/28/25 05:21 "~"Calcium	 9.3 mg/dl (8.4-10.2)  	01/28/25  05:21    "~"Phosphorus	 3.5 mg/dl (2.5-4.5)  	01/28/25  05:21    "~"Magnesium	 2.1 mg/dl (1.6-2.3)  	01/28/25  05:21    "~"Total Bilirubin	 0.5 mg/dl (0.2-1.3)  	01/27/25  04:14    "~"Direct Bilirubin	 0.2 mg/dl (0.0-0.4)  	01/27/25  04:14    "~"AST	 16 U/L (17-59)  L 	01/27/25  04:14    "~"ALT	 &lt; 10 U/L (0-50)  	01/27/25  04:14    "~"Alkaline Phosphatase	 113 U/L ()  	01/27/25  04:14    "~"Total Protein	 5.9 g/dl (6.3-8.2)  L 	01/27/25  04:14    "~"Albumin	 3.4 g/dl (3.5-5.0)  L 	01/27/25  04:14    "~"Physical Exam"~"-"~"-: "~"Gen: NAD"~"Abd: soft, NT/ND, non-peritoneal"~"Patient has a mehta catheter: No"~"Patient has a central line: No"

## 2025-01-28 NOTE — W.PN.HOSP.TC
"Today's Communication/Plan"~"-"~"-: "~"EGD"~"TPM"~"NGT"~"Assessment / Plan"~"Assessment / Plan"~"-: "~"74-year-old male admitted with intractable abdominal pain vomiting.  Patient was admitted and was discharged on 1/20/2025.  He was felt to have intractable vomiting secondary to chemotherapy at that time."~"CVS: S1-S2 normal"~"Chest: CTA B/L"~"Abdomen: Soft, NT, very diminished BS, NGT"~"Extremities: No edema"~"X-ray of the abdomen-contrast has passed into the colon"~"# Acute high-grade partial small bowel obstruction"~"History of recurrent SBO"~"Had a BM 1/27/25"~"GE junction carcinoma status post esophageal stent on FLOT chemotherapy"~"Concern for stricture versus neoplastic process"~"N.p.o. with NG tube"~"TPN started"~"GI and surgery following"~"X-ray has improved patient still has a lot of NG tube drainage, may have partial small bowel obstruction"~"Push enteroscopy to evaluate stent planned for  1/28/2025"~"# Leukocytosis-likely reactive"~"# Locally advanced esophageal cancer diagnosed in September 2024 with stent placement November 2024 chemotherapy started January 2025"~"PET scan showed locally advanced malignancy with some extension into the surrounding tissues"~"Pleural effusion from 1/20/2025-positive for malignant cells."~"Follows with Dr. Martinez"~"Onc following"~"# Small bilateral pleural effusions"~"History of thoracentesis done on 1/20/2025"~"Malignant effusion "~"# Hypertension-hold amlodipine and lisinopril"~"# Hyperthyroidism-methimazole on hold secondary to n.p.o. status"~"# Hyperlipidemia-hold statin as n.p.o."~"# Ex-smoker"~"# DVT prophylaxis-Lovenox"~"# Full code"~"D/W RN at bed side"~"Discussed with wife at bedside"~"D/W Surgeon"~"Anticipated Discharge: 24 - 48 hours"~"Subjective/Interval History"~"-"~"-: "~"Date of Service:  January 28, 2025"~"Objective Data"~"-"~"Labs: "~"Laboratory Results"~" 	01/28/25"~" 	05:21"~"Sodium	 136"~"Potassium	 4.5"~"Chloride	 100"~"Carbon Dioxide	 27"~"BUN	 22 H"~"Creatinine	 0.6 L"~"Glucose	 122 H"~"Calcium	 9.3"~"Vital Signs: "~"Vital Signs"~"Temp	Pulse	Resp	BP	Pulse Ox"~" 98.6 F 	 79 	 16 	 149/83 	 96 "~" 01/28/25 07:30	 01/28/25 07:30	 01/28/25 07:30	 01/28/25 07:30	 01/28/25 07:30"~"I&O"~"	01/27/25	01/28/25	01/29/25"~"	06:59	06:59	06:59"~"Intake Total	2363 / 2363	1060 / 1060	30 / 30"~"Output Total	1650 / 1650	1775 / 1775	250 / 250"~"Balance	713 / 713	-715 / -715	-220 / -220"

## 2025-01-29 VITALS — DIASTOLIC BLOOD PRESSURE: 79 MMHG | RESPIRATION RATE: 16 BRPM | SYSTOLIC BLOOD PRESSURE: 157 MMHG

## 2025-01-29 VITALS — DIASTOLIC BLOOD PRESSURE: 77 MMHG | SYSTOLIC BLOOD PRESSURE: 146 MMHG | RESPIRATION RATE: 16 BRPM

## 2025-01-29 VITALS — DIASTOLIC BLOOD PRESSURE: 81 MMHG | SYSTOLIC BLOOD PRESSURE: 143 MMHG | RESPIRATION RATE: 18 BRPM

## 2025-01-29 VITALS — RESPIRATION RATE: 18 BRPM | DIASTOLIC BLOOD PRESSURE: 71 MMHG | SYSTOLIC BLOOD PRESSURE: 150 MMHG

## 2025-01-29 VITALS — DIASTOLIC BLOOD PRESSURE: 75 MMHG | SYSTOLIC BLOOD PRESSURE: 136 MMHG | RESPIRATION RATE: 16 BRPM

## 2025-01-29 LAB
BUN SERPL-MCNC: 32 MG/DL (ref 9–20)
CALCIUM SERPL-MCNC: 9.3 MG/DL (ref 8.4–10.2)
CHLORIDE SERPL-SCNC: 103 MMOL/L (ref 98–107)
CO2 SERPL-SCNC: 24 MMOL/L (ref 22–30)
EGFR: > 60
ERYTHROCYTE [DISTWIDTH] IN BLOOD BY AUTOMATED COUNT: 14.4 % (ref 11.5–14.5)
ESTIMATED CREATININE CLEARANCE: 85 ML/MIN
GLUCOSE - POINT OF CARE: 119 MG/DL (ref 70–99)
GLUCOSE - POINT OF CARE: 128 MG/DL (ref 70–99)
GLUCOSE - POINT OF CARE: 130 MG/DL (ref 70–99)
GLUCOSE - POINT OF CARE: 130 MG/DL (ref 70–99)
GLUCOSE SERPL-MCNC: 127 MG/DL (ref 70–99)
HCT VFR BLD AUTO: 38.1 % (ref 39–52)
HGB BLD-MCNC: 12.4 G/DL (ref 13–18)
MCHC RBC AUTO-ENTMCNC: 32.5 G/DL (ref 33–37)
MCV RBC AUTO: 86 FL (ref 80–94)
PLATELET # BLD AUTO: 207 10^3/UL (ref 130–400)
POTASSIUM SERPL-SCNC: 4.3 MMOL/L (ref 3.5–5.1)
SODIUM SERPL-SCNC: 140 MMOL/L (ref 135–145)

## 2025-01-29 RX ADMIN — INSULIN ASPART: 100 INJECTION, SOLUTION INTRAVENOUS; SUBCUTANEOUS at 12:58

## 2025-01-29 RX ADMIN — PANTOPRAZOLE SODIUM 40 MG: 40 INJECTION, POWDER, LYOPHILIZED, FOR SOLUTION INTRAVENOUS at 08:08

## 2025-01-29 RX ADMIN — PANTOPRAZOLE SODIUM 40 MG: 40 INJECTION, POWDER, LYOPHILIZED, FOR SOLUTION INTRAVENOUS at 20:03

## 2025-01-29 RX ADMIN — DIPHENHYDRAMINE HYDROCHLORIDE 12.5 MG: 50 INJECTION, SOLUTION INTRAMUSCULAR; INTRAVENOUS at 21:19

## 2025-01-29 RX ADMIN — SODIUM CHLORIDE 10 ML: 9 INJECTION, SOLUTION INTRAMUSCULAR; INTRAVENOUS; SUBCUTANEOUS at 08:08

## 2025-01-29 RX ADMIN — ACETAMINOPHEN 100: 10 INJECTION INTRAVENOUS at 14:55

## 2025-01-29 RX ADMIN — SODIUM CHLORIDE 10 ML: 9 INJECTION, SOLUTION INTRAMUSCULAR; INTRAVENOUS; SUBCUTANEOUS at 20:03

## 2025-01-29 RX ADMIN — INSULIN ASPART: 100 INJECTION, SOLUTION INTRAVENOUS; SUBCUTANEOUS at 06:28

## 2025-01-29 RX ADMIN — INSULIN ASPART: 100 INJECTION, SOLUTION INTRAVENOUS; SUBCUTANEOUS at 00:53

## 2025-01-29 RX ADMIN — ENOXAPARIN SODIUM 40 MG: 40 INJECTION SUBCUTANEOUS at 17:45

## 2025-01-29 RX ADMIN — SODIUM CHLORIDE 1000: 900 INJECTION, SOLUTION INTRAVENOUS at 21:19

## 2025-01-29 RX ADMIN — THIAMINE HYDROCHLORIDE 100 MG: 100 INJECTION, SOLUTION INTRAMUSCULAR; INTRAVENOUS at 08:08

## 2025-01-29 RX ADMIN — INSULIN ASPART: 100 INJECTION, SOLUTION INTRAVENOUS; SUBCUTANEOUS at 18:11

## 2025-01-29 NOTE — W.PN.GS2
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 Presenting with recurrent "~"obstruction at ligament of treitz."~"NGT in place but outputs remain &gt;1L over 24h"~"AFVSS"~"SBFT with 1:15 transit time, no dilation, no mucosal abnormality apparent; unclear why he continues to have such high residuals; suspect malignant obstruction and ligament that allows contrast through but not secretions"~"EGD yesterday with nodular gastric mucosa (path pending) and reported resistance at distal duodenum"~"Malignant cells in pleural fluid"~"D/w Surgical Oncology and confirmed stage IV disease not amenable to surgical cure. D/w Oncology and GI teams as well. "~"D/w patient and family that with stage IV disease and malignant bowel obstruction his prognosis is grim and there are no good options for therapy here. I advised them that TPN and/or chemo would likely prolong his suffering without any meaningful "~"chance for improvement. Obstruction at ligament of treitz precludes gastric feeding via tube, and j-tube is not recommended given the challenges associated with such a tube and grim prognosis. I recommended hospice. "~"I offered palliative PEG for venting which would allow him to DC the ngt"~"TPN was not renewed in light of our discussions"~"If he decides to move fwd with palliative venting PEG pls call"~"60 mins spent reviewing chart, conferring with clinical teams, and talking to pt/family"~"Subjective Data"~"-"~"-: "~"Date of Service:  January 29, 2025"~"Continues to have high NGT outputs"~"Objective Data"~"-"~"-: "~"Intake and Output"~"	01/28/25	01/29/25	01/30/25"~"	06:59	06:59	06:59"~"Intake Total	1060 / 1060	846 / 846	556 / 556"~"Output Total	1775 / 1775	2200 / 2200	"~"Balance	-715 / -715	-1354 / -1354	556 / 556"~"Intake:			"~"  Oral fluids	240 / 240	300 / 300	"~"  IV fluids (Total)	300 / 300		"~"  IV piggybacks			100 / 100"~"  TPN/PPN	380 / 380	456 / 456	456 / 456"~"  Amount instilled into GI Tube (	140 / 140	90 / 90	"~"  Total)			"~"    [place] Sump	140 / 140	90 / 90	"~"Output:			"~"  Gastrointestinal tube output (	1350 / 1350	1700 / 1700	"~"  Total)			"~"    [place] Sump	1350 / 1350	1700 / 1700	"~"  Urine, Voided	425 / 425	500 / 500	"~"Vital Signs"~"Temp	Pulse	Resp	BP	Pulse Ox"~" 97.5 F 	 75 	 18 	 150/71 	 97 "~" 01/29/25 08:15	 01/29/25 08:15	 01/29/25 08:15	 01/29/25 08:15	 01/29/25 08:15"~"Lab Results"~"01/29/25 07:51 "~"01/29/25 09:59 "~"Calcium	 Cancelled  	01/29/25  09:59    "~"Phosphorus	 3.8 mg/dl (2.5-4.5)  	01/29/25  07:51    "~"Magnesium	 2.1 mg/dl (1.6-2.3)  	01/28/25  05:21    "~"Total Bilirubin	 0.5 mg/dl (0.2-1.3)  	01/27/25  04:14    "~"Direct Bilirubin	 0.2 mg/dl (0.0-0.4)  	01/27/25  04:14    "~"AST	 16 U/L (17-59)  L 	01/27/25  04:14    "~"ALT	 &lt; 10 U/L (0-50)  	01/27/25  04:14    "~"Alkaline Phosphatase	 113 U/L ()  	01/27/25  04:14    "~"Total Protein	 5.9 g/dl (6.3-8.2)  L 	01/27/25  04:14    "~"Albumin	 3.4 g/dl (3.5-5.0)  L 	01/27/25  04:14    "~"Physical Exam"~"-"~"-: "~"Gen: NAD"~"Abd: soft, nt, nd"~"Patient has a mehta catheter: No"~"Patient has a central line: No"

## 2025-01-29 NOTE — W.PN.HOSP.TC
"Today's Communication/Plan"~"-"~"-: "~"ONC to discuss prognosis  with pt."~"Assessment / Plan"~"Assessment / Plan"~"-: "~"74-year-old male admitted with intractable abdominal pain vomiting.  Patient was admitted and was discharged on 1/20/2025.  He was felt to have intractable vomiting secondary to chemotherapy at that time."~"CVS: S1-S2 normal"~"Chest: CTA B/L"~"Abdomen: Soft, NT, very diminished BS, NGT"~"Extremities: No edema"~"X-ray of the abdomen-contrast has passed into the colon"~"# Acute high-grade partial small bowel obstruction"~"History of recurrent SBO"~"Had a BM 1/27/25"~"GE junction carcinoma status post esophageal stent on FLOT chemotherapy"~"Concern for stricture versus neoplastic process"~"N.p.o. with NG tube"~"TPN started"~"GI and surgery following"~"Push enteroscopy to evaluate stent planned for  1/28/2025 noted."~"NGT with 800 output"~"Surgery recommending palliative care /Hospice "~"May get G tube for decompression"~"# Leukocytosis-likely reactive"~"# Locally advanced esophageal cancer diagnosed in September 2024 with stent placement November 2024 chemotherapy started January 2025"~"PET scan showed locally advanced malignancy with some extension into the surrounding tissues"~"Pleural effusion from 1/20/2025-positive for malignant cells."~"Follows with Dr. Martinez"~"Onc following"~"# Small bilateral pleural effusions"~"History of thoracentesis done on 1/20/2025"~"Malignant effusion "~"# Hypertension-hold amlodipine and lisinopril"~"# Hyperthyroidism-methimazole on hold secondary to n.p.o. status"~"# Hyperlipidemia-hold statin as n.p.o."~"# Ex-smoker"~"# DVT prophylaxis-Lovenox"~"# Full code"~"D/W RN at bed side"~"D/W Surgeon, ONC and GI"~"Poor prognosis"~"Anticipated Discharge: 24 - 48 hours"~"Subjective/Interval History"~"-"~"-: "~"Date of Service:  January 29, 2025"~"Objective Data"~"-"~"Vital Signs: "~"Vital Signs"~"Temp	Pulse	Resp	BP	Pulse Ox"~" 97.5 F 	 75 	 18 	 150/71 	 97 "~" 01/29/25 08:15	 01/29/25 08:15	 01/29/25 08:15	 01/29/25 08:15	 01/29/25 08:15"~"I&O"~"	01/28/25	01/29/25	01/30/25"~"	06:59	06:59	06:59"~"Intake Total	1060 / 1060	846 / 846	"~"Output Total	1775 / 1775	2200 / 2200	"~"Balance	-715 / -715	-1354 / -1354	"

## 2025-01-29 NOTE — W.PN.ONC2
"Today's Communication / Plan"~"-"~"-: "~"Continue supportive care.  He needs to be able to tolerate nutrition and have improvement in functional status to be eligible for salvage chemotherapy.  Remains full code but depending on how he responds to TPN and NG tube clamping, hospice care may "~"also be an option but for now continue more aggressive supportive treatment."~"Impression"~"Impression"~"-: "~"high-grade partial small bowel obstruction, recurrent"~"SBFT with 1:15 transit time, no dilation, no mucosal abnormality apparent"~"advanced GE junction cancer, w/ esophageal stent, s/p cycle #1 of FLOT chemotherapy"~"pleural effusion, s/p thora on 1/20/25, malignant "~"Malnutrition, started on TPN"~"Plan"~"Plan"~"-: "~"leukocytosis has been improving and likely caused by previous GCSF."~"EGD results as per above 1/28/2025"~"add'l testing pending on cell block from drained pleural effusion - Her2 = 0, MMR - Low prob MSI-H.  PD-L1 is pending"~"Further options for systemic therapy TBD although performance status and nutritional status will need to improve to be eligible."~"Subjective/Objective"~"Chief Complaint"~"-: "~"ACS Heme Onc F/U"~"Subjective"~"-: "~"Patient s/p EGD yesterday with results below.  Has NG tube.  Feels generalized weakness.  Started on TPN by surgery."~"EGD 1/28 RESULTS:"~"  - Pre-existing esophageal stent. Patent. Fixed to esophagus with OVESCO clip."~"                       - Erythematous mucosa in the stomach."~"                       - Nodular mucosa in the gastric body. Biopsied."~"                       - Normal duodenal bulb, first portion of the duodenum, second portion of the duodenum and third portion of the duodenum. No obvious stenosis noted."~"Vital Signs: "~"Vital Signs"~"Temp	Pulse	Resp	BP	Pulse Ox"~" 98.2 F 	 80 	 16 	 136/75 	 97 "~" 01/29/25 03:45	 01/29/25 03:45	 01/29/25 03:45	 01/29/25 03:45	 01/29/25 03:45"~"Lab Results: "~"Laboratory Data"~"WBC	 11.1 10^3/uL (4.8-10.8)  H 	01/27/25  04:14    "~"Hgb	 11.4 g/dL (13.0-18.0)  L 	01/27/25  04:14    "~"Plt Count	 182 10^3/uL (130-400)  	01/27/25  04:14    "~"eGFR	 &gt; 60.00  	01/28/25  05:21    "~"Physical Exam"~"-: "~"PS 4 "~"HEENT: Other (NGT)"~"Cardiology: S1 and S2"~"Pulmonary: Clear"~"GI: Soft"

## 2025-01-29 NOTE — W.PN.GI.CBS2
"Addendum entered and electronically signed by Neo Anne MD  02/13/25 16:25: "~"GI signed off at this time"~"Addendum entered and electronically signed by Migel Kimball DO  01/29/25 12:11: "~"I saw and examined the patient."~"The NP's note was reviewed and I agree with the note."~"Comment: S/p recent EGD with Dr. Cooley on 1/28 without any evidence of mass, lesion, stricture or other obvious obstruction throughout the examined duodenum. Still concern for for high NGT output and suspicion for underlying malignancy. Recommend "~"ongoing GOC discussions with family along with Oncology and surgical input. Previous mention of TPN however increased risks of infection with chemotherapy. Discussions about potential hospice, however patient wishes for ongoing treatment. No plans "~"for any further endoscopic interventions at this time. Agree with ongoing IV anti-emetics and supportive care as below."~"Addendum entered and electronically signed by DANILO Johnson  01/29/25 09:49: "~"add to below exam NGT intact  still with bilious output "~"Original Note:"~"Today's Communication / Plan"~"-"~"-: "~"s/p EGD as noted -- no obstruction though duodenum "~"NGT overnight with 800ml drainage then 200ml drainage from 6-8 am"~"hold on clamping trial this am"~"discussed at length with Dr. Meza and review of multiple imaging completed.  Dr. Meza will further review with oncology and  family "~"concern for underlying metastatic disease with + pleural fluid "~"options-- may need to consider decompressive peg/TPN with risks of infection with chemo, vs hospice vs second opinion at Tertiary center "~"close monitoring of electrolytes with high output "~"- IV anti-emetics and pain control PRN"~"Assessment / Plan"~"-"~"-: "~"Mr Santo is a 75yo male presents with admission 1/18/25- 1/21/25 with  n/v and CT showing nonspecific obstruction at junction of duodenum and jejunum with abrupt change in caliber and fluid distention of duodenum and stomach.  He was recently dx'd in "~"September with esophageal CA (path with intramuscular adeno CA per [facility]'s)  and had EGD/stent x 2 at [facility]. He also had surgical eval [facility].   Rec'd first chemo prior to that admission around 2nd week in January.    During that admission  NGT "~"placed in ER with blood return, likely from traversing esophageal tumor in stent. Pt completed repeat CT with IV and oral contrast 1/19 with no obstruction and improved duodenal dilation with also noted soft tissue density of esophageal stent "~"worrisome for tumor infiltration, moderate right effusion.  He had effusion drained and noted clinical improvement and was discharge 1/21.  He now returns 1/22 PM with recurrent symptoms. Repeat imaging with obs series without signs of obstruction "~"but CT with fluid filled stomach with distention and fluid filled duodenum dilated up to 4 cm with abrupt transition point with concern for partial SB obstruction and continued distal esophageal stent in place.  WBC 43, 900 on admission. "~"#Recurrent SBO-  Second admission.  Previously manage medically.  Imaging showing transition point at the ligament of Treitz"~"#Locally advanced esophageal CA with stent in place -- dx sept 2024, stent x 2 Nov 2024 then chem started 2nd week January"~"#Recent PET with locally advanced malignancy with some extension into surrounding soft tissue likely T3/4 with evidence for precarinal nodes enlargement"~"#Leukocytosis "~"#Hypokalemia"~"#Pleural effusion with drainage with malignant cell on path"~"1/18/25 CT a/p with IV contrast "~"Soft tissue attenuation within the esophageal stent, most pronounced at the distal stent margin suspicious for tumor infiltration. Ingested material felt to be less likely."~"Nonspecific obstruction at the junction of the duodenum and jejunum, where there is an abrupt change in caliber, and associated fluid distention of the duodenum and stomach."~"Diverticulosis without acute diverticulitis. Nonobstructing 5 mm calculus in the lower pole left kidney. Renal cysts. Moderate right pleural effusion."~"1/19/25 CTa/p with IV contrast "~"1). There is no small bowel obstruction. Duodenal dilatation present on the previous examination has resolved 2). There is soft tissue density at the lower margin of the esophageal stent worrisome for tumor infiltration 3). Moderate right pleural "~"effusion with compressive atelectasis at the right lung base"~"Small left pleural effusion4). 5 mm nonobstructing calculus at the lower pole of the left kidney5). Bilateral renal cysts 6). Prostatomegaly7). Diverticuli are present in the colon with no CT evidence of diverticulitis8). Multilevel lumbar "~"degenerative disc disease"~"1/22/25 obst series "~"1. No acute cardiopulmonary process."~"2. Non-specific bowel gas pattern without signs of distal obstruction. The proximal small bowel obstruction seen on CT was not apparent on this radiographic exam."~"1/23/25 CT a/p IV contrast "~"1). The fluid-filled stomach is markedly distended and the fluid-filled duodenum is dilated to 4 cm with abrupt transition point to decompressed small bowel at the ligament of Treitz suggesting partial obstruction 2). Small bilateral pleural "~"effusions with associated compressive atelectasis at the posterior lung bases. 3). Distal esophageal stent4). Bilateral renal cysts"~"5). Diverticuli are present in the colon with no CT evidence of diverticulitis"~"UGIS w/ SBFT 1/25/2025: No focal small bowel abnormality including no findings to suggest small bowel obstruction. No SB dilatation with SB transit of 1 hour and 15 minutes"~"abd X ray 1/27/25  Moderate residual contrast throughout the colon. No evidence of intestinal obstruction."~"Nonobstructing left renal stone. Stable"~"EGD 1/28/2025- Dr. Cooley-esophageal stent with OVESCO clip, erythema in stomach, nodular mucosa in gastric body, normal duodenal bulb first/second and third portion of duodenum no obvious stenosis "~"CXR 1/28/25 Nasogastric tube is present with tip in the left upper quadrant within the stomach."~"Recommendations: "~"s/p EGD as noted -- no obstruction though duodenum "~"NGT overnight with 800ml drainage then 200ml drainage from 6-8 am"~"hold on clamping trial this am"~"discussed at length with Dr. Meza and review of multiple imaging completed.  Dr. Meza will further review with oncology and  family "~"concern for underlying metastatic disease with + pleural fluid "~"options-- may need to consider decompressive peg/TPN with risks of infection with chemo, vs hospice vs second opinion at Tertiary center "~"close monitoring of electrolytes with high output "~"- IV anti-emetics and pain control PRN"~"Subjective"~"Subjective"~"-: "~"Date of Service:  January 29, 2025"~"1/27 brown loose stool, NPO with 800ml recorded output overnight  and about 150-200ml since 6 am with patient reporting minimal ice chips overnight "~"Objective"~"Data Reviewed"~"Laboratory Data: "~"Laboratory Results"~"01/27/25 04:14 "~"01/28/25 05:21 "~"Laboratory Results"~"Phosphorus	 3.5 mg/dl (2.5-4.5)  	01/28/25  05:21    "~"Magnesium	 2.1 mg/dl (1.6-2.3)  	01/28/25  05:21    "~"Total Bilirubin	 0.5 mg/dl (0.2-1.3)  	01/27/25  04:14    "~"AST	 16 U/L (17-59)  L 	01/27/25  04:14    "~"ALT	 &lt; 10 U/L (0-50)  	01/27/25  04:14    "~"Alkaline Phosphatase	 113 U/L ()  	01/27/25  04:14    "~"Amylase	 40 U/L ()  	01/22/25  20:57    "~"Lipase	 53 U/L ()  	01/22/25  20:57    "~"Vital Signs and I&O: "~"Vital Signs"~"Temp	Pulse	Resp	BP	Pulse Ox"~" 98.2 F 	 80 	 16 	 136/75 	 97 "~" 01/29/25 03:45	 01/29/25 03:45	 01/29/25 03:45	 01/29/25 03:45	 01/29/25 03:45"~"I&O"~"	01/28/25	01/29/25	01/30/25"~"	06:59	06:59	06:59"~"Intake Total	1060 / 1060	846 / 846	"~"Output Total	1775 / 1775	2200 / 2200	"~"Balance	-715 / -715	-1354 / -1354	"~"Physical Exam"~"Physical Exam"~"HEENT: Anicteric and Moist mucous membranes"~"Cardiology: Normal Sinus Rhythm"~"Pulmonary: Clear"~"GI: Soft, Non Distended and Non Tender"~"Extremities: No Edema"~"Neuro: Non Focal"

## 2025-01-30 VITALS — DIASTOLIC BLOOD PRESSURE: 76 MMHG | SYSTOLIC BLOOD PRESSURE: 152 MMHG | RESPIRATION RATE: 16 BRPM

## 2025-01-30 VITALS — RESPIRATION RATE: 16 BRPM | SYSTOLIC BLOOD PRESSURE: 152 MMHG | DIASTOLIC BLOOD PRESSURE: 75 MMHG

## 2025-01-30 VITALS — RESPIRATION RATE: 14 BRPM | SYSTOLIC BLOOD PRESSURE: 149 MMHG | DIASTOLIC BLOOD PRESSURE: 81 MMHG

## 2025-01-30 LAB
BUN SERPL-MCNC: 29 MG/DL (ref 9–20)
CALCIUM SERPL-MCNC: 9.4 MG/DL (ref 8.4–10.2)
CHLORIDE SERPL-SCNC: 104 MMOL/L (ref 98–107)
CO2 SERPL-SCNC: 24 MMOL/L (ref 22–30)
EGFR: > 60
ERYTHROCYTE [DISTWIDTH] IN BLOOD BY AUTOMATED COUNT: 14.6 % (ref 11.5–14.5)
ESTIMATED CREATININE CLEARANCE: 99 ML/MIN
GLUCOSE - POINT OF CARE: 103 MG/DL (ref 70–99)
GLUCOSE - POINT OF CARE: 105 MG/DL (ref 70–99)
GLUCOSE - POINT OF CARE: 106 MG/DL (ref 70–99)
GLUCOSE SERPL-MCNC: 116 MG/DL (ref 70–99)
HCT VFR BLD AUTO: 38.6 % (ref 39–52)
HGB BLD-MCNC: 12.5 G/DL (ref 13–18)
MCHC RBC AUTO-ENTMCNC: 32.4 G/DL (ref 33–37)
MCV RBC AUTO: 87.7 FL (ref 80–94)
PLATELET # BLD AUTO: 232 10^3/UL (ref 130–400)
POTASSIUM SERPL-SCNC: 4.2 MMOL/L (ref 3.5–5.1)
SODIUM SERPL-SCNC: 138 MMOL/L (ref 135–145)

## 2025-01-30 RX ADMIN — DEXTROSE MONOHYDRATE AND SODIUM CHLORIDE 1000: 5; .45 INJECTION, SOLUTION INTRAVENOUS at 23:33

## 2025-01-30 RX ADMIN — PANTOPRAZOLE SODIUM 40 MG: 40 INJECTION, POWDER, LYOPHILIZED, FOR SOLUTION INTRAVENOUS at 08:54

## 2025-01-30 RX ADMIN — CEFTRIAXONE 1000 MG: 1 INJECTION, POWDER, FOR SOLUTION INTRAMUSCULAR; INTRAVENOUS at 13:54

## 2025-01-30 RX ADMIN — INSULIN ASPART: 100 INJECTION, SOLUTION INTRAVENOUS; SUBCUTANEOUS at 23:28

## 2025-01-30 RX ADMIN — INSULIN ASPART: 100 INJECTION, SOLUTION INTRAVENOUS; SUBCUTANEOUS at 00:42

## 2025-01-30 RX ADMIN — INSULIN ASPART: 100 INJECTION, SOLUTION INTRAVENOUS; SUBCUTANEOUS at 18:03

## 2025-01-30 RX ADMIN — WATER 10 ML: 1 INJECTION INTRAMUSCULAR; INTRAVENOUS; SUBCUTANEOUS at 13:54

## 2025-01-30 RX ADMIN — PANTOPRAZOLE SODIUM 40 MG: 40 INJECTION, POWDER, LYOPHILIZED, FOR SOLUTION INTRAVENOUS at 19:56

## 2025-01-30 RX ADMIN — INSULIN ASPART: 100 INJECTION, SOLUTION INTRAVENOUS; SUBCUTANEOUS at 05:48

## 2025-01-30 RX ADMIN — THIAMINE HYDROCHLORIDE 100 MG: 100 INJECTION, SOLUTION INTRAMUSCULAR; INTRAVENOUS at 08:52

## 2025-01-30 RX ADMIN — SODIUM CHLORIDE 10 ML: 9 INJECTION, SOLUTION INTRAMUSCULAR; INTRAVENOUS; SUBCUTANEOUS at 08:54

## 2025-01-30 RX ADMIN — DIPHENHYDRAMINE HYDROCHLORIDE 12.5 MG: 50 INJECTION, SOLUTION INTRAMUSCULAR; INTRAVENOUS at 21:23

## 2025-01-30 RX ADMIN — Medication 1 SPRAYS: at 03:32

## 2025-01-30 RX ADMIN — INSULIN ASPART: 100 INJECTION, SOLUTION INTRAVENOUS; SUBCUTANEOUS at 13:43

## 2025-01-30 RX ADMIN — ENOXAPARIN SODIUM 40 MG: 40 INJECTION SUBCUTANEOUS at 18:05

## 2025-01-30 RX ADMIN — DEXTROSE MONOHYDRATE AND SODIUM CHLORIDE 1000: 5; .45 INJECTION, SOLUTION INTRAVENOUS at 13:54

## 2025-01-30 RX ADMIN — SODIUM CHLORIDE 10 ML: 9 INJECTION, SOLUTION INTRAMUSCULAR; INTRAVENOUS; SUBCUTANEOUS at 19:56

## 2025-01-30 NOTE — W.PN.SURGUPD
"Surgical Update"~"Surgical Update"~"-: "~"Patient seen in follow-up.  Family members at bedside."~"He confirms that he would like to proceed with a diagnostic laparoscopy, possible gastrostomy tube placement possible gastrojejunostomy bypass.  Anticipated operative procedure, potential operative findings and the management, alternative treatment "~"options, benefits and risk such as but not limited to bleeding requiring transfusion, infectious and wound related complications, iatrogenic injury to surrounding viscera, anastomotic related complications such as leak and the postoperative recovery "~"was reviewed in detail with the patient and his family members.  We discussed associated medical risks as previously outlined within the progress note today including postoperative morbidity and mortality."~"Any of the patient's concerns or questions were fully addressed and informed consent has been obtained"~"Anticipating OR tomorrow a.m. likely 10 AM jovita"

## 2025-01-30 NOTE — W.PN.ONC2
"Documented by User:  DANILO Mota  01/30/25 10:45"~"Today's Communication / Plan"~"-"~"-: "~"continue GOC"~"Impression"~"Impression"~"-: "~"high-grade partial small bowel obstruction, recurrent -malignant"~"SBFT with 1:15 transit time, no dilation, no mucosal abnormality apparent"~"advanced GE junction cancer, w/ esophageal stent, s/p cycle #1 of FLOT chemotherapy"~"pleural effusion, s/p thora on 1/20/25, malignant"~"EGD with nodular gastric mucosa (path pending) and reported resistance at distal duodenum"~"Malnutrition, started on TPN"~"Plan"~"Plan"~"-: "~"consider exlap for resection+/-vent GJ"~"add'l testing pending on cell block from drained pleural effusion - Her2 = 0, MMR - Low prob MSI-H.  PD-L1 is pending"~"Subjective/Objective"~"Subjective"~"Vital Signs: "~"Vital Signs"~"Temp	Pulse	Resp	BP	Pulse Ox"~" 97.8 F 	 81 	 16 	 152/75 	 97 "~" 01/30/25 07:15	 01/30/25 07:15	 01/30/25 07:15	 01/30/25 07:15	 01/30/25 07:15"~"Lab Results: "~"Laboratory Data"~"WBC	 14.7 10^3/uL (4.8-10.8)  H 	01/29/25  07:51    "~"Hgb	 12.4 g/dL (13.0-18.0)  L 	01/29/25  07:51    "~"Plt Count	 207 10^3/uL (130-400)  	01/29/25  07:51    "~"eGFR	 Cancelled  	01/29/25  09:59    "~"___________________________________________________________________________"~"Documented by User:  Oneil Martinez DO  01/30/25 11:08"~"Today's Communication / Plan"~"-"~"-: "~"Discussed with general surgery exploratory lap"~"If the findings that suggest a solitary high-grade malignant obstruction resection and G/J tube reasonable"~"If findings suggest peritoneal carcinomatosis G-tube and hospice"~"add'l testing pending on cell block from drained pleural effusion - Her2 = 0, MMR - Low prob MSI-H.  PD-L1 is pending"~"Impression"~"Impression"~"-: "~"High-grade partial small bowel obstruction, recurrent -malignant"~"SBFT with 1:15 transit time, no dilation, no mucosal abnormality apparent"~"advanced GE junction cancer, w/ esophageal stent, s/p cycle #1 of FLOT chemotherapy"~"pleural effusion, s/p thora on 1/20/25, malignant"~"EGD with nodular gastric mucosa (path pending) and reported resistance at distal duodenum"~"Malnutrition, started on TPN"~"Subjective/Objective"~"Chief Complaint"~"-: "~"Throat pain today"~"Subjective"~"-: "~"Patient continues to have difficulty with NG tube associated discomfort.  Protracted inability to tolerate p.o."~"Physical Exam"~"-: "~"Unchanged"

## 2025-01-30 NOTE — W.PN.HOSP.TC
"Today's Communication/Plan"~"-"~"-: "~"IV fluids"~"Exploratory laparotomy"~"Assessment / Plan"~"Assessment / Plan"~"-: "~"74-year-old male admitted with intractable abdominal pain vomiting.  Patient was admitted and was discharged on 1/20/2025.  He was felt to have intractable vomiting secondary to chemotherapy at that time."~"CVS: S1-S2 normal"~"Chest: Coarse breath sounds on the left"~"Abdomen: Soft, NT, very diminished BS, NGT"~"Extremities: No edema"~"X-ray of the abdomen-contrast has passed into the colon"~"# Acute high-grade partial small bowel obstruction"~"History of recurrent SBO"~"Had a BM 1/27/25"~"GE junction carcinoma status post esophageal stent on FLOT chemotherapy"~"Concern for stricture versus neoplastic process"~"N.p.o. with NG tube"~"TPN started, holding off further under definitive plans are made"~"GI and surgery following"~"Push enteroscopy 1/28/2025-esophageal stent was fixed with a clip, nodular mucosa in the gastric body biopsied"~"Patient continues with obstructive symptoms"~"Exploratory laparotomy planned by surgery to see if there is a single lesion versus multiple."~"# Leukocytosis-coarse breath sounds.  Possible aspiration pneumonitis.  Start ceftriaxone"~"# Locally advanced esophageal cancer diagnosed in September 2024 with stent placement November 2024 chemotherapy started January 2025"~"PET scan showed locally advanced malignancy with some extension into the surrounding tissues"~"Pleural effusion from 1/20/2025-positive for malignant cells."~"Follows with Dr. Martinez"~"Onc following"~"# Small bilateral pleural effusions"~"History of thoracentesis done on 1/20/2025"~"Malignant effusion "~"# Hypertension-hold amlodipine and lisinopril"~"# Hyperthyroidism-methimazole on hold secondary to n.p.o. status"~"# Hyperlipidemia-hold statin as n.p.o."~"# Ex-smoker"~"# DVT prophylaxis-Lovenox"~"# Full code"~"D/W RN at bed side"~"D/W Surgeon, ONC and GI"~"Discussed with wife at bedside"~"Poor prognosis"~"Anticipated Discharge: &gt; 48 hours"~"Subjective/Interval History"~"-"~"-: "~"Date of Service:  January 30, 2025"~"Objective Data"~"-"~"Vital Signs: "~"Vital Signs"~"Temp	Pulse	Resp	BP	Pulse Ox"~" 97.8 F 	 81 	 16 	 152/75 	 97 "~" 01/30/25 07:15	 01/30/25 07:15	 01/30/25 07:15	 01/30/25 07:15	 01/30/25 07:15"~"I&O"~"	01/29/25	01/30/25	01/31/25"~"	06:59	06:59	06:59"~"Intake Total	846 / 846	1561 / 1561	"~"Output Total	2200 / 2200	1075 / 1075	250 / 250"~"Balance	-1354 / -1354	486 / 486	-250 / -250"

## 2025-01-30 NOTE — PTCARENOTE
pt aaox3. oob in chair. family at bedside.  ivf running as ordered.  pt very concerned about getting nutrition.  ngt ti liws placement checked and flushed.  draining green.

## 2025-01-31 VITALS — RESPIRATION RATE: 18 BRPM | DIASTOLIC BLOOD PRESSURE: 64 MMHG | SYSTOLIC BLOOD PRESSURE: 126 MMHG

## 2025-01-31 VITALS — DIASTOLIC BLOOD PRESSURE: 76 MMHG | RESPIRATION RATE: 14 BRPM | SYSTOLIC BLOOD PRESSURE: 138 MMHG

## 2025-01-31 VITALS
DIASTOLIC BLOOD PRESSURE: 80 MMHG | SYSTOLIC BLOOD PRESSURE: 150 MMHG | RESPIRATION RATE: 22 BRPM | OXYGEN SATURATION: 2 %

## 2025-01-31 VITALS
OXYGEN SATURATION: 2 % | RESPIRATION RATE: 17 BRPM | SYSTOLIC BLOOD PRESSURE: 153 MMHG | DIASTOLIC BLOOD PRESSURE: 70 MMHG

## 2025-01-31 VITALS
OXYGEN SATURATION: 2 % | SYSTOLIC BLOOD PRESSURE: 155 MMHG | RESPIRATION RATE: 18 BRPM | DIASTOLIC BLOOD PRESSURE: 72 MMHG

## 2025-01-31 VITALS — DIASTOLIC BLOOD PRESSURE: 80 MMHG | SYSTOLIC BLOOD PRESSURE: 145 MMHG | RESPIRATION RATE: 16 BRPM

## 2025-01-31 VITALS
OXYGEN SATURATION: 2 % | RESPIRATION RATE: 18 BRPM | DIASTOLIC BLOOD PRESSURE: 72 MMHG | SYSTOLIC BLOOD PRESSURE: 155 MMHG

## 2025-01-31 VITALS
DIASTOLIC BLOOD PRESSURE: 72 MMHG | SYSTOLIC BLOOD PRESSURE: 149 MMHG | RESPIRATION RATE: 16 BRPM | OXYGEN SATURATION: 2 %

## 2025-01-31 VITALS — DIASTOLIC BLOOD PRESSURE: 72 MMHG | SYSTOLIC BLOOD PRESSURE: 155 MMHG | RESPIRATION RATE: 19 BRPM

## 2025-01-31 VITALS — OXYGEN SATURATION: 2 %

## 2025-01-31 VITALS — RESPIRATION RATE: 18 BRPM | DIASTOLIC BLOOD PRESSURE: 75 MMHG | SYSTOLIC BLOOD PRESSURE: 138 MMHG

## 2025-01-31 VITALS — SYSTOLIC BLOOD PRESSURE: 137 MMHG | DIASTOLIC BLOOD PRESSURE: 83 MMHG | RESPIRATION RATE: 14 BRPM

## 2025-01-31 VITALS
SYSTOLIC BLOOD PRESSURE: 149 MMHG | DIASTOLIC BLOOD PRESSURE: 70 MMHG | RESPIRATION RATE: 20 BRPM | OXYGEN SATURATION: 2 %

## 2025-01-31 VITALS — DIASTOLIC BLOOD PRESSURE: 70 MMHG | RESPIRATION RATE: 12 BRPM | SYSTOLIC BLOOD PRESSURE: 136 MMHG

## 2025-01-31 VITALS — DIASTOLIC BLOOD PRESSURE: 80 MMHG | SYSTOLIC BLOOD PRESSURE: 150 MMHG | RESPIRATION RATE: 16 BRPM

## 2025-01-31 LAB
BUN SERPL-MCNC: 28 MG/DL (ref 9–20)
CALCIUM SERPL-MCNC: 9 MG/DL (ref 8.4–10.2)
CHLORIDE SERPL-SCNC: 102 MMOL/L (ref 98–107)
CO2 SERPL-SCNC: 28 MMOL/L (ref 22–30)
EGFR: > 60
ERYTHROCYTE [DISTWIDTH] IN BLOOD BY AUTOMATED COUNT: 14.6 % (ref 11.5–14.5)
ESTIMATED CREATININE CLEARANCE: 85 ML/MIN
GLUCOSE - POINT OF CARE: 117 MG/DL (ref 70–99)
GLUCOSE - POINT OF CARE: 133 MG/DL (ref 70–99)
GLUCOSE - POINT OF CARE: 145 MG/DL (ref 70–99)
GLUCOSE - POINT OF CARE: 167 MG/DL (ref 70–99)
GLUCOSE SERPL-MCNC: 115 MG/DL (ref 70–99)
HCT VFR BLD AUTO: 36.5 % (ref 39–52)
HGB BLD-MCNC: 11.7 G/DL (ref 13–18)
MCHC RBC AUTO-ENTMCNC: 32.1 G/DL (ref 33–37)
MCV RBC AUTO: 88.4 FL (ref 80–94)
PLATELET # BLD AUTO: 241 10^3/UL (ref 130–400)
POTASSIUM SERPL-SCNC: 3.8 MMOL/L (ref 3.5–5.1)
SODIUM SERPL-SCNC: 138 MMOL/L (ref 135–145)

## 2025-01-31 PROCEDURE — 0DHA0UZ INSERTION OF FEEDING DEVICE INTO JEJUNUM, OPEN APPROACH: ICD-10-PCS | Performed by: SURGERY

## 2025-01-31 PROCEDURE — 0D160ZA BYPASS STOMACH TO JEJUNUM, OPEN APPROACH: ICD-10-PCS | Performed by: SURGERY

## 2025-01-31 RX ADMIN — PANTOPRAZOLE SODIUM 40 MG: 40 INJECTION, POWDER, LYOPHILIZED, FOR SOLUTION INTRAVENOUS at 07:57

## 2025-01-31 RX ADMIN — SODIUM CHLORIDE 10 ML: 9 INJECTION, SOLUTION INTRAMUSCULAR; INTRAVENOUS; SUBCUTANEOUS at 20:47

## 2025-01-31 RX ADMIN — SODIUM CHLORIDE 10 ML: 9 INJECTION, SOLUTION INTRAMUSCULAR; INTRAVENOUS; SUBCUTANEOUS at 07:57

## 2025-01-31 RX ADMIN — HYDROMORPHONE HYDROCHLORIDE 1 MG: 1 INJECTION, SOLUTION INTRAMUSCULAR; INTRAVENOUS; SUBCUTANEOUS at 21:06

## 2025-01-31 RX ADMIN — INSULIN ASPART 1 UNITS: 100 INJECTION, SOLUTION INTRAVENOUS; SUBCUTANEOUS at 18:25

## 2025-01-31 RX ADMIN — WATER 10 ML: 1 INJECTION INTRAMUSCULAR; INTRAVENOUS; SUBCUTANEOUS at 14:52

## 2025-01-31 RX ADMIN — PANTOPRAZOLE SODIUM 40 MG: 40 INJECTION, POWDER, LYOPHILIZED, FOR SOLUTION INTRAVENOUS at 20:48

## 2025-01-31 RX ADMIN — DEXTROSE MONOHYDRATE AND SODIUM CHLORIDE 1000: 5; .45 INJECTION, SOLUTION INTRAVENOUS at 22:49

## 2025-01-31 RX ADMIN — ENOXAPARIN SODIUM 40 MG: 40 INJECTION SUBCUTANEOUS at 18:23

## 2025-01-31 RX ADMIN — HYDROMORPHONE HYDROCHLORIDE 1 MG: 1 INJECTION, SOLUTION INTRAMUSCULAR; INTRAVENOUS; SUBCUTANEOUS at 16:44

## 2025-01-31 RX ADMIN — INSULIN ASPART: 100 INJECTION, SOLUTION INTRAVENOUS; SUBCUTANEOUS at 11:42

## 2025-01-31 RX ADMIN — THIAMINE HYDROCHLORIDE 100 MG: 100 INJECTION, SOLUTION INTRAMUSCULAR; INTRAVENOUS at 07:56

## 2025-01-31 RX ADMIN — DEXTROSE MONOHYDRATE AND SODIUM CHLORIDE 1000: 5; .45 INJECTION, SOLUTION INTRAVENOUS at 13:52

## 2025-01-31 RX ADMIN — CEFTRIAXONE 1000 MG: 1 INJECTION, POWDER, FOR SOLUTION INTRAMUSCULAR; INTRAVENOUS at 14:52

## 2025-01-31 RX ADMIN — INSULIN ASPART: 100 INJECTION, SOLUTION INTRAVENOUS; SUBCUTANEOUS at 04:41

## 2025-01-31 NOTE — W.PN.HOSP.TC
"Addendum entered and electronically signed by Elisa Tam MD  02/01/25 07:31: "~"Severe protein calorie malnutrition"~"Stage II sacral decubitus ulcer"~"Original Note:"~"Today's Communication/Plan"~"-"~"-: "~"For OR"~"Assessment / Plan"~"Assessment / Plan"~"-: "~"74-year-old male admitted with intractable abdominal pain vomiting.  Patient was admitted and was discharged on 1/20/2025.  He was felt to have intractable vomiting secondary to chemotherapy at that time."~"CVS: S1-S2 normal"~"Chest: Coarse breath sounds on the left"~"Abdomen: Soft, NT, very diminished BS, NGT"~"Extremities: No edema"~"X-ray of the abdomen-contrast has passed into the colon"~"# Acute high-grade partial small bowel obstruction"~"History of recurrent SBO"~"Had a BM 1/27/25"~"GE junction carcinoma status post esophageal stent on FLOT chemotherapy"~"Concern for stricture versus neoplastic process"~"N.p.o. with NG tube"~"TPN started, holding off further under definitive plans are made"~"GI and surgery following"~"Push enteroscopy 1/28/2025-esophageal stent was fixed with a clip, nodular mucosa in the gastric body biopsied"~"Patient continues with obstructive symptoms"~"Exploratory laparotomy planned by surgery to see if there is a single lesion versus multiple."~"# Low TSH-likely euthyroid sick syndrome.  Free T4 normal."~"# Leukocytosis-coarse breath sounds.  Possible aspiration pneumonitis.  Started ceftriaxone.  White count improving"~"# Locally advanced esophageal cancer diagnosed in September 2024 with stent placement November 2024 chemotherapy started January 2025"~"PET scan showed locally advanced malignancy with some extension into the surrounding tissues"~"Pleural effusion from 1/20/2025-positive for malignant cells."~"Follows with Dr. Martinez"~"Onc following"~"# Small bilateral pleural effusions"~"History of thoracentesis done on 1/20/2025"~"Malignant effusion "~"# Hypertension-hold amlodipine and lisinopril"~"# Hyperthyroidism-methimazole on hold secondary to n.p.o. status"~"# Hyperlipidemia-hold statin as n.p.o."~"# Ex-smoker"~"# DVT prophylaxis-Lovenox"~"# Full code"~"D/W RN at bed side"~"Discussed with wife at bedside"~"Anticipated Discharge: &gt; 48 hours"~"Subjective/Interval History"~"-"~"-: "~"Date of Service:  January 31, 2025"~"Objective Data"~"-"~"Labs: "~"Laboratory Results"~" 	01/31/25"~" 	08:25"~"WBC	 13.0 H"~"Hgb	 11.7 L"~"Hct	 36.5 L"~"Plt Count	 241"~"Sodium	 138"~"Potassium	 3.8"~"Chloride	 102"~"Carbon Dioxide	 28"~"BUN	 28 H"~"Creatinine	 0.7"~"Glucose	 115 H"~"Calcium	 9.0"~"Vital Signs: "~"Vital Signs"~"Temp	Pulse	Resp	BP	Pulse Ox"~" 98.1 F 	 84 	 16 	 150/80 	 98 "~" 01/31/25 07:31	 01/31/25 07:31	 01/31/25 07:31	 01/31/25 07:31	 01/31/25 07:31"~"I&O"~"	01/30/25	01/31/25	02/01/25"~"	06:59	06:59	06:59"~"Intake Total	1561 / 1561	2190 / 2190	"~"Output Total	1075 / 1075	1575 / 1575	"~"Balance	486 / 486	615 / 615	"

## 2025-01-31 NOTE — PN.CDI
"CDI"~"- -"~"CDI: "~"Physician Documentation Request"~"Admit Date: 01/23/25 03:18"~"Dear Doctor Yonatan, "~"Please review the following and provide your response in the progress notes. "~"Clinical Indicators: "~"Selected Entries"~"	01/23/25"~"14:20"~"Pressure injury stage [Sacrum]	Stage 2"~"Physician documentation of the type and location of wounds is required for compliant documentation. Based on the above clinical findings and your assessment, please provide the following in your progress note:  "~"	Yes, Stage 2 sacrum, pressure injury, POA"~"	No, Stage 2 sacrum, pressure injury, POA"~"	Other, (please specify)"~"1. Location of the ulcer/wound, including laterality."~"2. Type (etiology) of ulcer/wound:"~"	- Venous stasis ulcer"~"	- Pressure (decubitus) ulcer"~"	- Non-healing surgical wound"~"	- Other"~"3. If a pressure ulcer, please also include the stage* of the ulcer:"~"	- Stage 1 - Skin intact, non-blanchable redness"~"	- Stage 2 - Partial thickness loss of dermis, includes intact or open blister"~"	- Stage 3 - Full thickness tissue not including bone, tendon or muscle"~"	- Stage 4 - Full thickness tissue loss, including exposed bone, tendon or muscle"~"	- Unstageable - Full thickness loss in which the base of the ulcer is covered by slough (yellow, tan, gray, green or brown) and/or eschar (tan, brown or black) in the wound bed."~"Use of terms such as suspected, likely, concern for, or probable (associated with a specific diagnosis that is being evaluated, monitored, or treated as if it exists) are acceptable and can be coded in the inpatient setting, when documented at the "~"time of discharge. "~"Thank you, "~"Sarahi Barry RN BSN CCDS"~"CDI Specialist"~"please contact via tiger text"~"Please use your independent medical judgment in providing your response."~"*Source: National Pressure Ulcer Advisory Panel (NPUAP)"

## 2025-01-31 NOTE — W.IMMPOSTOP
"Addendum entered and electronically signed by Alex Tomlinson MD  02/17/25 19:18: "~"#[id number]"~"Original Note:"~"Surgical Immed Post Op Note"~"-"~"-: "~"Primary Surgeon:  Davi"~"Assisting Surgeon:  Shakir/Nubia"~"Pre-op Diagnosis: Small bowel obstruction, stage IV GE junction adenocarcinoma"~"Post-op Diagnosis:   Same"~"Procedure Performed: Diagnostic laparoscopy; laparotomy gastrojejunostomy bypass and placement of feeding jejunostomy tube"~"Anesthesia Type: GETA +0.25% Marcaine"~"Specimen / Cultures: None"~"Estimated Blood Loss: 20 mL"~"Complications: None immediate"~"Operative Findings: Bulky local disease at GE junction.  Bulky retroperitoneal disease around pancreas likely reflective of kip or satellite metastasis.  No ascites.  No peritoneal metastasis/no carcinomatosis.  Isolated small bowel obstruction at "~"ligament of Treitz due to retroperitoneal metastatic disease.  Retrogastric, isoperistaltic gastrojejunostomy bypass performed.  Feeding jejunostomy tube placed."

## 2025-01-31 NOTE — W.PN.ONC2
"Today's Communication / Plan"~"-"~"-: "~"-"~"Impression"~"Impression"~"-: "~"High-grade partial small bowel obstruction, recurrent -malignant"~"SBFT with 1:15 transit time, no dilation, no mucosal abnormality apparent"~"advanced GE junction cancer, w/ esophageal stent, s/p cycle #1 of FLOT chemotherapy"~"pleural effusion, s/p thora on 1/20/25, malignant"~"EGD with nodular gastric mucosa (path pending) and reported resistance at distal duodenum"~"Malnutrition, started on TPN"~"Plan"~"Plan"~"-: "~"diagnostic laparoscopy, possible gastrostomy tube placement possible gastrojejunostomy bypass planned today"~"add'l testing pending on cell block from drained pleural effusion - Her2 = 0, MMR - Low prob MSI-H.  PD-L1 is pending"~"Subjective/Objective"~"Subjective"~"-: "~"pt not examined, off unit"~"emotional support provided to family"~"Vital Signs: "~"Vital Signs"~"Temp	Pulse	Resp	BP	Pulse Ox"~" 98.1 F 	 84 	 16 	 150/80 	 98 "~" 01/31/25 07:31	 01/31/25 07:31	 01/31/25 07:31	 01/31/25 07:31	 01/31/25 07:31"~"Lab Results: "~"Laboratory Data"~"WBC	 15.8 10^3/uL (4.8-10.8)  H 	01/30/25  12:34    "~"Hgb	 12.5 g/dL (13.0-18.0)  L 	01/30/25  12:34    "~"Plt Count	 232 10^3/uL (130-400)  	01/30/25  12:34    "~"eGFR	 &gt; 60.00  	01/30/25  12:34    "

## 2025-01-31 NOTE — PN.CDI
"CDI"~"- -"~"CDI: "~"Physician Documentation Request"~"Admit Date: 01/23/25 03:18"~"Dear Doctor Yonatan, "~"Please review the following and provide your response in the progress notes. "~"Clinical Indicators: "~"Registered Dietician, 1/31"~"#Skin: sacral stage2 "~"#Significant 62 lbs, (28%) weight loss in 4 months since diagnosis. "~"#Pt meets AND/ASPEN criteria for severe protein calorie malnutrition of chronic illness "~"   #...with severe loss muscle and subcutaneous fat, &gt; 10% weight loss in 6 months, "~"   #...&lt; 75% estimated needs &gt; 1 month. "~"Based on the above and your clinical assessment, please verify the best reflection of  the patient's nutritional status:"~"	Severe Protein Calorie Malnutrition of chronic illness"~"	Other (please specify)"~"[place] Criteria ([facility] Hospitalist 2017)	"~"2 or more criteria must be present for either"~" non severe or severe malnutrition	"~"Note that the criteria differs related to the 	"~"presence of an acute or chronic illness	"~"	"~"	Chronic Illness         "~"Energy Intake	Non Severe: &lt;75% for &gt;1 month"~"	Severe: &lt;75% for &gt;1 month"~"	"~"Weight Loss	Non Severe: 5% over 1 month"~"	                 7.5% over 3 months"~"	                 10% over 6 months"~"	                 20% over 1 year"~"	Severe: &gt;5% over 1 month"~"	           &gt;7.5% over 3 months"~"	           &gt;10% over 6 months"~"	           &gt;20% over 1 year"~"	"~"Body Fat	Non Severe: Mild Loss"~"	Severe: Severe Loss"~"	"~"Muscle Mass	Non Severe: Mild Loss"~"	Severe: Severe Loss"~"	"~"Fluid Accumulation	Non Severe: Mild Accumulation"~"	Severe: Moderate to severe"~"	           accumulation"~"	"~"Reduced  Strength	Non Severe: N/A"~"	Severe: Measurably reduced"~"Use of terms such as suspected, likely, concern for, or probable (associated with a specific diagnosis that is being evaluated, monitored, or treated as if it exists) are acceptable and can be coded in the inpatient setting, when documented at the "~"time of discharge. "~"Thank you, "~"Sarahi Barry RN BSN CCDS"~"CDI Specialist"~"please contact via tiger text"~"Please use your independent medical judgment in providing your response."

## 2025-02-01 VITALS — RESPIRATION RATE: 18 BRPM | DIASTOLIC BLOOD PRESSURE: 72 MMHG | SYSTOLIC BLOOD PRESSURE: 141 MMHG

## 2025-02-01 VITALS — DIASTOLIC BLOOD PRESSURE: 72 MMHG | RESPIRATION RATE: 14 BRPM | SYSTOLIC BLOOD PRESSURE: 142 MMHG

## 2025-02-01 VITALS — RESPIRATION RATE: 16 BRPM | SYSTOLIC BLOOD PRESSURE: 155 MMHG | DIASTOLIC BLOOD PRESSURE: 77 MMHG

## 2025-02-01 VITALS — RESPIRATION RATE: 18 BRPM | SYSTOLIC BLOOD PRESSURE: 152 MMHG | DIASTOLIC BLOOD PRESSURE: 73 MMHG

## 2025-02-01 VITALS — DIASTOLIC BLOOD PRESSURE: 68 MMHG | RESPIRATION RATE: 18 BRPM | SYSTOLIC BLOOD PRESSURE: 156 MMHG

## 2025-02-01 VITALS — SYSTOLIC BLOOD PRESSURE: 154 MMHG | DIASTOLIC BLOOD PRESSURE: 79 MMHG | RESPIRATION RATE: 20 BRPM

## 2025-02-01 LAB
BUN SERPL-MCNC: 22 MG/DL (ref 9–20)
CALCIUM SERPL-MCNC: 8.3 MG/DL (ref 8.4–10.2)
CHLORIDE SERPL-SCNC: 100 MMOL/L (ref 98–107)
CO2 SERPL-SCNC: 29 MMOL/L (ref 22–30)
EGFR: > 60
ERYTHROCYTE [DISTWIDTH] IN BLOOD BY AUTOMATED COUNT: 14.7 % (ref 11.5–14.5)
ESTIMATED CREATININE CLEARANCE: 89 ML/MIN
GLUCOSE - POINT OF CARE: 112 MG/DL (ref 70–99)
GLUCOSE - POINT OF CARE: 124 MG/DL (ref 70–99)
GLUCOSE - POINT OF CARE: 129 MG/DL (ref 70–99)
GLUCOSE - POINT OF CARE: 130 MG/DL (ref 70–99)
GLUCOSE SERPL-MCNC: 141 MG/DL (ref 70–99)
HCT VFR BLD AUTO: 34.7 % (ref 39–52)
HGB BLD-MCNC: 11.2 G/DL (ref 13–18)
MCHC RBC AUTO-ENTMCNC: 32.3 G/DL (ref 33–37)
MCV RBC AUTO: 88.7 FL (ref 80–94)
PLATELET # BLD AUTO: 248 10^3/UL (ref 130–400)
POTASSIUM SERPL-SCNC: 3.6 MMOL/L (ref 3.5–5.1)
SODIUM SERPL-SCNC: 137 MMOL/L (ref 135–145)

## 2025-02-01 RX ADMIN — CEFTRIAXONE 1000 MG: 1 INJECTION, POWDER, FOR SOLUTION INTRAMUSCULAR; INTRAVENOUS at 13:00

## 2025-02-01 RX ADMIN — PANTOPRAZOLE SODIUM 40 MG: 40 INJECTION, POWDER, LYOPHILIZED, FOR SOLUTION INTRAVENOUS at 08:11

## 2025-02-01 RX ADMIN — INSULIN ASPART: 100 INJECTION, SOLUTION INTRAVENOUS; SUBCUTANEOUS at 06:15

## 2025-02-01 RX ADMIN — SODIUM CHLORIDE 10 ML: 9 INJECTION, SOLUTION INTRAMUSCULAR; INTRAVENOUS; SUBCUTANEOUS at 20:32

## 2025-02-01 RX ADMIN — HYDROMORPHONE HYDROCHLORIDE 0.5 MG: 1 INJECTION, SOLUTION INTRAMUSCULAR; INTRAVENOUS; SUBCUTANEOUS at 20:41

## 2025-02-01 RX ADMIN — DEXTROSE MONOHYDRATE AND SODIUM CHLORIDE 1000: 5; .45 INJECTION, SOLUTION INTRAVENOUS at 08:02

## 2025-02-01 RX ADMIN — INSULIN ASPART: 100 INJECTION, SOLUTION INTRAVENOUS; SUBCUTANEOUS at 01:02

## 2025-02-01 RX ADMIN — ENOXAPARIN SODIUM 40 MG: 40 INJECTION SUBCUTANEOUS at 17:17

## 2025-02-01 RX ADMIN — PANTOPRAZOLE SODIUM 40 MG: 40 INJECTION, POWDER, LYOPHILIZED, FOR SOLUTION INTRAVENOUS at 20:32

## 2025-02-01 RX ADMIN — HYDROMORPHONE HYDROCHLORIDE 1 MG: 1 INJECTION, SOLUTION INTRAMUSCULAR; INTRAVENOUS; SUBCUTANEOUS at 08:00

## 2025-02-01 RX ADMIN — DIPHENHYDRAMINE HYDROCHLORIDE 12.5 MG: 50 INJECTION, SOLUTION INTRAMUSCULAR; INTRAVENOUS at 23:05

## 2025-02-01 RX ADMIN — HYDROMORPHONE HYDROCHLORIDE 1 MG: 1 INJECTION, SOLUTION INTRAMUSCULAR; INTRAVENOUS; SUBCUTANEOUS at 12:07

## 2025-02-01 RX ADMIN — ACETAMINOPHEN 100: 10 INJECTION INTRAVENOUS at 23:07

## 2025-02-01 RX ADMIN — SODIUM CHLORIDE SOLN NEBU 3% 1 VIAL: 3 NEBU SOLN at 16:22

## 2025-02-01 RX ADMIN — SODIUM CHLORIDE 10 ML: 9 INJECTION, SOLUTION INTRAMUSCULAR; INTRAVENOUS; SUBCUTANEOUS at 08:11

## 2025-02-01 RX ADMIN — WATER 10 ML: 1 INJECTION INTRAMUSCULAR; INTRAVENOUS; SUBCUTANEOUS at 13:00

## 2025-02-01 RX ADMIN — ACETAMINOPHEN 100: 10 INJECTION INTRAVENOUS at 17:18

## 2025-02-01 RX ADMIN — DEXTROSE MONOHYDRATE AND SODIUM CHLORIDE 1000: 5; .45 INJECTION, SOLUTION INTRAVENOUS at 16:53

## 2025-02-01 NOTE — W.PN.HOSP.TC
"Today's Communication/Plan"~"-"~"-: "~"Feeding/meds via J-tube when okay with surgeon"~"Encourage out of bed and walking"~"IVF"~"Assessment / Plan"~"Assessment / Plan"~"-: "~"74-year-old male admitted with intractable abdominal pain vomiting.  Patient was admitted and was discharged on 1/20/2025.  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  "~"N.p.o. 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  Free T4 normal."~"# Leukocytosis-coarse breath sounds.  Possible aspiration pneumonitis.  Started ceftriaxone.  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 Martinez"~"Onc following"~"# Small bilateral pleural effusions"~"History of thoracentesis done on 1/20/2025"~"Malignant effusion "~"# Hypertension-Hold amlodipine and lisinopril"~"# Hyperthyroidism-methimazole on hold secondary to n.p.o. status.  Slightly low TSH but normal free T4 as of 1/30/2025"~"# Hyperlipidemia-hold statin as n.p.o."~"# Stage II sacral pressure injury-change position/offload/wound care"~"# Severe protein calorie malnutrition-hopefully we can start feeding soon as NG tube is out"~"# Ex-smoker"~"# DVT prophylaxis-Lovenox"~"# Full code"~"D/W RN at bed side"~"Discussed with wife at bedside"~"Anticipated Discharge: &gt; 48 hours"~"Subjective/Interval History"~"-"~"-: "~"Date of Service:  February 1, 2025"~"Objective Data"~"-"~"Labs: "~"Laboratory Results"~" 	02/01/25"~" 	09:21"~"WBC	 13.4 H"~"Hgb	 11.2 L"~"Hct	 34.7 L"~"Plt Count	 248"~"Sodium	 137"~"Potassium	 3.6"~"Chloride	 100"~"Carbon Dioxide	 29"~"BUN	 22 H"~"Creatinine	 0.7"~"Glucose	 141 H"~"Calcium	 8.3 L"~"Vital Signs: "~"Vital Signs"~"Temp	Pulse	Resp	BP	Pulse Ox"~" 98.8 F 	 86 	 16 	 155/77 	 96 "~" 02/01/25 07:42	 02/01/25 07:42	 02/01/25 07:42	 02/01/25 07:42	 02/01/25 07:50"~"I&O"~"	01/31/25	02/01/25	02/02/25"~"	06:59	06:59	06:59"~"Intake Total	2190 / 2190	2790 / 2790	"~"Output Total	1575 / 1575	1700 / 1700	"~"Balance	615 / 615	1090 / 1090	"

## 2025-02-01 NOTE — PTCARENOTE
"Pt had episode of increased SOB and difficulty managing secretions, pt was 93%RA. Respiratory paged to bedside, administered PRN breathing treatment. Suction set up, Deep breathing and coughing encouraged. Provider notified. STAT CXR. 2L 02 via NC  "~"applied 95%. breathing improved s/P breathing treatment. family at bedside. "

## 2025-02-01 NOTE — W.PN.ONC2
"Today's Communication / Plan"~"-"~"-: "~"- recovering well from ex-lap G-J bypass. "~"Impression"~"Impression"~"-: "~"High-grade partial small bowel obstruction, recurrent -malignant"~"SBFT with 1:15 transit time, no dilation, no mucosal abnormality apparent"~"advanced GE junction cancer, w/ esophageal stent, s/p cycle #1 of FLOT chemotherapy"~"pleural effusion, s/p thora on 1/20/25, malignant"~"EGD with nodular gastric mucosa (path pending) and reported resistance at distal duodenum"~"Malnutrition, started on TPN"~"Plan"~"Plan"~"-: "~"diagnostic laparoscopy 1/31 revealed area of obstruction at ligament of treitz from retroperitoneal adenopathy causing extrinsic compression, bulky disease at distal esophagus also noted.  gastrojejunostomy bypass performed. Pt recovering well from "~"surgery so far today. "~"NGT in place. likely plan to clamp and remove in next 48-72 hours. ADAT at that point with hopes of improving current nutritional status. "~"add'l testing pending on cell block from drained pleural effusion - Her2 = 0, MMR - Low prob MSI-H.  PD-L1 is pending"~"candidacy for palliative systemic interventions pending recovery and improvement in PS. ongoing GOC discussions. "~"Subjective/Objective"~"Chief Complaint"~"-: "~"SBO due to metastatic esophageal cancer"~"Subjective"~"-: "~"pt with no new complaints today. he underwent ex-lap yesterday that showed SBO obstruction at ligament if treitz due to extrinsic compression from bulky retroperitoneal adenopathy. G-J bypass performed. NGT still in place. abdominal pain controlled "~"at this time. "~"Vital Signs: "~"Vital Signs"~"Temp	Pulse	Resp	BP	Pulse Ox"~" 98.4 F 	 83 	 20 	 154/79 	 96 "~" 02/01/25 11:39	 02/01/25 11:39	 02/01/25 11:39	 02/01/25 11:39	 02/01/25 12:29"~"Lab Results: "~"Laboratory Data"~"WBC	 13.4 10^3/uL (4.8-10.8)  H 	02/01/25  09:21    "~"Hgb	 11.2 g/dL (13.0-18.0)  L 	02/01/25  09:21    "~"Plt Count	 248 10^3/uL (130-400)  	02/01/25  09:21    "~"eGFR	 &gt; 60.00  	02/01/25  09:21    "~"Physical Exam"~"HEENT: Other (ill appearing, cachectic ); No Jaundice"~"Cardiology: Normal Sinus Rhythm"~"GI: Soft and Other (midline incision without erythema); No Distended"~"Extremities: No Edema"~"Neuro: Non Focal"~"Review of Systems"~"Review of Systems"~"Constitutional: Reports Fatigue; Denies Fever"~"Respiratory: Denies Dyspnea"~"Cardiovascular: Denies Chest Pain"~"Gastrointestinal: Denies Nausea/Vomiting"

## 2025-02-02 VITALS — SYSTOLIC BLOOD PRESSURE: 148 MMHG | RESPIRATION RATE: 18 BRPM | DIASTOLIC BLOOD PRESSURE: 73 MMHG

## 2025-02-02 VITALS — DIASTOLIC BLOOD PRESSURE: 78 MMHG | SYSTOLIC BLOOD PRESSURE: 140 MMHG | RESPIRATION RATE: 16 BRPM

## 2025-02-02 VITALS — SYSTOLIC BLOOD PRESSURE: 139 MMHG | DIASTOLIC BLOOD PRESSURE: 73 MMHG | RESPIRATION RATE: 16 BRPM

## 2025-02-02 LAB
BUN SERPL-MCNC: 17 MG/DL (ref 9–20)
CALCIUM SERPL-MCNC: 8.4 MG/DL (ref 8.4–10.2)
CHLORIDE SERPL-SCNC: 99 MMOL/L (ref 98–107)
CO2 SERPL-SCNC: 30 MMOL/L (ref 22–30)
EGFR: > 60
ERYTHROCYTE [DISTWIDTH] IN BLOOD BY AUTOMATED COUNT: 14.4 % (ref 11.5–14.5)
ESTIMATED CREATININE CLEARANCE: 108 ML/MIN
GLUCOSE - POINT OF CARE: 101 MG/DL (ref 70–99)
GLUCOSE - POINT OF CARE: 116 MG/DL (ref 70–99)
GLUCOSE - POINT OF CARE: 117 MG/DL (ref 70–99)
GLUCOSE - POINT OF CARE: 120 MG/DL (ref 70–99)
GLUCOSE - POINT OF CARE: 126 MG/DL (ref 70–99)
GLUCOSE SERPL-MCNC: 128 MG/DL (ref 70–99)
HCT VFR BLD AUTO: 33.8 % (ref 39–52)
HGB BLD-MCNC: 10.9 G/DL (ref 13–18)
MCHC RBC AUTO-ENTMCNC: 32.2 G/DL (ref 33–37)
MCV RBC AUTO: 87.3 FL (ref 80–94)
PLATELET # BLD AUTO: 238 10^3/UL (ref 130–400)
POTASSIUM SERPL-SCNC: 3.4 MMOL/L (ref 3.5–5.1)
SODIUM SERPL-SCNC: 134 MMOL/L (ref 135–145)

## 2025-02-02 RX ADMIN — DEXTROSE MONOHYDRATE AND SODIUM CHLORIDE 1000: 5; .45 INJECTION, SOLUTION INTRAVENOUS at 02:24

## 2025-02-02 RX ADMIN — CEFTRIAXONE 1000 MG: 1 INJECTION, POWDER, FOR SOLUTION INTRAMUSCULAR; INTRAVENOUS at 13:05

## 2025-02-02 RX ADMIN — HYDROMORPHONE HYDROCHLORIDE 0.5 MG: 1 INJECTION, SOLUTION INTRAMUSCULAR; INTRAVENOUS; SUBCUTANEOUS at 11:32

## 2025-02-02 RX ADMIN — INSULIN ASPART: 100 INJECTION, SOLUTION INTRAVENOUS; SUBCUTANEOUS at 19:02

## 2025-02-02 RX ADMIN — PANTOPRAZOLE SODIUM 40 MG: 40 INJECTION, POWDER, LYOPHILIZED, FOR SOLUTION INTRAVENOUS at 08:48

## 2025-02-02 RX ADMIN — PANTOPRAZOLE SODIUM 40 MG: 40 INJECTION, POWDER, LYOPHILIZED, FOR SOLUTION INTRAVENOUS at 20:35

## 2025-02-02 RX ADMIN — SODIUM CHLORIDE 10 ML: 9 INJECTION, SOLUTION INTRAMUSCULAR; INTRAVENOUS; SUBCUTANEOUS at 08:49

## 2025-02-02 RX ADMIN — INSULIN ASPART: 100 INJECTION, SOLUTION INTRAVENOUS; SUBCUTANEOUS at 23:46

## 2025-02-02 RX ADMIN — DEXTROSE MONOHYDRATE AND SODIUM CHLORIDE 1000: 5; .45 INJECTION, SOLUTION INTRAVENOUS at 09:33

## 2025-02-02 RX ADMIN — DEXTROSE MONOHYDRATE AND SODIUM CHLORIDE: 5; .45 INJECTION, SOLUTION INTRAVENOUS at 20:58

## 2025-02-02 RX ADMIN — DIPHENHYDRAMINE HYDROCHLORIDE 12.5 MG: 50 INJECTION, SOLUTION INTRAMUSCULAR; INTRAVENOUS at 22:30

## 2025-02-02 RX ADMIN — SODIUM CHLORIDE 10 ML: 9 INJECTION, SOLUTION INTRAMUSCULAR; INTRAVENOUS; SUBCUTANEOUS at 20:35

## 2025-02-02 RX ADMIN — WATER 10 ML: 1 INJECTION INTRAMUSCULAR; INTRAVENOUS; SUBCUTANEOUS at 13:05

## 2025-02-02 RX ADMIN — ENOXAPARIN SODIUM 40 MG: 40 INJECTION SUBCUTANEOUS at 19:03

## 2025-02-02 RX ADMIN — ACETAMINOPHEN 100: 10 INJECTION INTRAVENOUS at 05:43

## 2025-02-02 RX ADMIN — ACETAMINOPHEN 100: 10 INJECTION INTRAVENOUS at 11:15

## 2025-02-02 NOTE — W.PN.HOSP.TC
"Today's Communication/Plan"~"-"~"-: "~"Await return of bowel function"~"Use J tube when OK with Surgeon"~"Will see if we can get TPN ordered today, if not will be tomorrow"~"Assessment / Plan"~"Assessment / Plan"~"-: "~"74-year-old male admitted with intractable abdominal pain vomiting.  Patient was admitted and was discharged on 1/20/2025.  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  "~"N.p.o. 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  Free T4 normal."~"# Leukocytosis-coarse breath sounds.  Possible aspiration pneumonitis.  Started ceftriaxone.  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 Martinez"~"Onc following"~"# right pleural effusion"~"History of thoracentesis done on 1/20/2025"~"Malignant effusion "~"Option for repeat thoracentesis given if SOB "~"# Hypertension-Hold amlodipine and lisinopril"~"# Hyperthyroidism-methimazole on hold secondary to n.p.o. status.  Slightly low TSH but normal free T4 as of 1/30/2025"~"# Hyperlipidemia-hold statin as n.p.o."~"# Stage II sacral pressure injury-change position/offload/wound care"~"# Severe protein calorie malnutrition-hopefully we can start feeding soon as NG tube is out. "~"# Ex-smoker"~"# DVT prophylaxis-Lovenox"~"# Full code"~"D/W RN at bed side"~"Discussed with wife at bedside"~"Anticipated Discharge: Within 24 hours"~"Subjective/Interval History"~"-"~"-: "~"Date of Service:  February 2, 2025"~"Objective Data"~"-"~"Labs: "~"Laboratory Results"~" 	02/02/25"~" 	11:09"~"WBC	 12.8 H"~"Hgb	 10.9 L"~"Hct	 33.8 L"~"Plt Count	 238"~"Sodium	 134 L"~"Potassium	 3.4 L"~"Chloride	 99"~"Carbon Dioxide	 30"~"BUN	 17"~"Creatinine	 0.6 L"~"Glucose	 128 H"~"Calcium	 8.4"~"Vital Signs: "~"Vital Signs"~"Temp	Pulse	Resp	BP	Pulse Ox"~" 97.8 F 	 75 	 16 	 140/78 	 98 "~" 02/02/25 07:42	 02/02/25 07:42	 02/02/25 07:42	 02/02/25 07:42	 02/02/25 07:42"~"I&O"~"	02/01/25	02/02/25	02/03/25"~"	06:59	06:59	06:59"~"Intake Total	2790 / 2790	1730 / 1730	"~"Output Total	1700 / 1700	1150 / 1150	375 / 375"~"Balance	1090 / 1090	580 / 580	-375 / -375"

## 2025-02-02 NOTE — W.PN.GS2
"Addendum entered and electronically signed by Frandy Hedrick MD  02/02/25 13:59: "~"I saw and examined the patient."~"The NP's note was reviewed and I agree with the note."~"Comment:   "~"Still no flatus or BMs, NGT with 975mL output.  Abdomen soft, but still quite tender.  "~"Continue NGT to LIWS and J-tube capped until return of bowel function.  Continue IV Tylenol and IV Dilaudid for pain control"~"Original Note:"~"Today's Communication / Plan"~"-"~"-: "~"NGT/NPO"~"Assessment / Plan"~"-"~"-: "~"Assessment/Plan:  75 yo male with recent hospitalization 1/18-1/21 who has locally advanced adenocarcinoma at the GE junction with prior esophageal stent placement at [facility]; right-sided malignant pleural effusion and initiation of neoadjuvant "~"(FLOT) chemo on 1/9/2025. Presenting with recurrent obstruction at ligament of treitz."~"POD #2 dx lap; laparotomy gastrojejunostomy bypass and placement of feeding jejunostomy tube"~"Bulky retroperitoneal disease around pancreas likely reflective of kip or satellite metastasis noted intraop."~"NGT outputs still elevated/bilious, await bowel recovery post operatively prior to removal and initiation of diet"~"AFVSS"~"--Continue npo with ngt to LIWS and follow for bowel recovery"~"--Nutrition following for tentative tube feeding once passing flatus"~"--Ofirmev scheduled and prn dilaudid"~"--IVF as per primary team"~"--Lovenox and scds for vte ppx"~"Subjective Data"~"-"~"-: "~"Date of Service:  February 2, 2025"~"Patient seen and examined at bedside with Dr. Hedrick. Denies n/v. Not yet passing flatus. Abd pain present, asked that exam not continue when abdomen palpated."~"Objective Data"~"-"~"-: "~"Intake and Output"~"	02/01/25	02/02/25	02/03/25"~"	06:59	06:59	06:59"~"Intake Total	2790 / 2790	1730 / 1730	"~"Output Total	1700 / 1700	1150 / 1150	375 / 375"~"Balance	1090 / 1090	580 / 580	-375 / -375"~"Intake:			"~"  IV fluids (Total)	1740 / 1740	1440 / 1440	"~"    Normosol	300 / 300		"~"  IV piggybacks	960 / 960	200 / 200	"~"  Amount instilled into GI Tube (	90 / 90	90 / 90	"~"  Total)			"~"    [place]	90 / 90	90 / 90	"~"Output:			"~"  Gastrointestinal tube output (	800 / 800	600 / 600	375 / 375"~"  Total)			"~"    [place]	800 / 800	600 / 600	375 / 375"~"  Urine, [place]	900 / 900	550 / 550	"~"Vital Signs"~"Temp	Pulse	Resp	BP	Pulse Ox"~" 97.8 F 	 75 	 16 	 140/78 	 98 "~" 02/02/25 07:42	 02/02/25 07:42	 02/02/25 07:42	 02/02/25 07:42	 02/02/25 07:42"~"Lab Results"~"02/02/25 11:09 "~"02/02/25 11:09 "~"Calcium	 8.4 mg/dl (8.4-10.2)  	02/02/25  11:09    "~"Phosphorus	 3.1 mg/dl (2.5-4.5)  	01/30/25  09:53    "~"Magnesium	 2.1 mg/dl (1.6-2.3)  	01/28/25  05:21    "~"Total Bilirubin	 0.5 mg/dl (0.2-1.3)  	01/27/25  04:14    "~"Direct Bilirubin	 0.2 mg/dl (0.0-0.4)  	01/27/25  04:14    "~"AST	 16 U/L (17-59)  L 	01/27/25  04:14    "~"ALT	 &lt; 10 U/L (0-50)  	01/27/25  04:14    "~"Alkaline Phosphatase	 113 U/L ()  	01/27/25  04:14    "~"Total Protein	 5.9 g/dl (6.3-8.2)  L 	01/27/25  04:14    "~"Albumin	 3.4 g/dl (3.5-5.0)  L 	01/27/25  04:14    "~"Physical Exam"~"-"~"-: "~"NAD AAOx3"~"ABD soft, nd, tenderness generalized, non-peritoneal, no rigidity or rebound"~"NGT with bilious outputs"

## 2025-02-02 NOTE — W.PN.HOSP.TC
"Today's Communication/Plan"~"-"~"-: "~"OOB to chair and ambulate"~"Assessment / Plan"~"Assessment / Plan"~"-: "~"74-year-old male admitted with intractable abdominal pain vomiting.  Patient was admitted and was discharged on 1/20/2025.  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  "~"N.p.o. 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  Free T4 normal."~"# Leukocytosis-coarse breath sounds.  Possible aspiration pneumonitis.  Started ceftriaxone.  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 Martinez"~"Onc following"~"# right pleural effusion"~"History of thoracentesis done on 1/20/2025"~"Malignant effusion "~"Option for repeat thoracentesis given if SOB "~"# Hypertension-Hold amlodipine and lisinopril"~"# Hyperthyroidism-methimazole on hold secondary to n.p.o. status.  Slightly low TSH but normal free T4 as of 1/30/2025"~"# Hyperlipidemia-hold statin as n.p.o."~"# Stage II sacral pressure injury-change position/offload/wound care"~"# Severe protein calorie malnutrition-hopefully we can start feeding soon as NG tube is out"~"# Ex-smoker"~"# DVT prophylaxis-Lovenox"~"# Full code"~"D/W RN at bed side"~"Discussed with wife at bedside"~"Anticipated Discharge: &gt; 48 hours"~"Subjective/Interval History"~"-"~"-: "~"Date of Service:  February 2, 2025"~"Objective Data"~"-"~"Labs: "~"Laboratory Results"~" 	02/02/25"~" 	11:09"~"WBC	 12.8 H"~"Hgb	 10.9 L"~"Hct	 33.8 L"~"Plt Count	 238"~"Sodium	 134 L"~"Potassium	 3.4 L"~"Chloride	 99"~"Carbon Dioxide	 30"~"BUN	 17"~"Creatinine	 0.6 L"~"Glucose	 128 H"~"Calcium	 8.4"~"Vital Signs: "~"Vital Signs"~"Temp	Pulse	Resp	BP	Pulse Ox"~" 97.8 F 	 75 	 16 	 140/78 	 98 "~" 02/02/25 07:42	 02/02/25 07:42	 02/02/25 07:42	 02/02/25 07:42	 02/02/25 07:42"~"I&O"~"	02/01/25	02/02/25	02/03/25"~"	06:59	06:59	06:59"~"Intake Total	2790 / 2790	1730 / 1730	"~"Output Total	1700 / 1700	1150 / 1150	375 / 375"~"Balance	1090 / 1090	580 / 580	-375 / -375"

## 2025-02-02 NOTE — PTCARENOTE
pt x2 assist OOB to chair. NG tube tontinues to low int. suction. irrigations as ordered. No change in assessment. Encouraged ambulation which pt did refuse. education R/T IS, deep breathing, and mobility. CB in reach, spouse at bedside.

## 2025-02-03 VITALS — DIASTOLIC BLOOD PRESSURE: 99 MMHG | SYSTOLIC BLOOD PRESSURE: 179 MMHG | RESPIRATION RATE: 18 BRPM

## 2025-02-03 VITALS — RESPIRATION RATE: 24 BRPM | DIASTOLIC BLOOD PRESSURE: 71 MMHG | SYSTOLIC BLOOD PRESSURE: 145 MMHG

## 2025-02-03 VITALS — RESPIRATION RATE: 24 BRPM | DIASTOLIC BLOOD PRESSURE: 77 MMHG | SYSTOLIC BLOOD PRESSURE: 130 MMHG

## 2025-02-03 VITALS — DIASTOLIC BLOOD PRESSURE: 108 MMHG | RESPIRATION RATE: 26 BRPM | SYSTOLIC BLOOD PRESSURE: 190 MMHG

## 2025-02-03 VITALS — DIASTOLIC BLOOD PRESSURE: 73 MMHG | SYSTOLIC BLOOD PRESSURE: 146 MMHG | RESPIRATION RATE: 27 BRPM

## 2025-02-03 VITALS — RESPIRATION RATE: 22 BRPM | DIASTOLIC BLOOD PRESSURE: 104 MMHG | SYSTOLIC BLOOD PRESSURE: 178 MMHG

## 2025-02-03 VITALS — SYSTOLIC BLOOD PRESSURE: 161 MMHG | RESPIRATION RATE: 18 BRPM | DIASTOLIC BLOOD PRESSURE: 73 MMHG

## 2025-02-03 VITALS — RESPIRATION RATE: 18 BRPM | SYSTOLIC BLOOD PRESSURE: 176 MMHG | DIASTOLIC BLOOD PRESSURE: 122 MMHG

## 2025-02-03 VITALS — RESPIRATION RATE: 20 BRPM

## 2025-02-03 VITALS — RESPIRATION RATE: 18 BRPM | DIASTOLIC BLOOD PRESSURE: 88 MMHG | SYSTOLIC BLOOD PRESSURE: 174 MMHG

## 2025-02-03 VITALS — DIASTOLIC BLOOD PRESSURE: 86 MMHG | SYSTOLIC BLOOD PRESSURE: 169 MMHG | RESPIRATION RATE: 26 BRPM

## 2025-02-03 VITALS — RESPIRATION RATE: 16 BRPM

## 2025-02-03 LAB
ALBUMIN SERPL-MCNC: 3.1 G/DL (ref 3.5–5)
ALP SERPL-CCNC: 124 U/L (ref 38–126)
ALT SERPL-CCNC: 16 U/L (ref 0–50)
AST SERPL-CCNC: 16 U/L (ref 17–59)
BASE EXCESS BLDA CALC-SCNC: 2.9 MMOL/L
BUN SERPL-MCNC: 18 MG/DL (ref 9–20)
BUN SERPL-MCNC: 19 MG/DL (ref 9–20)
CALCIUM SERPL-MCNC: 8.4 MG/DL (ref 8.4–10.2)
CALCIUM SERPL-MCNC: 9 MG/DL (ref 8.4–10.2)
CHLORIDE SERPL-SCNC: 98 MMOL/L (ref 98–107)
CHLORIDE SERPL-SCNC: 98 MMOL/L (ref 98–107)
CO2 SERPL-SCNC: 26 MMOL/L (ref 22–30)
CO2 SERPL-SCNC: 31 MMOL/L (ref 22–30)
EGFR: > 60
EGFR: > 60
ERYTHROCYTE [DISTWIDTH] IN BLOOD BY AUTOMATED COUNT: 14.1 % (ref 11.5–14.5)
ERYTHROCYTE [DISTWIDTH] IN BLOOD BY AUTOMATED COUNT: 14.2 % (ref 11.5–14.5)
ESTIMATED CREATININE CLEARANCE: 106 ML/MIN
ESTIMATED CREATININE CLEARANCE: 106 ML/MIN
GLUCOSE - POINT OF CARE: 117 MG/DL (ref 70–99)
GLUCOSE - POINT OF CARE: 137 MG/DL (ref 70–99)
GLUCOSE - POINT OF CARE: 161 MG/DL (ref 70–99)
GLUCOSE - POINT OF CARE: 168 MG/DL (ref 70–99)
GLUCOSE SERPL-MCNC: 129 MG/DL (ref 70–99)
GLUCOSE SERPL-MCNC: 170 MG/DL (ref 70–99)
HCO3 BLDA-SCNC: 27.9 MMOL/L (ref 21–28)
HCT VFR BLD AUTO: 34.6 % (ref 39–52)
HCT VFR BLD AUTO: 37.7 % (ref 39–52)
HGB BLD-MCNC: 11.3 G/DL (ref 13–18)
HGB BLD-MCNC: 12.6 G/DL (ref 13–18)
MAGNESIUM SERPL-MCNC: 2.1 MG/DL (ref 1.6–2.3)
MCHC RBC AUTO-ENTMCNC: 32.7 G/DL (ref 33–37)
MCHC RBC AUTO-ENTMCNC: 33.4 G/DL (ref 33–37)
MCV RBC AUTO: 85.7 FL (ref 80–94)
MCV RBC AUTO: 86.7 FL (ref 80–94)
NRBC BLD AUTO-RTO: 0 %
PCO2 BLDA: 43 MMHG (ref 35–48)
PLATELET # BLD AUTO: 264 10^3/UL (ref 130–400)
PLATELET # BLD AUTO: 318 10^3/UL (ref 130–400)
PO2 BLDA: 82 MMHG (ref 83–108)
POTASSIUM SERPL-SCNC: 3.4 MMOL/L (ref 3.5–5.1)
POTASSIUM SERPL-SCNC: 3.4 MMOL/L (ref 3.5–5.1)
PROCALCITONIN SERPL-MCNC: 0.3 NG/ML (ref 0–0.25)
PROT SERPL-MCNC: 6.4 G/DL (ref 6.3–8.2)
SAO2 % BLDA: 98.3 % (ref 94–98)
SODIUM SERPL-SCNC: 135 MMOL/L (ref 135–145)
SODIUM SERPL-SCNC: 136 MMOL/L (ref 135–145)
TRIGL SERPL-MCNC: 149 MG/DL (ref 10–149)
TROPONIN I SERPL-MCNC: 0.07 NG/ML

## 2025-02-03 RX ADMIN — METOPROLOL TARTRATE 5 MG: 1 INJECTION, SOLUTION INTRAVENOUS at 19:55

## 2025-02-03 RX ADMIN — SODIUM CHLORIDE 10 ML: 9 INJECTION, SOLUTION INTRAMUSCULAR; INTRAVENOUS; SUBCUTANEOUS at 20:54

## 2025-02-03 RX ADMIN — PANTOPRAZOLE SODIUM 40 MG: 40 INJECTION, POWDER, LYOPHILIZED, FOR SOLUTION INTRAVENOUS at 20:54

## 2025-02-03 RX ADMIN — SODIUM CHLORIDE SOLN NEBU 3% 1 VIAL: 3 NEBU SOLN at 10:10

## 2025-02-03 RX ADMIN — TAZOBACTAM SODIUM AND PIPERACILLIN SODIUM 50: 375; 3 INJECTION, SOLUTION INTRAVENOUS at 21:20

## 2025-02-03 RX ADMIN — CEFTRIAXONE 1000 MG: 1 INJECTION, POWDER, FOR SOLUTION INTRAMUSCULAR; INTRAVENOUS at 14:26

## 2025-02-03 RX ADMIN — PANTOPRAZOLE SODIUM 40 MG: 40 INJECTION, POWDER, LYOPHILIZED, FOR SOLUTION INTRAVENOUS at 09:19

## 2025-02-03 RX ADMIN — ACETAMINOPHEN 100: 10 INJECTION INTRAVENOUS at 20:45

## 2025-02-03 RX ADMIN — INSULIN ASPART: 100 INJECTION, SOLUTION INTRAVENOUS; SUBCUTANEOUS at 12:35

## 2025-02-03 RX ADMIN — INSULIN ASPART 1 UNITS: 100 INJECTION, SOLUTION INTRAVENOUS; SUBCUTANEOUS at 23:46

## 2025-02-03 RX ADMIN — SODIUM CHLORIDE 10 ML: 9 INJECTION, SOLUTION INTRAMUSCULAR; INTRAVENOUS; SUBCUTANEOUS at 09:20

## 2025-02-03 RX ADMIN — MORPHINE SULFATE 1 MG: 2 INJECTION, SOLUTION INTRAMUSCULAR; INTRAVENOUS at 21:22

## 2025-02-03 RX ADMIN — LEVALBUTEROL HYDROCHLORIDE 0.63 MG: 0.63 SOLUTION RESPIRATORY (INHALATION) at 21:32

## 2025-02-03 RX ADMIN — WATER 10 ML: 1 INJECTION INTRAMUSCULAR; INTRAVENOUS; SUBCUTANEOUS at 14:26

## 2025-02-03 RX ADMIN — ENOXAPARIN SODIUM 40 MG: 40 INJECTION SUBCUTANEOUS at 18:35

## 2025-02-03 RX ADMIN — VANCOMYCIN HYDROCHLORIDE 535 MG: 1 INJECTION, POWDER, LYOPHILIZED, FOR SOLUTION INTRAVENOUS at 22:05

## 2025-02-03 RX ADMIN — INSULIN ASPART 1 UNITS: 100 INJECTION, SOLUTION INTRAVENOUS; SUBCUTANEOUS at 18:28

## 2025-02-03 RX ADMIN — INSULIN ASPART: 100 INJECTION, SOLUTION INTRAVENOUS; SUBCUTANEOUS at 05:48

## 2025-02-03 NOTE — PHA.VAN.IN
"Assessment"~"- Assessment"~"Renal Function: Appears similar to baseline"~"Concomitant Antimicrobials: ZOSYN"~"- Previous Dosing Experience"~"Previous Regimen: NONE"~"AUC Dosing Plan"~"- Dosing Variables"~"Dosing Weight (kg): 71.4"~"Dosing CrCl (ml/min): 100"~"Vd coefficient (L/kg): 0.7"~"- Empiric Dosing"~"Initial / Loading Dose: 1750MG"~"Maintenance Regimen: 1GM IV Q12H"~"Estimated AUC (mcg*h/mL): 478"~"Estimated Peak (mcg*h/mL): 30.8"~"Estimated Trough (mcg/ml): 11.8"~"Estimated Half Life (H): 7.9"~"Pharmacokinetics Vancomycin I"~"- -"~"Patient Age: 74"~"Patient Sex: Male"~"Vancomycin Day #: 1"~"Indication: Pulmonary/Respiratory"~"Requesting Provider: SONYA"~"Height / Weight: "~"Height                        	5 ft 11 in                                      	"~"Actual Weight                 	71.441 kg                                       	"~"- Vital Signs / Lab Results"~"-: "~"Temp	Pulse	Resp	BP	Pulse Ox"~" 100.6 F H	 90 	 20 	 210/103 	 95 "~" 02/03/25 19:00	 02/03/25 21:34	 02/03/25 21:34	 02/03/25 19:55	 02/03/25 21:34"~"Lab Results - Hematology"~" 	02/01/25	02/02/25	02/03/25"~" 	09:21	11:09	08:49"~"WBC	 13.4 H	 12.8 H	 12.0 H"~" 	02/03/25"~" 	20:11"~"WBC	 24.2 H"~"Lab Results - Chemistry"~" 	02/01/25	02/02/25	02/03/25"~" 	09:21	11:09	08:49"~"BUN	 22 H	 17	 18"~"Creatinine	 0.7	 0.6 L	 0.6 L"~"Estimated Creat Clear	 89	 108	 106"~"Albumin			 3.1 L"~" 	02/03/25"~" 	20:12"~"BUN	 19"~"Creatinine	 0.6 L"~"Estimated Creat Clear	 106"~"Albumin	"~"Microbiology Results"~" 02/03/25 20:11	Influenza Types A & B (ERIKA) - Final"~" Nasal Swab	   Negative for Influenza A & B, NAAT"~"	   Negative results must be combined with clinical observations"~"	   and patient history."~"	   Nucleic Acid Amplification test (NAAT)performed on the"~"	   [employer] platform."

## 2025-02-03 NOTE — W.PN.ONC2
"Today's Communication / Plan"~"-"~"-: "~"- continue TPN. "~"- apprec surgry, nutrition input. "~"Impression"~"Impression"~"-: "~"High-grade partial small bowel obstruction, recurrent -malignant"~"SBFT with 1:15 transit time, no dilation, no mucosal abnormality apparent"~"advanced GE junction cancer, w/ esophageal stent, s/p cycle #1 of FLOT chemotherapy"~"pleural effusion, s/p thora on 1/20/25, malignant"~"EGD with nodular gastric mucosa (path pending) and reported resistance at distal duodenum"~"Malnutrition, started on TPN"~"Plan"~"Plan"~"-: "~"diagnostic laparoscopy 1/31 revealed area of obstruction at ligament of treitz from retroperitoneal adenopathy causing extrinsic compression, bulky disease at distal esophagus also noted.  gastrojejunostomy bypass performed."~"NGT in place. likely plan to clamp and remove in next 24-72 hours. ADAT at that point with hopes of improving current nutritional status. On TPN for now. "~"add'l testing pending on cell block from drained pleural effusion - Her2 = 0, MMR - Low prob MSI-H.  PD-L1 is pending"~"candidacy for palliative systemic interventions pending recovery and improvement in PS. ongoing GOC discussions. "~"Subjective/Objective"~"Chief Complaint"~"-: "~"SBO, metastatic esophageal cancer"~"Subjective"~"-: "~"pt has no new complaints today. He still has NGT in place, receiving TPN. Denies recurrent abdominal pain, bloating, nausea. He notes mild tenderness at incision site. no fevers."~"Vital Signs: "~"Vital Signs"~"Temp	Pulse	Resp	BP	Pulse Ox"~" 98.0 F 	 84 	 18 	 161/73 	 95 "~" 02/03/25 07:51	 02/03/25 07:51	 02/03/25 07:51	 02/03/25 07:51	 02/03/25 07:51"~"Lab Results: "~"Laboratory Data"~"WBC	 12.0 10^3/uL (4.8-10.8)  H 	02/03/25  08:49    "~"Hgb	 11.3 g/dL (13.0-18.0)  L 	02/03/25  08:49    "~"Plt Count	 264 10^3/uL (130-400)  	02/03/25  08:49    "~"eGFR	 &gt; 60.00  	02/03/25  08:49    "~"Physical Exam"~"HEENT: No Jaundice"~"Cardiology: Normal Sinus Rhythm"~"Pulmonary: Clear"~"GI: Soft and Other (midline incision w/ mild erythema at superior and inferior aspect, no dehiscence ); No Distended"~"Neuro: Non Focal"~"Review of Systems"~"Review of Systems"~"Constitutional: Denies Fever"~"Head: Denies Sore Throat"~"Respiratory: Denies Dyspnea or Cough"~"Cardiovascular: Denies Chest Pain"~"Gastrointestinal: Denies Nausea/Vomiting"

## 2025-02-03 NOTE — W.PN.UPDATE
"Update Note"~"Progress Note Update"~"-: "~"Reported by the nursing staff that the patient is [id number], bp 210/103, recheck is 171/ 90, fever, patient still complaining of slightly SOB patient on 6 L of  O2. Denied chest pain.  "~"CBC, BMP, Pro veda, blood culture, covid, influenza, abg. "~"IV Lopressor 5mg and IV Tylenol. Hr down to 100s."~"  "~"Chest xray done this afternoon result shows Moderate opacification right lower lung, concerning for pneumonia versus layering pleural fluid. Progressed."~"Small right pleural effusion. Progressed."~"Chest CT/PE done this afternoon and result shows "~"-Moderate right lower lobe pneumonia. Mild left lower lobe pneumonia. New bilaterally"~"-Moderate right pleural effusion. Increased. Small left pleural effusion. Increased"~"-No pulmonary embolus."~"-Small pericardial effusion. Increased."~"-Covid, flu (Neg)"~"-WBC is trending up from 12 to 24, fever, Procalcitonin is positive will start the patient on Zosyn and vanco "~"-One hr later patient complained of chest pain sternum area, non radiating, described as pressure. Patient is tachypneic EKG done and will order troponin. "~"Will give breathing treatment and small dose of morphine"~"Transfer to imu"~"One time order of Lasix 20 mg given  "~"Abnormal troponin received x2, will trend troponin, monitor EKG "~"Cardiology consult "~" "

## 2025-02-03 NOTE — PTCARENOTE
"Pt. c/o continuing SOB and chest pain. Provider notified, EKG performed and pt. found to be in sinus tachycardia. Pt. also found to have fever of 100.6 and elevated BP (200s/100s). One time dose of lopressor administered as ordered. Labs and blood "~"cultures drawn and sent. Provider at bedside. One time dose of morphine given for SOB/chest pain. Provider placed order to transfer pt to IMU. Pt. assigned to room 3367. Waiting for room to be cleaned and report will be called to RN prior to "~"transfer."

## 2025-02-03 NOTE — W.PN.SURGUPD
"Surgical Update"~"Surgical Update"~"-: "~"S: Patient with UGI study today. Reviewed with patient and family at bedside with Dr. Tomlinson"~"B: POD #3 GJ bypass and feeding jejunostomy tube placement and found to have bulky retroperitoneal disease around pancreas causing small bowel obstruction at the ligament of Treitz. "~"A: Still with high outputs from NGT and not yet passing flatus. UGI study done today to further evaluate, unable to visualize GJ junction on study. Residual contrast from prior study within the colon."~"R: Will plan intermittent clamping of NGT overnight and follow for tolerance. If able to tolerate without nausea, increasing pain; anticipate trickle tube feedings starting via j-tube tomorrow. Continued on TPN for nutritional needs. "

## 2025-02-03 NOTE — W.PN.GS2
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 Presenting with recurrent obstruction at ligament of treitz."~"POD #3 open GJ bypass and feeding jejunostomy tube placement found to have bulky retroperitoneal disease around pancreas causing small bowel obstruction at the ligament of Treitz.  "~"Will plan for a small bowel follow-through via the NG.  If no residual obstruction, will plan to remove the NG today and start trickle tube feeds."~"Continue TPN for now"~"Nutrition following"~"Pain control"~"DVT prophylaxis"~"General Surgery will continue to follow"~"Time Spent"~"Total Time Spent with Patient (in minutes): 20"~"Subjective Data"~"-"~"-: "~"Date of Service:  February 3, 2025"~"Interval Events: "~"No acute events overnight. Slept well. Pain Controlled. Denies Nausea/Vomiting, -bowel function."~"Objective Data"~"-"~"-: "~"Intake and Output"~"	02/02/25	02/03/25	02/04/25"~"	06:59	06:59	06:59"~"Intake Total	1730 / 1730	1832 / 1832	"~"Output Total	1150 / 1150	1790 / 1790	"~"Balance	580 / 580	42 / 42	"~"Intake:			"~"  IV fluids (Total)	1440 / 1440	1400 / 1400	"~"  IV piggybacks	200 / 200		"~"  TPN/PPN		342 / 342	"~"  Amount instilled into GI Tube (	90 / 90	90 / 90	"~"  Total)			"~"    [place] Sump	90 / 90	90 / 90	"~"Output:			"~"  Gastrointestinal tube output (	600 / 600	1265 / 1265	"~"  Total)			"~"    [place] Sump	600 / 600	1265 / 1265	"~"  Urine, Mehta	550 / 550	525 / 525	"~"Vital Signs"~"Temp	Pulse	Resp	BP	Pulse Ox"~" 98.0 F 	 84 	 18 	 161/73 	 95 "~" 02/03/25 07:51	 02/03/25 07:51	 02/03/25 07:51	 02/03/25 07:51	 02/03/25 07:51"~"Lab Results"~"02/03/25 08:49 "~"02/03/25 08:49 "~"Calcium	 8.4 mg/dl (8.4-10.2)  	02/03/25  08:49    "~"Phosphorus	 2.1 mg/dl (2.5-4.5)  L 	02/03/25  08:49    "~"Magnesium	 2.1 mg/dl (1.6-2.3)  	02/03/25  08:49    "~"Total Bilirubin	 0.6 mg/dl (0.2-1.3)  	02/03/25  08:49    "~"Direct Bilirubin	 0.2 mg/dl (0.0-0.4)  	01/27/25  04:14    "~"AST	 16 U/L (17-59)  L 	02/03/25  08:49    "~"ALT	 16 U/L (0-50)  	02/03/25  08:49    "~"Alkaline Phosphatase	 124 U/L ()  	02/03/25  08:49    "~"Total Protein	 6.4 g/dl (6.3-8.2)  	02/03/25  08:49    "~"Albumin	 3.1 g/dl (3.5-5.0)  L 	02/03/25  08:49    "~"Physical Exam"~"-"~"-: "~"GENERAL/NEURO: Awake, Alert, no distress"~"CHEST: Unlabored breathing on RA"~"ABDOMEN: Soft, appropriately tender, mildly distended, NG tube with bilious output, J-tube in place."~"Patient has a mehta catheter: No"~"Patient has a central line: No"

## 2025-02-03 NOTE — W.PN.HOSP.TC
"Today's Communication/Plan"~"-"~"-: "~"TPN renewed."~"SBFT results pending"~"OOB to chair"~"Encouraged patient for increase activity"~"Folate to come out today"~"Assessment / Plan"~"Assessment / Plan"~"-: "~"74-year-old male admitted with intractable abdominal pain vomiting.  Patient was admitted and was discharged on 1/20/2025.  He was felt to have intractable vomiting secondary to chemotherapy at that time."~"CVS: S1-S2 normal"~"Chest: Coarse breath sounds on the left"~"Abdomen: Soft, surgical wound, G-tube, Diminished bowels sounds, a lot of NG tube output"~"Extremities: No edema"~"# Acute high-grade partial small bowel obstruction"~"History of recurrent SBO"~"Had a BM 1/27/25, but continued to have obstructive symptoms"~"GE junction carcinoma status post esophageal stent on FLOT chemotherapy"~"Push enteroscopy 1/28/2025-esophageal stent was fixed with a clip, nodular mucosa in the gastric body biopsied"~"Status post diagnostic laparoscopy, laparotomy, Isolated small bowel obstruction at ligament of treats due to 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  "~"N.p.o. with NG tube till recovery of bowels"~"TPN started,  Patient can be fed through the tube when okay with surgery"~"Normal follow-through today-results pending"~"Surgery following"~"# GE junction carcinoma status post esophageal stent on FLOT chemotherapy"~"# Low TSH-likely euthyroid sick syndrome.  Free T4 normal."~"# Leukocytosis-coarse breath sounds.  Possible aspiration pneumonitis.  Started ceftriaxone.  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 Martinez"~"Onc following"~"# right pleural effusion"~"History of thoracentesis done on 1/20/2025"~"Malignant effusion "~"Option for repeat thoracentesis given if SOB "~"# Hypertension-Hold amlodipine and lisinopril"~"# Hyperthyroidism-methimazole on hold secondary to n.p.o. status.  Slightly low TSH but normal free T4 as of 1/30/2025"~"# Hyperlipidemia-hold statin as n.p.o."~"# Stage II sacral pressure injury-change position/offload/wound care"~"# Severe protein calorie malnutrition-hopefully we can start feeding soon as NG tube is out. "~"# Ex-smoker"~"# DVT prophylaxis-Lovenox"~"# Full code"~"D/W RN at bed side"~"Discussed with wife at bedside"~"Anticipated Discharge: 24 - 48 hours"~"Subjective/Interval History"~"-"~"-: "~"Date of Service:  February 3, 2025"~"Objective Data"~"-"~"Labs: "~"Laboratory Results"~" 	02/03/25"~" 	08:49"~"WBC	 12.0 H"~"Hgb	 11.3 L"~"Hct	 34.6 L"~"Plt Count	 264"~"Sodium	 136"~"Potassium	 3.4 L"~"Chloride	 98"~"Carbon Dioxide	 31 H"~"BUN	 18"~"Creatinine	 0.6 L"~"Glucose	 129 H"~"Calcium	 8.4"~"Total Bilirubin	 0.6"~"AST	 16 L"~"ALT	 16"~"Alkaline Phosphatase	 124"~"Vital Signs: "~"Vital Signs"~"Temp	Pulse	Resp	BP	Pulse Ox"~" 98.0 F 	 84 	 16 	 161/73 	 95 "~" 02/03/25 07:51	 02/03/25 10:12	 02/03/25 10:12	 02/03/25 07:51	 02/03/25 07:51"~"I&O"~"	02/02/25	02/03/25	02/04/25"~"	06:59	06:59	06:59"~"Intake Total	1730 / 1730	1832 / 1832	"~"Output Total	1150 / 1150	1790 / 1790	"~"Balance	580 / 580	42 / 42	"

## 2025-02-03 NOTE — PTCARENOTE
"Pt's family at bedside, ran into hallway stating, 'He needs help!' Pt assessed. Stating he is short of breath. Tachypneic with RR 30s - 40s. SaO2 found to be 76% on room air. HR 140s. 6L NC applied. SaO2 slowly amanda to &gt; 90%. HR remains 140s. BP "~"179/99. Axillary temp 99.7F. EKG obtained resulting Sinus Tach. Dr Jameson notified, arrived to bedside to assess. New orders for CXR and Abdominal XR and PE Study. Dr. Gold, General Surgery, notified. NGT had been clamped approximately 2 hours "~"prior. Instructed to return to suction. 50ml green output suctioned from NGT. Pt upgraded to telemetry."

## 2025-02-03 NOTE — W.PN.UPDATE
"Addendum entered and electronically signed by Jesus Jameson DO  02/03/25 17:54: "~"Correction: Patient is on Lovenox for chemical DVT prophylaxis.  Risks of PE still fairly high with his malignancy and recent OR procedure"~"Original Note:"~"Update Note"~"Progress Note Update"~"-: "~"Notified by nursing at 1720 that patient was tachycardic with heart rate 140/min, hypoxemic with SpO2 in the 70s on room air.  Was placed on supplemental oxygen via nasal cannula with improved SpO2 into the 90s.  Remained persistently tachycardic.  "~"ECG showing sinus tachycardia with nonspecific ST and T wave changes.  No new ST deviation or obvious T wave abnormality, patient denies chest pain."~"Unclear cause for this tachycardia and dyspnea though differentials do include pulmonary embolus, intra-abdominal complication, aspiration.  Concern for pulmonary embolism with recent surgical procedure, lack of chemical DVT prophylaxis for this "~"reason, and his malignancy history.  Will order stat portable x-ray chest and abdomen to further assess.  If negative will proceed with CTA thorax to rule out pulmonary embolism.  Will defer against beta-blockade for now as appears sinus tachycardia "~"and likely physiologically driven. Wean oxygen for SpO2 goal &gt;90%."~"His family was at the bedside, also raise concerns about possible delirium."

## 2025-02-04 VITALS — RESPIRATION RATE: 22 BRPM

## 2025-02-04 VITALS — RESPIRATION RATE: 21 BRPM

## 2025-02-04 VITALS — RESPIRATION RATE: 23 BRPM

## 2025-02-04 VITALS — DIASTOLIC BLOOD PRESSURE: 67 MMHG | RESPIRATION RATE: 28 BRPM | SYSTOLIC BLOOD PRESSURE: 115 MMHG

## 2025-02-04 VITALS — RESPIRATION RATE: 20 BRPM

## 2025-02-04 VITALS — RESPIRATION RATE: 22 BRPM | SYSTOLIC BLOOD PRESSURE: 132 MMHG | DIASTOLIC BLOOD PRESSURE: 76 MMHG

## 2025-02-04 VITALS — RESPIRATION RATE: 33 BRPM | SYSTOLIC BLOOD PRESSURE: 159 MMHG | DIASTOLIC BLOOD PRESSURE: 81 MMHG

## 2025-02-04 VITALS — RESPIRATION RATE: 26 BRPM

## 2025-02-04 VITALS — RESPIRATION RATE: 24 BRPM | SYSTOLIC BLOOD PRESSURE: 155 MMHG | DIASTOLIC BLOOD PRESSURE: 30 MMHG

## 2025-02-04 VITALS — RESPIRATION RATE: 22 BRPM | SYSTOLIC BLOOD PRESSURE: 121 MMHG | DIASTOLIC BLOOD PRESSURE: 64 MMHG

## 2025-02-04 VITALS — RESPIRATION RATE: 35 BRPM

## 2025-02-04 VITALS — DIASTOLIC BLOOD PRESSURE: 75 MMHG | RESPIRATION RATE: 20 BRPM | SYSTOLIC BLOOD PRESSURE: 146 MMHG

## 2025-02-04 VITALS — SYSTOLIC BLOOD PRESSURE: 144 MMHG | RESPIRATION RATE: 20 BRPM | DIASTOLIC BLOOD PRESSURE: 67 MMHG

## 2025-02-04 VITALS — SYSTOLIC BLOOD PRESSURE: 155 MMHG | DIASTOLIC BLOOD PRESSURE: 88 MMHG | RESPIRATION RATE: 37 BRPM

## 2025-02-04 VITALS — RESPIRATION RATE: 26 BRPM | DIASTOLIC BLOOD PRESSURE: 94 MMHG | SYSTOLIC BLOOD PRESSURE: 163 MMHG

## 2025-02-04 VITALS — RESPIRATION RATE: 29 BRPM | DIASTOLIC BLOOD PRESSURE: 85 MMHG | SYSTOLIC BLOOD PRESSURE: 139 MMHG

## 2025-02-04 VITALS — DIASTOLIC BLOOD PRESSURE: 61 MMHG | RESPIRATION RATE: 19 BRPM | SYSTOLIC BLOOD PRESSURE: 121 MMHG

## 2025-02-04 VITALS — RESPIRATION RATE: 28 BRPM | SYSTOLIC BLOOD PRESSURE: 176 MMHG | DIASTOLIC BLOOD PRESSURE: 159 MMHG

## 2025-02-04 VITALS — RESPIRATION RATE: 27 BRPM | DIASTOLIC BLOOD PRESSURE: 99 MMHG | SYSTOLIC BLOOD PRESSURE: 193 MMHG

## 2025-02-04 VITALS — RESPIRATION RATE: 18 BRPM

## 2025-02-04 VITALS — DIASTOLIC BLOOD PRESSURE: 75 MMHG | RESPIRATION RATE: 24 BRPM | SYSTOLIC BLOOD PRESSURE: 143 MMHG

## 2025-02-04 VITALS — DIASTOLIC BLOOD PRESSURE: 82 MMHG | SYSTOLIC BLOOD PRESSURE: 123 MMHG | RESPIRATION RATE: 24 BRPM

## 2025-02-04 VITALS — RESPIRATION RATE: 33 BRPM

## 2025-02-04 VITALS — RESPIRATION RATE: 23 BRPM | DIASTOLIC BLOOD PRESSURE: 79 MMHG | SYSTOLIC BLOOD PRESSURE: 132 MMHG

## 2025-02-04 VITALS — SYSTOLIC BLOOD PRESSURE: 142 MMHG | RESPIRATION RATE: 24 BRPM | DIASTOLIC BLOOD PRESSURE: 79 MMHG

## 2025-02-04 VITALS — RESPIRATION RATE: 20 BRPM | SYSTOLIC BLOOD PRESSURE: 133 MMHG | DIASTOLIC BLOOD PRESSURE: 65 MMHG

## 2025-02-04 VITALS — RESPIRATION RATE: 19 BRPM

## 2025-02-04 VITALS — DIASTOLIC BLOOD PRESSURE: 73 MMHG | RESPIRATION RATE: 33 BRPM | SYSTOLIC BLOOD PRESSURE: 130 MMHG

## 2025-02-04 VITALS — RESPIRATION RATE: 27 BRPM

## 2025-02-04 VITALS — DIASTOLIC BLOOD PRESSURE: 85 MMHG | RESPIRATION RATE: 25 BRPM | SYSTOLIC BLOOD PRESSURE: 141 MMHG

## 2025-02-04 VITALS — RESPIRATION RATE: 32 BRPM

## 2025-02-04 VITALS — DIASTOLIC BLOOD PRESSURE: 69 MMHG | SYSTOLIC BLOOD PRESSURE: 154 MMHG | RESPIRATION RATE: 24 BRPM

## 2025-02-04 VITALS — RESPIRATION RATE: 18 BRPM | DIASTOLIC BLOOD PRESSURE: 64 MMHG | SYSTOLIC BLOOD PRESSURE: 126 MMHG

## 2025-02-04 VITALS — RESPIRATION RATE: 30 BRPM

## 2025-02-04 VITALS — RESPIRATION RATE: 31 BRPM

## 2025-02-04 VITALS — DIASTOLIC BLOOD PRESSURE: 85 MMHG | SYSTOLIC BLOOD PRESSURE: 131 MMHG

## 2025-02-04 VITALS — RESPIRATION RATE: 22 BRPM | SYSTOLIC BLOOD PRESSURE: 130 MMHG | DIASTOLIC BLOOD PRESSURE: 56 MMHG

## 2025-02-04 VITALS — SYSTOLIC BLOOD PRESSURE: 139 MMHG | DIASTOLIC BLOOD PRESSURE: 71 MMHG | RESPIRATION RATE: 17 BRPM

## 2025-02-04 VITALS — RESPIRATION RATE: 26 BRPM | SYSTOLIC BLOOD PRESSURE: 144 MMHG | DIASTOLIC BLOOD PRESSURE: 80 MMHG

## 2025-02-04 VITALS — SYSTOLIC BLOOD PRESSURE: 112 MMHG | RESPIRATION RATE: 18 BRPM | DIASTOLIC BLOOD PRESSURE: 72 MMHG

## 2025-02-04 VITALS — SYSTOLIC BLOOD PRESSURE: 132 MMHG | RESPIRATION RATE: 22 BRPM | DIASTOLIC BLOOD PRESSURE: 75 MMHG

## 2025-02-04 VITALS — RESPIRATION RATE: 22 BRPM | SYSTOLIC BLOOD PRESSURE: 157 MMHG | DIASTOLIC BLOOD PRESSURE: 83 MMHG

## 2025-02-04 VITALS — SYSTOLIC BLOOD PRESSURE: 124 MMHG | DIASTOLIC BLOOD PRESSURE: 66 MMHG | RESPIRATION RATE: 18 BRPM

## 2025-02-04 VITALS — RESPIRATION RATE: 24 BRPM

## 2025-02-04 VITALS — SYSTOLIC BLOOD PRESSURE: 170 MMHG | DIASTOLIC BLOOD PRESSURE: 85 MMHG | RESPIRATION RATE: 25 BRPM

## 2025-02-04 VITALS — RESPIRATION RATE: 27 BRPM | SYSTOLIC BLOOD PRESSURE: 164 MMHG | DIASTOLIC BLOOD PRESSURE: 81 MMHG

## 2025-02-04 VITALS — SYSTOLIC BLOOD PRESSURE: 154 MMHG | DIASTOLIC BLOOD PRESSURE: 107 MMHG | RESPIRATION RATE: 29 BRPM

## 2025-02-04 VITALS — DIASTOLIC BLOOD PRESSURE: 69 MMHG | SYSTOLIC BLOOD PRESSURE: 109 MMHG

## 2025-02-04 VITALS — RESPIRATION RATE: 14 BRPM

## 2025-02-04 VITALS — SYSTOLIC BLOOD PRESSURE: 175 MMHG | RESPIRATION RATE: 27 BRPM | DIASTOLIC BLOOD PRESSURE: 96 MMHG

## 2025-02-04 VITALS — RESPIRATION RATE: 28 BRPM | DIASTOLIC BLOOD PRESSURE: 69 MMHG

## 2025-02-04 VITALS — SYSTOLIC BLOOD PRESSURE: 127 MMHG | RESPIRATION RATE: 21 BRPM | DIASTOLIC BLOOD PRESSURE: 69 MMHG

## 2025-02-04 VITALS — RESPIRATION RATE: 34 BRPM

## 2025-02-04 LAB
ALBUMIN SERPL-MCNC: 2.7 G/DL (ref 3.5–5)
ALP SERPL-CCNC: 119 U/L (ref 38–126)
ALT SERPL-CCNC: 15 U/L (ref 0–50)
AST SERPL-CCNC: 17 U/L (ref 17–59)
BODY FLUID SECOND TECH: (no result)
BUN SERPL-MCNC: 25 MG/DL (ref 9–20)
CALCIUM SERPL-MCNC: 8.6 MG/DL (ref 8.4–10.2)
CHLORIDE SERPL-SCNC: 103 MMOL/L (ref 98–107)
CO2 SERPL-SCNC: 29 MMOL/L (ref 22–30)
EGFR: > 60
ERYTHROCYTE [DISTWIDTH] IN BLOOD BY AUTOMATED COUNT: 14.2 % (ref 11.5–14.5)
ESTIMATED CREATININE CLEARANCE: 102 ML/MIN
GLUCOSE - POINT OF CARE: 134 MG/DL (ref 70–99)
GLUCOSE - POINT OF CARE: 135 MG/DL (ref 70–99)
GLUCOSE - POINT OF CARE: 140 MG/DL (ref 70–99)
GLUCOSE - POINT OF CARE: 168 MG/DL (ref 70–99)
GLUCOSE SERPL-MCNC: 145 MG/DL (ref 70–99)
HCT VFR BLD AUTO: 34.7 % (ref 39–52)
HGB BLD-MCNC: 11.6 G/DL (ref 13–18)
LDH SERPL P TO L-CCNC: 146 U/L (ref 120–246)
MCHC RBC AUTO-ENTMCNC: 33.4 G/DL (ref 33–37)
MCV RBC AUTO: 86.1 FL (ref 80–94)
PLATELET # BLD AUTO: 247 10^3/UL (ref 130–400)
POTASSIUM SERPL-SCNC: 3.8 MMOL/L (ref 3.5–5.1)
PROT SERPL-MCNC: 5.6 G/DL (ref 6.3–8.2)
SODIUM SERPL-SCNC: 140 MMOL/L (ref 135–145)
TROPONIN I SERPL-MCNC: 0.05 NG/ML
TROPONIN I SERPL-MCNC: 0.07 NG/ML

## 2025-02-04 PROCEDURE — 0W993ZZ DRAINAGE OF RIGHT PLEURAL CAVITY, PERCUTANEOUS APPROACH: ICD-10-PCS | Performed by: RADIOLOGY

## 2025-02-04 RX ADMIN — TAZOBACTAM SODIUM AND PIPERACILLIN SODIUM 50: 375; 3 INJECTION, SOLUTION INTRAVENOUS at 04:12

## 2025-02-04 RX ADMIN — FUROSEMIDE 40 MG: 10 INJECTION, SOLUTION INTRAMUSCULAR; INTRAVENOUS at 18:36

## 2025-02-04 RX ADMIN — INSULIN ASPART: 100 INJECTION, SOLUTION INTRAVENOUS; SUBCUTANEOUS at 18:04

## 2025-02-04 RX ADMIN — SODIUM BICARBONATE 270 MEQ: 84 INJECTION, SOLUTION INTRAVENOUS at 01:31

## 2025-02-04 RX ADMIN — INSULIN ASPART: 100 INJECTION, SOLUTION INTRAVENOUS; SUBCUTANEOUS at 06:04

## 2025-02-04 RX ADMIN — TAZOBACTAM SODIUM AND PIPERACILLIN SODIUM 50: 375; 3 INJECTION, SOLUTION INTRAVENOUS at 09:07

## 2025-02-04 RX ADMIN — PANTOPRAZOLE SODIUM 40 MG: 40 INJECTION, POWDER, LYOPHILIZED, FOR SOLUTION INTRAVENOUS at 19:37

## 2025-02-04 RX ADMIN — MORPHINE SULFATE 1 MG: 2 INJECTION, SOLUTION INTRAMUSCULAR; INTRAVENOUS at 23:14

## 2025-02-04 RX ADMIN — SODIUM CHLORIDE 10 ML: 9 INJECTION, SOLUTION INTRAMUSCULAR; INTRAVENOUS; SUBCUTANEOUS at 19:37

## 2025-02-04 RX ADMIN — TAZOBACTAM SODIUM AND PIPERACILLIN SODIUM 50: 375; 3 INJECTION, SOLUTION INTRAVENOUS at 23:14

## 2025-02-04 RX ADMIN — INSULIN ASPART: 100 INJECTION, SOLUTION INTRAVENOUS; SUBCUTANEOUS at 12:26

## 2025-02-04 RX ADMIN — METOPROLOL TARTRATE 2.5 MG: 1 INJECTION, SOLUTION INTRAVENOUS at 19:35

## 2025-02-04 RX ADMIN — POTASSIUM CHLORIDE 50: 400 INJECTION, SOLUTION INTRAVENOUS at 18:45

## 2025-02-04 RX ADMIN — TAZOBACTAM SODIUM AND PIPERACILLIN SODIUM 50: 375; 3 INJECTION, SOLUTION INTRAVENOUS at 17:57

## 2025-02-04 RX ADMIN — ACETAMINOPHEN 100: 10 INJECTION INTRAVENOUS at 19:35

## 2025-02-04 RX ADMIN — SODIUM CHLORIDE 10 ML: 9 INJECTION, SOLUTION INTRAMUSCULAR; INTRAVENOUS; SUBCUTANEOUS at 09:07

## 2025-02-04 RX ADMIN — ENOXAPARIN SODIUM 40 MG: 40 INJECTION SUBCUTANEOUS at 17:57

## 2025-02-04 RX ADMIN — FUROSEMIDE 20 MG: 10 INJECTION, SOLUTION INTRAMUSCULAR; INTRAVENOUS at 02:20

## 2025-02-04 RX ADMIN — PANTOPRAZOLE SODIUM 40 MG: 40 INJECTION, POWDER, LYOPHILIZED, FOR SOLUTION INTRAVENOUS at 09:07

## 2025-02-04 RX ADMIN — VANCOMYCIN HYDROCHLORIDE 200: 1 INJECTION, SOLUTION INTRAVENOUS at 06:04

## 2025-02-04 NOTE — W.PN.UPDATE
"Update Note"~"Progress Note Update"~"-: "~"Called by hospitalist, Dr. Man, about sudden severe shortness of breath from this patient after getting a thoracentesis early on the right hemithorax.  CXR obtained and shows concern for reexpansion pulmonary edema.  No pneumothorax seen to my "~"view.  40 mg IV Lasix to be given now as well as 20 mill equivalents of potassium chloride through his central line.  I will get a repeat CXR at 10 PM tonight.  Continue to closely monitor."

## 2025-02-04 NOTE — W.PN.UPDATE
"Update Note"~"Progress Note Update"~"-: "~"Called to see patient as cross cover due to tachycardia and respiratory distress"~"Concern was for PTX post thoracentesis earlier today with follow up cxr neg, but done earlier.  "~"REpeat CXR done at 1830 did not demonstrate PTX, but was consistent with RLL PNA"~"PE:  chronic ill appearing, cachexic male not currently in distress"~"130/73  "~"Lungs wet rhonchi rt mid lung area"~"CV tachycardia"~"Abd soft, scaphoid"~"Imp: concern for probable new RLL PNA, ?aspiration"~"  call placed and discussed with Dr. Khalil, recommends continue current Zosyn, consult placed to pulm"~"reviewed with dgt in room"

## 2025-02-04 NOTE — PHA.VAN.FU
"Vancomycin Assessment / Plan"~"- Assessment"~"Renal Function: Stable"~"WBC's are: Trending Down"~"Concomitant Antimicrobials: piperacillin/tazobactam"~"- Dosing Plan"~"Continue: Vanc 1000mg Q12H"~"- Monitoring Plan"~"No level(s) ordered at this time: consider levels in next few days"~"- Follow Up"~"-: "~"Pharmacy will continue to follow.  "~"Vancomycin Follow UP"~"- -"~"Patient Age: 74"~"Patient Sex: Male"~"Vancomycin Day #: 2"~"Indication: Pulmonary/Respiratory"~"Requesting Provider: KURT Amaya"~"Pertinent Antimicrobial Allergies: "~"no pertinent antibiotic allergies"~"Height / Weight: "~"Height                        	5 ft 11 in                                      	"~"Actual Weight                 	68.9 kg                                         	"~"Pertinent Past Medical History: Esophageal cancer"~"- Vital Signs / Lab Results"~"-: "~"Temp	Pulse	Resp	BP	Pulse Ox"~" 98.2 F 	 89 	 23 	 132/79 	 97 "~" 02/04/25 11:20	 02/04/25 11:00	 02/04/25 11:00	 02/04/25 11:00	 02/04/25 11:41"~"Lab Results - Hematology"~" 	02/02/25	02/03/25	02/03/25"~" 	11:09	08:49	20:11"~"WBC	 12.8 H	 12.0 H	 24.2 H"~" 	02/04/25"~" 	02:11"~"WBC	 20.1 H"~"Lab Results - Chemistry"~" 	02/02/25	02/03/25	02/03/25"~" 	11:09	08:49	20:12"~"BUN	 17	 18	 19"~"Creatinine	 0.6 L	 0.6 L	 0.6 L"~"Estimated Creat Clear	 108	 106	 106"~"Albumin		 3.1 L	"~" 	02/04/25	02/04/25"~" 	02:11	08:27"~"BUN		 25 H"~"Creatinine		 0.6 L"~"Estimated Creat Clear		 102"~"Albumin	 2.7 L	"~"Microbiology Results"~" 02/03/25 20:11	Influenza Types A & B (ERIKA) - Final"~" Nasal Swab	   Negative for Influenza A & B, NAAT"~"	   Negative results must be combined with clinical observations"~"	   and patient history."~"	   Nucleic Acid Amplification test (NAAT)performed on the"~"	   [employer] NOW platform."

## 2025-02-04 NOTE — PTCARENOTE
"Pt. resting in bed, family bedside. "~"TF started per order, tpn running. "~"Scheduled IRAD for thoracentesis. "~"All questions answered, plan of care explained.  "

## 2025-02-04 NOTE — PTCARENOTE
"on assessment pt ST in the 150s, pt on 6L NC and nonrebreather, NP paged and made aware, pt denies chest pain and SOB, one time lopressor iv and IV Tylenol ordered and given see MAR, pt states he 'feels okay', daughter at bedside, call bell in reach "~"TF on hold at this time due to increased NGT output, pt continues with TPN."

## 2025-02-04 NOTE — W.PN.GS2
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 Presenting with recurrent obstruction at ligament of treitz."~"POD #4 open GJ bypass and feeding jejunostomy tube placement found to have bulky retroperitoneal disease around pancreas causing small bowel obstruction at the ligament of Treitz.  "~"CT chest negative for PE; moderate right pleural effusion, moderate RLL consolidation/possible PNA; possible LLL PNA"~"	expected intraabdominal free air under the diaphragm for POD#4 after laparotomy"~"Plan:   Vanco/Zosyn started overnight for probable PNA"~"	continue NGT decompression for now - if pulmonary status stabilizing over next 24hrs then will attempt clamping trials in 24-36hrs to assess for adequacy of decompression via GJ bypass"~"	start trick TF via J jube"~"	renew TPN for now while initiating"~"	DVT prophylaxis"~"Subjective Data"~"-"~"-: "~"Date of Service:  February 4, 2025"~"pt seen and examined"~"last evening events reviewed - acute onset tachycardia and hypoxia/dyspnea "~"CT chest PE protocol obtained and pt transferred to ICU"~"this am states he is much more comfortable"~"denies SOB, denies CP, denies abdominal pain"~"Objective Data"~"-"~"-: "~"Intake and Output"~"	02/03/25	02/04/25	02/05/25"~"	06:59	06:59	06:59"~"Intake Total	1832 / 1832	546 / 546	"~"Output Total	1790 / 1790	2100 / 2100	"~"Balance	42 / 42	-1554 / -1554	"~"Intake:			"~"  IV fluids (Total)	1400 / 1400		"~"  TPN/PPN	342 / 342	456 / 456	"~"  Amount instilled into GI Tube (	90 / 90	90 / 90	"~"  Total)			"~"    [place] Sump	90 / 90	90 / 90	"~"Output:			"~"  Gastrointestinal tube output (	1265 / 1265	1150 / 1150	"~"  Total)			"~"    [place] Sump	1265 / 1265	1150 / 1150	"~"  Urine, Lomeli	525 / 525	300 / 300	"~"  Urine, Voided		650 / 650	"~"Other:			"~"  Number of unmeasured liquid			"~"  stools			"~"    Rectum		1	"~"Vital Signs"~"Temp	Pulse	Resp	BP	Pulse Ox"~" 98.2 F 	 79 	 21 	 130/56 	 98 "~" 02/04/25 03:35	 02/04/25 06:30	 02/04/25 06:30	 02/04/25 06:00	 02/04/25 06:30"~"Lab Results"~"02/04/25 02:11 "~"Calcium	 9.0 mg/dl (8.4-10.2)  	02/03/25  20:12    "~"Phosphorus	 2.1 mg/dl (2.5-4.5)  L 	02/03/25  08:49    "~"Magnesium	 2.1 mg/dl (1.6-2.3)  	02/03/25  08:49    "~"Total Bilirubin	 0.7 mg/dl (0.2-1.3)  	02/04/25  02:11    "~"Direct Bilirubin	 0.4 mg/dl (0.0-0.4)  	02/04/25  02:11    "~"AST	 17 U/L (17-59)  	02/04/25  02:11    "~"ALT	 15 U/L (0-50)  	02/04/25  02:11    "~"Alkaline Phosphatase	 119 U/L ()  	02/04/25  02:11    "~"Total Protein	 5.6 g/dl (6.3-8.2)  L 	02/04/25  02:11    "~"Albumin	 2.7 g/dl (3.5-5.0)  L 	02/04/25  02:11    "~"Physical Exam"~"-"~"-: "~"NAD AAOx3"~"6L supplement O2 via NC"~"ABD: soft, ND, nontender"~"	incision with glue dressing, clean"~"	J tube flushed "~"	NGT in place with bilious contents"

## 2025-02-04 NOTE — CON.ID
"Consultation"~"-"~"Date/Time Consultation Requested: January 4, 2025  0742"~"Date/Time Consultation Performed: January 4, 2025  1155"~"Requesting Provider: Dr. Elisa Tam"~"Performing Provider: Dr. Fina Cerda"~"Reason for Consultation: Pneumonia, recent surgery"~"Chief Complaint / Past History"~"History of Present Illness"~"-: "~"History obtained from review of medical records, from family at bedside, and some from the patient.  74-year-old male with recent history of esophageal cancer status post stent, recently started on chemotherapy #1, recent hospitalization from "~"January 18 to January 21 with acute high-grade proximal small bowel obstruction with transition point at the ligament of Treitz, upper GI bleed and moderate right pleural effusion.  Pleural fluid was positive for malignant cells consistent with "~"metastases.  Small bowel obstruction was managed conservatively.  He was discharged to home January 21.  However he returned to the hospital on January 22 again with intractable nausea and vomiting.  Repeat CAT scan the abdomen pelvis showed "~"recurrence of high-grade partial small bowel obstruction.  January 28 he underwent upper GI endoscopy, with findings of erythematous mucosa in the stomach, nodular mucosa in the gastric body the, biopsied, esophageal stent patent.  January 31 he was "~"taken to the OR status post diagnostic laparoscopy, laparotomy GJ bypass and placement of feeding jejunostomy tube.  OR finding of bulky local disease at GE junction, bulky retroperitoneal disease around the pancreas.  Yesterday patient became "~"tachycardic, hypoxemic with O2 in the 70s, low-grade fever 100.6.  Chest CT no PE, there is moderate right lower lobe pneumonia, mild left lower lobe pneumonia which are new, moderate right pleural effusion progressed.  Ceftriaxone was discontinued "~"and he was started on vancomycin and Zosyn.  Today he is much calmer.  He denies cough today.  He feels very weak.  No significant abdominal pain."~"Past History"~"Additional Past Medical History: "~"Esophageal cancer s/p stent, on chemo"~"HTN"~"Hyperthyroidism"~"HLD"~"BPH"~"Diverticulosis"~"Allergy History: "~"tree nut Allergy (Verified 01/22/25 17:51)"~"	Unknown"~"Medications Reviewed: Yes"~"Current Antibiotics: "~"Ceftriaxone x 5 doses"~"Zosyn day 2"~"Vancomycin day 2"~"Social History"~"Tobacco: Former Smoker"~"Alcohol: None"~"Drug: None"~"Personal: "~"Living: With Family"~"Family History"~"Family History: Not Pertinent"~"Review of Systems"~"Review of Systems"~"General: Fever, Chills and Change in Appetite"~"HEENT: Negative Sinus Problems, Headache or Pharyngitis"~"Cardiovascular: Negative Chest Pain"~"Respiratory: Dyspnea; Negative Sputum Production"~"Genital / Urological: Negative Dysuria or Flank Pain"~"Endocrine: Weakness"~"Skin / Hair / Nails: Negative Rash"~"All systems: All other systems were reviewed and were negative"~"Vital Signs"~"-: "~"Temp	Pulse	Resp	BP	Pulse Ox"~" 97.7 F 	 89 	 23 	 132/79 	 97 "~" 02/04/25 07:30	 02/04/25 11:00	 02/04/25 11:00	 02/04/25 11:00	 02/04/25 11:41"~"Selected Entries"~"	02/03/25"~"19:00"~"Temp	100.6 F H"~"Physical Exam"~"Physical Exam"~"Constitutional: Acutely Ill"~"Head: Other (NGT in place)"~"Eyes: No Conjunctival Hemorrhage and Sclera Anicteric"~"Cardiovascular: Regular Rate and S1/S2"~"Pulmonary: Other (Decreased breathe sound right base, crackles left)"~"Gastrointestinal: Soft, Non Tender, Non Distended and Decreased Bowel Sounds"~"Genito-Urinary: Negative CVA Tenderness"~"Extremities: Negative Edema"~"Neurological: AO x 3"~"Lab / Diagnostic Study Results"~"-: "~"02/04/25 02:11 "~"02/04/25 08:27 "~"Abs Immat Gran (auto)	 0.2 10^3/uL (0-0.05)  H 	02/03/25  20:11    "~"Absolute Neuts (auto)	 23.0 10^3/uL (1.4-6.5)  H 	02/03/25  20:11    "~"Absolute Lymphs (auto)	 0.3 10^3/uL (1.2-3.4)  L 	02/03/25  20:11    "~"Absolute Monos (auto)	 0.7 10^3/uL (0.1-0.6)  H 	02/03/25  20:11    "~"Absolute Basos (auto)	 0.0 10^3/uL (0-0.2)  	02/03/25  20:11    "~"Immature Gran %	 0.8 % (0-0.5)  H 	02/03/25  20:11    "~"Neutrophils %	 95.0 % (42.2-75.2)  H 	02/03/25  20:11    "~"Lymphocytes %	 1.2 % (20.5-51.1)  L 	02/03/25  20:11    "~"Monocytes %	 2.7 % (1.7-9.3)  	02/03/25  20:11    "~"Eosinophils %	 0.1 % (0-6)  	02/03/25  20:11    "~"Basophils %	 0.2 % (0-2)  	02/03/25  20:11    "~"Lactic Acid	 0.9 mmol/L (0.7-2.0)  	01/22/25  20:57    "~"Procalcitonin	 0.30 ng/ml (0.0-0.25)  H 	02/03/25  20:12    "~"Microbiology Results"~"Micro: "~" 02/03/25 20:11	Influenza Types A & B (ERIKA) - Final"~" Nasal Swab	   Negative for Influenza A & B, NAAT"~"	   Negative results must be combined with clinical observations"~"	   and patient history."~"	   Nucleic Acid Amplification test (NAAT)performed on the"~"	   [employer] NOW platform."~" 02/03/25 20:15	Blood Culture - Pending"~" Blood/Venous	"~" 02/03/25 20:11	Blood Culture - Pending"~" Blood/Venous	"~" 01/25/25 15:38	 - Final"~" Feces/Stool	   Negative for Norovirus GI and GII."~" 01/23/25 14:34	MRSA Screen - Final"~" Nose	   No Methicillin Resistant Staphylococcus aureus isolated."~"-: "~"1/23/25 CT a/p: The fluid-filled stomach is markedly distended and the fluid-filled duodenum is dilated to 4 cm with abrupt transition point to decompressed small bowel at the ligament of Treitz suggesting partial obstruction. Small bilateral "~"pleural effusions with associated compressive atelectasis at the posterior lung bases. Distal esophageal stent"~"2/3/25 Chest CT: Moderate right lower lobe pneumonia. Mild left lower lobe pneumonia. New bilaterally."~"Assessment / Plan"~"-: "~"# Aspiration pneumonia"~"# Acute hypoxic respiratory insufficiency"~"# Fever - resolving"~"# Leukocytosis -trending down"~"    - Risk factors for aspiration:  N/V, high grade SBO, esophageal cancer"~"    - Continue Zosyn"~"    - DC Vancomycin"~"    - Follow temps, wbc"~"# Esophageal ca s/p stents, s/p cycle #1 Chemo"~"# Malignant pleural effusion"~"# High grade partial SBO due to bulky RP disease and around pancreas s/p open GJ bypass and J-tube placement 1/28/25."

## 2025-02-04 NOTE — W.PN.HOSP.TC
"Today's Communication/Plan"~"-"~"-: "~"Renewed TPN"~"Thoracentesis"~"Wean O2"~"ID shari"~"Trickle TF"~"Assessment / Plan"~"Assessment / Plan"~"-: "~"74-year-old male admitted with intractable abdominal pain vomiting.  Patient was admitted and was discharged on 1/20/2025.  He was felt to have intractable vomiting secondary to chemotherapy at that time."~"CVS: S1-S2 normal"~"Chest: Coarse breath sounds on the left"~"Abdomen: Soft, surgical wound, G-tube, Diminished bowels sounds, a lot of NG tube output"~"Extremities: No edema"~"X-ray of the abdomen today-progression of oral contrast into the distal colon and rectum"~"CT of the chest-moderate right lower lobe pneumonia, mild left lower lobe pneumonia.  Moderate right pleural effusion.  Small pericardial effusion.  No PE"~"# Chest pain overnight 2/3/2025"~"Slightly high troponin.  Cardiology evaluation appreciated.  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  "~"N.p.o. 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  Free T4 normal."~"# Leukocytosis-coarse breath sounds.  Possible aspiration Pneumonia Started ceftriaxone.  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 Martinez"~"Onc following"~"# Right pleural effusion"~"History of thoracentesis done on 1/20/2025"~"Malignant effusion "~"Repeat thoracentesis given  SOB "~"# Hypertension-Hold amlodipine and lisinopril"~"# Hyperthyroidism-methimazole on hold secondary to n.p.o. status.  Slightly low TSH but normal free T4 as of 1/30/2025"~"# Hyperlipidemia-hold statin as n.p.o."~"# Stage II sacral pressure injury-change position/offload/wound care"~"# Severe protein calorie malnutrition-hopefully we can start feeding soon as NG tube is out.  Currently on TPN"~"# Ex-smoker"~"# DVT prophylaxis-Lovenox"~"# Full code"~"D/W RN at bed side"~"D/W Surgeon"~"Discussed with wife on the phone"~"time spent over 50 min"~"Anticipated Discharge: &gt; 48 hours"~"Subjective/Interval History"~"-"~"-: "~"Date of Service:  February 4, 2025"~"Objective Data"~"-"~"Labs: "~"Laboratory Results"~" 	02/04/25	02/04/25"~" 	02:11	08:27"~"WBC	 20.1 H	"~"Hgb	 11.6 L	"~"Hct	 34.7 L	"~"Plt Count	 247  D	"~"Sodium		 140"~"Potassium		 3.8"~"Chloride		 103"~"Carbon Dioxide		 29"~"BUN		 25 H"~"Creatinine		 0.6 L"~"Glucose		 145 H"~"Calcium		 8.6"~"Total Bilirubin	 0.7	"~"AST	 17	"~"ALT	 15	"~"Alkaline Phosphatase	 119	"~"Vital Signs: "~"Vital Signs"~"Temp	Pulse	Resp	BP	Pulse Ox"~" 97.7 F 	 79 	 21 	 130/56 	 98 "~" 02/04/25 07:30	 02/04/25 06:30	 02/04/25 06:30	 02/04/25 06:00	 02/04/25 06:30"~"I&O"~"	02/03/25	02/04/25	02/05/25"~"	06:59	06:59	06:59"~"Intake Total	1832 / 1832	546 / 546	20 / 20"~"Output Total	1790 / 1790	2100 / 2100	225 / 225"~"Balance	42 / 42	-1554 / -1554	-205 / -205"

## 2025-02-04 NOTE — PTCARENOTE
"Pt. tachycardic sustained 140-150s. desaturating, 75 spo2, SBP 190s, Agitated, tachypneic, bilateral breath sounds heard. "~"Dr. Man notified via TT: orders for STAT CXR, concern for potential pneumo post thoracentesis.  "~"Dr. Khalil notified, orders for lasix/K. "

## 2025-02-04 NOTE — W.PN.ONC
"Today's Communication / Plan"~"-"~"-: "~"Abx for PNA (vanco/zosyn)"~"Continue TPN, diet per surgery - to start trickle tube feeds via j tube"~"Will likely need rehab/PT in hopes of regaining performance status prior to resuming systemic chemotherapy (if goals remain restorative)"~"Will follow "~"Impression"~"Impression"~"-: "~"High-grade partial small bowel obstruction, recurrent -malignant, s/p gastrojej bypass surgery 1/31/24"~"advanced GE junction cancer, w/ esophageal stent, s/p cycle #1 of FLOT chemotherapy"~"pleural effusion, s/p thora on 1/20/25, malignant, PDL1 10%"~"Malnutrition, started on TPN"~"hospital acquired pneumonia, 2/3/25"~"Plan"~"Plan"~"-: "~"Abx for PNA (vanco/zosyn)"~"Continue TPN, diet per surgery - to start trickle tube feeds via j tube"~"Will likely need rehab/PT in hopes of regaining performance status prior to resuming systemic chemotherapy  (if goals remain restorative)"~"Will follow "~"Subjective/Objective"~"Subjective/Objective"~"-: "~"transferred to ICU yesterday pm w/ tachycardia and hypoxia, and imaging c/w HAP."~"Feeling a bit better today"~"Vital Signs: "~"Vital Signs"~"Temp	Pulse	Resp	BP	Pulse Ox"~" 97.7 F 	 89 	 23 	 132/79 	 97 "~" 02/04/25 07:30	 02/04/25 11:00	 02/04/25 11:00	 02/04/25 11:00	 02/04/25 11:41"~"Lab Results: "~"Laboratory Data"~"WBC	 20.1 10^3/uL (4.8-10.8)  H 	02/04/25  02:11    "~"Hgb	 11.6 g/dL (13.0-18.0)  L 	02/04/25  02:11    "~"Plt Count	 247 10^3/uL (130-400)  D	02/04/25  02:11    "~"eGFR	 &gt; 60.00  	02/04/25  08:27    "

## 2025-02-04 NOTE — PTCARENOTE
"Pt upgraded to IMU level of care due to increased O2 requirements, pt transferred over to bed without issue, denies pain and SOB at this time, pt AAOx3, SA/ST on monitor, 6L NC 95%, rhonchi/very course, productive cough, NPO, NGT to LIS, TPN per "~"order through R sub Q port, no complaints at this time, call bell in reach "

## 2025-02-05 VITALS — SYSTOLIC BLOOD PRESSURE: 139 MMHG | RESPIRATION RATE: 23 BRPM | DIASTOLIC BLOOD PRESSURE: 75 MMHG

## 2025-02-05 VITALS — RESPIRATION RATE: 23 BRPM | SYSTOLIC BLOOD PRESSURE: 130 MMHG | DIASTOLIC BLOOD PRESSURE: 74 MMHG

## 2025-02-05 VITALS — SYSTOLIC BLOOD PRESSURE: 124 MMHG | DIASTOLIC BLOOD PRESSURE: 74 MMHG | RESPIRATION RATE: 25 BRPM

## 2025-02-05 VITALS — DIASTOLIC BLOOD PRESSURE: 74 MMHG | SYSTOLIC BLOOD PRESSURE: 132 MMHG | RESPIRATION RATE: 23 BRPM

## 2025-02-05 VITALS — RESPIRATION RATE: 22 BRPM | DIASTOLIC BLOOD PRESSURE: 82 MMHG | SYSTOLIC BLOOD PRESSURE: 129 MMHG

## 2025-02-05 VITALS — RESPIRATION RATE: 26 BRPM | DIASTOLIC BLOOD PRESSURE: 97 MMHG | SYSTOLIC BLOOD PRESSURE: 158 MMHG

## 2025-02-05 VITALS — SYSTOLIC BLOOD PRESSURE: 113 MMHG | DIASTOLIC BLOOD PRESSURE: 80 MMHG | RESPIRATION RATE: 15 BRPM

## 2025-02-05 VITALS — RESPIRATION RATE: 28 BRPM

## 2025-02-05 VITALS — DIASTOLIC BLOOD PRESSURE: 82 MMHG | RESPIRATION RATE: 22 BRPM | SYSTOLIC BLOOD PRESSURE: 141 MMHG

## 2025-02-05 VITALS — RESPIRATION RATE: 20 BRPM

## 2025-02-05 VITALS — RESPIRATION RATE: 22 BRPM | DIASTOLIC BLOOD PRESSURE: 72 MMHG | SYSTOLIC BLOOD PRESSURE: 134 MMHG

## 2025-02-05 VITALS — DIASTOLIC BLOOD PRESSURE: 84 MMHG | SYSTOLIC BLOOD PRESSURE: 157 MMHG | RESPIRATION RATE: 28 BRPM

## 2025-02-05 VITALS — RESPIRATION RATE: 25 BRPM

## 2025-02-05 VITALS — RESPIRATION RATE: 22 BRPM

## 2025-02-05 VITALS — DIASTOLIC BLOOD PRESSURE: 85 MMHG | RESPIRATION RATE: 29 BRPM | SYSTOLIC BLOOD PRESSURE: 171 MMHG

## 2025-02-05 VITALS — RESPIRATION RATE: 19 BRPM

## 2025-02-05 VITALS — RESPIRATION RATE: 24 BRPM

## 2025-02-05 VITALS — RESPIRATION RATE: 21 BRPM

## 2025-02-05 VITALS — SYSTOLIC BLOOD PRESSURE: 138 MMHG | RESPIRATION RATE: 24 BRPM | DIASTOLIC BLOOD PRESSURE: 78 MMHG

## 2025-02-05 VITALS — SYSTOLIC BLOOD PRESSURE: 138 MMHG | RESPIRATION RATE: 23 BRPM | DIASTOLIC BLOOD PRESSURE: 83 MMHG

## 2025-02-05 VITALS — RESPIRATION RATE: 22 BRPM | DIASTOLIC BLOOD PRESSURE: 78 MMHG | SYSTOLIC BLOOD PRESSURE: 146 MMHG

## 2025-02-05 VITALS — RESPIRATION RATE: 23 BRPM

## 2025-02-05 VITALS — RESPIRATION RATE: 27 BRPM | DIASTOLIC BLOOD PRESSURE: 121 MMHG | SYSTOLIC BLOOD PRESSURE: 139 MMHG

## 2025-02-05 VITALS — DIASTOLIC BLOOD PRESSURE: 79 MMHG | RESPIRATION RATE: 22 BRPM | SYSTOLIC BLOOD PRESSURE: 138 MMHG

## 2025-02-05 VITALS — SYSTOLIC BLOOD PRESSURE: 155 MMHG | RESPIRATION RATE: 22 BRPM | DIASTOLIC BLOOD PRESSURE: 89 MMHG

## 2025-02-05 VITALS — DIASTOLIC BLOOD PRESSURE: 98 MMHG | RESPIRATION RATE: 18 BRPM | SYSTOLIC BLOOD PRESSURE: 144 MMHG

## 2025-02-05 VITALS — SYSTOLIC BLOOD PRESSURE: 124 MMHG | DIASTOLIC BLOOD PRESSURE: 76 MMHG | RESPIRATION RATE: 24 BRPM

## 2025-02-05 VITALS — SYSTOLIC BLOOD PRESSURE: 137 MMHG | RESPIRATION RATE: 21 BRPM | DIASTOLIC BLOOD PRESSURE: 83 MMHG

## 2025-02-05 VITALS — SYSTOLIC BLOOD PRESSURE: 117 MMHG | DIASTOLIC BLOOD PRESSURE: 70 MMHG | RESPIRATION RATE: 25 BRPM

## 2025-02-05 VITALS — SYSTOLIC BLOOD PRESSURE: 120 MMHG | DIASTOLIC BLOOD PRESSURE: 77 MMHG | RESPIRATION RATE: 22 BRPM

## 2025-02-05 VITALS — DIASTOLIC BLOOD PRESSURE: 81 MMHG | RESPIRATION RATE: 25 BRPM | SYSTOLIC BLOOD PRESSURE: 177 MMHG

## 2025-02-05 VITALS — RESPIRATION RATE: 29 BRPM

## 2025-02-05 VITALS — SYSTOLIC BLOOD PRESSURE: 103 MMHG | RESPIRATION RATE: 24 BRPM | DIASTOLIC BLOOD PRESSURE: 71 MMHG

## 2025-02-05 VITALS — RESPIRATION RATE: 17 BRPM

## 2025-02-05 VITALS — RESPIRATION RATE: 26 BRPM

## 2025-02-05 VITALS — RESPIRATION RATE: 32 BRPM

## 2025-02-05 VITALS — RESPIRATION RATE: 36 BRPM

## 2025-02-05 LAB
BUN SERPL-MCNC: 36 MG/DL (ref 9–20)
CALCIUM SERPL-MCNC: 9.4 MG/DL (ref 8.4–10.2)
CHLORIDE SERPL-SCNC: 104 MMOL/L (ref 98–107)
CO2 SERPL-SCNC: 30 MMOL/L (ref 22–30)
EGFR: > 60
ERYTHROCYTE [DISTWIDTH] IN BLOOD BY AUTOMATED COUNT: 14.5 % (ref 11.5–14.5)
ESTIMATED CREATININE CLEARANCE: 68 ML/MIN
GLUCOSE - POINT OF CARE: 145 MG/DL (ref 70–99)
GLUCOSE - POINT OF CARE: 151 MG/DL (ref 70–99)
GLUCOSE - POINT OF CARE: 163 MG/DL (ref 70–99)
GLUCOSE SERPL-MCNC: 151 MG/DL (ref 70–99)
HCT VFR BLD AUTO: 38 % (ref 39–52)
HGB BLD-MCNC: 12.1 G/DL (ref 13–18)
MAGNESIUM SERPL-MCNC: 1.9 MG/DL (ref 1.6–2.3)
MCHC RBC AUTO-ENTMCNC: 31.8 G/DL (ref 33–37)
MCV RBC AUTO: 88 FL (ref 80–94)
PLATELET # BLD AUTO: 308 10^3/UL (ref 130–400)
POTASSIUM SERPL-SCNC: 3.8 MMOL/L (ref 3.5–5.1)
SODIUM SERPL-SCNC: 143 MMOL/L (ref 135–145)

## 2025-02-05 RX ADMIN — TAZOBACTAM SODIUM AND PIPERACILLIN SODIUM 50: 375; 3 INJECTION, SOLUTION INTRAVENOUS at 04:09

## 2025-02-05 RX ADMIN — ENOXAPARIN SODIUM 40 MG: 40 INJECTION SUBCUTANEOUS at 17:48

## 2025-02-05 RX ADMIN — INSULIN ASPART 1 UNITS: 100 INJECTION, SOLUTION INTRAVENOUS; SUBCUTANEOUS at 00:28

## 2025-02-05 RX ADMIN — TAZOBACTAM SODIUM AND PIPERACILLIN SODIUM 50: 375; 3 INJECTION, SOLUTION INTRAVENOUS at 15:49

## 2025-02-05 RX ADMIN — TAZOBACTAM SODIUM AND PIPERACILLIN SODIUM 50: 375; 3 INJECTION, SOLUTION INTRAVENOUS at 21:55

## 2025-02-05 RX ADMIN — DIPHENHYDRAMINE HYDROCHLORIDE 12.5 MG: 50 INJECTION, SOLUTION INTRAMUSCULAR; INTRAVENOUS at 21:52

## 2025-02-05 RX ADMIN — INSULIN ASPART: 100 INJECTION, SOLUTION INTRAVENOUS; SUBCUTANEOUS at 05:51

## 2025-02-05 RX ADMIN — PANTOPRAZOLE SODIUM 40 MG: 40 INJECTION, POWDER, LYOPHILIZED, FOR SOLUTION INTRAVENOUS at 09:13

## 2025-02-05 RX ADMIN — SODIUM CHLORIDE 10 ML: 9 INJECTION, SOLUTION INTRAMUSCULAR; INTRAVENOUS; SUBCUTANEOUS at 21:50

## 2025-02-05 RX ADMIN — PANTOPRAZOLE SODIUM 40 MG: 40 INJECTION, POWDER, LYOPHILIZED, FOR SOLUTION INTRAVENOUS at 21:50

## 2025-02-05 RX ADMIN — INSULIN ASPART 1 UNITS: 100 INJECTION, SOLUTION INTRAVENOUS; SUBCUTANEOUS at 17:47

## 2025-02-05 RX ADMIN — SODIUM CHLORIDE 10 ML: 9 INJECTION, SOLUTION INTRAMUSCULAR; INTRAVENOUS; SUBCUTANEOUS at 09:13

## 2025-02-05 RX ADMIN — DIPHENHYDRAMINE HYDROCHLORIDE 12.5 MG: 50 INJECTION, SOLUTION INTRAMUSCULAR; INTRAVENOUS at 00:29

## 2025-02-05 RX ADMIN — INSULIN ASPART 1 UNITS: 100 INJECTION, SOLUTION INTRAVENOUS; SUBCUTANEOUS at 13:54

## 2025-02-05 RX ADMIN — MORPHINE SULFATE 1 MG: 2 INJECTION, SOLUTION INTRAMUSCULAR; INTRAVENOUS at 17:52

## 2025-02-05 RX ADMIN — HYDROMORPHONE HYDROCHLORIDE 0.5 MG: 1 INJECTION, SOLUTION INTRAMUSCULAR; INTRAVENOUS; SUBCUTANEOUS at 11:24

## 2025-02-05 RX ADMIN — TAZOBACTAM SODIUM AND PIPERACILLIN SODIUM 50: 375; 3 INJECTION, SOLUTION INTRAVENOUS at 09:14

## 2025-02-05 NOTE — PTCARENOTE
Dr. Martinez notified via TT to arrange phone call for family to discuss GOC, awaiting reponse. physician notified [phone].

## 2025-02-05 NOTE — PTCARENOTE
"Received Pt from day RN. Per report family and Doctor will be having conversation about plan of care and goals, possibly hospice. Pt on TPN until bag is done then D/C, verified with ICU pharmacist. Pt remains on TF at goal. Pt on midflow 10L spo2 "~"98-99%. NG tube intact to LIS, green/yellow output. Pt and wife making needs known that patients wants to be comfortable over night no anxiety or pain (PRN medication ad ordered). Assessment care and vitals as charted. "

## 2025-02-05 NOTE — PTCARENOTE
"Assumed care of pt. approx 0700. "~"Pt. complaining of difficulty breathing, expi/insp wheezing noted. 6L N/C, moderate biliary secretions. "~"Pt. refusing interventions until speaking w/ physician."~"	Oncology rounded, would not speak with them until wife present. "~"	Pulm/intensivist rounded, not candidate for PAP therapy due to persistent secretions/biliary vomiting. "~"Maintaining airway at this time, hemodynamically stable, Tachycardic w.o ectopy noted, tachypneic. "~"Pt. updated on plan of care, wants to wait for wife to come in, followed up and wife is on her way at this time. "

## 2025-02-05 NOTE — PTCARENOTE
"Wife bedside with sister. Phone call with Dr. Bhatt from onc arranged to discuss further goals of care. Per Dr. Bhatt he is recommending Hospice care. Family wants to make a decision when daughter is here around 5pm. "~"Hospitalist made aware, evaluated bedside. "

## 2025-02-05 NOTE — W.PN.ONC2
"Today's Communication / Plan"~"-"~"-: "~"Patient has been hospitalized for almost 2 weeks without significant improvement, I would argue his deterioration most likely related to his underlying advanced malignancy."~"Yesterday he underwent thoracentesis and developed post procedure respiratory failure and tachycardia consistent with reexpansion pulmonary edema."~"I reviewed goals of care but patient wished to wait until his family were available.  I did discuss this with Dr. Strong who is the critical care attending."~"From my perspective, patient will be unlikely eligible for chemotherapy in light of his very poor performance status and hospice is appropriate."~"Reviewing the hospital records, it seems like these [facility] conversations previously occurred but family wishes him to try to maintain restorative goals of care."~"Overall prognosis from multiple medical comorbidities related to his advanced malignancy is poor.  I anticipate patient will likely need intubation if his respiratory status worsens and I would think that this would be something that should be "~"avoided if possible."~"Began discussing above with patient again today, but he wishes me to wait until his whole family is here particularly his wife and daughter.  I obliged to his wishes."~"36 minutes coordinating care time spent."~"Impression"~"Impression"~"-: "~"High-grade partial small bowel obstruction, recurrent -malignant, s/p gastrojej bypass surgery 1/31/24"~"advanced GE junction cancer, w/ esophageal stent, s/p cycle #1 of FLOT chemotherapy"~"pleural effusion, s/p thora on 1/20/25, malignant, PDL1 10%"~"Malnutrition, started on TPN"~"hospital acquired pneumonia, 2/3/25"~"Reexpansion pulmonary edema"~"Plan"~"Plan"~"-: "~"Abx for PNA (vanco/zosyn)"~"Continue TPN, diet per surgery - to start trickle tube feeds via j tube"~"Will likely need rehab/PT in hopes of regaining performance status prior to resuming systemic chemotherapy  (if goals remain restorative)"~"Will follow "~"Subjective/Objective"~"Chief Complaint"~"-: "~"ACS Heme Onc "~"Subjective"~"-: "~"Weak.  Remains full code.  Events from yesterday noted.  Patient underwent thoracentesis and postthoracentesis developed significant tachycardia and respiratory distress.  No pneumothorax was identified but he did develop reexpansion pulmonary "~"edema.  Patient was upgraded to ICU level of care.  He remains on TPN as he is unable to tolerate enteral feeds. "~"Vital Signs: "~"Vital Signs"~"Temp	Pulse	Resp	BP	Pulse Ox"~" 98 F 	 116 	 32 	 124/76 	 97 "~" 02/05/25 08:00	 02/05/25 05:45	 02/05/25 05:45	 02/05/25 05:00	 02/05/25 05:45"~"Lab Results: "~"Laboratory Data"~"WBC	 18.2 10^3/uL (4.8-10.8)  H 	02/05/25  04:01    "~"Hgb	 12.1 g/dL (13.0-18.0)  L 	02/05/25  04:01    "~"Plt Count	 308 10^3/uL (130-400)  D	02/05/25  04:01    "~"eGFR	 &gt; 60.00  	02/05/25  04:01    "~"Physical Exam"~"HEENT: Other (Cachectic, generalized weakness, ECOG PS = 4)"~"Cardiology: Other (Tachycardia pulse about 130)"~"Pulmonary: Rhonchi"

## 2025-02-05 NOTE — W.PN.GS2
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; right-sided malignant pleural effusion and initiation of neoadjuvant (FLOT) "~"chemo on 1/9/2025. Presenting with recurrent obstruction at ligament of treitz."~"POD #5 open GJ bypass and feeding jejunostomy tube placement found to have bulky retroperitoneal disease around pancreas causing small bowel obstruction at the ligament of Treitz.  "~"CT chest negative for PE; moderate right pleural effusion, moderate RLL consolidation/possible PNA; possible LLL PNA"~"	expected intraabdominal free air under the diaphragm for POD#4 after laparotomy"~"IR right thoracentesis 2/4"~"Plan:   Zosyn for probable PNA"~"	"~"	NGT clamping trials (4hrs clamp/check residual and repeat) to assess for adequacy of decompression via GJ bypass; hopefully can remove NGT in next 24hrs"~"	tube feeding via J jube up to 40ml/hr today - advance by 20ml/hr q 24hrs to goal"~"	renew TPN for now while starting J tube feeds"~"	PT/OT as tolerated"~"	Lovenox/SCDs for VTEp"~"Subjective Data"~"-"~"-: "~"Date of Service:  February 5, 2025"~"pt seen and examined"~"chief complaint is phlegm/cough with mucus"~"Objective Data"~"-"~"-: "~"Intake and Output"~"	02/04/25	02/05/25	02/06/25"~"	06:59	06:59	06:59"~"Intake Total	546 / 546	210 / 210	"~"Output Total	2100 / 2100	1075 / 1075	"~"Balance	-1554 / -1554	-865 / -865	"~"Intake:			"~"  Oral fluids		0 / 0	"~"  TPN/PPN	456 / 456	190 / 190	"~"  Amount instilled into GI Tube (	90 / 90	20 / 20	"~"  Total)			"~"    [place] Sump	90 / 90	20 / 20	"~"Output:			"~"  Gastrointestinal tube output (	1150 / 1150	650 / 650	"~"  Total)			"~"    [place] Sump	1150 / 1150	650 / 650	"~"  Urine, [place]	300 / 300		"~"  Urine, Voided	650 / 650	425 / 425	"~"Other:			"~"  Number of approximated LARGE		1	"~"  amounts of urine			"~"  Number of unmeasured liquid			"~"  stools			"~"    Rectum	1		"~"Vital Signs"~"Temp	Pulse	Resp	BP	Pulse Ox"~" 98.8 F 	 116 	 32 	 124/76 	 97 "~" 02/05/25 03:35	 02/05/25 05:45	 02/05/25 05:45	 02/05/25 05:00	 02/05/25 05:45"~"Lab Results"~"02/05/25 04:01 "~"02/05/25 04:01 "~"Calcium	 9.4 mg/dl (8.4-10.2)  	02/05/25  04:01    "~"Phosphorus	 3.6 mg/dl (2.5-4.5)  	02/05/25  04:01    "~"Magnesium	 1.9 mg/dl (1.6-2.3)  	02/05/25  04:01    "~"Total Bilirubin	 0.7 mg/dl (0.2-1.3)  	02/04/25  02:11    "~"Direct Bilirubin	 0.4 mg/dl (0.0-0.4)  	02/04/25  02:11    "~"AST	 17 U/L (17-59)  	02/04/25  02:11    "~"ALT	 15 U/L (0-50)  	02/04/25  02:11    "~"Alkaline Phosphatase	 119 U/L ()  	02/04/25  02:11    "~"Total Protein	 Cancelled  	02/04/25  15:37    "~"Albumin	 2.7 g/dl (3.5-5.0)  L 	02/04/25  02:11    "~"Physical Exam"~"-"~"-: "~"NAD AAOx3"~"ABD: soft, ND, NTTP"~"	incision with glue dressing; no erythema,no opening, no drainage"~"	J tube site clean"

## 2025-02-05 NOTE — W.PN.ID1
"Date of Service"~"-: "~"Date of Service:  February 5, 2025"~"Today's Communication"~"-: "~"Continue Zosyn. "~"Assessment / Plan"~"-: "~"# Aspiration pneumonia"~"# Acute hypoxic respiratory insufficiency"~"# Fever - resolving"~"# Leukocytosis -trending down"~"    - Risk factors for aspiration:  N/V, high grade SBO, esophageal cancer"~"    - Continue Zosyn d3"~"    - Follow temps, wbc"~"# Esophageal ca s/p stents, s/p cycle #1 Chemo"~"# Recurrent right malignant pleural effusion"~"# High grade partial SBO due to bulky RP disease and around pancreas s/p open GJ bypass and J-tube placement 1/28/25."~"Chief Complaint"~"-: Pneumonia"~"Subjective / Review of Systems"~"-: "~"Feeling better. "~"Vital Signs / Physical Exam"~"Vital Signs"~"-: "~"Vital Signs"~"Temp	Pulse	Resp	BP	Pulse Ox"~" 98.7 F 	 117 	 26 	 158/97 	 95 "~" 02/05/25 11:45	 02/05/25 09:00	 02/05/25 09:00	 02/05/25 09:00	 02/05/25 09:00"~"Physical Exam"~"Constitutional: Acutely Ill"~"Cardiovascular: S1/S2 and Other (tachy)"~"Pulmonary: Rales (right base)"~"Gastrointestinal: Soft, Non Tender, Non Distended and Decreased Bowel Sounds"~"Extremities: Negative Edema"~"Neurological: Awake and Alert"~"Objective Data"~"Lab Data"~"-: "~"Lab Results"~"02/05/25 04:01 "~"02/05/25 04:01 "~"Estimated Creat Clear	 68 ml/min 	02/05/25  04:01    "~"Lactic Acid	 0.9 mmol/L (0.7-2.0)  	01/22/25  20:57    "~"Total Bilirubin	 0.7 mg/dl (0.2-1.3)  	02/04/25  02:11    "~"AST	 17 U/L (17-59)  	02/04/25  02:11    "~"ALT	 15 U/L (0-50)  	02/04/25  02:11    "~"Alkaline Phosphatase	 119 U/L ()  	02/04/25  02:11    "~"Amylase	 40 U/L ()  	01/22/25  20:57    "~"Most recent labs reviewed. "~"Micro Results: "~" 02/04/25 15:37	Body Fluid Culture - Preliminary"~" Pleural Fluid	   No Growth After 18-24 Hours"~"	Gram Stain - Preliminary"~" 02/03/25 20:15	Blood Culture - Preliminary"~" Blood/Venous	   No Growth in 24 hours- Final report to follow"~" 02/03/25 20:11	Blood Culture - Preliminary"~" Blood/Venous	   No Growth in 24 hours- Final report to follow"~" 02/04/25 15:37	Fungal Culture - Pending"~" Pleural Fluid	"~" 02/04/25 15:37	Acid Fast Bacilli Smear - Pending"~" Pleural Fluid	Acid Fast Bacilli Culture - Pending"~" 02/03/25 20:11	Influenza Types A & B (ERIKA) - Final"~" Nasal Swab	   Negative for Influenza A & B, NAAT"~"	   Negative results must be combined with clinical observations"~"	   and patient history."~"	   Nucleic Acid Amplification test (NAAT)performed on the"~"	   [employer] ID NOW platform."~" 01/25/25 15:38	 - Final"~" Feces/Stool	   Negative for Norovirus GI and GII."~" 01/23/25 14:34	MRSA Screen - Final"~" Nose	   No Methicillin Resistant Staphylococcus aureus isolated."~"-: "~"1/23/25 CT a/p: The fluid-filled stomach is markedly distended and the fluid-filled duodenum is dilated to 4 cm with abrupt transition point to decompressed small bowel at the ligament of Treitz suggesting partial obstruction. Small bilateral "~"pleural effusions with associated compressive atelectasis at the posterior lung bases. Distal esophageal stent"~"2/3/25 Chest CT: Moderate right lower lobe pneumonia. Mild left lower lobe pneumonia. New bilaterally."

## 2025-02-05 NOTE — W.PN.HOSP.TC
"Today's Communication/Plan"~"-"~"-: "~"Long-term prognosis guarded"~"Continue with TPN and trickle tube feeding"~"IV antibiotics for now"~"Wean oxygen as tolerated"~"Assessment / Plan"~"Assessment / Plan"~"-: "~"74-year-old male admitted with intractable abdominal pain vomiting.  Patient was admitted and was discharged on 1/20/2025.  He was felt to have intractable vomiting secondary to chemotherapy at that time."~"X-ray of the abdomen today-progression of oral contrast into the distal colon and rectum"~"CT of the chest-moderate right lower lobe pneumonia, mild left lower lobe pneumonia.  Moderate right pleural effusion.  Small pericardial effusion.  No PE"~"# Acute hypoxic respiratory failure multifactorial"~"Aspiration pneumonia, atelectasis, pleural effusion, also NG tube not helping, pleural effusion"~"Discussed about thoracentesis"~"Status post thoracentesis with 1050 cc of cloudy yellow pleural fluid removed.  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  "~"N.p.o. with NG tube to remain for now still has drainage"~"TPN started, trickle tube feeding started through J-tube"~"clamping trial.  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 Martinez"~"Onc following and recommending hospice as patient with advanced disease process and poor performance status."~"# Tachycardia likely multifactorial due to pain, severe hypoxemia"~"Monitor heart rate for now.  To read individual problems as above."~"# Chest pain overnight 2/3/2025"~"Slightly high troponin.  Cardiology evaluation appreciated.  Nonischemic myocardial injury"~"No PE"~"# Low TSH-likely euthyroid sick syndrome.  Free T4 normal."~"# Right pleural effusion"~"History of thoracentesis done on 1/20/2025 and 2/4/25"~"Malignant effusion "~" "~"# Hypertension-Hold amlodipine and lisinopril"~"# Hyperthyroidism-methimazole on hold secondary to n.p.o. status.  Slightly low TSH but normal free T4 as of 1/30/2025"~"# Hyperlipidemia-hold statin as n.p.o."~"# Stage II sacral pressure injury-change position/offload/wound care"~"# Severe protein calorie malnutrition-  Currently on TPN"~"# Ex-smoker"~"# DVT prophylaxis-Lovenox"~"# Full code"~"Discussed with spouse and other family member at bedside in detail.  Waiting for daughter's arrival. "~"Anticipated Discharge: &gt; 48 hours"~"Subjective/Interval History"~"-"~"-: "~"Date of Service:  February 5, 2025"~"Underwent thoracentesis yesterday"~"Postop centesis patient with severe hypoxemia and tachycardia"~"Received IV Lasix per pulmonary"~"States of increasing oral secretion"~"States no shortness of breath"~"Remains tachycardic"~"Objective Data"~"-"~"Labs: "~"Laboratory Results"~" 	02/05/25"~" 	04:01"~"WBC	 18.2 H"~"Hgb	 12.1 L"~"Hct	 38.0 L"~"Plt Count	 308  D"~"Sodium	 143"~"Potassium	 3.8"~"Chloride	 104"~"Carbon Dioxide	 30"~"BUN	 36 H"~"Creatinine	 0.9"~"Glucose	 151 H"~"Calcium	 9.4"~"Vital Signs: "~"Vital Signs"~"Temp	Pulse	Resp	BP	Pulse Ox"~" 98 F 	 117 	 26 	 158/97 	 95 "~" 02/05/25 08:00	 02/05/25 09:00	 02/05/25 09:00	 02/05/25 09:00	 02/05/25 09:00"~"I&O"~"	02/04/25	02/05/25	02/06/25"~"	06:59	06:59	06:59"~"Intake Total	546 / 546	210 / 210	0 / 0"~"Output Total	2100 / 2100	1075 / 1075	200 / 200"~"Balance	-1554 / -1554	-865 / -865	-200 / -200"~"Physical Exam"~"-"~"General: Appears Chronically Ill and Cachectic"~"HEENT: Anicteric, Oxygen and Other (port R chest wall noted )"~"Respiratory: Rhonchi and Non Labored Respirations"~"Cardiac: Regular Rhythm, S1/S2 and Tachycardic; Negative Murmur, Rub or Gallop"~"GI: Soft, Nondistended, Normal Bowel Sounds, Peg Tube and Other (NGT in place. midline scar with staples noted )"~"Musculoskeletal: No Clubbing, No Cyanosis and No Edema"~"Skin: Warm and Dry; Negative Rash"~"Neuro: Awake and Nonfocal/Grossly Intact"~"Psych: Calm"~"Data Reviewed"~"-"~"Total Time Spent with Patient (in minutes): 57"

## 2025-02-05 NOTE — RESPNOTE
attmepted to N-T suction PT was refused, he wants the mucus out, but doesnt agree to allow suction to get it out and cough is ineffective.

## 2025-02-06 VITALS — RESPIRATION RATE: 23 BRPM | DIASTOLIC BLOOD PRESSURE: 87 MMHG | SYSTOLIC BLOOD PRESSURE: 115 MMHG

## 2025-02-06 VITALS — RESPIRATION RATE: 25 BRPM | SYSTOLIC BLOOD PRESSURE: 134 MMHG | DIASTOLIC BLOOD PRESSURE: 73 MMHG

## 2025-02-06 VITALS — RESPIRATION RATE: 18 BRPM | DIASTOLIC BLOOD PRESSURE: 68 MMHG | SYSTOLIC BLOOD PRESSURE: 144 MMHG

## 2025-02-06 VITALS — RESPIRATION RATE: 25 BRPM | DIASTOLIC BLOOD PRESSURE: 71 MMHG | SYSTOLIC BLOOD PRESSURE: 126 MMHG

## 2025-02-06 VITALS — SYSTOLIC BLOOD PRESSURE: 133 MMHG | RESPIRATION RATE: 26 BRPM | DIASTOLIC BLOOD PRESSURE: 78 MMHG

## 2025-02-06 VITALS — DIASTOLIC BLOOD PRESSURE: 71 MMHG | RESPIRATION RATE: 18 BRPM | SYSTOLIC BLOOD PRESSURE: 136 MMHG

## 2025-02-06 VITALS — SYSTOLIC BLOOD PRESSURE: 157 MMHG | RESPIRATION RATE: 27 BRPM | DIASTOLIC BLOOD PRESSURE: 76 MMHG

## 2025-02-06 VITALS — RESPIRATION RATE: 17 BRPM | DIASTOLIC BLOOD PRESSURE: 71 MMHG | SYSTOLIC BLOOD PRESSURE: 122 MMHG

## 2025-02-06 VITALS — RESPIRATION RATE: 19 BRPM | DIASTOLIC BLOOD PRESSURE: 67 MMHG | SYSTOLIC BLOOD PRESSURE: 126 MMHG

## 2025-02-06 VITALS — SYSTOLIC BLOOD PRESSURE: 132 MMHG | DIASTOLIC BLOOD PRESSURE: 78 MMHG | RESPIRATION RATE: 27 BRPM

## 2025-02-06 VITALS — DIASTOLIC BLOOD PRESSURE: 76 MMHG | RESPIRATION RATE: 19 BRPM | SYSTOLIC BLOOD PRESSURE: 144 MMHG

## 2025-02-06 VITALS — SYSTOLIC BLOOD PRESSURE: 126 MMHG | RESPIRATION RATE: 24 BRPM | DIASTOLIC BLOOD PRESSURE: 65 MMHG

## 2025-02-06 VITALS — SYSTOLIC BLOOD PRESSURE: 136 MMHG | RESPIRATION RATE: 25 BRPM | DIASTOLIC BLOOD PRESSURE: 73 MMHG

## 2025-02-06 VITALS — DIASTOLIC BLOOD PRESSURE: 81 MMHG | SYSTOLIC BLOOD PRESSURE: 134 MMHG | RESPIRATION RATE: 23 BRPM

## 2025-02-06 VITALS — DIASTOLIC BLOOD PRESSURE: 84 MMHG | RESPIRATION RATE: 24 BRPM | SYSTOLIC BLOOD PRESSURE: 138 MMHG

## 2025-02-06 VITALS — SYSTOLIC BLOOD PRESSURE: 127 MMHG | RESPIRATION RATE: 24 BRPM | DIASTOLIC BLOOD PRESSURE: 76 MMHG

## 2025-02-06 VITALS — SYSTOLIC BLOOD PRESSURE: 144 MMHG | DIASTOLIC BLOOD PRESSURE: 70 MMHG | RESPIRATION RATE: 15 BRPM

## 2025-02-06 VITALS — SYSTOLIC BLOOD PRESSURE: 144 MMHG | DIASTOLIC BLOOD PRESSURE: 68 MMHG | RESPIRATION RATE: 24 BRPM

## 2025-02-06 VITALS — SYSTOLIC BLOOD PRESSURE: 137 MMHG | RESPIRATION RATE: 25 BRPM | DIASTOLIC BLOOD PRESSURE: 77 MMHG

## 2025-02-06 VITALS — RESPIRATION RATE: 23 BRPM | SYSTOLIC BLOOD PRESSURE: 127 MMHG | DIASTOLIC BLOOD PRESSURE: 66 MMHG

## 2025-02-06 VITALS — SYSTOLIC BLOOD PRESSURE: 135 MMHG | RESPIRATION RATE: 16 BRPM | DIASTOLIC BLOOD PRESSURE: 72 MMHG

## 2025-02-06 VITALS — RESPIRATION RATE: 15 BRPM | DIASTOLIC BLOOD PRESSURE: 69 MMHG | SYSTOLIC BLOOD PRESSURE: 143 MMHG

## 2025-02-06 VITALS — RESPIRATION RATE: 24 BRPM | SYSTOLIC BLOOD PRESSURE: 144 MMHG | DIASTOLIC BLOOD PRESSURE: 76 MMHG

## 2025-02-06 LAB
ALBUMIN SERPL-MCNC: 2.9 G/DL (ref 3.5–5)
ALP SERPL-CCNC: 141 U/L (ref 38–126)
ALT SERPL-CCNC: 17 U/L (ref 0–50)
AST SERPL-CCNC: 19 U/L (ref 17–59)
BUN SERPL-MCNC: 51 MG/DL (ref 9–20)
CALCIUM SERPL-MCNC: 9.7 MG/DL (ref 8.4–10.2)
CHLORIDE SERPL-SCNC: 106 MMOL/L (ref 98–107)
CO2 SERPL-SCNC: 33 MMOL/L (ref 22–30)
EGFR: > 60
ERYTHROCYTE [DISTWIDTH] IN BLOOD BY AUTOMATED COUNT: 14.6 % (ref 11.5–14.5)
ESTIMATED CREATININE CLEARANCE: 73 ML/MIN
GLUCOSE - POINT OF CARE: 127 MG/DL (ref 70–99)
GLUCOSE - POINT OF CARE: 133 MG/DL (ref 70–99)
GLUCOSE - POINT OF CARE: 134 MG/DL (ref 70–99)
GLUCOSE - POINT OF CARE: 141 MG/DL (ref 70–99)
GLUCOSE SERPL-MCNC: 126 MG/DL (ref 70–99)
HCT VFR BLD AUTO: 35.9 % (ref 39–52)
HGB BLD-MCNC: 11.4 G/DL (ref 13–18)
MCHC RBC AUTO-ENTMCNC: 31.8 G/DL (ref 33–37)
MCV RBC AUTO: 89.1 FL (ref 80–94)
PLATELET # BLD AUTO: 261 10^3/UL (ref 130–400)
POTASSIUM SERPL-SCNC: 4.1 MMOL/L (ref 3.5–5.1)
PROT SERPL-MCNC: 6 G/DL (ref 6.3–8.2)
SODIUM SERPL-SCNC: 147 MMOL/L (ref 135–145)

## 2025-02-06 RX ADMIN — ENOXAPARIN SODIUM 40 MG: 40 INJECTION SUBCUTANEOUS at 17:47

## 2025-02-06 RX ADMIN — TAZOBACTAM SODIUM AND PIPERACILLIN SODIUM 50: 375; 3 INJECTION, SOLUTION INTRAVENOUS at 10:25

## 2025-02-06 RX ADMIN — SODIUM CHLORIDE 10 ML: 9 INJECTION, SOLUTION INTRAMUSCULAR; INTRAVENOUS; SUBCUTANEOUS at 20:28

## 2025-02-06 RX ADMIN — MORPHINE SULFATE 1 MG: 2 INJECTION, SOLUTION INTRAMUSCULAR; INTRAVENOUS at 10:25

## 2025-02-06 RX ADMIN — DIPHENHYDRAMINE HYDROCHLORIDE 12.5 MG: 50 INJECTION, SOLUTION INTRAMUSCULAR; INTRAVENOUS at 22:06

## 2025-02-06 RX ADMIN — INSULIN ASPART: 100 INJECTION, SOLUTION INTRAVENOUS; SUBCUTANEOUS at 12:07

## 2025-02-06 RX ADMIN — INSULIN ASPART: 100 INJECTION, SOLUTION INTRAVENOUS; SUBCUTANEOUS at 00:14

## 2025-02-06 RX ADMIN — SODIUM CHLORIDE 10 ML: 9 INJECTION, SOLUTION INTRAMUSCULAR; INTRAVENOUS; SUBCUTANEOUS at 07:59

## 2025-02-06 RX ADMIN — INSULIN ASPART: 100 INJECTION, SOLUTION INTRAVENOUS; SUBCUTANEOUS at 17:09

## 2025-02-06 RX ADMIN — PANTOPRAZOLE SODIUM 40 MG: 40 INJECTION, POWDER, LYOPHILIZED, FOR SOLUTION INTRAVENOUS at 20:28

## 2025-02-06 RX ADMIN — MORPHINE SULFATE 1 MG: 2 INJECTION, SOLUTION INTRAMUSCULAR; INTRAVENOUS at 05:03

## 2025-02-06 RX ADMIN — TAZOBACTAM SODIUM AND PIPERACILLIN SODIUM 50: 375; 3 INJECTION, SOLUTION INTRAVENOUS at 15:10

## 2025-02-06 RX ADMIN — HEPARIN SODIUM (PORCINE) LOCK FLUSH IV SOLN 100 UNIT/ML 500 UNIT: 100 SOLUTION at 13:59

## 2025-02-06 RX ADMIN — INSULIN ASPART: 100 INJECTION, SOLUTION INTRAVENOUS; SUBCUTANEOUS at 07:32

## 2025-02-06 RX ADMIN — HYDROMORPHONE HYDROCHLORIDE 0.5 MG: 1 INJECTION, SOLUTION INTRAMUSCULAR; INTRAVENOUS; SUBCUTANEOUS at 00:11

## 2025-02-06 RX ADMIN — PANTOPRAZOLE SODIUM 40 MG: 40 INJECTION, POWDER, LYOPHILIZED, FOR SOLUTION INTRAVENOUS at 07:59

## 2025-02-06 RX ADMIN — MORPHINE SULFATE 1 MG: 2 INJECTION, SOLUTION INTRAMUSCULAR; INTRAVENOUS at 22:06

## 2025-02-06 RX ADMIN — TAZOBACTAM SODIUM AND PIPERACILLIN SODIUM 50: 375; 3 INJECTION, SOLUTION INTRAVENOUS at 04:12

## 2025-02-06 RX ADMIN — TAZOBACTAM SODIUM AND PIPERACILLIN SODIUM 50: 375; 3 INJECTION, SOLUTION INTRAVENOUS at 22:05

## 2025-02-06 NOTE — PTCARENOTE
"Pt. received @ change of shift; assessment per charting- see flow sheet.  NGT removed by surgery, Dr. Tomlinson.  Pt. denies nausea; no vomiting.  RT weaned down O2 to 4LNC, SpO2 98%; no s/s of resp distress.  Repositioned per protocol.  Wife @ "~"bedside.  Call bell remains w in reach. "

## 2025-02-06 NOTE — W.PN.ID1
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 "~"Chief Complaint"~"-: Pneumonia"~"Subjective / Review of Systems"~"-: "~"Feeling better today."~"Vital Signs / Physical Exam"~"Vital Signs"~"-: "~"Vital Signs"~"Temp	Pulse	Resp	BP	Pulse Ox"~" 98.8 F 	 93 	 19 	 144/76 	 99 "~" 02/06/25 12:08	 02/06/25 03:00	 02/06/25 03:00	 02/06/25 03:00	 02/06/25 08:40"~"Physical Exam"~"Constitutional: Chronically Ill"~"Cardiovascular: S1/S2 and Other (tachy)"~"Pulmonary: Rales (right base)"~"Gastrointestinal: Soft, Non Tender, Non Distended and Decreased Bowel Sounds"~"Extremities: Negative Edema"~"Neurological: Awake and Alert"~"Objective Data"~"Lab Data"~"-: "~"Lab Results"~"02/06/25 04:33 "~"02/06/25 04:33 "~"Estimated Creat Clear	 73 ml/min 	02/06/25  04:33    "~"Lactic Acid	 0.9 mmol/L (0.7-2.0)  	01/22/25  20:57    "~"Total Bilirubin	 0.8 mg/dl (0.2-1.3)  	02/06/25  04:33    "~"AST	 19 U/L (17-59)  	02/06/25  04:33    "~"ALT	 17 U/L (0-50)  	02/06/25  04:33    "~"Alkaline Phosphatase	 141 U/L ()  H 	02/06/25  04:33    "~"Amylase	 40 U/L ()  	01/22/25  20:57    "~"Most recent labs reviewed. "~"Micro Results: "~" 02/04/25 15:37	Body Fluid Culture - Preliminary"~" Pleural Fluid	   No Growth After 48 Hours"~"	Gram Stain - Preliminary"~" 02/03/25 20:15	Blood Culture - Preliminary"~" Blood/Venous	   No Growth in 48 hours- Final report to follow"~" 02/03/25 20:11	Blood Culture - Preliminary"~" Blood/Venous	   No Growth in 48 hours- Final report to follow"~" 02/04/25 15:37	Fungal Culture - Pending"~" Pleural Fluid	"~" 02/04/25 15:37	Acid Fast Bacilli Smear - Pending"~" Pleural Fluid	Acid Fast Bacilli Culture - Pending"~" 02/03/25 20:11	Influenza Types A & B (ERIKA) - Final"~" Nasal Swab	   Negative for Influenza A & B, NAAT"~"	   Negative results must be combined with clinical observations"~"	   and patient history."~"	   Nucleic Acid Amplification test (NAAT)performed on the"~"	   [employer] NOW platform."~" 01/25/25 15:38	 - Final"~" Feces/Stool	   Negative for Norovirus GI and GII."~" 01/23/25 14:34	MRSA Screen - Final"~" Nose	   No Methicillin Resistant Staphylococcus aureus isolated."~"-: "~"1/23/25 CT a/p: The fluid-filled stomach is markedly distended and the fluid-filled duodenum is dilated to 4 cm with abrupt transition point to decompressed small bowel at the ligament of Treitz suggesting partial obstruction. Small bilateral "~"pleural effusions with associated compressive atelectasis at the posterior lung bases. Distal esophageal stent"~"2/3/25 Chest CT: Moderate right lower lobe pneumonia. Mild left lower lobe pneumonia. New bilaterally."

## 2025-02-06 NOTE — PTCARENOTE
"Patient is c/o abdominal/epigastric discomfort and is requesting Morphine. He is also asking for something to help him sleep. I also contacted [facility] and requested something for anxiety if needed--order received. Morphine and Benadryl "~"given IV per prn order. Repositioned for comfort. "

## 2025-02-06 NOTE — W.PN.GS2
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 Presenting with recurrent obstruction at ligament of treitz."~"POD #6 open GJ bypass and feeding jejunostomy tube placement found to have bulky retroperitoneal disease around pancreas causing small bowel obstruction at the ligament of Treitz.  "~"CT chest negative for PE; moderate right pleural effusion, moderate RLL consolidation/possible PNA; possible LLL PNA"~"	expected intraabdominal free air under the diaphragm for POD#4 after laparotomy"~"IR right thoracentesis 2/4"~"Lengthy discussions with patient and his family members last night discussing further treatment recommendations.  Possible that esophageal stent and NG tube is promoting drainage of bilious contents from obstruction back through NG tube rather than "~"preferentially through recent gastrojejunostomy palliative bypass.  Given stability outputs which are essentially unchanged almost a week out from surgery we discussed a trial of NG tube removal to see if bypass will be adequate to allow for "~"internal drainage/diversion of proximal small bowel obstruction.   If continued obstructive symptoms despite presence of recent GJ bypass then patient would likely transition to hospice care.  Discussed with Dr. Martinez."~"Plan:   NG tube removed"~"	 Zosyn for probable PNA"~"	tube feeding via J jube to goal 70 mL an hour"~"	PT/OT as tolerated"~"	Further medical care per hospitalist/medical services"~"	Lovenox/SCDs for VTEp"~"Subjective Data"~"-"~"-: "~"Date of Service:  February 6, 2025"~"Patient seen and examined.  His wife is at bedside.  Discussed with daughter via teleconference call"~"More comfortable with breathing effort."~"Denies abdominal pain"~"Tube feedings going at 40 mL an hour by feeding jejunostomy"~"Objective Data"~"-"~"-: "~"Intake and Output"~"	02/05/25	02/06/25	02/07/25"~"	06:59	06:59	06:59"~"Intake Total	210 / 210	1705 / 1705	"~"Output Total	1075 / 1075	2550 / 2550	"~"Balance	-865 / -865	-845 / -845	"~"Intake:			"~"  Oral fluids	0 / 0	0 / 0	"~"  IV piggybacks		150 / 150	"~"  TPN/PPN	190 / 190	190 / 190	"~"  Tube feeding		840 / 840	"~"  Feeding tube flush amount		525 / 525	"~"  Amount instilled into GI Tube (	20 / 20		"~"  Total)			"~"    Hettinger Sump	20 / 20		"~"Output:			"~"  Gastrointestinal tube output (	650 / 650	1550 / 1550	"~"  Total)			"~"    Hettinger Sump	650 / 650	1550 / 1550	"~"  Urine, Voided	425 / 425	1000 / 1000	"~"Other:			"~"  Number of approximated LARGE	1		"~"  amounts of urine			"~"Vital Signs"~"Temp	Pulse	Resp	BP	Pulse Ox"~" 98.1 F 	 93 	 19 	 144/76 	 99 "~" 02/06/25 07:25	 02/06/25 03:00	 02/06/25 03:00	 02/06/25 03:00	 02/06/25 08:40"~"Lab Results"~"02/06/25 04:33 "~"02/06/25 04:33 "~"Calcium	 9.7 mg/dl (8.4-10.2)  	02/06/25  04:33    "~"Phosphorus	 3.6 mg/dl (2.5-4.5)  	02/05/25  04:01    "~"Magnesium	 1.9 mg/dl (1.6-2.3)  	02/05/25  04:01    "~"Total Bilirubin	 0.8 mg/dl (0.2-1.3)  	02/06/25  04:33    "~"Direct Bilirubin	 0.4 mg/dl (0.0-0.4)  	02/04/25  02:11    "~"AST	 19 U/L (17-59)  	02/06/25  04:33    "~"ALT	 17 U/L (0-50)  	02/06/25  04:33    "~"Alkaline Phosphatase	 141 U/L ()  H 	02/06/25  04:33    "~"Total Protein	 6.0 g/dl (6.3-8.2)  L 	02/06/25  04:33    "~"Albumin	 2.9 g/dl (3.5-5.0)  L 	02/06/25  04:33    "~"Physical Exam"~"-"~"-: "~"NAD AAOx3"~"ABD: Soft, nondistended, nontender to palpation"~"Midline incision with glue dressing"

## 2025-02-06 NOTE — W.PN.HOSP.TC
"Today's Communication/Plan"~"-"~"-: "~"Monitor secretion with NG tube removal"~"Continue with IV Zosyn for now"~"Tube feeding. Increase free water flushes "~"wean o2 "~"prognosis guarded. "~"Assessment / Plan"~"Assessment / Plan"~"-: "~"74-year-old male admitted with intractable abdominal pain vomiting.  Patient was admitted and was discharged on 1/20/2025.  He was felt to have intractable vomiting secondary to chemotherapy at that time."~"X-ray of the abdomen today-progression of oral contrast into the distal colon and rectum"~"CT of the chest-moderate right lower lobe pneumonia, mild left lower lobe pneumonia.  Moderate right pleural effusion.  Small pericardial effusion.  No PE"~"# Acute hypoxic respiratory failure multifactorial"~"Aspiration pneumonia, atelectasis, pleural effusion,  pleural effusion"~"Discussed about thoracentesis"~"Status post thoracentesis with 1050 cc of cloudy yellow pleural fluid removed.  Preliminary fluid studies negative for growth."~"Status post IV Lasix per pulmonary due to noncardiogenic pulmonary edema"~"Continue with broad-spectrum antibiotics with Zosyn"~"Currently on 6 L"~"Wean oxygen as tolerated"~"# Acute high-grade partial small bowel obstruction"~"History of recurrent SBO"~"Had a BM 1/27/25, but continued to have obstructive symptoms"~"GE junction carcinoma status post esophageal stent on FLOT chemotherapy"~"Push enteroscopy 1/28/2025-esophageal stent was fixed with a clip, nodular mucosa in the gastric body biopsied"~"Status post diagnostic laparoscopy, laparotomy, Isolated small bowel obstruction at ligament of treats due to 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  "~"NG tube was removed.  Patient will be monitored closely for increased biliary secretion."~"TPN stopped and continue with tube feeding"~"Surgery correspondence noted.  Patient and family agreed with discussion with plan by general surgery.   "~"Surgery following `"~"# Locally advanced esophageal cancer diagnosed in September 2024 with stent placement November 2024 chemotherapy started January 2025"~"# GE junction carcinoma status post esophageal stent on FLOT chemotherapy"~"PET scan showed locally advanced malignancy with some extension into the surrounding tissues"~"Pleural effusion from 1/20/2025-Positive for malignant cells."~"Follows with Dr. Martinez"~"Onc following and recommending hospice as patient with advanced disease process and poor performance status."~"# Mild hypernatremia"~"Increase free water flushes to 50 cc "~"# Tachycardia likely multifactorial due to pain, severe hypoxemia"~"Monitor heart rate for now.  To read individual problems as above."~"# Chest pain overnight 2/3/2025"~"Slightly high troponin.  Cardiology evaluation appreciated.  Nonischemic myocardial injury"~"No PE"~"# Low TSH-likely euthyroid sick syndrome.  Free T4 normal."~"# Right pleural effusion"~"History of thoracentesis done on 1/20/2025 and 2/4/25"~"Malignant effusion "~" "~"# Hypertension-Hold amlodipine and lisinopril"~"# Hyperthyroidism-methimazole restarted "~"# Hyperlipidemia-hold statin as n.p.o."~"# Stage II sacral pressure injury-change position/offload/wound care"~"# Severe protein calorie malnutrition-  on TF "~"# Ex-smoker"~"# DVT prophylaxis-Lovenox"~"# Full code"~"Discussed with patient and patient wife at bedside in detail for prolonged period of time.  NG tube was removed.  Plan is to monitor secretions.  They have discussed with oncology and surgery.  They will monitor  patient response for 24 hours before "~"making any decision and changing plan of care.  They understand poor prognosis.  All patient and spouse questions were answered in details.."~"Anticipated Discharge: &gt; 48 hours"~"Subjective/Interval History"~"-"~"-: "~"Date of Service:  February 6, 2025"~"NG tube was removed by general surgery"~"Remains with biliary secretion"~"Per patient and patient's spouse at bedside states morphine is significantly helping with pain.  They would like to continue with morphine"~"Objective Data"~"-"~"Labs: "~"Laboratory Results"~" 	02/06/25"~" 	04:33"~"WBC	 11.1 H"~"Hgb	 11.4 L"~"Hct	 35.9 L"~"Plt Count	 261"~"Sodium	 147 H"~"Potassium	 4.1"~"Chloride	 106"~"Carbon Dioxide	 33 H"~"BUN	 51 H"~"Creatinine	 0.8"~"Glucose	 126 H"~"Calcium	 9.7"~"Total Bilirubin	 0.8"~"AST	 19"~"ALT	 17"~"Alkaline Phosphatase	 141 H"~"Vital Signs: "~"Vital Signs"~"Temp	Pulse	Resp	BP	Pulse Ox"~" 98.8 F 	 93 	 19 	 144/76 	 99 "~" 02/06/25 12:08	 02/06/25 03:00	 02/06/25 03:00	 02/06/25 03:00	 02/06/25 08:40"~"I&O"~"	02/05/25	02/06/25	02/07/25"~"	06:59	06:59	06:59"~"Intake Total	210 / 210	1705 / 1705	50 / 50"~"Output Total	1075 / 1075	2550 / 2550	350 / 350"~"Balance	-865 / -865	-845 / -845	-300 / -300"~"Physical Exam"~"-"~"General: Appears Chronically Ill and Cachectic"~"HEENT: Anicteric, Oxygen and Other (port R chest wall noted )"~"Respiratory: Rhonchi and Non Labored Respirations"~"Cardiac: Regular Rhythm, S1/S2 and Tachycardic; Negative Murmur, Rub or Gallop"~"GI: Soft, Nondistended, Normal Bowel Sounds, Peg Tube and Other (  midline scar with staples noted )"~"Musculoskeletal: No Clubbing, No Cyanosis and No Edema"~"Skin: Warm and Dry; Negative Rash"~"Neuro: Awake and Nonfocal/Grossly Intact"~"Psych: Calm"~"Data Reviewed"~"-"~"Total Time Spent with Patient (in minutes): 55"

## 2025-02-06 NOTE — W.PN.ONC
"Today's Communication / Plan"~"-"~"-: "~"NG tube extracted to stress test anastomosis"~"If patient is unable to tolerate he is likely goals were changed from restorative to hospice"~"Prior to considering therapeutic interventions he would require significant reconditioning and nutritional and support"~"Tolerating tube feeds"~"Will likely need rehab/PT in hopes of regaining performance status prior to resuming systemic chemotherapy  "~"Will follow "~"Impression"~"Impression"~"-: "~"High-grade partial small bowel obstruction, recurrent -malignant, s/p gastrojej bypass surgery 1/31/24"~"advanced GE junction cancer, w/ esophageal stent, s/p cycle #1 of FLOT chemotherapy"~"pleural effusion, s/p thora on 1/20/25, malignant, PDL1 10%"~"Malnutrition, started on TPN"~"hospital acquired pneumonia, 2/3/25"~"Reexpansion pulmonary edema"~"Subjective/Objective"~"Subjective/Objective"~"-: "~"Patient appears reasonably calm.  Feels great with NG tube out."~"Vital Signs: "~"Vital Signs"~"Temp	Pulse	Resp	BP	Pulse Ox"~" 98.1 F 	 93 	 19 	 144/76 	 99 "~" 02/06/25 07:25	 02/06/25 03:00	 02/06/25 03:00	 02/06/25 03:00	 02/06/25 08:40"~"Physical exam unchanged"~"Lab Results: "~"Laboratory Data"~"WBC	 11.1 10^3/uL (4.8-10.8)  H 	02/06/25  04:33    "~"Hgb	 11.4 g/dL (13.0-18.0)  L 	02/06/25  04:33    "~"Plt Count	 261 10^3/uL (130-400)  	02/06/25  04:33    "~"eGFR	 &gt; 60.00  	02/06/25  04:33    "

## 2025-02-06 NOTE — PTCARENOTE
"Patient received sitting up in bed, awake, alert and oriented. He is without apparent signs of distress or discomfort. He states he has mild abdominal discomfort but it is tolerable. He is notably anxious, flat affect. Emotional support and "~"encouragement given as needed. His wife Dot is at the bedside. He denies CP or SOB. No dizziness or HA. Pedal pulses weak, no edema. Midline abdominal incision approximated with dermabond. Left abdominal J tube with Osmolite Tube feeding at 60cc/hr "~"with 50cc/hr flush. Abdomen slightly tender with palpation. Bowel sounds hyperactive. Rectal trumpet with loos liquid tan stool draining. BUL clear. RML and RLL diminished with scattered crackles, left base with fine crackles. S1 S2 tachy with ST on "~"CM and pac's. "

## 2025-02-06 NOTE — W.PN.PUL3
"Today's Communication / Plan"~"-"~"-: "~"Wean down FiO2"~"Continue secretion clearance"~"Aspiration precautions"~"Supportive care"~"Sign off.  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 initiated chemotherapy 1/9/2025, presented with recurrent obstruction.  Developed possible aspiration pneumonia."~"Has been on antibiotics.  Started on TPN.  Decline functional status."~"Pulmonary consulted on 2/4/2025 after developing shortness of breath postthoracentesis.  Possible reexpansion pulmonary edema."~"Hypoxemic respiratory failure: Initially on 15 L mid flow."~"Acute respiratory insufficiency post right thoracentesis 2/4/2025 likely reexpansion pulmonary edema-on 2 L of oxygen"~"Aspiration pneumonia on antibiotics"~"Poor secretion handling due to esophageal cancer"~"Conditions present prior admission:"~"Hypothyroidism"~"Hypertension"~"Recurrent esophageal cancer"~"History of recurrent small bowel obstruction"~"GE junction esophageal carcinoma status post esophageal stent and chemotherapy"~"Small bilateral pleural effusions possibly malignant both"~"	Thoracentesis 1/20/2025 malignant"~"Assessment and plan:"~"Acute decompensation postthoracentesis possibly reexpansion pulmonary edema on top of his underlying right lower lobe aspiration pneumonia."~"Oxygenation improving down to 6 L.  99%.  Continue to wean down as able."~"Clinically feels better from the shortness of breath perspective compared to yesterday 2/6/2025."~"-"~"Thoracentesis 2/4/2025: Exudate with high risk features pH of 7.  Likely malignant."~"Doubt infectious etiology on the pleural space-culture negative."~"Also hypoalbuminemia contributing to this pleural effusion."~"Patient did receive diuresis yesterday.  No further diuretics necessary."~"Chest x-ray 2/6/2025: Reviewed, no recurrence plan Right lower lobe infiltrate improved"~"-"~"Most recent chest x-ray 2/5/2025: Persistent right lower lobe infiltrate possibly due to pneumonia."~"Repeat chest x-ray 2/6/2025: Improved right lower lobe infiltrate."~"Continue antibiotics per infectious disease"~"All cultures negative so far"~"Leukocytosis improved/last fever 100.6 on 2/3/2025"~"-"~"Continue secretion clearance at the patient is debilitated, has poor secretion handling high risk for aspiration."~"Continue aspiration precautions"~"Antitussives"~"Levalbuterol as needed to aid with secretion clearance"~"Incentive spirometry as able"~"Okay to use 3% saline on as-needed basis"~"Unfortunately, this patient is mostly bedbound due to debilitation and prolonged hospital stay"~"-"~"Unfortunately, not much to offer from the pulmonary perspective."~"Oncology following.  Will need to rehab and improve significantly before any therapy."~"-"~"No additional pulmonary recommendations.  Continue to wean down FiO2 as able."~"Please call back if pulmonary condition deteriorates."~"---------------------------------------------"~"Data reviewed:"~"1/23/25 CT a/p: The fluid-filled stomach is markedly distended and the fluid-filled duodenum is dilated to 4 cm with abrupt transition point to decompressed small bowel at the ligament of Treitz suggesting partial obstruction. Small bilateral "~"pleural effusions with associated compressive atelectasis at the posterior lung bases. Distal esophageal stent"~"2/3/25 Chest CT: Moderate right lower lobe pneumonia. Mild left lower lobe pneumonia. New bilaterally."~"Subjective Data"~"-"~"Date of Service: "~"Date of Service:  February 6, 2025"~"Chief Complaint: Pulmonary Follow Up (Hypoxemic respiratory failure-pleural effusion)"~"Subjective: "~"Patient offers no new complaints"~"From the breathing perspective he feels better"~"Continues to feel debilitated"~"Review of Systems"~"Cardiopulmonary: Dyspnea, Dyspnea on Exertion and Cough"~"Objective Data"~"Data Reviewed"~"Vital Signs / I&O / Oxygen: "~"Vital Signs"~"Temp	Pulse	Resp	BP	Pulse Ox"~" 98.8 F 	 93 	 19 	 144/76 	 99 "~" 02/06/25 12:08	 02/06/25 03:00	 02/06/25 03:00	 02/06/25 03:00	 02/06/25 08:40"~"Intake and Output"~"	02/05/25	02/06/25	02/07/25"~"	06:59	06:59	06:59"~"Intake Total	210 / 210	1705 / 1705	50 / 50"~"Output Total	1075 / 1075	2550 / 2550	350 / 350"~"Balance	-865 / -865	-845 / -845	-300 / -300"~"SaO2                          	99                                              	"~"Nasal Cannula flow liters per 	10                                              	"~"minute                        	"~"Physical Exam"~"General: Comfortable (At rest) and Other (Cachectic)"~"HEENT: Normocephalic"~"Cardiovascular: S1-S2"~"Respiratory: Crackles (Right base)"~"GI: Soft, Non Distended and NG Tube"~"Neurology: Awake and Alert"~"Labs/Micro/Reports"~"-: "~"Lab Data"~"02/06/25 04:33 "~"02/06/25 04:33 "~"Microbiology"~"02/04/25 15:37   Pleural Fluid   Body Fluid Culture - Preliminary"~"                            No Growth After 48 Hours"~"02/04/25 15:37   Pleural Fluid   Gram Stain - Preliminary"~"02/03/25 20:15   Blood/Venous   Blood Culture - Preliminary"~"                            No Growth in 48 hours- Final report to follow"~"02/03/25 20:11   Blood/Venous   Blood Culture - Preliminary"~"                            No Growth in 48 hours- Final report to follow"~"02/03/25 20:11   Nasal Swab   Influenza Types A & B (ERIKA) - Final"~"                            Negative for Influenza A & B, NAAT"~"                            Negative results must be combined with clinical observations"~"                            and patient history."~"                            Nucleic Acid Amplification test (NAAT)performed on the"~"                            [employer] NOW platform."
no

## 2025-02-07 VITALS — RESPIRATION RATE: 23 BRPM | SYSTOLIC BLOOD PRESSURE: 115 MMHG | DIASTOLIC BLOOD PRESSURE: 99 MMHG

## 2025-02-07 VITALS — SYSTOLIC BLOOD PRESSURE: 139 MMHG | RESPIRATION RATE: 29 BRPM | DIASTOLIC BLOOD PRESSURE: 71 MMHG

## 2025-02-07 VITALS — RESPIRATION RATE: 24 BRPM | SYSTOLIC BLOOD PRESSURE: 126 MMHG | DIASTOLIC BLOOD PRESSURE: 96 MMHG

## 2025-02-07 VITALS — SYSTOLIC BLOOD PRESSURE: 149 MMHG | DIASTOLIC BLOOD PRESSURE: 71 MMHG | RESPIRATION RATE: 24 BRPM

## 2025-02-07 VITALS — RESPIRATION RATE: 32 BRPM | SYSTOLIC BLOOD PRESSURE: 133 MMHG | DIASTOLIC BLOOD PRESSURE: 79 MMHG

## 2025-02-07 VITALS — SYSTOLIC BLOOD PRESSURE: 140 MMHG | DIASTOLIC BLOOD PRESSURE: 77 MMHG | RESPIRATION RATE: 27 BRPM

## 2025-02-07 VITALS — DIASTOLIC BLOOD PRESSURE: 82 MMHG | RESPIRATION RATE: 37 BRPM | SYSTOLIC BLOOD PRESSURE: 147 MMHG

## 2025-02-07 VITALS — SYSTOLIC BLOOD PRESSURE: 132 MMHG | RESPIRATION RATE: 27 BRPM | DIASTOLIC BLOOD PRESSURE: 74 MMHG

## 2025-02-07 VITALS — DIASTOLIC BLOOD PRESSURE: 72 MMHG | RESPIRATION RATE: 27 BRPM | SYSTOLIC BLOOD PRESSURE: 135 MMHG

## 2025-02-07 VITALS — RESPIRATION RATE: 31 BRPM | SYSTOLIC BLOOD PRESSURE: 113 MMHG | DIASTOLIC BLOOD PRESSURE: 93 MMHG

## 2025-02-07 VITALS — OXYGEN SATURATION: 97 % | SYSTOLIC BLOOD PRESSURE: 123 MMHG | DIASTOLIC BLOOD PRESSURE: 76 MMHG | HEART RATE: 103 BPM

## 2025-02-07 VITALS
HEART RATE: 103 BPM | DIASTOLIC BLOOD PRESSURE: 75 MMHG | RESPIRATION RATE: 24 BRPM | SYSTOLIC BLOOD PRESSURE: 123 MMHG | OXYGEN SATURATION: 97 %

## 2025-02-07 VITALS — DIASTOLIC BLOOD PRESSURE: 96 MMHG | SYSTOLIC BLOOD PRESSURE: 131 MMHG | RESPIRATION RATE: 26 BRPM

## 2025-02-07 VITALS — SYSTOLIC BLOOD PRESSURE: 134 MMHG | DIASTOLIC BLOOD PRESSURE: 84 MMHG | RESPIRATION RATE: 27 BRPM

## 2025-02-07 VITALS — RESPIRATION RATE: 14 BRPM

## 2025-02-07 VITALS — DIASTOLIC BLOOD PRESSURE: 75 MMHG | SYSTOLIC BLOOD PRESSURE: 140 MMHG | RESPIRATION RATE: 25 BRPM

## 2025-02-07 VITALS — DIASTOLIC BLOOD PRESSURE: 76 MMHG | SYSTOLIC BLOOD PRESSURE: 123 MMHG | RESPIRATION RATE: 24 BRPM

## 2025-02-07 VITALS — RESPIRATION RATE: 25 BRPM | DIASTOLIC BLOOD PRESSURE: 74 MMHG | SYSTOLIC BLOOD PRESSURE: 140 MMHG

## 2025-02-07 VITALS — SYSTOLIC BLOOD PRESSURE: 138 MMHG | RESPIRATION RATE: 32 BRPM | DIASTOLIC BLOOD PRESSURE: 83 MMHG

## 2025-02-07 VITALS — DIASTOLIC BLOOD PRESSURE: 87 MMHG | SYSTOLIC BLOOD PRESSURE: 137 MMHG | RESPIRATION RATE: 30 BRPM

## 2025-02-07 VITALS — DIASTOLIC BLOOD PRESSURE: 76 MMHG | SYSTOLIC BLOOD PRESSURE: 147 MMHG | RESPIRATION RATE: 21 BRPM

## 2025-02-07 VITALS — DIASTOLIC BLOOD PRESSURE: 75 MMHG | RESPIRATION RATE: 31 BRPM | SYSTOLIC BLOOD PRESSURE: 138 MMHG

## 2025-02-07 VITALS — RESPIRATION RATE: 30 BRPM

## 2025-02-07 VITALS — SYSTOLIC BLOOD PRESSURE: 134 MMHG | DIASTOLIC BLOOD PRESSURE: 86 MMHG | RESPIRATION RATE: 26 BRPM

## 2025-02-07 VITALS — DIASTOLIC BLOOD PRESSURE: 75 MMHG | RESPIRATION RATE: 32 BRPM | SYSTOLIC BLOOD PRESSURE: 132 MMHG

## 2025-02-07 VITALS — RESPIRATION RATE: 24 BRPM | DIASTOLIC BLOOD PRESSURE: 69 MMHG | SYSTOLIC BLOOD PRESSURE: 132 MMHG

## 2025-02-07 VITALS — RESPIRATION RATE: 27 BRPM

## 2025-02-07 LAB
ALBUMIN SERPL-MCNC: 2.7 G/DL (ref 3.5–5)
ALP SERPL-CCNC: 216 U/L (ref 38–126)
ALT SERPL-CCNC: 66 U/L (ref 0–50)
AST SERPL-CCNC: 66 U/L (ref 17–59)
BUN SERPL-MCNC: 48 MG/DL (ref 9–20)
CALCIUM SERPL-MCNC: 8.6 MG/DL (ref 8.4–10.2)
CHLORIDE SERPL-SCNC: 104 MMOL/L (ref 98–107)
CO2 SERPL-SCNC: 34 MMOL/L (ref 22–30)
EGFR: > 60
ERYTHROCYTE [DISTWIDTH] IN BLOOD BY AUTOMATED COUNT: 14.7 % (ref 11.5–14.5)
ESTIMATED CREATININE CLEARANCE: 73 ML/MIN
GLUCOSE - POINT OF CARE: 116 MG/DL (ref 70–99)
GLUCOSE - POINT OF CARE: 125 MG/DL (ref 70–99)
GLUCOSE - POINT OF CARE: 126 MG/DL (ref 70–99)
GLUCOSE - POINT OF CARE: 131 MG/DL (ref 70–99)
GLUCOSE - POINT OF CARE: 143 MG/DL (ref 70–99)
GLUCOSE SERPL-MCNC: 137 MG/DL (ref 70–99)
HCT VFR BLD AUTO: 34.7 % (ref 39–52)
HGB BLD-MCNC: 10.6 G/DL (ref 13–18)
MCHC RBC AUTO-ENTMCNC: 30.5 G/DL (ref 33–37)
MCV RBC AUTO: 90.8 FL (ref 80–94)
NRBC BLD AUTO-RTO: 0 %
PLATELET # BLD AUTO: 260 10^3/UL (ref 130–400)
POTASSIUM SERPL-SCNC: 3.8 MMOL/L (ref 3.5–5.1)
PROT SERPL-MCNC: 5.7 G/DL (ref 6.3–8.2)
SODIUM SERPL-SCNC: 146 MMOL/L (ref 135–145)

## 2025-02-07 RX ADMIN — TAZOBACTAM SODIUM AND PIPERACILLIN SODIUM 50: 375; 3 INJECTION, SOLUTION INTRAVENOUS at 15:14

## 2025-02-07 RX ADMIN — INSULIN ASPART: 100 INJECTION, SOLUTION INTRAVENOUS; SUBCUTANEOUS at 17:46

## 2025-02-07 RX ADMIN — SODIUM CHLORIDE 10 ML: 9 INJECTION, SOLUTION INTRAMUSCULAR; INTRAVENOUS; SUBCUTANEOUS at 10:28

## 2025-02-07 RX ADMIN — TAZOBACTAM SODIUM AND PIPERACILLIN SODIUM 50: 375; 3 INJECTION, SOLUTION INTRAVENOUS at 21:57

## 2025-02-07 RX ADMIN — PANTOPRAZOLE SODIUM 40 MG: 40 INJECTION, POWDER, LYOPHILIZED, FOR SOLUTION INTRAVENOUS at 20:24

## 2025-02-07 RX ADMIN — DIPHENHYDRAMINE HYDROCHLORIDE 12.5 MG: 50 INJECTION, SOLUTION INTRAMUSCULAR; INTRAVENOUS at 21:55

## 2025-02-07 RX ADMIN — PANTOPRAZOLE SODIUM 40 MG: 40 INJECTION, POWDER, LYOPHILIZED, FOR SOLUTION INTRAVENOUS at 10:28

## 2025-02-07 RX ADMIN — SODIUM CHLORIDE 10 ML: 9 INJECTION, SOLUTION INTRAMUSCULAR; INTRAVENOUS; SUBCUTANEOUS at 20:24

## 2025-02-07 RX ADMIN — TAZOBACTAM SODIUM AND PIPERACILLIN SODIUM 50: 375; 3 INJECTION, SOLUTION INTRAVENOUS at 10:28

## 2025-02-07 RX ADMIN — INSULIN ASPART: 100 INJECTION, SOLUTION INTRAVENOUS; SUBCUTANEOUS at 06:18

## 2025-02-07 RX ADMIN — INSULIN ASPART: 100 INJECTION, SOLUTION INTRAVENOUS; SUBCUTANEOUS at 11:43

## 2025-02-07 RX ADMIN — LEVALBUTEROL HYDROCHLORIDE 0.63 MG: 0.63 SOLUTION RESPIRATORY (INHALATION) at 06:06

## 2025-02-07 RX ADMIN — METHIMAZOLE 5 MG: 5 TABLET ORAL at 10:28

## 2025-02-07 RX ADMIN — LORAZEPAM 0.5 MG: 0.5 TABLET ORAL at 10:47

## 2025-02-07 RX ADMIN — LORAZEPAM 0.5 MG: 0.5 TABLET ORAL at 21:54

## 2025-02-07 RX ADMIN — TAZOBACTAM SODIUM AND PIPERACILLIN SODIUM 50: 375; 3 INJECTION, SOLUTION INTRAVENOUS at 03:21

## 2025-02-07 RX ADMIN — LORAZEPAM 0.5 MG: 0.5 TABLET ORAL at 16:34

## 2025-02-07 RX ADMIN — ENOXAPARIN SODIUM 40 MG: 40 INJECTION SUBCUTANEOUS at 17:58

## 2025-02-07 RX ADMIN — INSULIN ASPART: 100 INJECTION, SOLUTION INTRAVENOUS; SUBCUTANEOUS at 01:01

## 2025-02-07 NOTE — W.PN.HOSP.TC
"Today's Communication/Plan"~"-"~"-: "~"speech eval"~"IV abx"~"cont TF-Increase FWF "~"await family decision-code status "~"wean o2 as tolerated "~"Assessment / Plan"~"Assessment / Plan"~"-: "~"74-year-old male admitted with intractable abdominal pain vomiting.  Patient was admitted and was discharged on 1/20/2025.  He was felt to have intractable vomiting secondary to chemotherapy at that time."~"X-ray of the abdomen today-progression of oral contrast into the distal colon and rectum"~"CT of the chest-moderate right lower lobe pneumonia, mild left lower lobe pneumonia.  Moderate right pleural effusion.  Small pericardial effusion.  No PE"~"# Acute hypoxic respiratory failure multifactorial"~"Aspiration pneumonia, atelectasis, pleural effusion,  "~"Discussed about thoracentesis"~"Status post thoracentesis with 1050 cc of cloudy yellow pleural fluid removed.  Preliminary fluid studies negative for growth."~"Status post IV Lasix per pulmonary due to noncardiogenic pulmonary edema"~"Continue with broad-spectrum antibiotics with Zosyn"~"Oxygenation improving slowly. 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  "~"NG tube was removed.  Patient will be monitored closely for increased biliary secretion."~"TPN stopped and continue with tube feeding"~"Per surgery-okay for clears for comfort feeding only. "~"Surgery correspondence noted. Monitor secretions as NGT removed.   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 Martinez"~"Onc following and recommending hospice as patient with advanced disease process and poor performance status."~"# Mild hypernatremia"~"Increase free water flushes to 65 cc "~"trend bmp"~"# Tachycardia likely multifactorial due to pain, severe hypoxemia"~"Monitor heart rate for now.  To read individual problems as above."~"can consider low dose lopressor if needed "~"# Chest pain overnight 2/3/2025"~"Slightly high troponin.  Cardiology evaluation appreciated.  Nonischemic myocardial injury"~"No PE"~"# Low TSH-likely euthyroid sick syndrome.  Free T4 normal."~"# Right pleural effusion"~"History of thoracentesis done on 1/20/2025 and 2/4/25"~"Malignant effusion "~" "~"# Hypertension-Hold amlodipine and lisinopril"~"# Hyperthyroidism-methimazole restarted "~"# Hyperlipidemia-hold statin as n.p.o."~"# Stage II sacral pressure injury-change position/offload/wound care"~"# Severe protein calorie malnutrition-  on TF "~"# Ex-smoker"~"# DVT prophylaxis-Lovenox"~"# Full code"~"Discussed with patient and patient wife at bedside in detail for prolonged period of time.  Discussed with patient and spouse at bedside about CODE STATUS.  Family to discuss among themselves.  Patient wants to  discuss  with his daughter today."~"Anticipated Discharge: &gt; 48 hours"~"Subjective/Interval History"~"-"~"-: "~"Date of Service:  February 7, 2025"~"remains with cough/secretion "~"pain improved with morphine"~"was anxious overnight "~"Objective Data"~"-"~"Labs: "~"Laboratory Results"~" 	02/07/25"~" 	04:45"~"WBC	 11.4 H"~"Hgb	 10.6 L"~"Hct	 34.7 L"~"Plt Count	 260"~"Sodium	 146 H"~"Potassium	 3.8"~"Chloride	 104"~"Carbon Dioxide	 34 H"~"BUN	 48 H"~"Creatinine	 0.8"~"Glucose	 137 H"~"Calcium	 8.6"~"Total Bilirubin	 0.7"~"AST	 66 H"~"ALT	 66 H"~"Alkaline Phosphatase	 216 H"~"Vital Signs: "~"Vital Signs"~"Temp	Pulse	Resp	BP	Pulse Ox"~" 98.0 F 	 97 	 14 	 115/99 	 94 "~" 02/07/25 07:50	 02/07/25 06:07	 02/07/25 06:07	 02/07/25 05:00	 02/07/25 06:07"~"I&O"~"	02/06/25	02/07/25	02/08/25"~"	06:59	06:59	06:59"~"Intake Total	1705 / 1705	1545.0 / 1545.0	"~"Output Total	2550 / 2550	750 / 750	"~"Balance	-845 / -845	795.0 / 795.0	"~"Physical Exam"~"-"~"General: Appears Chronically Ill and Cachectic"~"HEENT: Anicteric, Oxygen and Other (port R chest wall noted )"~"Respiratory: Rhonchi and Non Labored Respirations"~"Cardiac: Regular Rhythm, S1/S2 and Tachycardic; Negative Murmur, Rub or Gallop"~"GI: Soft, Nondistended, Normal Bowel Sounds, Peg Tube and Other (  midline scar with staples noted )"~"Musculoskeletal: No Clubbing, No Cyanosis and No Edema"~"Skin: Warm and Dry; Negative Rash"~"Neuro: Awake and Nonfocal/Grossly Intact"~"Psych: Calm"~"Data Reviewed"~"-"~"Total Time Spent with Patient (in minutes): 55"

## 2025-02-07 NOTE — W.PN.GS2
"Addendum entered and electronically signed by Alex Tomlinson MD  02/07/25 15:38: "~"Patient seen in follow-up this afternoon with NP."~"Has tolerated NG tube removal for 48 hours without nausea, worsening abdominal pain."~"G-tube feeding at goal; no abdominal pains."~"Expected loose bowel movements"~"AF, stable tachycardia normotensive"~"ABD: Soft, nondistended, nontender on palpation"~"Midline incision with glue dressing.  No openings no drainage."~"J-tube site clean.  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 Presenting with recurrent obstruction at ligament of treitz."~"POD #7 open GJ bypass and feeding jejunostomy tube placement found to have bulky retroperitoneal disease around pancreas causing small bowel obstruction at the ligament of Treitz.  "~"CT chest negative for PE; moderate right pleural effusion, moderate RLL consolidation/possible PNA; possible LLL PNA"~"	expected intraabdominal free air under the diaphragm for POD#4 after laparotomy"~"IR right thoracentesis 2/4"~"NG tube removed 2/6, tolerating well without nausea or vomiting. +Diarrhea, not unexpected. "~"Afebrile, intermittent tachycardia "~"Plan:   Ok for clears for comfort"~"	tube feeding via J tube to goal 70 mL an hour"~"	on ABX for PNA: ID following"~"	PT/OT as tolerated"~"	Further medical care per hospitalist/medical services"~"	Lovenox/SCDs for VTEp"~"Subjective Data"~"-"~"-: "~"Date of Service:  February 7, 2025"~"Patient seen and examined at bedside. Wife present, questions answered. Denies n/v. Coughing up a good amount of phlegm. Passing stools via rectal tube. Denies abdominal pain or bloating. "~"Objective Data"~"-"~"-: "~"Intake and Output"~"	02/06/25	02/07/25	02/08/25"~"	06:59	06:59	06:59"~"Intake Total	1705 / 1705	1545.0 / 1545.0	"~"Output Total	2550 / 2550	750 / 750	"~"Balance	-845 / -845	795.0 / 795.0	"~"Intake:			"~"  Oral fluids	0 / 0		"~"  IV fluids (Total)		45.0 / 45.0	"~"    KVO NS		45.0 / 45.0	"~"  IV piggybacks	150 / 150	150 / 150	"~"  TPN/PPN	190 / 190		"~"  Tube feeding	840 / 840	750 / 750	"~"  Feeding tube flush amount	525 / 525	600 / 600	"~"  Amount instilled into GI Tube (		0 / 0	"~"  Total)			"~"    Jejunostomy		0 / 0	"~"Output:			"~"  Gastrointestinal tube output (	1550 / 1550	150 / 150	"~"  Total)			"~"    [place] Sump	1550 / 1550	150 / 150	"~"  Urine, Voided	1000 / 1000	600 / 600	"~"Vital Signs"~"Temp	Pulse	Resp	BP	Pulse Ox"~" 98.0 F 	 97 	 14 	 115/99 	 94 "~" 02/07/25 07:50	 02/07/25 06:07	 02/07/25 06:07	 02/07/25 05:00	 02/07/25 06:07"~"Lab Results"~"02/07/25 04:45 "~"02/07/25 04:45 "~"Calcium	 8.6 mg/dl (8.4-10.2)  	02/07/25  04:45    "~"Phosphorus	 3.6 mg/dl (2.5-4.5)  	02/05/25  04:01    "~"Magnesium	 1.9 mg/dl (1.6-2.3)  	02/05/25  04:01    "~"Total Bilirubin	 0.7 mg/dl (0.2-1.3)  	02/07/25  04:45    "~"Direct Bilirubin	 0.4 mg/dl (0.0-0.4)  	02/04/25  02:11    "~"AST	 66 U/L (17-59)  H 	02/07/25  04:45    "~"ALT	 66 U/L (0-50)  H 	02/07/25  04:45    "~"Alkaline Phosphatase	 216 U/L ()  H 	02/07/25  04:45    "~"Total Protein	 5.7 g/dl (6.3-8.2)  L 	02/07/25  04:45    "~"Albumin	 2.7 g/dl (3.5-5.0)  L 	02/07/25  04:45    "~"Physical Exam"~"-"~"-: "~"NAD AAOx3"~"ABD: Soft, nondistended, nontender to palpation"~"Midline incision with glue dressing"~"J-tube intact with TF running, liquid brown stool via rectal tube"

## 2025-02-07 NOTE — PTCARENOTE
"Pt received from night shift. Ox3 and very anxious today. Medicated with PRN ativan per request. NSR on tele, + pulses. 95% on 4L NC, breath sounds shallow and diminished. Pt struggles with turns due to his orthopnea. Pt having moderate amounts of "~"liquid stool. Rectal trumpet in place with moderate amount of leakage. Osmo 1.2 going through pt's J tube at goal rate, water flush increased to 65/hr.  Pt using urinal appropriately. PT/OT/ST in to evaluate. Midline ABD incision intact. "

## 2025-02-07 NOTE — PTCARENOTE
"Complete cares given with partial linen change. Patient very SOB with activity. Very anxious and panics with positive orthopnea. Harsh loose PC with brown secretions. Respiratory therapy contacted for resp treatment. BBS coarse t/o, improve with "~"cough, wheezing. Emotional support and encouragement given. "

## 2025-02-07 NOTE — W.PN.ID1
"Date of Service"~"-: "~"Date of Service:  February 7, 2025"~"Today's Communication"~"-: "~"Continue Zosyn d5 of 7"~"Assessment / Plan"~"-: "~"# Aspiration pneumonia"~"# Acute hypoxic respiratory failure improving"~"# Fever - resolved"~"# Leukocytosis -resolving"~"    - Continue Zosyn d5 of 7"~"    - Follow  wbc"~"# Esophageal ca s/p stents, s/p cycle #1 Chemo"~"# Recurrent right malignant pleural effusion"~"# High grade partial SBO due to bulky RP disease and around pancreas s/p open GJ bypass and J-tube placement 1/28/25."~"   "~"Chief Complaint"~"-: Pneumonia"~"Subjective / Review of Systems"~"-: "~"Coughing up phlegm"~"Vital Signs / Physical Exam"~"Vital Signs"~"-: "~"Vital Signs"~"Temp	Pulse	Resp	BP	Pulse Ox"~" 98.1 F 	 107 	 21 	 147/76 	 97 "~" 02/07/25 12:00	 02/07/25 11:00	 02/07/25 11:00	 02/07/25 11:00	 02/07/25 14:08"~"Physical Exam"~"Constitutional: Chronically Ill"~"Cardiovascular: S1/S2 and Other (tachy)"~"Pulmonary: Rhonchi"~"Gastrointestinal: Soft, Non Tender, Non Distended and Decreased Bowel Sounds"~"Extremities: Negative Edema"~"Neurological: Awake and Alert"~"Objective Data"~"Lab Data"~"-: "~"Lab Results"~"02/07/25 04:45 "~"02/07/25 04:45 "~"Estimated Creat Clear	 73 ml/min 	02/07/25  04:45    "~"Lactic Acid	 0.9 mmol/L (0.7-2.0)  	01/22/25  20:57    "~"Total Bilirubin	 0.7 mg/dl (0.2-1.3)  	02/07/25  04:45    "~"AST	 66 U/L (17-59)  H 	02/07/25  04:45    "~"ALT	 66 U/L (0-50)  H 	02/07/25  04:45    "~"Alkaline Phosphatase	 216 U/L ()  H 	02/07/25  04:45    "~"Amylase	 40 U/L ()  	01/22/25  20:57    "~"Most recent labs reviewed. "~"Micro Results: "~" 02/04/25 15:37	Body Fluid Culture - Final"~" Pleural Fluid	   No Growth After 72 Hours"~"	Gram Stain - Final"~" 02/03/25 20:15	Blood Culture - Preliminary"~" Blood/Venous	   No Growth in 72 hours- Final report to follow"~" 02/03/25 20:11	Blood Culture - Preliminary"~" Blood/Venous	   No Growth in 72 hours- Final report to follow"~" 02/04/25 15:37	Fungal Culture - Pending"~" Pleural Fluid	"~" 02/04/25 15:37	Acid Fast Bacilli Smear - Pending"~" Pleural Fluid	Acid Fast Bacilli Culture - Pending"~" 02/03/25 20:11	Influenza Types A & B (ERIKA) - Final"~" Nasal Swab	   Negative for Influenza A & B, NAAT"~"	   Negative results must be combined with clinical observations"~"	   and patient history."~"	   Nucleic Acid Amplification test (NAAT)performed on the"~"	   [employer] ID NOW platform."~" 01/25/25 15:38	 - Final"~" Feces/Stool	   Negative for Norovirus GI and GII."~" 01/23/25 14:34	MRSA Screen - Final"~" Nose	   No Methicillin Resistant Staphylococcus aureus isolated."~"-: "~"1/23/25 CT a/p: The fluid-filled stomach is markedly distended and the fluid-filled duodenum is dilated to 4 cm with abrupt transition point to decompressed small bowel at the ligament of Treitz suggesting partial obstruction. Small bilateral "~"pleural effusions with associated compressive atelectasis at the posterior lung bases. Distal esophageal stent"~"2/3/25 Chest CT: Moderate right lower lobe pneumonia. Mild left lower lobe pneumonia. New bilaterally."~"2/6/26 CXR: Right basilar airspace consolidation, slightly improved compared to prior chest x-ray."

## 2025-02-07 NOTE — PTCARENOTE
Patient appears to be sleeping comfortably when left undisturbed with eyes closed, lying still, respirations non labored. Labs drawn from portacath. Repositioned. Denies pain. No complaints offered.

## 2025-02-07 NOTE — PTOTSP
"Dysphagia Evaluation"~"Patient presents with signs concerning for unspecified pharyngeal dysphagia (due to medical acuity, deconditioning) and has known esophageal cancer.  Top down and  bottom up aspiration risk elevated.  Signs of aspiration observed w/ single sip of "~"water.  Patient refused ice chip trials."~"Recommend:"~"1. NPO (non-oral means in place via J-tube)"~"2. Medications: via non-oral means"~"3. Oral care 3-5x daily with use of suctioning toothbrush"~"4. Ok for ice chips to chew/spit out for oral comfort"~"5. Dysphagia therapy at the acute care level.  Will assess for appropriateness of aspiration risk hydration protocol as able/appropriate"

## 2025-02-07 NOTE — VATNOTE
VAT paged to assess patient's cord in left arm cephalic area. Primary RN had removed PIV 18 gauge and applied warm compress. Cord measuring 8cm by 2 cm and marked. Hard to the touch and slightly red. Patient denies pain. Will continue to monitor.

## 2025-02-07 NOTE — PTCARENOTE
"Patient woke up very confused, trying to climb OOB. Unable to reorient. Assisted patient with urinal. Although unable to reorient, patient calmed and went back to sleep briefly. Patient's wife is at the bedside. Emotional support and encouragement "~"given. Confusion possibly due to Benadryl with Ativan at bedtime? Will watch closely."

## 2025-02-07 NOTE — PTCARENOTE
"patient received lying in bed, awake, alert and oriented x 3. He is without apparent signs of distress or discomfort. He has a harsh, loose NPC. Tachypneic but non labored. Saturating 92-94% on RA. He denies pain. He denies N/V. Denies SOB or CP. "~"BBS are coarse t/o. RML and RLL diminished. S1S2 regular, SR with pac's on CM. Abdomen soft. Jtube left abdomen with dressing CDI. Osmolite Tube feeding at 70cc/hr with H2O flush of 65c/hr. Rectal trumpet in place draining loose liquid tan brown "~"stool. Pedal pulses diminished, no edema. LFA #18 SL site with cord palpable, painful to flush. SL dc'd, catheter intact. Warm compress to site. IV team notified. "

## 2025-02-08 VITALS — DIASTOLIC BLOOD PRESSURE: 70 MMHG | RESPIRATION RATE: 28 BRPM | SYSTOLIC BLOOD PRESSURE: 133 MMHG

## 2025-02-08 VITALS — SYSTOLIC BLOOD PRESSURE: 137 MMHG | DIASTOLIC BLOOD PRESSURE: 77 MMHG | RESPIRATION RATE: 22 BRPM

## 2025-02-08 VITALS — SYSTOLIC BLOOD PRESSURE: 145 MMHG | RESPIRATION RATE: 23 BRPM | DIASTOLIC BLOOD PRESSURE: 75 MMHG

## 2025-02-08 VITALS — SYSTOLIC BLOOD PRESSURE: 133 MMHG | DIASTOLIC BLOOD PRESSURE: 74 MMHG | RESPIRATION RATE: 31 BRPM

## 2025-02-08 VITALS — DIASTOLIC BLOOD PRESSURE: 81 MMHG | RESPIRATION RATE: 31 BRPM | SYSTOLIC BLOOD PRESSURE: 140 MMHG

## 2025-02-08 VITALS — SYSTOLIC BLOOD PRESSURE: 137 MMHG | RESPIRATION RATE: 29 BRPM | DIASTOLIC BLOOD PRESSURE: 66 MMHG

## 2025-02-08 VITALS — DIASTOLIC BLOOD PRESSURE: 72 MMHG | RESPIRATION RATE: 28 BRPM | SYSTOLIC BLOOD PRESSURE: 157 MMHG

## 2025-02-08 VITALS — RESPIRATION RATE: 20 BRPM | SYSTOLIC BLOOD PRESSURE: 142 MMHG | DIASTOLIC BLOOD PRESSURE: 80 MMHG

## 2025-02-08 VITALS — DIASTOLIC BLOOD PRESSURE: 67 MMHG | RESPIRATION RATE: 29 BRPM | SYSTOLIC BLOOD PRESSURE: 142 MMHG

## 2025-02-08 VITALS — RESPIRATION RATE: 29 BRPM | SYSTOLIC BLOOD PRESSURE: 133 MMHG | DIASTOLIC BLOOD PRESSURE: 72 MMHG

## 2025-02-08 VITALS — SYSTOLIC BLOOD PRESSURE: 131 MMHG | DIASTOLIC BLOOD PRESSURE: 80 MMHG | RESPIRATION RATE: 30 BRPM

## 2025-02-08 VITALS — DIASTOLIC BLOOD PRESSURE: 69 MMHG | SYSTOLIC BLOOD PRESSURE: 138 MMHG | RESPIRATION RATE: 31 BRPM

## 2025-02-08 VITALS — DIASTOLIC BLOOD PRESSURE: 81 MMHG | RESPIRATION RATE: 20 BRPM | SYSTOLIC BLOOD PRESSURE: 147 MMHG

## 2025-02-08 VITALS — DIASTOLIC BLOOD PRESSURE: 73 MMHG | SYSTOLIC BLOOD PRESSURE: 136 MMHG | RESPIRATION RATE: 30 BRPM

## 2025-02-08 VITALS — DIASTOLIC BLOOD PRESSURE: 72 MMHG | RESPIRATION RATE: 32 BRPM | SYSTOLIC BLOOD PRESSURE: 125 MMHG

## 2025-02-08 VITALS — DIASTOLIC BLOOD PRESSURE: 95 MMHG | RESPIRATION RATE: 23 BRPM | SYSTOLIC BLOOD PRESSURE: 120 MMHG

## 2025-02-08 VITALS — RESPIRATION RATE: 22 BRPM | SYSTOLIC BLOOD PRESSURE: 137 MMHG | DIASTOLIC BLOOD PRESSURE: 85 MMHG

## 2025-02-08 VITALS — DIASTOLIC BLOOD PRESSURE: 97 MMHG | RESPIRATION RATE: 28 BRPM | SYSTOLIC BLOOD PRESSURE: 131 MMHG

## 2025-02-08 VITALS — RESPIRATION RATE: 29 BRPM | SYSTOLIC BLOOD PRESSURE: 127 MMHG | DIASTOLIC BLOOD PRESSURE: 81 MMHG

## 2025-02-08 VITALS — DIASTOLIC BLOOD PRESSURE: 86 MMHG | SYSTOLIC BLOOD PRESSURE: 130 MMHG | RESPIRATION RATE: 18 BRPM

## 2025-02-08 VITALS — DIASTOLIC BLOOD PRESSURE: 74 MMHG | RESPIRATION RATE: 28 BRPM | SYSTOLIC BLOOD PRESSURE: 144 MMHG

## 2025-02-08 VITALS — RESPIRATION RATE: 30 BRPM | SYSTOLIC BLOOD PRESSURE: 138 MMHG | DIASTOLIC BLOOD PRESSURE: 73 MMHG

## 2025-02-08 VITALS — SYSTOLIC BLOOD PRESSURE: 145 MMHG | DIASTOLIC BLOOD PRESSURE: 75 MMHG | RESPIRATION RATE: 13 BRPM

## 2025-02-08 VITALS — SYSTOLIC BLOOD PRESSURE: 133 MMHG | RESPIRATION RATE: 23 BRPM | DIASTOLIC BLOOD PRESSURE: 64 MMHG

## 2025-02-08 VITALS — RESPIRATION RATE: 16 BRPM

## 2025-02-08 LAB
ALBUMIN SERPL-MCNC: 2.6 G/DL (ref 3.5–5)
ALP SERPL-CCNC: 196 U/L (ref 38–126)
ALT SERPL-CCNC: 62 U/L (ref 0–50)
AST SERPL-CCNC: 45 U/L (ref 17–59)
BUN SERPL-MCNC: 40 MG/DL (ref 9–20)
CALCIUM SERPL-MCNC: 8.4 MG/DL (ref 8.4–10.2)
CHLORIDE SERPL-SCNC: 105 MMOL/L (ref 98–107)
CO2 SERPL-SCNC: 33 MMOL/L (ref 22–30)
EGFR: > 60
ERYTHROCYTE [DISTWIDTH] IN BLOOD BY AUTOMATED COUNT: 14.6 % (ref 11.5–14.5)
ESTIMATED CREATININE CLEARANCE: 84 ML/MIN
GLUCOSE - POINT OF CARE: 130 MG/DL (ref 70–99)
GLUCOSE - POINT OF CARE: 131 MG/DL (ref 70–99)
GLUCOSE - POINT OF CARE: 139 MG/DL (ref 70–99)
GLUCOSE SERPL-MCNC: 130 MG/DL (ref 70–99)
HCT VFR BLD AUTO: 33.7 % (ref 39–52)
HGB BLD-MCNC: 10.4 G/DL (ref 13–18)
MCHC RBC AUTO-ENTMCNC: 30.9 G/DL (ref 33–37)
MCV RBC AUTO: 90.8 FL (ref 80–94)
NRBC BLD AUTO-RTO: 0 %
PLATELET # BLD AUTO: 267 10^3/UL (ref 130–400)
POTASSIUM SERPL-SCNC: 3.7 MMOL/L (ref 3.5–5.1)
PROT SERPL-MCNC: 5.6 G/DL (ref 6.3–8.2)
SODIUM SERPL-SCNC: 144 MMOL/L (ref 135–145)

## 2025-02-08 RX ADMIN — TAZOBACTAM SODIUM AND PIPERACILLIN SODIUM 50: 375; 3 INJECTION, SOLUTION INTRAVENOUS at 09:02

## 2025-02-08 RX ADMIN — PANTOPRAZOLE SODIUM 40 MG: 40 INJECTION, POWDER, LYOPHILIZED, FOR SOLUTION INTRAVENOUS at 19:48

## 2025-02-08 RX ADMIN — METHIMAZOLE 5 MG: 5 TABLET ORAL at 08:40

## 2025-02-08 RX ADMIN — GUAIFENESIN AND DEXTROMETHORPHAN 10 ML: 100; 10 SYRUP ORAL at 21:45

## 2025-02-08 RX ADMIN — TAZOBACTAM SODIUM AND PIPERACILLIN SODIUM 50: 375; 3 INJECTION, SOLUTION INTRAVENOUS at 16:20

## 2025-02-08 RX ADMIN — TAZOBACTAM SODIUM AND PIPERACILLIN SODIUM 50: 375; 3 INJECTION, SOLUTION INTRAVENOUS at 04:21

## 2025-02-08 RX ADMIN — Medication 5 MG: at 21:45

## 2025-02-08 RX ADMIN — TAZOBACTAM SODIUM AND PIPERACILLIN SODIUM 50: 375; 3 INJECTION, SOLUTION INTRAVENOUS at 21:45

## 2025-02-08 RX ADMIN — SODIUM CHLORIDE 10 ML: 9 INJECTION, SOLUTION INTRAMUSCULAR; INTRAVENOUS; SUBCUTANEOUS at 08:40

## 2025-02-08 RX ADMIN — INSULIN ASPART: 100 INJECTION, SOLUTION INTRAVENOUS; SUBCUTANEOUS at 18:03

## 2025-02-08 RX ADMIN — INSULIN ASPART: 100 INJECTION, SOLUTION INTRAVENOUS; SUBCUTANEOUS at 00:30

## 2025-02-08 RX ADMIN — OLANZAPINE 5 MG: 10 INJECTION, POWDER, LYOPHILIZED, FOR SOLUTION INTRAMUSCULAR at 01:08

## 2025-02-08 RX ADMIN — INSULIN ASPART: 100 INJECTION, SOLUTION INTRAVENOUS; SUBCUTANEOUS at 12:18

## 2025-02-08 RX ADMIN — PANTOPRAZOLE SODIUM 40 MG: 40 INJECTION, POWDER, LYOPHILIZED, FOR SOLUTION INTRAVENOUS at 08:40

## 2025-02-08 RX ADMIN — INSULIN ASPART: 100 INJECTION, SOLUTION INTRAVENOUS; SUBCUTANEOUS at 05:30

## 2025-02-08 RX ADMIN — ENOXAPARIN SODIUM 40 MG: 40 INJECTION SUBCUTANEOUS at 18:02

## 2025-02-08 RX ADMIN — WATER 2.1 ML: 1 INJECTION INTRAMUSCULAR; INTRAVENOUS; SUBCUTANEOUS at 01:09

## 2025-02-08 RX ADMIN — SODIUM CHLORIDE 10 ML: 9 INJECTION, SOLUTION INTRAMUSCULAR; INTRAVENOUS; SUBCUTANEOUS at 19:48

## 2025-02-08 RX ADMIN — LEVALBUTEROL HYDROCHLORIDE 0.63 MG: 0.63 SOLUTION RESPIRATORY (INHALATION) at 06:27

## 2025-02-08 NOTE — PTCARENOTE
"Rec'd pt at 0700, pt IMU status. Pt drowsy initially this am but easily awakens, orientedx3. Follows commands, MCKEON with gen weakness. Monitor SR with occas PACs. Lungs sct coarse, occas moist cough for thick tan mucous, pt utilizing yankauer at "~"bedside to clear secretions. Mouth care performed by pt. +BS, abd soft/nt. J-tube with Osmolite at 70mls/hr. Rectal trumpet in place. Wife remains at bedside."

## 2025-02-08 NOTE — PTCARENOTE
"Patient again awoke, very confused. He thinks he is at home. He wants to get OOB and go downstairs to go to sleep, trying to climb OOB. Attempts to reorient patient are unsuccessful. I am sitting at the bedside watching patient for his safety. Bed "~"alarm on. Reached out to IMU TRAN Pulido. New orders received. See MAR. Zyprexa ordered."

## 2025-02-08 NOTE — W.PN.HOSP.TC
"Today's Communication/Plan"~"-"~"-: "~"All discussed with the patient wife in detail and expressed understanding"~"Discussed with the nurse"~"Assessment / Plan"~"Assessment / Plan"~"-: "~"Physical exam:"~"General: Awake, alert and oriented x3, lethargic, answer question properly but speaks in a low tone of not in distress and holds appropriate conversation."~"HEENT: No active discharge, ecchymosis or bruising, moist lips, tongue and mucous membrane."~"Eyes: No discharge or red conjunctiva, no nystagmus, pupils are reactive and equal"~"Neck:Supple, no JVD no bruit no goiter."~"Respiratory: Normal AP contour and diameter, normal chest wall movement, normal respiratory effort, no respiratory distress, "~"Lungs: Good air entry bilaterally, no wheezing or rhonchi, no rales or crackles"~"Heart: S1, S2 regular, normal rate, no added sound."~"Gastrointestinal: PEG tube in epigastric area, has midline incision positive bowel sounds, soft, nontender, no guarding or rigidity or organomegaly"~"Musculoskeletal: , no chest wall abnormality or tenderness.  All joints and extremities have good range of motion, no muscle tenderness or any joint swelling or tenderness."~"Extremities: Lower extremities pitting edema, good peripheral pulses, good range of motion"~"Skin: Warm and dry, no ulceration, normal color.  "~"Neurological: Awake, alert and oriented x3, lethargic, mild cognitive decline appreciated, speech clear and comprehensive, good muscle tone, move extremities"~"Psychiatric: Normal mood, question thought and judgment, normal affect,"~"74-year-old male admitted with intractable abdominal pain vomiting.  Patient was admitted and was discharged on 1/20/2025.  He was felt to have intractable vomiting secondary to chemotherapy at that time."~"X-ray of the abdomen today-progression of oral contrast into the distal colon and rectum"~"CT of the chest-moderate right lower lobe pneumonia, mild left lower lobe pneumonia.  Moderate right pleural effusion.  Small pericardial effusion.  No PE"~"# Acute hypoxic respiratory failure multifactorial"~"Infusion on room air now"~"Aspiration pneumonia, atelectasis, pleural effusion,  "~"Discussed about thoracentesis"~"Status post thoracentesis with 1050 cc of cloudy yellow pleural fluid removed.  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  ."~"Restarted on tube feeding"~"Now having a loose stool since yesterday"~"Continue to monitor"~"Continue to have loose stools and may consider stool workup for culture and C. 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 Martinez"~"Onc following and recommending hospice as patient with advanced disease process and poor performance status."~"# Mild hypernatremia"~"Increase free water flushes to 65 cc "~"trend bmp"~"# Tachycardia likely multifactorial due to pain, severe hypoxemia"~"Monitor heart rate for now.  To read individual problems as above."~"can consider low dose lopressor if needed "~"# Chest pain overnight 2/3/2025"~"Slightly high troponin.  Cardiology evaluation appreciated.  Nonischemic myocardial injury"~"No PE"~"# Low TSH-likely euthyroid sick syndrome.  Free T4 normal."~"# Right pleural effusion"~"History of thoracentesis done on 1/20/2025 and 2/4/25"~"Malignant effusion "~" "~"# Hypertension-Hold amlodipine and lisinopril"~"# Hyperthyroidism-methimazole restarted "~"# Hyperlipidemia-hold statin as n.p.o."~"# Stage II sacral pressure injury-change position/offload/wound care"~"# Severe protein calorie malnutrition-  on TF "~"# Ex-smoker"~"# DVT prophylaxis-Lovenox"~"# Full code"~"All discussed with the patient and his wife at the bedside in detail and expressed understanding all the question answered"~"Discussed with the nurse"~"Plan SNF versus other option including hospice.  "~"Anticipated Discharge: 24 - 48 hours"~"Subjective/Interval History"~"-"~"-: "~"Date of Service:  February 8, 2025"~"Seen and examined, awake and alert, lethargic but answers question properly, not in distress, denies any chest pain or shortness of breath or fever or chill, have rectal tube and has loose stool now, which according to the wife started yesterday as "~"he had a small bowel obstruction"~"NG tube removed"~"Started back on GJ tube feeding, denies any nausea vomiting"~"Objective Data"~"-"~"Labs: "~"Laboratory Results"~" 	02/08/25"~" 	04:28"~"WBC	 11.7 H"~"Hgb	 10.4 L"~"Hct	 33.7 L"~"Plt Count	 267"~"Vital Signs: "~"Vital Signs"~"Temp	Pulse	Resp	BP	Pulse Ox"~" 98.1 F 	 91 	 31 	 140/81 	 94 "~" 02/08/25 15:00	 02/08/25 16:00	 02/08/25 16:00	 02/08/25 16:00	 02/08/25 16:00"~"I&O"~"	02/07/25	02/08/25	02/09/25"~"	07:59	07:59	07:59"~"Intake Total	1545.0 / 1545.0	3660 / 3800	1415 / 1415"~"Output Total	750 / 750	225 / 225	"~"Balance	795.0 / 795.0	3435 / 3575	1415 / 1415"~"Data Reviewed"~"-"~"Medical Tests (Nuc Med, Echo etc): Report Reviewed by me"~"Labs: Labs Reviewed by me"~"Old Records: Reviewed"

## 2025-02-08 NOTE — PTCARENOTE
"Patient received lying in bed awake, alert and oriented. He is very anxious. Repositioned for comfort. Tachypneic but nonlabored respirations. Frequent loose productive cough with brown gray sputum. He suctions himself with yankauer as needed. Upper "~"aiway is coarse, improves with coughing/clearing secretions. RUL coarse rhonchi, ANDRE clear, RML and RLL diminished with fine crackles, left base with crackles. Abdomen soft. Midline abdominal incision well approximated, no drainage, dermabond "~"intact.  Jtube intact with sutures, drain sponge CDI. Tolerating tube feeding Osmolite 1.2 at 70cc/hr with H2O flush at 65cc/hr. Pedal pulses palpable, no edema. S1 S2 regular, SR on CM. Patient is requesting something to sleep, takes Melatonin at "~"home. Also offered cough syrup for cough--orders received from Deisy [facility]FIELD."

## 2025-02-08 NOTE — W.PN.ID1
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 "~"+intermittent anxiety/agitation"~"Vital Signs / Physical Exam"~"Vital Signs"~"-: "~"Vital Signs"~"Temp	Pulse	Resp	BP	Pulse Ox"~" 98.6 F 	 94 	 23 	 133/64 	 97 "~" 02/08/25 07:15	 02/08/25 10:00	 02/08/25 10:00	 02/08/25 10:00	 02/08/25 10:00"~"Physical Exam"~"Constitutional: Acutely Ill"~"Eyes: No Conjunctival Hemorrhage and Sclera Anicteric"~"Pulmonary: Rhonchi"~"Gastrointestinal: Soft and Non Tender"~"Extremities: Negative Edema"~"Neurological: AO x 3"~"Lines: Port (no erythema)"~"Objective Data"~"Lab Data"~"-: "~"Lab Results"~"02/08/25 04:28 "~"02/08/25 04:28 "~"Estimated Creat Clear	 84 ml/min 	02/08/25  04:28    "~"Lactic Acid	 0.9 mmol/L (0.7-2.0)  	01/22/25  20:57    "~"Total Bilirubin	 0.6 mg/dl (0.2-1.3)  	02/08/25  04:28    "~"AST	 45 U/L (17-59)  	02/08/25  04:28    "~"ALT	 62 U/L (0-50)  H 	02/08/25  04:28    "~"Alkaline Phosphatase	 196 U/L ()  H 	02/08/25  04:28    "~"Amylase	 40 U/L ()  	01/22/25  20:57    "~"Most recent labs reviewed. "~"Micro Results: "~" 02/03/25 20:15	Blood Culture - Preliminary"~" Blood/Venous	   No Growth in 4 days- Final report to follow"~" 02/03/25 20:11	Blood Culture - Preliminary"~" Blood/Venous	   No Growth in 4 days- Final report to follow"~" 02/04/25 15:37	Body Fluid Culture - Final"~" Pleural Fluid	   No Growth After 72 Hours"~"	Gram Stain - Final"~" 02/04/25 15:37	Fungal Culture - Pending"~" Pleural Fluid	"~" 02/04/25 15:37	Acid Fast Bacilli Smear - Pending"~" Pleural Fluid	Acid Fast Bacilli Culture - Pending"~" 02/03/25 20:11	Influenza Types A & B (ERIKA) - Final"~" Nasal Swab	   Negative for Influenza A & B, NAAT"~"	   Negative results must be combined with clinical observations"~"	   and patient history."~"	   Nucleic Acid Amplification test (NAAT)performed on the"~"	   [employer] NOW platform."~" 01/25/25 15:38	 - Final"~" Feces/Stool	   Negative for Norovirus GI and GII."~" 01/23/25 14:34	MRSA Screen - Final"~" Nose	   No Methicillin Resistant Staphylococcus aureus isolated."~"-: "~"1/23/25 CT a/p: The fluid-filled stomach is markedly distended and the fluid-filled duodenum is dilated to 4 cm with abrupt transition point to decompressed small bowel at the ligament of Treitz suggesting partial obstruction. Small bilateral "~"pleural effusions with associated compressive atelectasis at the posterior lung bases. Distal esophageal stent"~"2/3/25 Chest CT: Moderate right lower lobe pneumonia. Mild left lower lobe pneumonia. New bilaterally."~"2/6/26 CXR: Right basilar airspace consolidation, slightly improved compared to prior chest x-ray."

## 2025-02-08 NOTE — W.PN.UPDATE
"Update Note"~"Progress Note Update"~"-: "~"RN notified NP, patient restless confused, trying to get OOB. been receiving Benadryl HS, did receive 3 doses of Ativan 2/7, stable VS. IM Zyprexa 5mg ordered. NP went to evaluate patient. Noted patient to be sleeping,  RN at the beside. stable VS. "~"confusion likely due to multi Meds/ hospital delirium/paradoxical affect? "~"will order UA"~"maintain fall precautions, bed alarm."~"Ativan held "

## 2025-02-08 NOTE — PTCARENOTE
"Patient has removed his gown, all linens, heart monitor leads, sat monitor, and [facility] Attempting to climb OOB. Luckily he did not remove his Jtube. Sats 91-92% on RA. Complete cares given, CHG cloth bath, complete linen change. "~"[facility] portacat site cleansed with chloraprep and redressed via sterile technique. J tube retaped. Rectal tube in place. Oxygen at 2 L/nc donned. Manual CPT given and patient encouraged to DB&C, suctioned orally for moderate amount of thick tan "~"secretions."

## 2025-02-09 VITALS — RESPIRATION RATE: 18 BRPM | DIASTOLIC BLOOD PRESSURE: 86 MMHG | SYSTOLIC BLOOD PRESSURE: 127 MMHG

## 2025-02-09 VITALS — RESPIRATION RATE: 29 BRPM | DIASTOLIC BLOOD PRESSURE: 99 MMHG | SYSTOLIC BLOOD PRESSURE: 134 MMHG

## 2025-02-09 VITALS — DIASTOLIC BLOOD PRESSURE: 74 MMHG | SYSTOLIC BLOOD PRESSURE: 134 MMHG

## 2025-02-09 VITALS — DIASTOLIC BLOOD PRESSURE: 77 MMHG | RESPIRATION RATE: 18 BRPM | SYSTOLIC BLOOD PRESSURE: 128 MMHG

## 2025-02-09 VITALS — DIASTOLIC BLOOD PRESSURE: 82 MMHG | RESPIRATION RATE: 27 BRPM | SYSTOLIC BLOOD PRESSURE: 135 MMHG

## 2025-02-09 VITALS — DIASTOLIC BLOOD PRESSURE: 69 MMHG | RESPIRATION RATE: 20 BRPM | SYSTOLIC BLOOD PRESSURE: 149 MMHG

## 2025-02-09 VITALS — SYSTOLIC BLOOD PRESSURE: 137 MMHG | RESPIRATION RATE: 22 BRPM | DIASTOLIC BLOOD PRESSURE: 68 MMHG

## 2025-02-09 VITALS — SYSTOLIC BLOOD PRESSURE: 121 MMHG | RESPIRATION RATE: 30 BRPM | DIASTOLIC BLOOD PRESSURE: 85 MMHG

## 2025-02-09 VITALS — RESPIRATION RATE: 29 BRPM | DIASTOLIC BLOOD PRESSURE: 78 MMHG | SYSTOLIC BLOOD PRESSURE: 139 MMHG

## 2025-02-09 VITALS — SYSTOLIC BLOOD PRESSURE: 135 MMHG | RESPIRATION RATE: 15 BRPM | DIASTOLIC BLOOD PRESSURE: 82 MMHG

## 2025-02-09 VITALS — RESPIRATION RATE: 18 BRPM

## 2025-02-09 VITALS — SYSTOLIC BLOOD PRESSURE: 133 MMHG | DIASTOLIC BLOOD PRESSURE: 68 MMHG | RESPIRATION RATE: 27 BRPM

## 2025-02-09 VITALS — DIASTOLIC BLOOD PRESSURE: 79 MMHG | RESPIRATION RATE: 16 BRPM | SYSTOLIC BLOOD PRESSURE: 141 MMHG

## 2025-02-09 VITALS — SYSTOLIC BLOOD PRESSURE: 136 MMHG | DIASTOLIC BLOOD PRESSURE: 79 MMHG | RESPIRATION RATE: 17 BRPM

## 2025-02-09 VITALS — DIASTOLIC BLOOD PRESSURE: 83 MMHG | RESPIRATION RATE: 18 BRPM | SYSTOLIC BLOOD PRESSURE: 126 MMHG

## 2025-02-09 VITALS — DIASTOLIC BLOOD PRESSURE: 99 MMHG | RESPIRATION RATE: 25 BRPM | SYSTOLIC BLOOD PRESSURE: 142 MMHG

## 2025-02-09 VITALS — RESPIRATION RATE: 21 BRPM | DIASTOLIC BLOOD PRESSURE: 74 MMHG | SYSTOLIC BLOOD PRESSURE: 134 MMHG

## 2025-02-09 VITALS — SYSTOLIC BLOOD PRESSURE: 146 MMHG | DIASTOLIC BLOOD PRESSURE: 78 MMHG | RESPIRATION RATE: 18 BRPM

## 2025-02-09 VITALS — RESPIRATION RATE: 19 BRPM

## 2025-02-09 VITALS — DIASTOLIC BLOOD PRESSURE: 80 MMHG | RESPIRATION RATE: 33 BRPM | SYSTOLIC BLOOD PRESSURE: 139 MMHG

## 2025-02-09 LAB
ALBUMIN SERPL-MCNC: 2.4 G/DL (ref 3.5–5)
ALP SERPL-CCNC: 182 U/L (ref 38–126)
ALT SERPL-CCNC: 44 U/L (ref 0–50)
AST SERPL-CCNC: 29 U/L (ref 17–59)
BUN SERPL-MCNC: 33 MG/DL (ref 9–20)
CALCIUM SERPL-MCNC: 8.1 MG/DL (ref 8.4–10.2)
CHLORIDE SERPL-SCNC: 105 MMOL/L (ref 98–107)
CO2 SERPL-SCNC: 33 MMOL/L (ref 22–30)
EGFR: > 60
ESTIMATED CREATININE CLEARANCE: 87 ML/MIN
GLUCOSE - POINT OF CARE: 108 MG/DL (ref 70–99)
GLUCOSE - POINT OF CARE: 111 MG/DL (ref 70–99)
GLUCOSE - POINT OF CARE: 140 MG/DL (ref 70–99)
GLUCOSE - POINT OF CARE: 141 MG/DL (ref 70–99)
GLUCOSE - POINT OF CARE: 143 MG/DL (ref 70–99)
GLUCOSE SERPL-MCNC: 147 MG/DL (ref 70–99)
POTASSIUM SERPL-SCNC: 3.8 MMOL/L (ref 3.5–5.1)
PROT SERPL-MCNC: 5.4 G/DL (ref 6.3–8.2)
SODIUM SERPL-SCNC: 141 MMOL/L (ref 135–145)

## 2025-02-09 RX ADMIN — GUAIFENESIN AND DEXTROMETHORPHAN 10 ML: 100; 10 SYRUP ORAL at 08:11

## 2025-02-09 RX ADMIN — INSULIN ASPART: 100 INJECTION, SOLUTION INTRAVENOUS; SUBCUTANEOUS at 18:16

## 2025-02-09 RX ADMIN — LEVALBUTEROL HYDROCHLORIDE 0.63 MG: 0.63 SOLUTION RESPIRATORY (INHALATION) at 01:09

## 2025-02-09 RX ADMIN — SODIUM CHLORIDE 10 ML: 9 INJECTION, SOLUTION INTRAMUSCULAR; INTRAVENOUS; SUBCUTANEOUS at 08:04

## 2025-02-09 RX ADMIN — INSULIN ASPART: 100 INJECTION, SOLUTION INTRAVENOUS; SUBCUTANEOUS at 06:22

## 2025-02-09 RX ADMIN — TAZOBACTAM SODIUM AND PIPERACILLIN SODIUM 50: 375; 3 INJECTION, SOLUTION INTRAVENOUS at 22:37

## 2025-02-09 RX ADMIN — Medication 1 FLUSH: at 20:20

## 2025-02-09 RX ADMIN — INSULIN ASPART: 100 INJECTION, SOLUTION INTRAVENOUS; SUBCUTANEOUS at 00:18

## 2025-02-09 RX ADMIN — SODIUM CHLORIDE 10 ML: 9 INJECTION, SOLUTION INTRAMUSCULAR; INTRAVENOUS; SUBCUTANEOUS at 20:20

## 2025-02-09 RX ADMIN — GUAIFENESIN AND DEXTROMETHORPHAN 10 ML: 100; 10 SYRUP ORAL at 03:53

## 2025-02-09 RX ADMIN — ENOXAPARIN SODIUM 40 MG: 40 INJECTION SUBCUTANEOUS at 16:49

## 2025-02-09 RX ADMIN — TAZOBACTAM SODIUM AND PIPERACILLIN SODIUM 50: 375; 3 INJECTION, SOLUTION INTRAVENOUS at 03:51

## 2025-02-09 RX ADMIN — PANTOPRAZOLE SODIUM 40 MG: 40 INJECTION, POWDER, LYOPHILIZED, FOR SOLUTION INTRAVENOUS at 08:03

## 2025-02-09 RX ADMIN — Medication 1 FLUSH: at 20:19

## 2025-02-09 RX ADMIN — INSULIN ASPART: 100 INJECTION, SOLUTION INTRAVENOUS; SUBCUTANEOUS at 23:01

## 2025-02-09 RX ADMIN — PANTOPRAZOLE SODIUM 40 MG: 40 INJECTION, POWDER, LYOPHILIZED, FOR SOLUTION INTRAVENOUS at 20:21

## 2025-02-09 RX ADMIN — Medication 5 MG: at 22:31

## 2025-02-09 RX ADMIN — LEVALBUTEROL HYDROCHLORIDE 0.63 MG: 0.63 SOLUTION RESPIRATORY (INHALATION) at 08:04

## 2025-02-09 RX ADMIN — TAZOBACTAM SODIUM AND PIPERACILLIN SODIUM 50: 375; 3 INJECTION, SOLUTION INTRAVENOUS at 09:29

## 2025-02-09 RX ADMIN — TAZOBACTAM SODIUM AND PIPERACILLIN SODIUM 50: 375; 3 INJECTION, SOLUTION INTRAVENOUS at 15:30

## 2025-02-09 RX ADMIN — METHIMAZOLE 5 MG: 5 TABLET ORAL at 08:03

## 2025-02-09 RX ADMIN — INSULIN ASPART: 100 INJECTION, SOLUTION INTRAVENOUS; SUBCUTANEOUS at 12:05

## 2025-02-09 RX ADMIN — HEPARIN SODIUM (PORCINE) LOCK FLUSH IV SOLN 100 UNIT/ML 500 UNIT: 100 SOLUTION at 23:39

## 2025-02-09 NOTE — W.PN.ID1
"Date of Service"~"-: "~"Date of Service:  February 9, 2025"~"Today's Communication"~"-: "~" - Last day of Zosyn d7 of 7"~" - ID will sign off. "~"Assessment / Plan"~"-: "~"# Aspiration pneumonia resolved"~"# Acute hypoxic respiratory failure improving"~"# Fever - resolved"~"# Leukocytosis -resolving"~"    - Last day of Zosyn d7 of 7"~"    - ID will sign off. "~"# Esophageal ca s/p stents, s/p cycle #1 Chemo"~"# Recurrent right malignant pleural effusion"~"# High grade partial SBO due to bulky RP disease and around pancreas s/p open GJ bypass and J-tube placement 1/28/25."~"   "~"Chief Complaint"~"-: Pneumonia"~"Subjective / Review of Systems"~"-: "~"No events overnight. "~"Cough and secretions stable. "~"Vital Signs / Physical Exam"~"Vital Signs"~"-: "~"Vital Signs"~"Temp	Pulse	Resp	BP	Pulse Ox"~" 98.7 F 	 91 	 27 	 135/82 	 94 "~" 02/09/25 07:22	 02/09/25 10:00	 02/09/25 10:00	 02/09/25 10:00	 02/09/25 10:00"~"Physical Exam"~"Constitutional: Chronically Ill"~"Cardiovascular: Regular Rate and S1/S2"~"Pulmonary: Rhonchi"~"Gastrointestinal: Soft, Non Tender and Non Distended"~"Extremities: Negative Edema"~"Neurological: AO x 3"~"Objective Data"~"Lab Data"~"-: "~"Lab Results"~"02/08/25 04:28 "~"02/09/25 04:02 "~"Estimated Creat Clear	 87 ml/min 	02/09/25  04:02    "~"Lactic Acid	 0.9 mmol/L (0.7-2.0)  	01/22/25  20:57    "~"Total Bilirubin	 0.3 mg/dl (0.2-1.3)  	02/09/25  04:02    "~"AST	 29 U/L (17-59)  	02/09/25  04:02    "~"ALT	 44 U/L (0-50)  	02/09/25  04:02    "~"Alkaline Phosphatase	 182 U/L ()  H 	02/09/25  04:02    "~"Amylase	 40 U/L ()  	01/22/25  20:57    "~"Most recent labs reviewed. "~"Micro Results: "~" 02/03/25 20:15	Blood Culture - Final"~" Blood/Venous	   No Growth - Final Report"~" 02/03/25 20:11	Blood Culture - Final"~" Blood/Venous	   No Growth - Final Report"~" 02/04/25 15:37	Body Fluid Culture - Final"~" Pleural Fluid	   No Growth After 72 Hours"~"	Gram Stain - Final"~" 02/04/25 15:37	Fungal Culture - Pending"~" Pleural Fluid	"~" 02/04/25 15:37	Acid Fast Bacilli Smear - Pending"~" Pleural Fluid	Acid Fast Bacilli Culture - Pending"~" 02/03/25 20:11	Influenza Types A & B (ERIKA) - Final"~" Nasal Swab	   Negative for Influenza A & B, NAAT"~"	   Negative results must be combined with clinical observations"~"	   and patient history."~"	   Nucleic Acid Amplification test (NAAT)performed on the"~"	   [employer] NOW platform."~" 01/25/25 15:38	 - Final"~" Feces/Stool	   Negative for Norovirus GI and GII."~" 01/23/25 14:34	MRSA Screen - Final"~" Nose	   No Methicillin Resistant Staphylococcus aureus isolated."~"-: "~"1/23/25 CT a/p: The fluid-filled stomach is markedly distended and the fluid-filled duodenum is dilated to 4 cm with abrupt transition point to decompressed small bowel at the ligament of Treitz suggesting partial obstruction. Small bilateral "~"pleural effusions with associated compressive atelectasis at the posterior lung bases. Distal esophageal stent"~"2/3/25 Chest CT: Moderate right lower lobe pneumonia. Mild left lower lobe pneumonia. New bilaterally."~"2/6/26 CXR: Right basilar airspace consolidation, slightly improved compared to prior chest x-ray."

## 2025-02-09 NOTE — W.PN.HOSP.TC
"Today's Communication/Plan"~"-"~"-: "~"All discussed with the patient and his wife at the bedside"~"CODE STATUS full code DVT prophylaxis level"~"All discussed with the nurse"~"Assessment / Plan"~"Assessment / Plan"~"-: "~"Physical exam:"~"General: Awake, alert and oriented x3, lethargic, answer question properly but speaks in a low tone of not in distress and holds appropriate conversation."~"HEENT: No active discharge, ecchymosis or bruising, moist lips, tongue and mucous membrane."~"Eyes: No discharge or red conjunctiva, no nystagmus, pupils are reactive and equal"~"Neck:Supple, no JVD no bruit no goiter."~"Respiratory: Normal AP contour and diameter, normal chest wall movement, normal respiratory effort, no respiratory distress, "~"Lungs: Good air entry bilaterally, no wheezing or rhonchi, no rales or crackles"~"Heart: S1, S2 regular, normal rate, no added sound."~"Gastrointestinal: PEG tube in epigastric area, has midline incision positive bowel sounds, soft, nontender, no guarding or rigidity or organomegaly"~"Musculoskeletal: , no chest wall abnormality or tenderness.  All joints and extremities have good range of motion, no muscle tenderness or any joint swelling or tenderness."~"Extremities: Lower extremities pitting edema, good peripheral pulses, good range of motion"~"Skin: Warm and dry, no ulceration, normal color.  "~"Neurological: Awake, alert and oriented x3, lethargic, mild cognitive decline appreciated, speech clear and comprehensive, good muscle tone, move extremities"~"# Acute hypoxic respiratory failure multifactorial"~"On room air now"~"Aspiration pneumonia, atelectasis, pleural effusion,  "~"Discussed about thoracentesis"~"Status post thoracentesis with 1050 cc of cloudy yellow pleural fluid removed.  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  ."~"Restarted on tube feeding"~"Now having a loose stool since last couple of days, he is on PEG tube feed"~"Continue to monitor"~"Continue to have loose stools and may consider stool workup for culture and C. 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 Martinez"~"Onc following and recommending hospice as patient with advanced disease process and poor performance status."~"# Mild hypernatremia"~"Increase free water flushes to 65 cc "~"trend bmp"~"# Tachycardia likely multifactorial due to pain, severe hypoxemia"~"Monitor heart rate for now.  To read individual problems as above."~"can consider low dose lopressor if needed "~"# Chest pain overnight 2/3/2025"~"Slightly high troponin.  Cardiology evaluation appreciated.  Nonischemic myocardial injury"~"No PE"~"# Low TSH-likely euthyroid sick syndrome.  Free T4 normal."~"# Right pleural effusion"~"History of thoracentesis done on 1/20/2025 and 2/4/25"~"Malignant effusion "~" "~"# Hypertension-Hold amlodipine and lisinopril"~"# Hyperthyroidism-methimazole restarted "~"# Hyperlipidemia-hold statin as n.p.o."~"# Stage II sacral pressure injury-change position/offload/wound care"~"# Severe protein calorie malnutrition-  on TF "~"# Ex-smoker"~"# DVT prophylaxis-Lovenox"~"# Full code"~"All discussed with the patient and his wife at the bedside in detail and expressed understanding all the question answered"~"Discussed with the nurse"~"Move out of ICU"~"Plan SNF versus other option including hospice.  According to "~"Pending complaint"~"Anticipated Discharge: 24 - 48 hours"~"Subjective/Interval History"~"-"~"-: "~"Date of Service:  February 9, 2025"~"Seen and examined, awake and alert, sitting on the bed and wife at the bedside, denies any chest pain or shortness of breath or fever or chill, still have a loose stool on rectal tube.  No abdominal pain no fever or chill or any cough or congestion, "~"no PEG tube feeding"~"Objective Data"~"-"~"Labs: "~"Laboratory Results"~" 	02/09/25"~" 	04:02"~"Sodium	 141"~"Potassium	 3.8"~"Chloride	 105"~"Carbon Dioxide	 33 H"~"BUN	 33 H"~"Creatinine	 0.7"~"Glucose	 147 H"~"Calcium	 8.1 L"~"Total Bilirubin	 0.3"~"AST	 29"~"ALT	 44"~"Alkaline Phosphatase	 182 H"~"Vital Signs: "~"Vital Signs"~"Temp	Pulse	Resp	BP	Pulse Ox"~" 98.7 F 	 90 	 29 	 134/74 	 92 "~" 02/09/25 07:22	 02/09/25 13:00	 02/09/25 12:00	 02/09/25 13:00	 02/09/25 13:00"~"I&O"~"	02/08/25	02/09/25	02/10/25"~"	07:59	07:59	07:59"~"Intake Total	3660 / 3800	3760.0 / 3950.0	890 / 890"~"Output Total	225 / 225		"~"Balance	3435 / 3575	3760.0 / 3950.0	890 / 890"~"Data Reviewed"~"-"~"Labs: Labs Reviewed by me, Discussed with Patient and Discussed with Family"

## 2025-02-09 NOTE — PTCARENOTE
Repiratory treatment and accapella requested from RT for patient c/o chest congestion. Ice chips provided and tolerated. Robitussin as needed.

## 2025-02-09 NOTE — PTCARENOTE
Pt OOB to recliner chair with min/mod assist of 2, gait slightly unsteady. Wife remains at bedside, pt for transfer to tele.

## 2025-02-10 VITALS — DIASTOLIC BLOOD PRESSURE: 77 MMHG | RESPIRATION RATE: 20 BRPM | SYSTOLIC BLOOD PRESSURE: 150 MMHG

## 2025-02-10 VITALS — DIASTOLIC BLOOD PRESSURE: 80 MMHG | SYSTOLIC BLOOD PRESSURE: 133 MMHG | RESPIRATION RATE: 20 BRPM

## 2025-02-10 VITALS — DIASTOLIC BLOOD PRESSURE: 82 MMHG | SYSTOLIC BLOOD PRESSURE: 147 MMHG | RESPIRATION RATE: 20 BRPM

## 2025-02-10 VITALS — SYSTOLIC BLOOD PRESSURE: 141 MMHG | RESPIRATION RATE: 22 BRPM | DIASTOLIC BLOOD PRESSURE: 73 MMHG

## 2025-02-10 VITALS — RESPIRATION RATE: 18 BRPM

## 2025-02-10 VITALS — RESPIRATION RATE: 24 BRPM | DIASTOLIC BLOOD PRESSURE: 84 MMHG | SYSTOLIC BLOOD PRESSURE: 151 MMHG

## 2025-02-10 VITALS — DIASTOLIC BLOOD PRESSURE: 84 MMHG | RESPIRATION RATE: 28 BRPM | SYSTOLIC BLOOD PRESSURE: 153 MMHG

## 2025-02-10 VITALS — DIASTOLIC BLOOD PRESSURE: 86 MMHG | SYSTOLIC BLOOD PRESSURE: 149 MMHG | RESPIRATION RATE: 20 BRPM

## 2025-02-10 VITALS — DIASTOLIC BLOOD PRESSURE: 85 MMHG | SYSTOLIC BLOOD PRESSURE: 155 MMHG

## 2025-02-10 LAB
BUN SERPL-MCNC: 27 MG/DL (ref 9–20)
CALCIUM SERPL-MCNC: 8.3 MG/DL (ref 8.4–10.2)
CHLORIDE SERPL-SCNC: 103 MMOL/L (ref 98–107)
CO2 SERPL-SCNC: 27 MMOL/L (ref 22–30)
EGFR: > 60
ERYTHROCYTE [DISTWIDTH] IN BLOOD BY AUTOMATED COUNT: 14.6 % (ref 11.5–14.5)
ESTIMATED CREATININE CLEARANCE: 88 ML/MIN
GLUCOSE - POINT OF CARE: 107 MG/DL (ref 70–99)
GLUCOSE - POINT OF CARE: 129 MG/DL (ref 70–99)
GLUCOSE - POINT OF CARE: 130 MG/DL (ref 70–99)
GLUCOSE - POINT OF CARE: 154 MG/DL (ref 70–99)
GLUCOSE SERPL-MCNC: 156 MG/DL (ref 70–99)
HCT VFR BLD AUTO: 32.3 % (ref 39–52)
HGB BLD-MCNC: 10.4 G/DL (ref 13–18)
MAGNESIUM SERPL-MCNC: 2.3 MG/DL (ref 1.6–2.3)
MCHC RBC AUTO-ENTMCNC: 32.2 G/DL (ref 33–37)
MCV RBC AUTO: 86.8 FL (ref 80–94)
NRBC BLD AUTO-RTO: 0 %
PLATELET # BLD AUTO: 257 10^3/UL (ref 130–400)
POTASSIUM SERPL-SCNC: 4.2 MMOL/L (ref 3.5–5.1)
SODIUM SERPL-SCNC: 138 MMOL/L (ref 135–145)
SQUAMOUS URNS QL MICRO: (no result) /LPF
TROPONIN I SERPL-MCNC: < 0.012 NG/ML
URINE RED BLOOD CELL: (no result) /HPF (ref 0–2)
URINE WHITE CELL: (no result) /HPF (ref 0–5)

## 2025-02-10 RX ADMIN — PANTOPRAZOLE SODIUM 40 MG: 40 INJECTION, POWDER, LYOPHILIZED, FOR SOLUTION INTRAVENOUS at 19:50

## 2025-02-10 RX ADMIN — PANTOPRAZOLE SODIUM 40 MG: 40 INJECTION, POWDER, LYOPHILIZED, FOR SOLUTION INTRAVENOUS at 08:57

## 2025-02-10 RX ADMIN — INSULIN ASPART: 100 INJECTION, SOLUTION INTRAVENOUS; SUBCUTANEOUS at 05:09

## 2025-02-10 RX ADMIN — SODIUM CHLORIDE 10 ML: 9 INJECTION, SOLUTION INTRAMUSCULAR; INTRAVENOUS; SUBCUTANEOUS at 08:57

## 2025-02-10 RX ADMIN — ENOXAPARIN SODIUM 40 MG: 40 INJECTION SUBCUTANEOUS at 17:54

## 2025-02-10 RX ADMIN — Medication 5 MG: at 22:56

## 2025-02-10 RX ADMIN — ATORVASTATIN CALCIUM 10 MG: 10 TABLET, FILM COATED ORAL at 22:56

## 2025-02-10 RX ADMIN — LEVALBUTEROL HYDROCHLORIDE 0.63 MG: 0.63 SOLUTION RESPIRATORY (INHALATION) at 00:10

## 2025-02-10 RX ADMIN — GUAIFENESIN AND DEXTROMETHORPHAN 10 ML: 100; 10 SYRUP ORAL at 00:07

## 2025-02-10 RX ADMIN — INSULIN ASPART: 100 INJECTION, SOLUTION INTRAVENOUS; SUBCUTANEOUS at 17:52

## 2025-02-10 RX ADMIN — SODIUM CHLORIDE 10 ML: 9 INJECTION, SOLUTION INTRAMUSCULAR; INTRAVENOUS; SUBCUTANEOUS at 19:50

## 2025-02-10 RX ADMIN — METHIMAZOLE 5 MG: 5 TABLET ORAL at 08:59

## 2025-02-10 RX ADMIN — INSULIN ASPART: 100 INJECTION, SOLUTION INTRAVENOUS; SUBCUTANEOUS at 12:48

## 2025-02-10 NOTE — W.PN.ONC2
"Today's Communication / Plan"~"-"~"-: "~"."~"Impression"~"Impression"~"-: "~"High-grade partial small bowel obstruction, recurrent -malignant, s/p open GJ bypass and J-tube placement 1/28/25"~"advanced GE junction cancer, w/ esophageal stent, s/p cycle #1 of FLOT chemotherapy"~"pleural effusion, s/p thora on 1/20/25, malignant, PDL1 10%, repeat thora 2/4"~"Malnutrition, started TPN during hospitalization, now transitioned to Jtube for feeding"~"aspiration PNA completed course of abx"~"Speech therapy evaluation -liquid diet"~"VT, V tach"~"Hyperthyroidism"~"Stage II sacral pressure injury"~"Plan"~"Plan"~"-: "~"Continue tube feeds for severe protein calorie malnutrition"~"If goals remain restorative then will need to optimize PS, nutrition, and wound healing (sacral DTI)"~"If pt would like to comfort focused goals then hospice appropriate"~"Wife at bedside provided with updates"~"Subjective/Objective"~"Subjective"~"-: "~"no new complaints"~"felt good OOB to chair yesterday"~"Vital Signs: "~"Vital Signs"~"Temp	Pulse	Resp	BP	Pulse Ox"~" 97.4 F 	 86 	 22 	 141/73 	 99 "~" 02/10/25 06:46	 02/10/25 06:46	 02/10/25 06:46	 02/10/25 06:46	 02/10/25 06:46"~"Lab Results: "~"Laboratory Data"~"WBC	 13.8 10^3/uL (4.8-10.8)  H 	02/10/25  03:05    "~"Hgb	 10.4 g/dL (13.0-18.0)  L 	02/10/25  03:05    "~"Plt Count	 257 10^3/uL (130-400)  	02/10/25  03:05    "~"eGFR	 &gt; 60.00  	02/10/25  03:05    "~"Physical Exam"~"HEENT: Moist Mucous Membranes; No Jaundice"~"Cardiology: Normal Sinus Rhythm"~"Pulmonary: Clear"~"GI: Soft and Other (Jtube for nutrition, midline ab incision)"~"Extremities: Pulses Present; No Edema"

## 2025-02-10 NOTE — W.PN.UPDATE
"Addendum entered and electronically signed by DANILO Wiley  02/10/25 03:44: "~"pt had 18 beat VT and 8 beat vtach  overnight. check K and mag"~"Original Note:"~"Update Note"~"Progress Note Update"~"-: "~"pt with ongoing issues trying to manage/expectorate his secretion causing him great anxiety. Received levalbuterol per order by respiratory. Attempt made by RT to suction pt via nasotracheal but pt would not allow. Pt suctions himself via [place]. "~"Secretions are thick  and with pt having esophageal cancer his cough reflex is poor making expectorating his secretions difficult. Room air pulse ox 95%"~"Asked RT to try saline 3% nebulizer to try and help loosen secretions but RT felt this would not help and thus was not done. "~"Will add cxr for am"

## 2025-02-10 NOTE — PTCARENOTE
"Addendum entered by Jolynn Saeed  02/10/25 04:11: "~"Pt noted to be diaphoretic, temp 97.7 sugar 154, pt with two runs of v tach.  Longest 18  beats. House NP aware.  Pt continues to complain of difficulty bringing up secretions. "~"Original Note:"~"Pt stating difficulty bringing up secretions as well as SOB.  pulse ox 94-95 % RA, respiratory notified, neb given, acapella utilized, attempted deep suctioning, pt unable to tolerated.  2 L humidified O2 placed for comfort.  NP aware. "

## 2025-02-10 NOTE — W.PN.HOSP.TC
"Today's Communication/Plan"~"-"~"-: "~"Patient now agreeable for video swallow-order for tomorrow"~"Discharge planning to rehab"~"Assessment / Plan"~"Assessment / Plan"~"-: "~"74-year-old man with small bowel obstruction "~"Patient states that he has sputum he has been suctioning himself.  Discussed about avoiding that to cause any trauma in the back of the pharynx."~"He is afraid of trying to swallow, but now agreeable to do a swallow evaluation with speech"~"Cardiovascular system S1-S2 appreciated"~"Chest clear to auscultation, decreased breath sounds on the right side"~"Abdomen soft , midline incision healing well, J-tube"~"No pedal edema"~"# NSVT"~"Check echo"~"EKG and 2 sets of trop"~"# Acute hypoxic respiratory failure multifactorial"~"on 2 L O2 now"~"Aspiration pneumonia, atelectasis, pleural effusion,  "~"Status post thoracentesis with 1050 cc of cloudy yellow pleural fluid removed.  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 Martinez"~"# Mild hypernatremia"~"Change free water flushes to 50 cc/hr "~"trend bmp"~"# Tachycardia likely multifactorial due to pain, severe hypoxemia"~"Monitor heart rate for now.  To read individual problems as above."~"can consider low dose Lopressor if needed "~"# Chest pain overnight 2/3/2025"~"Slightly high troponin.  Cardiology evaluation appreciated.  Nonischemic myocardial injury"~"No PE"~"# Low TSH-likely euthyroid sick syndrome.  Free T4 normal."~"# Right pleural effusion"~"History of thoracentesis done on 1/20/2025 and 2/4/25"~"Malignant effusion "~" "~"# Hypertension-Hold amlodipine and lisinopril"~"# Hyperthyroidism-methimazole restarted "~"# Hyperlipidemia-statin "~"# Stage II sacral pressure injury-change position/offload/wound care"~"# Severe protein calorie malnutrition-  on TF "~"# Ex-smoker"~"# DVT prophylaxis-Lovenox"~"# Full code"~"Discussed with wife at bedside"~"Discussed with nursing"~"Discussed with case management"~"Anticipated Discharge: Within 24 hours"~"Subjective/Interval History"~"-"~"-: "~"Date of Service:  February 10, 2025"~"Objective Data"~"-"~"Labs: "~"Laboratory Results"~" 	02/10/25"~" 	03:05"~"Sodium	 138"~"Potassium	 4.2"~"Chloride	 103"~"Carbon Dioxide	 27"~"BUN	 27 H"~"Creatinine	 0.7"~"Glucose	 156 H"~"Calcium	 8.3 L"~"Vital Signs: "~"Vital Signs"~"Temp	Pulse	Resp	BP	Pulse Ox"~" 98.7 F 	 90 	 20 	 149/86 	 99 "~" 02/10/25 11:01	 02/10/25 11:01	 02/10/25 11:01	 02/10/25 11:01	 02/10/25 11:01"~"I&O"~"	02/09/25	02/10/25	02/11/25"~"	06:59	06:59	06:59"~"Intake Total	3760.0 / 3900.0	1300 / 1300	"~"Output Total		200 / 200	"~"Balance	3760.0 / 3900.0	1100 / 1100	"

## 2025-02-11 VITALS — RESPIRATION RATE: 17 BRPM | DIASTOLIC BLOOD PRESSURE: 69 MMHG | SYSTOLIC BLOOD PRESSURE: 100 MMHG

## 2025-02-11 VITALS — DIASTOLIC BLOOD PRESSURE: 65 MMHG | RESPIRATION RATE: 19 BRPM | SYSTOLIC BLOOD PRESSURE: 127 MMHG

## 2025-02-11 VITALS — RESPIRATION RATE: 23 BRPM | SYSTOLIC BLOOD PRESSURE: 154 MMHG | DIASTOLIC BLOOD PRESSURE: 59 MMHG

## 2025-02-11 VITALS — SYSTOLIC BLOOD PRESSURE: 111 MMHG | RESPIRATION RATE: 21 BRPM | DIASTOLIC BLOOD PRESSURE: 67 MMHG

## 2025-02-11 VITALS — RESPIRATION RATE: 22 BRPM | DIASTOLIC BLOOD PRESSURE: 74 MMHG | SYSTOLIC BLOOD PRESSURE: 153 MMHG

## 2025-02-11 VITALS — RESPIRATION RATE: 19 BRPM | SYSTOLIC BLOOD PRESSURE: 98 MMHG | DIASTOLIC BLOOD PRESSURE: 72 MMHG

## 2025-02-11 VITALS — DIASTOLIC BLOOD PRESSURE: 75 MMHG | SYSTOLIC BLOOD PRESSURE: 153 MMHG | RESPIRATION RATE: 19 BRPM

## 2025-02-11 VITALS — DIASTOLIC BLOOD PRESSURE: 75 MMHG | SYSTOLIC BLOOD PRESSURE: 99 MMHG | RESPIRATION RATE: 18 BRPM

## 2025-02-11 VITALS — RESPIRATION RATE: 16 BRPM | SYSTOLIC BLOOD PRESSURE: 98 MMHG | DIASTOLIC BLOOD PRESSURE: 70 MMHG

## 2025-02-11 VITALS — RESPIRATION RATE: 29 BRPM | DIASTOLIC BLOOD PRESSURE: 70 MMHG | SYSTOLIC BLOOD PRESSURE: 156 MMHG

## 2025-02-11 VITALS — RESPIRATION RATE: 20 BRPM | SYSTOLIC BLOOD PRESSURE: 147 MMHG | DIASTOLIC BLOOD PRESSURE: 78 MMHG

## 2025-02-11 VITALS — DIASTOLIC BLOOD PRESSURE: 74 MMHG | SYSTOLIC BLOOD PRESSURE: 152 MMHG | RESPIRATION RATE: 22 BRPM

## 2025-02-11 VITALS — RESPIRATION RATE: 16 BRPM | SYSTOLIC BLOOD PRESSURE: 102 MMHG | DIASTOLIC BLOOD PRESSURE: 65 MMHG

## 2025-02-11 VITALS — DIASTOLIC BLOOD PRESSURE: 68 MMHG | RESPIRATION RATE: 15 BRPM | SYSTOLIC BLOOD PRESSURE: 160 MMHG

## 2025-02-11 VITALS — SYSTOLIC BLOOD PRESSURE: 98 MMHG | RESPIRATION RATE: 16 BRPM | DIASTOLIC BLOOD PRESSURE: 73 MMHG

## 2025-02-11 VITALS — SYSTOLIC BLOOD PRESSURE: 152 MMHG | DIASTOLIC BLOOD PRESSURE: 71 MMHG | RESPIRATION RATE: 16 BRPM

## 2025-02-11 VITALS — SYSTOLIC BLOOD PRESSURE: 136 MMHG | RESPIRATION RATE: 28 BRPM | DIASTOLIC BLOOD PRESSURE: 65 MMHG

## 2025-02-11 VITALS — DIASTOLIC BLOOD PRESSURE: 67 MMHG | SYSTOLIC BLOOD PRESSURE: 92 MMHG | RESPIRATION RATE: 16 BRPM

## 2025-02-11 VITALS — SYSTOLIC BLOOD PRESSURE: 137 MMHG | DIASTOLIC BLOOD PRESSURE: 69 MMHG | RESPIRATION RATE: 29 BRPM

## 2025-02-11 VITALS — SYSTOLIC BLOOD PRESSURE: 137 MMHG | DIASTOLIC BLOOD PRESSURE: 69 MMHG | RESPIRATION RATE: 24 BRPM

## 2025-02-11 VITALS — DIASTOLIC BLOOD PRESSURE: 74 MMHG | SYSTOLIC BLOOD PRESSURE: 149 MMHG | RESPIRATION RATE: 33 BRPM

## 2025-02-11 VITALS — SYSTOLIC BLOOD PRESSURE: 136 MMHG | DIASTOLIC BLOOD PRESSURE: 78 MMHG | RESPIRATION RATE: 22 BRPM

## 2025-02-11 VITALS — RESPIRATION RATE: 16 BRPM | SYSTOLIC BLOOD PRESSURE: 159 MMHG | DIASTOLIC BLOOD PRESSURE: 72 MMHG

## 2025-02-11 VITALS — DIASTOLIC BLOOD PRESSURE: 64 MMHG | RESPIRATION RATE: 26 BRPM | SYSTOLIC BLOOD PRESSURE: 109 MMHG

## 2025-02-11 VITALS — RESPIRATION RATE: 27 BRPM | DIASTOLIC BLOOD PRESSURE: 70 MMHG | SYSTOLIC BLOOD PRESSURE: 139 MMHG

## 2025-02-11 VITALS — RESPIRATION RATE: 16 BRPM | SYSTOLIC BLOOD PRESSURE: 122 MMHG | DIASTOLIC BLOOD PRESSURE: 62 MMHG

## 2025-02-11 VITALS — DIASTOLIC BLOOD PRESSURE: 64 MMHG | RESPIRATION RATE: 24 BRPM | SYSTOLIC BLOOD PRESSURE: 109 MMHG

## 2025-02-11 VITALS — DIASTOLIC BLOOD PRESSURE: 77 MMHG | SYSTOLIC BLOOD PRESSURE: 137 MMHG | RESPIRATION RATE: 30 BRPM

## 2025-02-11 VITALS — DIASTOLIC BLOOD PRESSURE: 70 MMHG | SYSTOLIC BLOOD PRESSURE: 158 MMHG | RESPIRATION RATE: 23 BRPM

## 2025-02-11 VITALS — RESPIRATION RATE: 23 BRPM | DIASTOLIC BLOOD PRESSURE: 73 MMHG | SYSTOLIC BLOOD PRESSURE: 149 MMHG

## 2025-02-11 VITALS — RESPIRATION RATE: 22 BRPM | DIASTOLIC BLOOD PRESSURE: 77 MMHG | SYSTOLIC BLOOD PRESSURE: 156 MMHG

## 2025-02-11 VITALS — RESPIRATION RATE: 25 BRPM | DIASTOLIC BLOOD PRESSURE: 90 MMHG | SYSTOLIC BLOOD PRESSURE: 139 MMHG

## 2025-02-11 VITALS — DIASTOLIC BLOOD PRESSURE: 66 MMHG | RESPIRATION RATE: 17 BRPM | SYSTOLIC BLOOD PRESSURE: 100 MMHG

## 2025-02-11 VITALS — SYSTOLIC BLOOD PRESSURE: 55 MMHG | RESPIRATION RATE: 20 BRPM | DIASTOLIC BLOOD PRESSURE: 37 MMHG

## 2025-02-11 VITALS — DIASTOLIC BLOOD PRESSURE: 62 MMHG | SYSTOLIC BLOOD PRESSURE: 94 MMHG | RESPIRATION RATE: 18 BRPM

## 2025-02-11 VITALS — RESPIRATION RATE: 18 BRPM | DIASTOLIC BLOOD PRESSURE: 68 MMHG | SYSTOLIC BLOOD PRESSURE: 118 MMHG

## 2025-02-11 VITALS — DIASTOLIC BLOOD PRESSURE: 68 MMHG | RESPIRATION RATE: 16 BRPM | SYSTOLIC BLOOD PRESSURE: 110 MMHG

## 2025-02-11 VITALS — SYSTOLIC BLOOD PRESSURE: 149 MMHG | DIASTOLIC BLOOD PRESSURE: 74 MMHG

## 2025-02-11 VITALS — SYSTOLIC BLOOD PRESSURE: 74 MMHG | RESPIRATION RATE: 23 BRPM | DIASTOLIC BLOOD PRESSURE: 53 MMHG

## 2025-02-11 VITALS — SYSTOLIC BLOOD PRESSURE: 102 MMHG | DIASTOLIC BLOOD PRESSURE: 67 MMHG | RESPIRATION RATE: 16 BRPM

## 2025-02-11 VITALS — SYSTOLIC BLOOD PRESSURE: 154 MMHG | RESPIRATION RATE: 24 BRPM | DIASTOLIC BLOOD PRESSURE: 78 MMHG

## 2025-02-11 VITALS — SYSTOLIC BLOOD PRESSURE: 150 MMHG | RESPIRATION RATE: 14 BRPM | DIASTOLIC BLOOD PRESSURE: 73 MMHG

## 2025-02-11 VITALS — DIASTOLIC BLOOD PRESSURE: 87 MMHG | SYSTOLIC BLOOD PRESSURE: 123 MMHG

## 2025-02-11 VITALS — DIASTOLIC BLOOD PRESSURE: 57 MMHG | SYSTOLIC BLOOD PRESSURE: 77 MMHG | RESPIRATION RATE: 15 BRPM

## 2025-02-11 VITALS — SYSTOLIC BLOOD PRESSURE: 105 MMHG | DIASTOLIC BLOOD PRESSURE: 75 MMHG | RESPIRATION RATE: 17 BRPM

## 2025-02-11 VITALS — RESPIRATION RATE: 8 BRPM | SYSTOLIC BLOOD PRESSURE: 161 MMHG | DIASTOLIC BLOOD PRESSURE: 74 MMHG

## 2025-02-11 VITALS — DIASTOLIC BLOOD PRESSURE: 62 MMHG | RESPIRATION RATE: 16 BRPM | SYSTOLIC BLOOD PRESSURE: 96 MMHG

## 2025-02-11 VITALS — DIASTOLIC BLOOD PRESSURE: 69 MMHG | SYSTOLIC BLOOD PRESSURE: 117 MMHG | RESPIRATION RATE: 19 BRPM

## 2025-02-11 VITALS — RESPIRATION RATE: 22 BRPM | SYSTOLIC BLOOD PRESSURE: 138 MMHG | DIASTOLIC BLOOD PRESSURE: 75 MMHG

## 2025-02-11 VITALS — DIASTOLIC BLOOD PRESSURE: 81 MMHG | SYSTOLIC BLOOD PRESSURE: 138 MMHG | RESPIRATION RATE: 30 BRPM

## 2025-02-11 VITALS — RESPIRATION RATE: 34 BRPM | DIASTOLIC BLOOD PRESSURE: 69 MMHG | SYSTOLIC BLOOD PRESSURE: 144 MMHG

## 2025-02-11 VITALS — SYSTOLIC BLOOD PRESSURE: 111 MMHG | DIASTOLIC BLOOD PRESSURE: 71 MMHG | RESPIRATION RATE: 19 BRPM

## 2025-02-11 VITALS — RESPIRATION RATE: 16 BRPM | DIASTOLIC BLOOD PRESSURE: 67 MMHG | SYSTOLIC BLOOD PRESSURE: 111 MMHG

## 2025-02-11 VITALS — SYSTOLIC BLOOD PRESSURE: 117 MMHG | DIASTOLIC BLOOD PRESSURE: 68 MMHG | RESPIRATION RATE: 29 BRPM

## 2025-02-11 VITALS — RESPIRATION RATE: 20 BRPM | DIASTOLIC BLOOD PRESSURE: 43 MMHG | SYSTOLIC BLOOD PRESSURE: 53 MMHG

## 2025-02-11 VITALS — DIASTOLIC BLOOD PRESSURE: 71 MMHG | SYSTOLIC BLOOD PRESSURE: 151 MMHG

## 2025-02-11 VITALS — DIASTOLIC BLOOD PRESSURE: 98 MMHG | SYSTOLIC BLOOD PRESSURE: 138 MMHG | RESPIRATION RATE: 14 BRPM

## 2025-02-11 VITALS — RESPIRATION RATE: 18 BRPM | SYSTOLIC BLOOD PRESSURE: 148 MMHG | DIASTOLIC BLOOD PRESSURE: 76 MMHG

## 2025-02-11 VITALS — RESPIRATION RATE: 21 BRPM | DIASTOLIC BLOOD PRESSURE: 68 MMHG | SYSTOLIC BLOOD PRESSURE: 112 MMHG

## 2025-02-11 VITALS — RESPIRATION RATE: 16 BRPM | SYSTOLIC BLOOD PRESSURE: 90 MMHG | DIASTOLIC BLOOD PRESSURE: 65 MMHG

## 2025-02-11 VITALS — RESPIRATION RATE: 25 BRPM | DIASTOLIC BLOOD PRESSURE: 74 MMHG | SYSTOLIC BLOOD PRESSURE: 142 MMHG

## 2025-02-11 VITALS — DIASTOLIC BLOOD PRESSURE: 61 MMHG | RESPIRATION RATE: 17 BRPM | SYSTOLIC BLOOD PRESSURE: 105 MMHG

## 2025-02-11 VITALS — SYSTOLIC BLOOD PRESSURE: 157 MMHG | DIASTOLIC BLOOD PRESSURE: 67 MMHG | RESPIRATION RATE: 16 BRPM

## 2025-02-11 VITALS — RESPIRATION RATE: 15 BRPM | SYSTOLIC BLOOD PRESSURE: 164 MMHG | DIASTOLIC BLOOD PRESSURE: 69 MMHG

## 2025-02-11 VITALS — RESPIRATION RATE: 25 BRPM

## 2025-02-11 VITALS — RESPIRATION RATE: 28 BRPM

## 2025-02-11 VITALS — RESPIRATION RATE: 16 BRPM | SYSTOLIC BLOOD PRESSURE: 150 MMHG | DIASTOLIC BLOOD PRESSURE: 79 MMHG

## 2025-02-11 VITALS — RESPIRATION RATE: 16 BRPM

## 2025-02-11 LAB
ALBUMIN SERPL-MCNC: 3.2 G/DL (ref 3.5–5)
ALP SERPL-CCNC: 158 U/L (ref 38–126)
ALT SERPL-CCNC: 31 U/L (ref 0–50)
APTT PPP: 30.5 SEC (ref 23.4–35)
APTT PPP: 56.8 SEC (ref 23.4–35)
AST SERPL-CCNC: 26 U/L (ref 17–59)
BASE EXCESS BLDA CALC-SCNC: -0.2 MMOL/L
BASE EXCESS BLDA CALC-SCNC: -2 MMOL/L
BASE EXCESS BLDA CALC-SCNC: -3.2 MMOL/L
BUN SERPL-MCNC: 25 MG/DL (ref 9–20)
BUN SERPL-MCNC: 35 MG/DL (ref 9–20)
CALCIUM SERPL-MCNC: 8.6 MG/DL (ref 8.4–10.2)
CALCIUM SERPL-MCNC: 9 MG/DL (ref 8.4–10.2)
CHLORIDE SERPL-SCNC: 102 MMOL/L (ref 98–107)
CHLORIDE SERPL-SCNC: 99 MMOL/L (ref 98–107)
CO2 SERPL-SCNC: 24 MMOL/L (ref 22–30)
CO2 SERPL-SCNC: 27 MMOL/L (ref 22–30)
EGFR: > 60
EGFR: > 60
ERYTHROCYTE [DISTWIDTH] IN BLOOD BY AUTOMATED COUNT: 14.6 % (ref 11.5–14.5)
ERYTHROCYTE [DISTWIDTH] IN BLOOD BY AUTOMATED COUNT: 14.6 % (ref 11.5–14.5)
ESTIMATED CREATININE CLEARANCE: 57 ML/MIN
ESTIMATED CREATININE CLEARANCE: 78 ML/MIN
GAS FLOW.O2 O2 DELIVERY SYS: (no result) L/MIN
GAS FLOW.O2 O2 DELIVERY SYS: (no result) L/MIN
GLUCOSE - POINT OF CARE: 103 MG/DL (ref 70–99)
GLUCOSE - POINT OF CARE: 134 MG/DL (ref 70–99)
GLUCOSE - POINT OF CARE: 163 MG/DL (ref 70–99)
GLUCOSE - POINT OF CARE: 167 MG/DL (ref 70–99)
GLUCOSE - POINT OF CARE: 89 MG/DL (ref 70–99)
GLUCOSE - POINT OF CARE: 97 MG/DL (ref 70–99)
GLUCOSE SERPL-MCNC: 107 MG/DL (ref 70–99)
GLUCOSE SERPL-MCNC: 203 MG/DL (ref 70–99)
HCO3 BLDA-SCNC: 24.8 MMOL/L (ref 21–28)
HCO3 BLDA-SCNC: 25.4 MMOL/L (ref 21–28)
HCO3 BLDA-SCNC: 25.4 MMOL/L (ref 21–28)
HCT VFR BLD AUTO: 33.5 % (ref 39–52)
HCT VFR BLD AUTO: 36.2 % (ref 39–52)
HGB BLD-MCNC: 10.7 G/DL (ref 13–18)
HGB BLD-MCNC: 11.5 G/DL (ref 13–18)
INR PPP: 1.15
INR PPP: 1.19
MAGNESIUM SERPL-MCNC: 2.8 MG/DL (ref 1.6–2.3)
MCHC RBC AUTO-ENTMCNC: 31.8 G/DL (ref 33–37)
MCHC RBC AUTO-ENTMCNC: 31.9 G/DL (ref 33–37)
MCV RBC AUTO: 88.9 FL (ref 80–94)
MCV RBC AUTO: 91.4 FL (ref 80–94)
PCO2 BLDA: 41 MMHG (ref 35–48)
PCO2 BLDA: 54 MMHG (ref 35–48)
PCO2 BLDA: 62 MMHG (ref 35–48)
PLATELET # BLD AUTO: 335 10^3/UL (ref 130–400)
PLATELET # BLD AUTO: 385 10^3/UL (ref 130–400)
PO2 BLDA: 144 MMHG (ref 83–108)
PO2 BLDA: 147 MMHG (ref 83–108)
PO2 BLDA: 159 MMHG (ref 83–108)
POTASSIUM SERPL-SCNC: 5.2 MMOL/L (ref 3.5–5.1)
POTASSIUM SERPL-SCNC: 5.7 MMOL/L (ref 3.5–5.1)
PROT SERPL-MCNC: 6.2 G/DL (ref 6.3–8.2)
PROTHROMBIN TIME: 15 SEC (ref 11.4–14.6)
PROTHROMBIN TIME: 15.4 SEC (ref 11.4–14.6)
SAO2 % BLDA: 98.8 % (ref 94–98)
SAO2 % BLDA: 99.4 % (ref 94–98)
SAO2 % BLDA: 99.4 % (ref 94–98)
SODIUM SERPL-SCNC: 134 MMOL/L (ref 135–145)
SODIUM SERPL-SCNC: 136 MMOL/L (ref 135–145)
TRIGL SERPL-MCNC: 160 MG/DL (ref 10–149)
TROPONIN I SERPL-MCNC: 0.01 NG/ML
TROPONIN I SERPL-MCNC: < 0.012 NG/ML

## 2025-02-11 PROCEDURE — 0BH17EZ INSERTION OF ENDOTRACHEAL AIRWAY INTO TRACHEA, VIA NATURAL OR ARTIFICIAL OPENING: ICD-10-PCS | Performed by: INTERNAL MEDICINE

## 2025-02-11 PROCEDURE — 5A1945Z RESPIRATORY VENTILATION, 24-96 CONSECUTIVE HOURS: ICD-10-PCS | Performed by: INTERNAL MEDICINE

## 2025-02-11 RX ADMIN — HEPARIN SODIUM (PORCINE) LOCK FLUSH IV SOLN 100 UNIT/ML 500 UNIT: 100 SOLUTION at 01:43

## 2025-02-11 RX ADMIN — INSULIN ASPART: 100 INJECTION, SOLUTION INTRAVENOUS; SUBCUTANEOUS at 01:07

## 2025-02-11 RX ADMIN — INSULIN ASPART: 100 INJECTION, SOLUTION INTRAVENOUS; SUBCUTANEOUS at 17:24

## 2025-02-11 RX ADMIN — TAZOBACTAM SODIUM AND PIPERACILLIN SODIUM 50: 375; 3 INJECTION, SOLUTION INTRAVENOUS at 17:24

## 2025-02-11 RX ADMIN — ATORVASTATIN CALCIUM 10 MG: 10 TABLET, FILM COATED ORAL at 21:31

## 2025-02-11 RX ADMIN — METOPROLOL TARTRATE 5 MG: 1 INJECTION, SOLUTION INTRAVENOUS at 01:43

## 2025-02-11 RX ADMIN — INSULIN HUMAN 10 UNITS: 100 INJECTION, SOLUTION PARENTERAL at 05:24

## 2025-02-11 RX ADMIN — MORPHINE SULFATE 1 MG: 2 INJECTION, SOLUTION INTRAMUSCULAR; INTRAVENOUS at 03:13

## 2025-02-11 RX ADMIN — FENTANYL CITRATE 100: 50 INJECTION, SOLUTION INTRAMUSCULAR; INTRAVENOUS at 05:32

## 2025-02-11 RX ADMIN — INSULIN ASPART: 100 INJECTION, SOLUTION INTRAVENOUS; SUBCUTANEOUS at 12:46

## 2025-02-11 RX ADMIN — PANTOPRAZOLE SODIUM 40 MG: 40 INJECTION, POWDER, LYOPHILIZED, FOR SOLUTION INTRAVENOUS at 10:18

## 2025-02-11 RX ADMIN — INSULIN ASPART: 100 INJECTION, SOLUTION INTRAVENOUS; SUBCUTANEOUS at 06:35

## 2025-02-11 RX ADMIN — METOPROLOL TARTRATE 12.5 MG: 25 TABLET, FILM COATED ORAL at 21:10

## 2025-02-11 RX ADMIN — PROPOFOL 100: 10 INJECTION, EMULSION INTRAVENOUS at 07:17

## 2025-02-11 RX ADMIN — FUROSEMIDE 20 MG: 10 INJECTION, SOLUTION INTRAMUSCULAR; INTRAVENOUS at 02:50

## 2025-02-11 RX ADMIN — HYDRALAZINE HYDROCHLORIDE 5 MG: 20 INJECTION INTRAMUSCULAR; INTRAVENOUS at 02:27

## 2025-02-11 RX ADMIN — DEXTROSE MONOHYDRATE 25 GRAMS: 25 INJECTION, SOLUTION INTRAVENOUS at 05:21

## 2025-02-11 RX ADMIN — TAZOBACTAM SODIUM AND PIPERACILLIN SODIUM 50: 375; 3 INJECTION, SOLUTION INTRAVENOUS at 05:43

## 2025-02-11 RX ADMIN — FENTANYL CITRATE 100: 50 INJECTION, SOLUTION INTRAMUSCULAR; INTRAVENOUS at 23:36

## 2025-02-11 RX ADMIN — METHIMAZOLE 5 MG: 5 TABLET ORAL at 10:18

## 2025-02-11 RX ADMIN — HEPARIN SODIUM 250: 10000 INJECTION, SOLUTION INTRAVENOUS at 12:13

## 2025-02-11 RX ADMIN — SODIUM CHLORIDE, SODIUM LACTATE, POTASSIUM CHLORIDE, AND CALCIUM CHLORIDE 1000: 600; 310; 30; 20 INJECTION, SOLUTION INTRAVENOUS at 16:28

## 2025-02-11 RX ADMIN — SODIUM CHLORIDE 10 ML: 9 INJECTION, SOLUTION INTRAMUSCULAR; INTRAVENOUS; SUBCUTANEOUS at 10:18

## 2025-02-11 RX ADMIN — TAZOBACTAM SODIUM AND PIPERACILLIN SODIUM 50: 375; 3 INJECTION, SOLUTION INTRAVENOUS at 12:46

## 2025-02-11 RX ADMIN — INSULIN ASPART: 100 INJECTION, SOLUTION INTRAVENOUS; SUBCUTANEOUS at 23:39

## 2025-02-11 RX ADMIN — FENTANYL CITRATE 50 MCG: 0.05 INJECTION, SOLUTION INTRAMUSCULAR; INTRAVENOUS at 06:09

## 2025-02-11 RX ADMIN — TAZOBACTAM SODIUM AND PIPERACILLIN SODIUM 50: 375; 3 INJECTION, SOLUTION INTRAVENOUS at 23:24

## 2025-02-11 RX ADMIN — PROPOFOL 100: 10 INJECTION, EMULSION INTRAVENOUS at 16:28

## 2025-02-11 RX ADMIN — HEPARIN SODIUM 4000 UNITS: 5000 INJECTION, SOLUTION INTRAVENOUS; SUBCUTANEOUS at 12:12

## 2025-02-11 NOTE — W.PN.ONC2
"Today's Communication / Plan"~"-"~"-: "~"."~"Impression"~"Impression"~"-: "~"High-grade partial small bowel obstruction, recurrent -malignant, s/p open GJ bypass and J-tube placement 1/28/25"~"advanced GE junction cancer, w/ esophageal stent, s/p cycle #1 of FLOT chemotherapy"~"pleural effusion, s/p thora on 1/20/25, malignant, PDL1 10%, repeat thora 2/4"~"Malnutrition, started TPN during hospitalization, now transitioned to Jtube for feeding"~"aspiration PNA completed course of abx"~"Speech therapy evaluation -liquid diet"~"VT, V tach"~"Hyperthyroidism"~"Stage II sacral pressure injury"~"recurrent aspiration, VDRF intubated 2/11"~"Plan"~"Plan"~"-: "~"If goals remain restorative then will need to optimize PS, nutrition, and wound healing (sacral DTI)"~"If pt would like to comfort focused goals then hospice appropriate"~"GOC discussed with Wife and daughter"~"Subjective/Objective"~"Subjective"~"-: "~"acute events overnight reviewed. Acute respiratory failure requiring intubation."~"Vital Signs: "~"Vital Signs"~"Temp	Pulse	Resp	BP	Pulse Ox"~" 98.3 F 	 101 	 19 	 109/64 	 99 "~" 02/11/25 08:11	 02/11/25 07:00	 02/11/25 07:00	 02/11/25 06:14	 02/11/25 07:57"~"Lab Results: "~"Laboratory Data"~"WBC	 24.6 10^3/uL (4.8-10.8)  H 	02/11/25  02:15    "~"Hgb	 11.5 g/dL (13.0-18.0)  L 	02/11/25  02:15    "~"Plt Count	 385 10^3/uL (130-400)  D	02/11/25  02:15    "~"PT	 15.4 Sec (11.4-14.6)  H 	02/11/25  02:15    "~"INR	 1.19  	02/11/25  02:15    "~"eGFR	 Cancelled  	02/11/25  04:58    "~"Physical Exam"~"HEENT: Moist Mucous Membranes; No Jaundice"~"Cardiology: Normal Sinus Rhythm"~"Pulmonary: Other (VDRF)"~"GI: Soft and Other (Jtube)"~"Extremities: Pulses Present; No Edema"~"Neuro: Other (sedated)"

## 2025-02-11 NOTE — RR
"pt complaining of increased work of breathing and unable to clear secretions despite oral suctioning. tube feedings stopped, BP elevated at 210/97, HR in 40s on tele, RR in high 20s, pulse ox reading low 80s on 2L NC. pt placed on nonrebreather, "~"CRNP made aware. RR called, pt transferred to IMU."~"A Rapid Response was called on this patient, please see Rapid Response form."

## 2025-02-11 NOTE — PTCARENOTE
"Pt admitted to room 3363 from IMU. Pt respiratory status declining and not tolerating bipap mask. Respiratory, ICU NP, and anesthesia at the bedside. Pt intubated @0445 w/ #8 tube, 23 cm @ right lip. See worklist for full vent settings. POX 98%. Pt "~"a-fib to sinus rhythm on the tele monitor. HR 90s-100s. BPs stable. Last /71. Palpable pulses throughout. Temp 98.2 F. Abdomen soft. J-tube in place. Tube feeds currently on hold. Lomeli catheter placed at the bedside and draining yellow urine. "~"Rectal trumpet in place. Right upper chest sub Q port flushed. Right forearm #20 placed. Fentanyl drip initiated, dextrose/insulin given for elevated K - see MAR. See worklist for full nursing assessment and interventions. X-ray complete. Ordered "~"labs sent. Family updated and at bedside.   "

## 2025-02-11 NOTE — PTCARENOTE
"Updated assessment, follow up vital sign trends and ongoing drip titration as per unit based protocols. Continue with orientation, teaching and follow up plan of cares for patient and family. Supportive cares and emotional support ongoing. Critical "~"care nursing in and out at bedside with intensivist team thru shift. Vascular access team place midline. Heparin drip started as per cardiology. Lab trends and follow up protocols ongoing. Family in and out at bedside. They were apart of am rounds "~"with critical care team. Intensivist team, cardiology at bedside with them for updates and support. "

## 2025-02-11 NOTE — W.PN.UPDATE
"Update Note"~"Progress Note Update"~"-: "~"0213 RRT for respiratory distress"~"PT has hx of esophageal cancer and has had ongoing issues with clearing secretions. Now pt with increase RR and WOB, audible rhonchi. Pt had been frequently suctioning himself using yankauer. Secretions noted to be brown colored possibly from TF? "~"Tube feeds turned off by nurse shortly before RRT. "~"Pulse ox before rapid was 96% on 2 L. Now on NRB with oxygen 92%."~"Labs ordered, port cxr, ABG and small dose of lasix trialed. Pt transferred to IMU for closer monitoring and probably HFNC. Differentials include poss aspiration vs pulm edema "~"ABG 7.22/62/142/24   on arrival to imu RT placed pt on bipap 15/5. PT was able to tolerate bipap for a little while, and lungs sounded improved, but pt still with increased WOB and getting visibly tired. Discussed if he would want to be intubated to "~"help his breathing. He said at first 'no' but then would say 'i don't know, i guess.' Pt aaox3 but feel due to situation he cannot make a decision regarding intubation. Another NP called wife, alejandra who was also overwhelmed with making decision on "~"whether or not to intubate. ICU NP discussed with wife and then decision was made to intubate. Pt was then ok to be intubated as long as he was sedated. Explained to pt that he will be sedated prior.  "~"Pt transferred to ICU where he was successful intubated without complication.  "

## 2025-02-11 NOTE — W.PN.ANESINT
"Anesthesia Intubation Note"~"- Intubation Note"~"Intubation Note: "~"Diagnosis:   respiratory distress"~"Blade:   glidescope"~"Tube Size:  8.0 HiLo"~"Depth: 23cm"~"Side Taped: center"~"Drugs Used: 100mg propofol, 50mg rocuronium, 300mcg phenylephrine"~"Grade View:1"~"EtCO2 Present: ETCO2 present "~"Atraumatic: atraumatic"~"Attempts:  1 attempt"~"Insertion Start and Stop Time: 0445 start 0448 end"~"SaO2 Pre: 92"~"SaO2 Post: 99"~"Glidescope Used: yes"~"Other Airway Adjustments: none"~"Pre-Oxygenated: bipap preoxygenated"~"Portable Chest X-Ray: ordered"~"RSI: no"~"Suctioned: no"~"Bilateral Breath Sounds Confirmed:  bilateral breath sounds confirmed, no breaths sounds over abdomen"~"Vent Settings:"~"	Settings per __X_Attending Physician "~"Julia Weber CRNA"

## 2025-02-11 NOTE — PTCARENOTE
"Resumed care of pt this evening. Received pt intubated and sedated on prop 20 mcg/kg/min and fent 50 mcg/hr via right peripheral IV site. Pt is also on heparin gtt infusing at 10.5 units/hr. Pt awakens/moves to verbal and tactile stimuli. Pt able to "~"move all 4 extremities but demonstrates generalized weakness. Pupils are equal and responsive to light. Pt is NSR on tele monitor, has trace GA, and weak but palpable pedal pulses bilaterally. Pt has a #8 ETT positioned 25 cm at the right lip. Pt "~"suctioned via ETT for moderate amounts of thick tan colored sputum. Pt tolerating vent settings: , R 16, FIO2 40%, and PEEP 5 satting at 98% pulse ox. On auscultation pt lungs sound coarse, rhonchorous, and diminished TO. Pt has J-tube in "~"place, clamped. Pt's abdomen is round w/ active BS. Rectal trumpet in place and is draining brown watery stool. Lomeli cath in place draining gregory colored urine. Pt's abdomen has lap sites and midline incision that are approximated, closed w/ "~"surgical glue, and SUSY. "

## 2025-02-11 NOTE — PTCARENOTE
Received Pt from rapid on 4E. Pt on NRB 15L spo2 94%. Pt given Lasix as ordered. RT at bed side Pt placed on Bipap. TF on hold.

## 2025-02-11 NOTE — PTCARENOTE
"pt tele monitor alarming for HR up to 140s and irregular rhythm. EKG done confirming afib with RVR. pt asymptomatic. /92, . CRNP made aware, stat order for 5mg IV Lopressor administered through R SQ port. prn order for 5mg IV lopressor "~"q4 for HR sustaining &gt;120. will continue to monitor."

## 2025-02-11 NOTE — W.PN.HOSP.TC
"Today's Communication/Plan"~"-"~"-: "~"Very difficult situation because of the setbacks holding him from getting treatment for the cancer"~"No he is able heparin drip."~"Add beta-blockers through the tube"~"No obstruction on x-ray of the abdomen.  If no residuals lower rate of tube feeds can be restarted"~"Sputum cultures if possible"~"Zosyn restarted"~"Wean off of ventilator as tolerated-possibly SBT tomorrow"~"Echo"~"Assessment / Plan"~"Assessment / Plan"~"-: "~"74-year-old man with small bowel obstruction "~"Patient states that he has sputum he has been suctioning himself.  Discussed about avoiding that to cause any trauma in the back of the pharynx."~"He is afraid of trying to swallow, but now agreeable to do a swallow evaluation with speech"~"Cardiovascular system S1-S2 appreciated"~"Chest clear to auscultation, decreased breath sounds on the right side"~"Abdomen soft , midline incision healing well, J-tube"~"No pedal edema"~"# New onset atrial fibrillation"~"Possibly from hypoxia now in sinus rhythm"~"Check echo"~"Started on anticoagulation with caution as he is high risk for bleeding with esophageal cancer."~"# NSVT"~"Check echo"~"EKG and 2 sets of trop"~"Low-dose of beta-blockers once he is off of pressors"~"# Septic shock-secondary to aspiration pneumonia-wean pressors as tolerated"~"# Acute hypoxic respiratory failure multifactorial"~"Patient was moved to ICU overnight on 2/10/2025 secondary to acute respiratory failure"~"Intubated-VDRF"~"Likely vomited and aspirated"~"I had warned him against using Yunker yesterday for suctioning constantly."~"Aspiration pneumonia, atelectasis, pleural effusion,  "~"Status post thoracentesis 2/4/25 with 1050 cc of cloudy yellow pleural fluid removed.  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  ."~"Started on tube feeding"~"# NSVT-check echo.  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 Martinez"~"# Mild hypernatremia"~"Change free water flushes to 50 cc/hr "~"trend bmp"~"# Tachycardia likely multifactorial due to pain, severe hypoxemia"~"Monitor heart rate for now.  To read individual problems as above."~"can consider low dose Lopressor if needed "~"# Chest pain overnight 2/3/2025"~"Slightly high troponin.  Cardiology evaluation appreciated.  Nonischemic myocardial injury"~"No PE"~"# Low TSH-likely euthyroid sick syndrome.  Free T4 normal."~"# Right pleural effusion"~"History of thoracentesis done on 1/20/2025 and 2/4/25"~"Malignant effusion "~"Would repeat thoracentesis only if symptomatic and difficulty coming off of ventilator"~" "~"# Hypertension-Hold amlodipine and lisinopril"~"# Hyperthyroidism-methimazole restarted "~"# Hyperlipidemia-statin "~"# Stage II sacral pressure injury-change position/offload/wound care"~"# Severe protein calorie malnutrition-  on TF .  Restart tube feeds as tolerated.  X-ray of the abdomen without any obstruction"~"# Ex-smoker"~"# DVT prophylaxis-Lovenox"~"# Full code"~"D/W ICU team during rounds"~"D/W Cardiology"~"Total Critical Care Time 40 minutes.  I was immediately available to the patient and staff.  I personally examined,  reviewed labs, diagnostic images/reports, interpretations, treatment plans, discussed patient care with other providers and family , "~"entered orders as appropriate and documented the medical record."~"Anticipated Discharge: &gt; 48 hours"~"Subjective/Interval History"~"-"~"-: "~"Date of Service:  February 11, 2025"~"Objective Data"~"-"~"Labs: "~"Laboratory Results"~" 	02/11/25	02/11/25	02/11/25"~" 	02:15	02:28	04:58"~"WBC	 24.6 H		"~"Hgb	 11.5 L		"~"Hct	 36.2 L		"~"Plt Count	 385  D		"~"PT	 15.4 H		"~"INR	 1.19		"~"APTT			"~"HCO3		 25.4	"~"Sodium	 136		 Cancelled"~"Potassium	 5.7 H D		 Cancelled"~"Chloride	 99		 Cancelled"~"Carbon Dioxide	 24		 Cancelled"~"BUN	 25 H		 Cancelled"~"Creatinine	 0.8		 Cancelled"~"Glucose	 203 H		 Cancelled"~"Calcium	 9.0		 Cancelled"~"Total Bilirubin	 0.8		"~"AST	 26		"~"ALT	 31		"~"Alkaline Phosphatase	 158 H		"~" 	02/11/25	02/11/25	02/11/25"~" 	05:55	10:17	10:50"~"WBC			 32.0 H"~"Hgb			 10.7 L"~"Hct			 33.5 L"~"Plt Count			 335"~"PT			 15.0 H"~"INR			 1.15"~"APTT			 30.5"~"HCO3	 25.4	 24.8	"~"Sodium			 Pending"~"Potassium			 Pending"~"Chloride			 Pending"~"Carbon Dioxide			 Pending"~"BUN			 Pending"~"Creatinine			 Pending"~"Glucose			 Pending"~"Calcium			 Pending"~"Total Bilirubin			"~"AST			"~"ALT			"~"Alkaline Phosphatase			"~"Vital Signs: "~"Vital Signs"~"Temp	Pulse	Resp	BP	Pulse Ox"~" 98.3 F 	 101 	 19 	 109/64 	 99 "~" 02/11/25 08:11	 02/11/25 07:00	 02/11/25 07:00	 02/11/25 06:14	 02/11/25 07:57"~"I&O"~"	02/10/25	02/11/25	02/12/25"~"	06:59	06:59	06:59"~"Intake Total	1300 / 1300	150 / 210	60 / 60"~"Output Total	200 / 200	955 / 965	10 / 10"~"Balance	1100 / 1100	-805 / -755	50 / 50"

## 2025-02-11 NOTE — W.PN.UPDATE
"Update Note"~"Progress Note Update"~"-: "~"0135 RN reported to NP 's -140's. EKG done Afib RVR which is different from previous EKG. Patient seen and evaluated. asymptomatic, resting in bed, suctioning self prn. 98.0, 137/92  Afib, 22, 98% 2l NC, Will order Metoprolol 5mg IV now "~"and prn HR&gt; 120. May consider Cardiologist. On Lovenox "~"HR  at present. "

## 2025-02-11 NOTE — PTCARENOTE
"Completed bed bath, oral cares and skin assessment at this time.Follow up plan of cares with family. Family heading home at this assessment. Will follow up tonight via phone. Continue with teaching, supportive cares and emotional support. Hourly "~"rounds and frequent patient safety checks continue. Continue wound cares skin cares and turning protocols. Follow up heparin protocol, await follow up ptt trend and will follow orders. Neosynephrine remains off. Sedation and IVF as ordered.  "

## 2025-02-11 NOTE — W.PN.ID1
"Date of Service"~"-: "~"Date of Service:  February 11, 2025"~"Today's Communication"~"-: "~"Continue Zsoyn for now. "~"Assessment / Plan"~"-: "~"# Recurrent aspiration pneumonia "~"# Acute hypoxic respiratory failure, intubated 2/11"~"# Leukocytosis -trended up"~"# Esophageal ca s/p stents, s/p cycle #1 Chemo"~"# High grade partial SBO due to bulky RP disease and around pancreas s/p open GJ bypass and J-tube placement 1/28/25."~"# Recurrent right malignant pleural effusion  "~"- Zosyn resumed (d8)"~"- Sputum cx pending"~"- Patient unable to clear upper airway secretions due to esoph cancer and high grade partial SBO despite GJ bypass. Ongoing aspiration expected. "~"   Prognosis poor. "~"Chief Complaint"~"-: Pneumonia"~"Subjective / Review of Systems"~"-: "~"Events noted. Pt was transferred out of ICU to floor, then developed respiratory distress, intubated. Of note, secretions was brown concerning for tube feed.  "~"Vital Signs / Physical Exam"~"Vital Signs"~"-: "~"Vital Signs"~"Temp	Pulse	Resp	BP	Pulse Ox"~" 98.3 F 	 101 	 19 	 109/64 	 99 "~" 02/11/25 08:11	 02/11/25 07:00	 02/11/25 07:00	 02/11/25 06:14	 02/11/25 07:57"~"Physical Exam"~"Constitutional: Acutely Ill and Chronically Ill"~"Eyes: Sclera Anicteric"~"Cardiovascular: S1/S2 and Other (tachycardic)"~"Pulmonary: Coarse"~"Gastrointestinal: Soft, Non Tender and Non Distended"~"Genito-Urinary: Lomeli"~"Lines: Port"~"Objective Data"~"Lab Data"~"-: "~"Lab Results"~"02/11/25 02:15 "~"PT	 15.4 Sec (11.4-14.6)  H 	02/11/25  02:15    "~"INR	 1.19  	02/11/25  02:15    "~"Estimated Creat Clear	 Cancelled  	02/11/25  04:58    "~"Lactic Acid	 5.1 mmol/L (0.7-2.0)  H* 	02/11/25  02:15    "~"Total Bilirubin	 0.8 mg/dl (0.2-1.3)  	02/11/25  02:15    "~"AST	 26 U/L (17-59)  	02/11/25  02:15    "~"ALT	 31 U/L (0-50)  	02/11/25  02:15    "~"Alkaline Phosphatase	 158 U/L ()  H 	02/11/25  02:15    "~"Amylase	 40 U/L ()  	01/22/25  20:57    "~"Most recent labs reviewed. "~"Micro Results: "~" 02/11/25 05:19	Respiratory Culture - Pending"~" Tracheal Aspirate	Gram Stain - Pending"~" 02/04/25 15:37	Fungal Culture - Preliminary"~" Pleural Fluid	   Culture in progress."~"	   Positive cultures are reported as soon as detected."~"	   Final report to follow in four to five weeks."~" 02/04/25 15:37	Acid Fast Bacilli Smear - Preliminary"~" Pleural Fluid	Acid Fast Bacilli Culture - Preliminary"~" 02/03/25 20:15	Blood Culture - Final"~" Blood/Venous	   No Growth - Final Report"~" 02/03/25 20:11	Blood Culture - Final"~" Blood/Venous	   No Growth - Final Report"~" 02/04/25 15:37	Body Fluid Culture - Final"~" Pleural Fluid	   No Growth After 72 Hours"~"	Gram Stain - Final"~" 02/03/25 20:11	Influenza Types A & B (ERIKA) - Final"~" Nasal Swab	   Negative for Influenza A & B, NAAT"~"	   Negative results must be combined with clinical observations"~"	   and patient history."~"	   Nucleic Acid Amplification test (NAAT)performed on the"~"	   [employer] NOW platform."~" 01/25/25 15:38	 - Final"~" Feces/Stool	   Negative for Norovirus GI and GII."~" 01/23/25 14:34	MRSA Screen - Final"~" Nose	   No Methicillin Resistant Staphylococcus aureus isolated."~"-: "~"1/23/25 CT a/p: The fluid-filled stomach is markedly distended and the fluid-filled duodenum is dilated to 4 cm with abrupt transition point to decompressed small bowel at the ligament of Treitz suggesting partial obstruction. Small bilateral "~"pleural effusions with associated compressive atelectasis at the posterior lung bases. Distal esophageal stent"~"2/3/25 Chest CT: Moderate right lower lobe pneumonia. Mild left lower lobe pneumonia. New bilaterally."~"2/6/25 CXR: Right basilar airspace consolidation, slightly improved compared to prior chest x-ray."~"2/10/25 CXR: Increased right basilar opacification in comparison to recent prior study at least in part suggesting increased pleural effusion, cannot exclude accompanying atelectasis and/or pneumonia."~"2/11/25 CXR: Stable right basilar opacity compared to the chest radiograph from 2/10/2025, most compatible with a small to moderate pleural effusion and adjacent atelectasis and/or pneumonia."

## 2025-02-11 NOTE — W.PN.CD
"Today's Communication / Plan"~"-"~"-: "~"start hep gtt"~"echo"~"Impression / Plan"~"-"~"-: "~"VDRF:"~"	-intubated overnight"~"	-increase wob yesterday, ? aspiration/mucus plugging"~"	-being treated for PNA and pleural effusion this hospitalization"~"Afib: "~"	-brief during hypoxia, now back in nsr"~"	-C2V is a 3 and likely underestimates it due to CA"~"	-d/w ICU team and Dr tam, will start with heparin gtt and see how tolerated it"~"	-echo "~"Esophageal adenocarcinoma of the GE junction"~"	-Advanced malignancy with extension into the surrounding soft tissue likely T3/T4 & precarinal lymph node enlargement"~"Acute high-grade partial small bowel obstruction status post open GJ bypass and feeding jejunostomy 1/31/2025 -improved"~"Malignant right pleural effusion status post thoracentesis 1/20/2025, moderate on CT 2/3/2025"~"Hypertension, amlodipine and lisinopril on hold in the setting of acute illness"~"Dyslipidemia"~"Severe protein calorie malnutrition"~"Subjective:"~"He is intubated and  sedated"~"CCT 33 minutes, gathering history d/w team and family at the bedside"~"Physical Exam"~"Vital Signs/Labs"~"-: "~"Vital Signs"~"Temp	Pulse	Resp	BP	Pulse Ox"~" 98.3 F 	 101 	 19 	 109/64 	 99 "~" 02/11/25 08:11	 02/11/25 07:00	 02/11/25 07:00	 02/11/25 06:14	 02/11/25 07:57"~"	02/10/25	02/11/25	02/12/25"~"	06:59	06:59	06:59"~"Actual Weight		70 kg	"~"02/11/25 02:15 "~"PT	 15.4 Sec (11.4-14.6)  H 	02/11/25  02:15    "~"INR	 1.19  	02/11/25  02:15    "~"Magnesium	 2.8 mg/dl (1.6-2.3)  H 	02/11/25  02:15    "~"Triglycerides	 Cancelled  	02/11/25  04:58    "~"Free T4	 1.35 ng/dl (0.78-2.19)  	01/30/25  12:34    "~" 	02/04/25	02/11/25"~" 	02:11	03:32"~"Pro-B-Natriuretic Pept	 1640	 676"~"LAB Results"~" 	02/10/25	02/10/25	02/11/25"~" 	16:08	23:27	02:15"~"Troponin I	 &lt; 0.012	 &lt; 0.012	 0.012"~"Physical Exam"~"Constitutional: Comfortable and Other (intubated and sedated)"~"Cardiovascular: Rhythm & rate is regular and Pedal edema is absent"~"Respiratory: Respiratory effort normal, Lungs clear to auscul., Wheeze Absent, Crackles Absent and Rhonchi Absent"~"Data Reviewed"~"-"~"-: "~"Date of Service:  February 11, 2025"~"Medical Decision Making: Review of Case with other Provider (Dr Tam, Dr Bustillos and  ICU team Interdisciplenary rounds --will start heparin)"

## 2025-02-12 VITALS — DIASTOLIC BLOOD PRESSURE: 73 MMHG | RESPIRATION RATE: 16 BRPM | SYSTOLIC BLOOD PRESSURE: 106 MMHG

## 2025-02-12 VITALS — SYSTOLIC BLOOD PRESSURE: 100 MMHG | DIASTOLIC BLOOD PRESSURE: 71 MMHG | RESPIRATION RATE: 12 BRPM

## 2025-02-12 VITALS — DIASTOLIC BLOOD PRESSURE: 65 MMHG | SYSTOLIC BLOOD PRESSURE: 96 MMHG | RESPIRATION RATE: 16 BRPM

## 2025-02-12 VITALS — SYSTOLIC BLOOD PRESSURE: 107 MMHG | DIASTOLIC BLOOD PRESSURE: 72 MMHG | RESPIRATION RATE: 26 BRPM

## 2025-02-12 VITALS — SYSTOLIC BLOOD PRESSURE: 106 MMHG | RESPIRATION RATE: 14 BRPM | DIASTOLIC BLOOD PRESSURE: 67 MMHG

## 2025-02-12 VITALS — SYSTOLIC BLOOD PRESSURE: 100 MMHG | RESPIRATION RATE: 16 BRPM | DIASTOLIC BLOOD PRESSURE: 71 MMHG

## 2025-02-12 VITALS — DIASTOLIC BLOOD PRESSURE: 69 MMHG | SYSTOLIC BLOOD PRESSURE: 108 MMHG | RESPIRATION RATE: 16 BRPM

## 2025-02-12 VITALS — RESPIRATION RATE: 16 BRPM | DIASTOLIC BLOOD PRESSURE: 72 MMHG | SYSTOLIC BLOOD PRESSURE: 109 MMHG

## 2025-02-12 VITALS — DIASTOLIC BLOOD PRESSURE: 66 MMHG | RESPIRATION RATE: 13 BRPM | SYSTOLIC BLOOD PRESSURE: 117 MMHG

## 2025-02-12 VITALS — DIASTOLIC BLOOD PRESSURE: 64 MMHG | RESPIRATION RATE: 13 BRPM | SYSTOLIC BLOOD PRESSURE: 105 MMHG

## 2025-02-12 VITALS — SYSTOLIC BLOOD PRESSURE: 112 MMHG | RESPIRATION RATE: 14 BRPM | DIASTOLIC BLOOD PRESSURE: 68 MMHG

## 2025-02-12 VITALS — SYSTOLIC BLOOD PRESSURE: 114 MMHG | RESPIRATION RATE: 12 BRPM | DIASTOLIC BLOOD PRESSURE: 66 MMHG

## 2025-02-12 VITALS — DIASTOLIC BLOOD PRESSURE: 75 MMHG | SYSTOLIC BLOOD PRESSURE: 104 MMHG | RESPIRATION RATE: 17 BRPM

## 2025-02-12 VITALS — RESPIRATION RATE: 13 BRPM | DIASTOLIC BLOOD PRESSURE: 69 MMHG | SYSTOLIC BLOOD PRESSURE: 111 MMHG

## 2025-02-12 VITALS — DIASTOLIC BLOOD PRESSURE: 68 MMHG | RESPIRATION RATE: 16 BRPM | SYSTOLIC BLOOD PRESSURE: 112 MMHG

## 2025-02-12 VITALS — SYSTOLIC BLOOD PRESSURE: 112 MMHG | DIASTOLIC BLOOD PRESSURE: 68 MMHG | RESPIRATION RATE: 14 BRPM

## 2025-02-12 VITALS — SYSTOLIC BLOOD PRESSURE: 114 MMHG | DIASTOLIC BLOOD PRESSURE: 68 MMHG | RESPIRATION RATE: 16 BRPM

## 2025-02-12 VITALS — SYSTOLIC BLOOD PRESSURE: 117 MMHG | RESPIRATION RATE: 18 BRPM | DIASTOLIC BLOOD PRESSURE: 66 MMHG

## 2025-02-12 VITALS — DIASTOLIC BLOOD PRESSURE: 74 MMHG | SYSTOLIC BLOOD PRESSURE: 113 MMHG | RESPIRATION RATE: 13 BRPM

## 2025-02-12 VITALS — DIASTOLIC BLOOD PRESSURE: 68 MMHG | RESPIRATION RATE: 16 BRPM | SYSTOLIC BLOOD PRESSURE: 111 MMHG

## 2025-02-12 VITALS — RESPIRATION RATE: 17 BRPM | DIASTOLIC BLOOD PRESSURE: 76 MMHG | SYSTOLIC BLOOD PRESSURE: 104 MMHG

## 2025-02-12 VITALS — DIASTOLIC BLOOD PRESSURE: 68 MMHG | SYSTOLIC BLOOD PRESSURE: 103 MMHG | RESPIRATION RATE: 20 BRPM

## 2025-02-12 VITALS — RESPIRATION RATE: 21 BRPM | DIASTOLIC BLOOD PRESSURE: 69 MMHG | SYSTOLIC BLOOD PRESSURE: 104 MMHG

## 2025-02-12 VITALS — SYSTOLIC BLOOD PRESSURE: 108 MMHG | DIASTOLIC BLOOD PRESSURE: 67 MMHG | RESPIRATION RATE: 19 BRPM

## 2025-02-12 VITALS — RESPIRATION RATE: 13 BRPM | DIASTOLIC BLOOD PRESSURE: 67 MMHG | SYSTOLIC BLOOD PRESSURE: 114 MMHG

## 2025-02-12 LAB
APTT PPP: 126 SEC (ref 23.4–35)
APTT PPP: 39.1 SEC (ref 23.4–35)
APTT PPP: 54.1 SEC (ref 23.4–35)
APTT PPP: 91.3 SEC (ref 23.4–35)
BASE EXCESS BLDA CALC-SCNC: 0.6 MMOL/L
BUN SERPL-MCNC: 45 MG/DL (ref 9–20)
CALCIUM SERPL-MCNC: 8.5 MG/DL (ref 8.4–10.2)
CHLORIDE SERPL-SCNC: 100 MMOL/L (ref 98–107)
CO2 SERPL-SCNC: 23 MMOL/L (ref 22–30)
EGFR: 41.26
ERYTHROCYTE [DISTWIDTH] IN BLOOD BY AUTOMATED COUNT: 14.6 % (ref 11.5–14.5)
ESTIMATED CREATININE CLEARANCE: 37 ML/MIN
GLUCOSE - POINT OF CARE: 101 MG/DL (ref 70–99)
GLUCOSE - POINT OF CARE: 72 MG/DL (ref 70–99)
GLUCOSE - POINT OF CARE: 82 MG/DL (ref 70–99)
GLUCOSE - POINT OF CARE: 94 MG/DL (ref 70–99)
GLUCOSE SERPL-MCNC: 91 MG/DL (ref 70–99)
HCO3 BLDA-SCNC: 24.2 MMOL/L (ref 21–28)
HCT VFR BLD AUTO: 28.9 % (ref 39–52)
HGB BLD-MCNC: 9.5 G/DL (ref 13–18)
MAGNESIUM SERPL-MCNC: 2.4 MG/DL (ref 1.6–2.3)
MCHC RBC AUTO-ENTMCNC: 32.9 G/DL (ref 33–37)
MCV RBC AUTO: 86.8 FL (ref 80–94)
NRBC BLD AUTO-RTO: 0 %
PCO2 BLDA: 34 MMHG (ref 35–48)
PLATELET # BLD AUTO: 304 10^3/UL (ref 130–400)
PO2 BLDA: 106 MMHG (ref 83–108)
POTASSIUM SERPL-SCNC: 5.1 MMOL/L (ref 3.5–5.1)
SAO2 % BLDA: 99.7 % (ref 94–98)
SODIUM SERPL-SCNC: 134 MMOL/L (ref 135–145)

## 2025-02-12 RX ADMIN — ONDANSETRON HYDROCHLORIDE 4 MG: 2 SOLUTION INTRAMUSCULAR; INTRAVENOUS at 07:27

## 2025-02-12 RX ADMIN — PANTOPRAZOLE SODIUM 40 MG: 40 INJECTION, POWDER, LYOPHILIZED, FOR SOLUTION INTRAVENOUS at 07:27

## 2025-02-12 RX ADMIN — PROPOFOL 100: 10 INJECTION, EMULSION INTRAVENOUS at 22:16

## 2025-02-12 RX ADMIN — INSULIN ASPART: 100 INJECTION, SOLUTION INTRAVENOUS; SUBCUTANEOUS at 23:43

## 2025-02-12 RX ADMIN — ATORVASTATIN CALCIUM 10 MG: 10 TABLET, FILM COATED ORAL at 21:25

## 2025-02-12 RX ADMIN — INSULIN ASPART: 100 INJECTION, SOLUTION INTRAVENOUS; SUBCUTANEOUS at 12:50

## 2025-02-12 RX ADMIN — HEPARIN SODIUM 250: 10000 INJECTION, SOLUTION INTRAVENOUS at 11:07

## 2025-02-12 RX ADMIN — INSULIN ASPART: 100 INJECTION, SOLUTION INTRAVENOUS; SUBCUTANEOUS at 18:30

## 2025-02-12 RX ADMIN — SODIUM CHLORIDE, SODIUM LACTATE, POTASSIUM CHLORIDE, AND CALCIUM CHLORIDE 1000: 600; 310; 30; 20 INJECTION, SOLUTION INTRAVENOUS at 04:58

## 2025-02-12 RX ADMIN — PROPOFOL 100: 10 INJECTION, EMULSION INTRAVENOUS at 12:50

## 2025-02-12 RX ADMIN — AMPICILLIN AND SULBACTAM 120 GM: 10; 5 INJECTION, POWDER, FOR SOLUTION INTRAVENOUS at 17:44

## 2025-02-12 RX ADMIN — AMPICILLIN AND SULBACTAM 120 GM: 10; 5 INJECTION, POWDER, FOR SOLUTION INTRAVENOUS at 23:45

## 2025-02-12 RX ADMIN — FENTANYL CITRATE 100: 50 INJECTION, SOLUTION INTRAMUSCULAR; INTRAVENOUS at 20:35

## 2025-02-12 RX ADMIN — TAZOBACTAM SODIUM AND PIPERACILLIN SODIUM 50: 500; 4 INJECTION, SOLUTION INTRAVENOUS at 12:50

## 2025-02-12 RX ADMIN — SODIUM CHLORIDE, SODIUM LACTATE, POTASSIUM CHLORIDE, AND CALCIUM CHLORIDE 1000: 600; 310; 30; 20 INJECTION, SOLUTION INTRAVENOUS at 16:29

## 2025-02-12 RX ADMIN — TAZOBACTAM SODIUM AND PIPERACILLIN SODIUM 50: 375; 3 INJECTION, SOLUTION INTRAVENOUS at 05:00

## 2025-02-12 RX ADMIN — SODIUM CHLORIDE 10 ML: 9 INJECTION, SOLUTION INTRAMUSCULAR; INTRAVENOUS; SUBCUTANEOUS at 07:27

## 2025-02-12 RX ADMIN — INSULIN ASPART: 100 INJECTION, SOLUTION INTRAVENOUS; SUBCUTANEOUS at 05:05

## 2025-02-12 RX ADMIN — METHIMAZOLE 5 MG: 5 TABLET ORAL at 08:26

## 2025-02-12 RX ADMIN — METOPROLOL TARTRATE: 25 TABLET, FILM COATED ORAL at 07:28

## 2025-02-12 NOTE — PTCARENOTE
"Multiple updates thru day with family at bedside and on phone. Hospitalist team, cardiology, infectious disease, and now renal team have been in and out to assess and follow up plan of cares with family. Intensivist team continues rounds with "~"patient and family. Ongoing work with pharmacy, drip titration and antibiotic trends as per team. Wife and sister at bedside thru day. Continue to reinforce plan of cares, concerns for day, supportive cares ongoing with emotional support. Skin cares "~"oral cares as per unit based protocols. Assessments ongoing and unchanged. Follow up trends in vital signs and labs as per heparin based protocol. "

## 2025-02-12 NOTE — W.PN.INTV
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 status post 1 cycle chemotherapy found to have malignant right pleural effusion who has recurrent "~"aspiration and once again likely had an aspiration requiring intubation and mechanical ventilation-intensivist consulted for ventilator/pneumonia/critical care management 2/11/2025."~"Respiratory failure due to aspiration"~"	Intubated 2/11/2025"~"	Extubated"~"Aspiration pneumonia-recurrent"~"Atrial fibrillation with rapid ventricular response"~"Nonsustained ventricular tachycardia"~"Sepsis with shock unresponsive to fluids requiring pressors"~"High-grade partial small bowel obstruction with a history of recurrent small bowel obstruction"~"Hypernatremia"~"Low TSH"~"Right pleural effusion-history of thoracentesis 1/20/2025 as well as 2//25-malignant"~"Sacral pressure injury-stage II"~"Severe protein calorie malnutrition"~"Leukocytosis"~"Anemia-normocytic-hemoglobin 10.7"~"Conditions present prior to admission:"~"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  "~"Open GJ tube 1/31/2025.  "~"High-grade partial small bowel obstruction.  "~"Recurrent small bowel obstruction"~"Right thoracentesis 2//25-malignant cells.  "~"Former smoker."~"Plan"~"Admit patient to medical intensive care unit for persistent hypotension despite fluid resuscitation requiring pressors"~"Ventilator settings reviewed and adjusted"~"Monitor airway pressures"~"ABG reviewed-iatrogenic respiratory alkalosis-decrease rate"~"Try spontaneous breathing trial later today-reviewed with respiratory therapy"~"VAP prevention protocol"~"Nebulizers if needed"~"Chest x-ray 2/12/2025 without significant change in pleural effusion or infiltrates"~"Follow chest x-ray in size of pleural effusion"~"Spontaneous breathing trial once stabilized"~"Obtain cultures"~"Empiric antibiotics-Zosyn initiated"~"Infectious disease following-correspondence reviewed"~"Monitor leukocytosis"~"Decrease fluids"~"Lactate follow"~"Pressors weaned"~"Nephrology evaluation for worsening renal function"~"Atrial fibrillation rate control"~"Heparin drip initiated by cardiology-no obvious signs of bleeding"~"Cardiology following-correspondence reviewed"~"Surgery was following-signed off 2/7/2025"~"Oncology following-reviewed with Dr. Bhatt-overall poor prognosis"~"Comfort a priority"~"DVT prophylaxis"~"Nutrition-tube feeds via jejunostomy reinitiated with close monitoring of residuals from gastrostomy to decrease or minimize aspiration risk"~"Early mobilization/bedside range of motion"~"Dr. Bustillos reviewed with wife and daughter on multidisciplinary rounds 2/11/2025"~"Critical care statement: A total of 45 minutes of critical care time was provided for this patient today. This includes management of unstable vital signs, evaluation of the patient at bedside, reviewing the patient's pertinent medical records "~"including radiographs, ventilator management, pressor management, microbiology, laboratory evaluations, and  discussion with primary team, consultants, pharmacy, nutrition, physical therapy, case management, charge nurse, critical care nursing, and "~"respiratory therapy."~"Diagnostic data:"~"Chest x-ray 1/18/2025-esophageal stent, gastric tube tip in the distal stent margin recommend advancing at least 8 cm for positioning gastric lumen, right pleural effusion"~"Chest x-ray 2//25-moderate airspace disease right lower lobe, tiny right pleural effusion"~"Chest x-ray 2/11/2025-stable right basilar infiltrate, small left pleural effusion"~"Chest x-ray small to moderate right pleural effusion"~"CT abdomen and pelvis 1/19/2025-moderate right pleural effusion, no bowel obstruction, soft tissue density lower margin esophageal stent worrisome for tumor infiltration, 5 mm nonobstructing calculus left pole of the kidney and diverticuli"~"CT chest 2/3/2025-moderate right lower lobe pneumonia, moderate right pleural effusion"~"Thoracentesis 1/20/25--1400 mL clear yellow pleural fluid"~"Thoracentesis 2//25--1050 mL cloudy yellow pleural fluid"~"Echocardiogram 2/11/2025-EF 50-55%"~"Subjective Dataa"~"Subjective Data"~"Date of Service: "~"Date of Service:  February 12, 2025"~"Chief Complaint: Intensivist Follow Up, Pulmonary Follow Up and Vent Management Follow Up"~"Subjective: "~"Sedated, moderate amount secretions, stable on the ventilator,"~"Review of Systems"~"General: Unobtainable - Sedation"~"Objective Data"~"Data Reviewed"~"Vital Signs / I&O / Oxygen: "~"Vital Signs"~"Temp	Pulse	Resp	BP	Pulse Ox"~" 99.4 F 	 80 	 16 	 94/67 	 97 "~" 02/12/25 04:00	 02/12/25 07:28	 02/12/25 06:00	 02/12/25 07:28	 02/12/25 06:00"~"Intake and Output"~"	02/11/25	02/12/25	02/13/25"~"	06:59	06:59	06:59"~"Intake Total	150 / 210	2164.5 / 2270.4	105.9 / 105.9"~"Output Total	955 / 965	622 / 632	10 / 10"~"Balance	-805 / -755	1542.5 / 1638.4	95.9 / 95.9"~"SaO2 [A/C]                    	98                                              	"~"SaO2                          	97                                              	"~"Nasal Cannula flow liters per 	2                                               	"~"minute                        	"~"Physical Exam"~"General: Respiratory Distress (n) and Comfortable"~"HEENT: Normocephalic, Anicteric and Moist Mucous Membranes"~"Cardiovascular: Regular Rhythm"~"Respiratory: Wheeze (n), Crackles, Rhonchi, Non-Labored Respirations, Accessory Resp Muscle Use (n) and ET Tube"~"GI: Soft, Non Distended and Non Tender"~"Neurology: No Motor Deficits and Other (Sedated on the ventilator)"~"Skin: Warm, Good Color, Cyanosis (n), Jaundice (n) and Rash (n)"~"Labs/Micro/Reports"~"-: "~"Lab Data"~"02/12/25 03:49 "~"02/12/25 03:49 "~"Laboratory Results"~" 	02/11/25	02/11/25	02/11/25"~" 	10:17	10:50	17:18"~"PT		 15.0 H	"~"INR		 1.15	"~"APTT		 30.5	 56.8 H"~"pH	 7.39		"~"pCO2	 41		"~"pO2	 159 H		"~"HCO3	 24.8		"~"O2 Delivery Level	 		"~" 	02/12/25	02/12/25	02/12/25"~" 	00:18	03:35	06:21"~"PT			"~"INR			"~"APTT	 39.1 H		 54.1 H"~"pH		 7.46 H	"~"pCO2		 34 L	"~"pO2		 106	"~"HCO3		 24.2	"~"O2 Delivery Level		 	"~"Microbiology"~"02/11/25 05:19   Tracheal Aspirate   Gram Stain - Preliminary"~"02/04/25 15:37   Pleural Fluid   Fungal Culture - Preliminary"~"                            Culture in progress."~"                            Positive cultures are reported as soon as detected."~"                            Final report to follow in four to five weeks."~"02/04/25 15:37   Pleural Fluid   Acid Fast Bacilli Smear - Preliminary"~"02/04/25 15:37   Pleural Fluid   Acid Fast Bacilli Culture - Preliminary"

## 2025-02-12 NOTE — W.PN.ONC2
"Today's Communication / Plan"~"-"~"-: "~"Hospice care most appropriate however as above, family and patient have desired aggressive supportive care and resuscitative measures."~"Patient is unequivocally not an active systemic antineoplastic therapy candidate unless he has a marked improvement in his functional status."~"Impression"~"Impression"~"-: "~"High-grade partial small bowel obstruction, recurrent -malignant, s/p open GJ bypass and J-tube placement 1/28/25"~"advanced GE junction cancer, w/ esophageal stent, s/p cycle #1 of FLOT chemotherapy"~"pleural effusion, s/p thora on 1/20/25, malignant, PDL1 10%, repeat thora 2/4"~"Malnutrition, started TPN during hospitalization, now transitioned to Jtube for feeding"~"aspiration PNA completed course of abx"~"Speech therapy evaluation -liquid diet"~"VT, V tach"~"Hyperthyroidism"~"Stage II sacral pressure injury"~"recurrent aspiration, VDRF intubated 2/11"~"Plan"~"Plan"~"-: "~"Supportive care is being driven by family members advocating for patient based on his wishes."~"Overall prognosis extremely poor in general from both general medical as well as oncologic respect.  Patient will unlikely ever be able to resume antineoplastic therapy."~"Anticipate survival of &lt;1-month. "~"Palliative care with hospice would be most appropriate choice however following previous GOC conversations family, they have pursued aggressive management leading to the events yesterday that led to his intubation."~"Subjective/Objective"~"Chief Complaint"~"-: "~"ACS Heme Onc progress note"~"Subjective"~"-: "~"Events reviewed.  Patient with respiratory failure that occurred early yesterday a.m. 2/11 requiring intubation for suspected aspiration pneumonia. "~"Vital Signs: "~"Vital Signs"~"Temp	Pulse	Resp	BP	Pulse Ox"~" 99.4 F 	 79 	 16 	 108/69 	 97 "~" 02/12/25 04:00	 02/12/25 06:00	 02/12/25 06:00	 02/12/25 06:00	 02/12/25 06:00"~"Lab Results: "~"Laboratory Data"~"WBC	 16.6 10^3/uL (4.8-10.8)  H 	02/12/25  03:49    "~"Hgb	 9.5 g/dL (13.0-18.0)  L 	02/12/25  03:49    "~"Plt Count	 304 10^3/uL (130-400)  	02/12/25  03:49    "~"PT	 15.0 Sec (11.4-14.6)  H 	02/11/25  10:50    "~"INR	 1.15  	02/11/25  10:50    "~"APTT	 54.1 Sec (23.4-35.0)  H 	02/12/25  06:21    "~"eGFR	 41.26  	02/12/25  03:49    "~"Physical Exam"~"-: "~"Intubated"

## 2025-02-12 NOTE — W.PN.ID1
"Date of Service"~"-: "~"Date of Service:  February 12, 2025"~"Today's Communication"~"-: "~"Narrow Zosyn to Unasyn"~"Assessment / Plan"~"-: "~"# Recurrent aspiration pneumonia "~"# Acute hypoxic respiratory failure, intubated 2/11"~"# Leukocytosis -trending down"~"# ESSENCE"~"# Esophageal ca s/p stents, s/p cycle #1 Chemo"~"# High grade partial SBO due to bulky RP disease and around pancreas s/p open GJ bypass and J-tube placement 1/28/25."~"# Recurrent right malignant pleural effusion  "~"- Sputum cx usual respiratory cammy."~"- Narrow Zosyn to Unasyn"~"- Patient unable to clear upper airway secretions due to esoph cancer and high grade partial SBO despite GJ bypass. Ongoing aspiration expected. "~"   Prognosis poor.  "~"Chief Complaint"~"-: Pneumonia"~"Subjective / Review of Systems"~"-: "~"Remains intubated"~"Vital Signs / Physical Exam"~"Vital Signs"~"-: "~"Vital Signs"~"Temp	Pulse	Resp	BP	Pulse Ox"~" 99.1 F 	 74 	 12 	 100/71 	 97 "~" 02/12/25 12:00	 02/12/25 12:00	 02/12/25 12:00	 02/12/25 12:00	 02/12/25 13:01"~"Physical Exam"~"Constitutional: Acutely Ill and Chronically Ill"~"Eyes: Sclera Anicteric"~"Cardiovascular: S1/S2 and Other (tachycardic)"~"Pulmonary: Coarse"~"Gastrointestinal: Soft, Non Tender and Non Distended"~"Genito-Urinary: Lomeli"~"Lines: Port"~"Objective Data"~"Lab Data"~"-: "~"Lab Results"~"02/12/25 03:49 "~"02/12/25 03:49 "~"PT	 15.0 Sec (11.4-14.6)  H 	02/11/25  10:50    "~"INR	 1.15  	02/11/25  10:50    "~"APTT	 54.1 Sec (23.4-35.0)  H 	02/12/25  06:21    "~"Estimated Creat Clear	 37 ml/min 	02/12/25  03:49    "~"Lactic Acid	 1.2 mmol/L (0.7-2.0)  	02/11/25  10:50    "~"Total Bilirubin	 0.8 mg/dl (0.2-1.3)  	02/11/25  02:15    "~"AST	 26 U/L (17-59)  	02/11/25  02:15    "~"ALT	 31 U/L (0-50)  	02/11/25  02:15    "~"Alkaline Phosphatase	 158 U/L ()  H 	02/11/25  02:15    "~"Amylase	 40 U/L ()  	01/22/25  20:57    "~"Most recent labs reviewed. "~"Micro Results: "~" 02/11/25 05:19	Respiratory Culture - Preliminary"~" Tracheal Aspirate	   Usual Respiratory Cammy"~"	Gram Stain - Preliminary"~" 02/04/25 15:37	Fungal Culture - Preliminary"~" Pleural Fluid	   Culture in progress."~"	   Positive cultures are reported as soon as detected."~"	   Final report to follow in four to five weeks."~" 02/04/25 15:37	Acid Fast Bacilli Smear - Preliminary"~" Pleural Fluid	Acid Fast Bacilli Culture - Preliminary"~" 02/03/25 20:15	Blood Culture - Final"~" Blood/Venous	   No Growth - Final Report"~" 02/03/25 20:11	Blood Culture - Final"~" Blood/Venous	   No Growth - Final Report"~" 02/04/25 15:37	Body Fluid Culture - Final"~" Pleural Fluid	   No Growth After 72 Hours"~"	Gram Stain - Final"~" 02/03/25 20:11	Influenza Types A & B (ERIKA) - Final"~" Nasal Swab	   Negative for Influenza A & B, NAAT"~"	   Negative results must be combined with clinical observations"~"	   and patient history."~"	   Nucleic Acid Amplification test (NAAT)performed on the"~"	   [employer] platform."~" 01/25/25 15:38	 - Final"~" Feces/Stool	   Negative for Norovirus GI and GII."~" 01/23/25 14:34	MRSA Screen - Final"~" Nose	   No Methicillin Resistant Staphylococcus aureus isolated."~"-: "~"1/23/25 CT a/p: The fluid-filled stomach is markedly distended and the fluid-filled duodenum is dilated to 4 cm with abrupt transition point to decompressed small bowel at the ligament of Treitz suggesting partial obstruction. Small bilateral "~"pleural effusions with associated compressive atelectasis at the posterior lung bases. Distal esophageal stent"~"2/3/25 Chest CT: Moderate right lower lobe pneumonia. Mild left lower lobe pneumonia. New bilaterally."~"2/6/25 CXR: Right basilar airspace consolidation, slightly improved compared to prior chest x-ray."~"2/10/25 CXR: Increased right basilar opacification in comparison to recent prior study at least in part suggesting increased pleural effusion, cannot exclude accompanying atelectasis and/or pneumonia."~"2/11/25 CXR: Stable right basilar opacity compared to the chest radiograph from 2/10/2025, most compatible with a small to moderate pleural effusion and adjacent atelectasis and/or pneumonia."

## 2025-02-12 NOTE — W.PN.HOSP.TC
"Today's Communication/Plan"~"-"~"-: "~"Ventilator weaning efforts per pulmonary"~"IV fluids"~"Monitor creatinine"~"Restart tube feeds"~"Continue antibiotics at  adjusted dose"~"Assessment / Plan"~"Assessment / Plan"~"-: "~"74-year-old man with small bowel obstruction .  Had a GJ tube placed.  Pleural effusion drained once.  Was tolerating tube feeds but then had acute respiratory failure and was transferred to ICU overnight February 10, 2025"~"Cardiovascular system S1-S2 appreciated"~"Chest clear to auscultation, decreased breath sounds on the right side"~"Abdomen soft , midline incision healing well, J-tube"~"No pedal edema"~"# New onset atrial fibrillation"~"Possibly from hypoxia now in sinus rhythm"~"Echo 2/11/2025-low normal LV systolic function.  EF 50 to 55%.  Trace AI"~"Started on anticoagulation with caution as he is high risk for bleeding with esophageal cancer."~"# Acute kidney injury-likely prerenal secondary to hypotension "~"Check urine sodium and urine analysis"~"Avoid hypotension.  Lomeli catheter for diversion.  Nephrology has been consulted"~"Zosyn dose adjusted"~"Continue to hold lisinopril"~"# Hyperkalemia better"~"# NSVT-Low-dose beta-blockers "~"# Septic shock-secondary to aspiration pneumonia-wean pressors as tolerated"~"# Acute hypoxic respiratory failure multifactorial"~"Patient was moved to ICU overnight on 2/10/2025 secondary to acute respiratory failure"~"Intubated-VDRF"~"Likely vomited and aspirated"~"Anxiety also likely played a role"~"Aspiration pneumonia, atelectasis, pleural effusion,  "~"Status post thoracentesis 2/4/25 with 1050 cc of cloudy yellow pleural fluid removed.  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  ."~"Start  tube feeding again"~"X ray OK "~"# Locally advanced esophageal cancer diagnosed in September 2024 with stent placement November 2024 chemotherapy started January 2025"~"# GE junction carcinoma status post esophageal stent on FLOT chemotherapy"~"PET scan showed locally advanced malignancy with some extension into the surrounding tissues"~"Pleural effusion from 1/20/2025-Positive for malignant cells. Makes it stage 4"~"Follows with Dr. Martinez"~"Oncology correspondence from today appreciated"~"# Mild hypernatremia"~"# Chest pain overnight 2/3/2025"~"Slightly high troponin.  Cardiology evaluation appreciated.  Nonischemic myocardial injury"~"No PE"~"# Low TSH-likely euthyroid sick syndrome.  Free T4 normal."~"# Right pleural effusion"~"History of thoracentesis done on 1/20/2025 and 2/4/25"~"Malignant effusion "~"Would repeat thoracentesis only if symptomatic and difficulty coming off of ventilator"~"# Anemia-Likely secondary to chronic disease and malignancy.  Watch for any bleeding with heparin"~" "~"# Hypertension-Hold amlodipine and lisinopril. BB Started"~"# Hyperthyroidism-methimazole restarted "~"# Hyperlipidemia-statin "~"# Stage II sacral pressure injury-change position/offload/wound care"~"# Severe protein calorie malnutrition-  on TF .  Restart tube feeds as tolerated.  X-ray of the abdomen without any obstruction"~"# Ex-smoker"~"# DVT prophylaxis-Lovenox"~"# Full code"~"D/W ICU team during rounds"~"D/W Cardiology and Nephrology"~"Total Critical Care Time 38 minutes.  I was immediately available to the patient and staff.  I personally examined,  reviewed labs, diagnostic images/reports, interpretations, treatment plans, discussed patient care with other providers and family , "~"entered orders as appropriate and documented the medical record."~"Anticipated Discharge: &gt; 48 hours"~"Subjective/Interval History"~"-"~"-: "~"Date of Service:  February 12, 2025"~"Objective Data"~"-"~"Labs: "~"Laboratory Results"~" 	02/12/25	02/12/25	02/12/25"~" 	00:18	03:35	03:49"~"WBC			 16.6 H"~"Hgb			 9.5 L"~"Hct			 28.9 L"~"Plt Count			 304"~"APTT	 39.1 H		"~"HCO3		 24.2	"~"Sodium			 134 L"~"Potassium			 5.1"~"Chloride			 100"~"Carbon Dioxide			 23"~"BUN			 45 H"~"Creatinine			 1.7 H"~"Glucose			 91"~"Calcium			 8.5"~" 	02/12/25	02/12/25"~" 	06:21	13:00"~"WBC		"~"Hgb		"~"Hct		"~"Plt Count		"~"APTT	 54.1 H	 Pending"~"HCO3		"~"Sodium		"~"Potassium		"~"Chloride		"~"Carbon Dioxide		"~"BUN		"~"Creatinine		"~"Glucose		"~"Calcium		"~"Vital Signs: "~"Vital Signs"~"Temp	Pulse	Resp	BP	Pulse Ox"~" 99.2 F 	 77 	 21 	 104/69 	 98 "~" 02/12/25 11:08	 02/12/25 11:00	 02/12/25 11:00	 02/12/25 11:00	 02/12/25 11:00"~"I&O"~"	02/11/25	02/12/25	02/13/25"~"	06:59	06:59	06:59"~"Intake Total	150 / 210	2164.5 / 2270.4	537.5 / 537.5"~"Output Total	955 / 965	622 / 632	95 / 95"~"Balance	-805 / -755	1542.5 / 1638.4	442.5 / 442.5"

## 2025-02-12 NOTE — PTCARENOTE
"on assessment pt intubated and sedated. pt has midline and subQ port, Fen gtt, prop gtt, and hep gtt running per order, SR on the monitor, 98% on vent A/C 12/500/40%/5, large amount of oral and ETT secretions, Jtube clamped for possible aspiration, "~"rectal trumpet in place, mehta in place, foam on sacrum and turned Q2h"

## 2025-02-12 NOTE — W.PN.INTV
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 Day #22 of admission "~"Drips: fentanyl, propofol, phenylephrine, heparin"~" "~"Septic shock"~"-- Likely 2/2 pneumonia"~"-- Lactic acid 5.1 this am, downtrended to 1.2. "~"-- Patient requiring pressors, on phenylephrine, maintain MAP &gt;65 and SBP&gt;100 per renal, wean as able"~"-- Continue Lactated Ringer's at 80 ml/hr"~"-- Hold BP medications"~"-- ID following, continue zosyn (day #9)"~"-- Sputum cultures if able"~"-- Monitor leukocytosis - white count downtrending, 16.6 today"~"Acute respiratory failure"~"-- Patient intubated on 2/11/25"~"-- Currently on ventilator FiO2: 40%, PEEP: 5, rate: 16, volume: 500. "~"-- Likely 2/2 aspiration pneumonia"~"-- Patient was on tube feeds yesterday. Has been coughing up brown sputum, possibly tube feeds? Hold off on tube feeds for now"~"-- ID following, continue zosyn (day#8)"~"-- ph 7.46, pco2 34, po2 106, hco3 24.2. "~"-- Ventilator settings adjusted. Will decrease rate to 12."~"-- CXR today no significant change."~"-- Patient continues to have dark brown secretions"~"-- Try spontaneous breathing trial later today after adjusting ventilator settings "~"Right pleural effusion"~"-- s/p thoracentesis (2/4/25)"~"-- 1050 cc cloudy yellow fluid"~"-- Likely malignant"~"-- CXR today no significant change."~"Atrial fibrillation with RVR, Nonsustained ventricular tachycardia"~"-- Now in NSR"~"-- Hold lopressor"~"-- Echo: EF 50-55%"~"-- Tolerating heparin drip"~"-- Cardiology following - noted one brief episode of nsvt on tele in the last 24 hrs - continue to monitor"~"High-grade proximal small bowel obstruction, recurrent"~"-- s/p gastrojejunostomy bypass and feeding jejunostomy tube placement"~"-- 2/2 retroperitoneal metastasis around the pancreas"~"-- If possible, begin tube feeds at low rates with close monitoring of residuals today"~"-- Abdomen xray yesterday: Nonspecific bowel gas pattern with borderline prominent small bowel loops in the left hemiabdomen."~"Acute kidney injury"~"-- Cr 1.7 today"~"-- Patient is not on lasix. Lomeli in place. Oliguric (600 cc in the last 24hrs)."~"-- Nephrology consulted. Suspect prerenal in the setting of septic shock and hypotension. Urine sodium and creatinine pending. Adjusted zosyn dose. Possible AIN 2/2 zosyn, check urine eosinophils if Cr continues to rise tomorrow."~"Anemia"~"-- Normocytic. hemoglobin downtrending, 9.5 today"~"-- Likely dilutional. No active bleeding."~"-- Monitor closely"~"Severe protein calorie malnutrition"~"-- If possible, begin tube feeds with close monitoring of residuals today"~"Sacral pressure injury-stage II"~"Conditions present prior to admission:"~"Metastatic esophageal cancer s/p esophageal stent and cycle #1 of FLOT chemotherapy - oncology has had discussions regarding GOC and hospice with family. They are not ready yet."~"Right thoracentesis, malignant, PDL1 10% (1400cc clear yellow fluid)"~"Former smoker"~"Hypertension - Hold home meds for now"~"Hyperthyroidism - Continue methimazole"~"Plan:"~"Patient intubated and sedated"~"Ventilator weaning efforts as able"~"Continue IV fluids "~"Maintain MAP &gt;65 and SBP&gt;100 per renal, wean pressors as able"~"Adjust ventilator settings and try SBT later today if able"~"Continue zosyn, dose adjusted"~"Aspiration precautions"~"Begin tube feeds at low rates today"~"Monitor Cr for ESSENCE, nephrology following, urine sodium and creatinine pending"~"Monitor for bleeding, patient on heparin gtt"~"Appreciate oncology GOC discussions with family."~"DVT prophylaxis: Heparin drip"~"GI prophylaxis: Protonix"~"Subjective Dataa"~"Subjective Data"~"Date of Service: "~"Date of Service:  February 12, 2025"~"Review of Systems"~"General: Unobtainable - Sedation"~"Genitourinary: Lomeli"~"Objective Data"~"Data Reviewed"~"Vital Signs / I&O / Oxygen: "~"Vital Signs"~"Temp	Pulse	Resp	BP	Pulse Ox"~" 99.1 F 	 80 	 16 	 94/67 	 97 "~" 02/12/25 07:43	 02/12/25 07:28	 02/12/25 06:00	 02/12/25 07:28	 02/12/25 07:58"~"Intake and Output"~"	02/11/25	02/12/25	02/13/25"~"	06:59	06:59	06:59"~"Intake Total	150 / 210	2164.5 / 2270.4	105.9 / 105.9"~"Output Total	955 / 965	622 / 632	10 / 10"~"Balance	-805 / -755	1542.5 / 1638.4	95.9 / 95.9"~"SaO2 [A/C]                    	98                                              	"~"SaO2                          	97                                              	"~"Nasal Cannula flow liters per 	2                                               	"~"minute                        	"~"Physical Exam"~"General: Comfortable and Fever (n)"~"HEENT: Normocephalic"~"Cardiovascular: S1-S2, Regular Rhythm, JVD (n) and Peripheral Edema (n)"~"Respiratory: Crackles and Other (Decreased breath sounds at lung bases (left greater than the right))"~"GI: Soft, Non Distended, Tender (Right lower quadrant) and Normal Bowel Sounds"~"Neurology: Other (Intubated and sedated, follows commands)"~"Skin: Warm, Good Color and Bruising (n)"~"Labs/Micro/Reports"~"-: "~"Lab Data"~"02/12/25 03:49 "~"02/12/25 03:49 "~"Laboratory Results"~" 	02/11/25	02/11/25	02/11/25"~" 	10:17	10:50	17:18"~"PT		 15.0 H	"~"INR		 1.15	"~"APTT		 30.5	 56.8 H"~"pH	 7.39		"~"pCO2	 41		"~"pO2	 159 H		"~"HCO3	 24.8		"~"O2 Delivery Level	 		"~" 	02/12/25	02/12/25	02/12/25"~" 	00:18	03:35	06:21"~"PT			"~"INR			"~"APTT	 39.1 H		 54.1 H"~"pH		 7.46 H	"~"pCO2		 34 L	"~"pO2		 106	"~"HCO3		 24.2	"~"O2 Delivery Level		 	"~"Microbiology"~"02/11/25 05:19   Tracheal Aspirate   Gram Stain - Preliminary"~"02/04/25 15:37   Pleural Fluid   Fungal Culture - Preliminary"~"                            Culture in progress."~"                            Positive cultures are reported as soon as detected."~"                            Final report to follow in four to five weeks."~"02/04/25 15:37   Pleural Fluid   Acid Fast Bacilli Smear - Preliminary"~"02/04/25 15:37   Pleural Fluid   Acid Fast Bacilli Culture - Preliminary"~"___________________________________________________________________________"~"Documented by User:  Michelet Bustillos MD  02/12/25 12:41"~"Today's Communication / Plan"~"Recommendations"~"-: "~"As above"~"I reviewed  this patients case independently and in conjunction with the resident.  I personally examined the patient. Patient's complex medical history, laboratory evaluations, events over the last 24 hours, radiographs, microbiological data were "~"all personally reviewed."~"Agree with documented assessment and plan"~"Michelet Bustillos MD, [facility], [facility]"

## 2025-02-12 NOTE — W.PN.CD
"Today's Communication / Plan"~"-"~"-: "~"continue hep gtt"~"hold bb"~"monitor tele"~"Impression / Plan"~"-"~"-: "~"VDRF:"~"	-remains intubated "~"	-increase wob yesterday, ? aspiration/mucus plugging"~"	-being treated for PNA and pleural effusion this hospitalization"~"	-CXR shows recurrent pleural effusion"~"Afib: "~"	-brief during hypoxia, now back in nsr"~"	-C2V is a 3 and likely underestimates it due to CA"~"	-d/w ICU team and Dr tam, tolerarting heparin gtt, hgb down but so is WBC. "~"ESSENCE: Cr up to 1.7 today, D/w Dr Mariano, likely related to relative hypotension"~"	-will hold bb "~"NSVT:  one brief run on tele in last 24 hours, will monitor. It was not HD significant"~"Esophageal adenocarcinoma of the GE junction"~"	-Advanced malignancy with extension into the surrounding soft tissue likely T3/T4 & precarinal lymph node enlargement"~"	-h/o suggesting Hospice, but family not ready for this course of  care"~"Acute high-grade partial small bowel obstruction status post open GJ bypass and feeding jejunostomy 1/31/2025 -improved"~"Malignant right pleural effusion status post thoracentesis 1/20/2025, moderate on CT 2/3/2025, recurrent on CXR 2/11/25"~"Hypertension, amlodipine and lisinopril on hold in the setting of acute illness"~"Dyslipidemia"~"Severe protein calorie malnutrition"~"Subjective:"~"He is intubated and  sedated, wife at the bedside"~"CCT 31 minutes, gathering history d/w team(Dr Tam, Dr Mariano and ICU Nursing All) and family at the bedside"~"Physical Exam"~"Vital Signs/Labs"~"-: "~"Vital Signs"~"Temp	Pulse	Resp	BP	Pulse Ox"~" 99.2 F 	 74 	 20 	 103/68 	 98 "~" 02/12/25 11:08	 02/12/25 10:00	 02/12/25 10:00	 02/12/25 10:00	 02/12/25 10:10"~"	02/11/25	02/12/25	02/13/25"~"	06:59	06:59	06:59"~"Actual Weight	70 kg	70.5 kg	"~"02/12/25 03:49 "~"02/12/25 03:49 "~"PT	 15.0 Sec (11.4-14.6)  H 	02/11/25  10:50    "~"INR	 1.15  	02/11/25  10:50    "~"APTT	 54.1 Sec (23.4-35.0)  H 	02/12/25  06:21    "~"Magnesium	 2.4 mg/dl (1.6-2.3)  H 	02/12/25  03:49    "~"Triglycerides	 Cancelled  	02/11/25  04:58    "~"Free T4	 1.35 ng/dl (0.78-2.19)  	01/30/25  12:34    "~" 	02/04/25	02/11/25"~" 	02:11	03:32"~"Pro-B-Natriuretic Pept	 1640	 676"~"LAB Results"~" 	02/10/25	02/10/25	02/11/25"~" 	16:08	23:27	02:15"~"Troponin I	 &lt; 0.012	 &lt; 0.012	 0.012"~"Physical Exam"~"Constitutional: No acute distress"~"Cardiovascular: Rhythm & rate is regular, Pedal edema is absent, JVD pressure is normal, Systolic murmur absent and Diastolic murmur absent"~"Respiratory: Respiratory effort normal and Lungs clear to auscul. (anteriorly, ventilated)"~"Neuro/Psych: Other (sedated)"~"Data Reviewed"~"-"~"-: "~"Date of Service:  February 12, 2025"~"EKG: Other (tele nsvt no afib)"

## 2025-02-12 NOTE — PTCARENOTE
Upon reassessment pt continues to tolerate vent settings satting at 98% pulse ox. Heparin gtt increased to 1250 units/hr per protocol.

## 2025-02-13 VITALS — RESPIRATION RATE: 18 BRPM | SYSTOLIC BLOOD PRESSURE: 125 MMHG | DIASTOLIC BLOOD PRESSURE: 64 MMHG

## 2025-02-13 VITALS — RESPIRATION RATE: 29 BRPM | SYSTOLIC BLOOD PRESSURE: 123 MMHG | DIASTOLIC BLOOD PRESSURE: 65 MMHG

## 2025-02-13 VITALS — SYSTOLIC BLOOD PRESSURE: 124 MMHG | RESPIRATION RATE: 21 BRPM | DIASTOLIC BLOOD PRESSURE: 62 MMHG

## 2025-02-13 VITALS — SYSTOLIC BLOOD PRESSURE: 104 MMHG | RESPIRATION RATE: 17 BRPM | DIASTOLIC BLOOD PRESSURE: 61 MMHG

## 2025-02-13 VITALS — DIASTOLIC BLOOD PRESSURE: 66 MMHG | RESPIRATION RATE: 13 BRPM | SYSTOLIC BLOOD PRESSURE: 106 MMHG

## 2025-02-13 VITALS — RESPIRATION RATE: 20 BRPM | SYSTOLIC BLOOD PRESSURE: 124 MMHG | DIASTOLIC BLOOD PRESSURE: 69 MMHG

## 2025-02-13 VITALS — SYSTOLIC BLOOD PRESSURE: 118 MMHG | RESPIRATION RATE: 12 BRPM | DIASTOLIC BLOOD PRESSURE: 63 MMHG

## 2025-02-13 VITALS — RESPIRATION RATE: 16 BRPM | DIASTOLIC BLOOD PRESSURE: 70 MMHG | SYSTOLIC BLOOD PRESSURE: 126 MMHG

## 2025-02-13 VITALS — DIASTOLIC BLOOD PRESSURE: 59 MMHG | RESPIRATION RATE: 12 BRPM | SYSTOLIC BLOOD PRESSURE: 111 MMHG

## 2025-02-13 VITALS — SYSTOLIC BLOOD PRESSURE: 112 MMHG | DIASTOLIC BLOOD PRESSURE: 64 MMHG | RESPIRATION RATE: 12 BRPM

## 2025-02-13 VITALS — DIASTOLIC BLOOD PRESSURE: 64 MMHG | RESPIRATION RATE: 24 BRPM | SYSTOLIC BLOOD PRESSURE: 120 MMHG

## 2025-02-13 VITALS — RESPIRATION RATE: 24 BRPM | SYSTOLIC BLOOD PRESSURE: 129 MMHG | DIASTOLIC BLOOD PRESSURE: 56 MMHG

## 2025-02-13 VITALS — DIASTOLIC BLOOD PRESSURE: 67 MMHG | SYSTOLIC BLOOD PRESSURE: 128 MMHG | RESPIRATION RATE: 14 BRPM

## 2025-02-13 VITALS — DIASTOLIC BLOOD PRESSURE: 67 MMHG | RESPIRATION RATE: 12 BRPM | SYSTOLIC BLOOD PRESSURE: 101 MMHG

## 2025-02-13 VITALS — DIASTOLIC BLOOD PRESSURE: 64 MMHG | RESPIRATION RATE: 12 BRPM | SYSTOLIC BLOOD PRESSURE: 107 MMHG

## 2025-02-13 VITALS — RESPIRATION RATE: 12 BRPM | DIASTOLIC BLOOD PRESSURE: 69 MMHG | SYSTOLIC BLOOD PRESSURE: 109 MMHG

## 2025-02-13 VITALS — DIASTOLIC BLOOD PRESSURE: 75 MMHG | SYSTOLIC BLOOD PRESSURE: 143 MMHG | RESPIRATION RATE: 14 BRPM

## 2025-02-13 VITALS — SYSTOLIC BLOOD PRESSURE: 126 MMHG | DIASTOLIC BLOOD PRESSURE: 64 MMHG | RESPIRATION RATE: 12 BRPM

## 2025-02-13 VITALS — SYSTOLIC BLOOD PRESSURE: 105 MMHG | RESPIRATION RATE: 12 BRPM | DIASTOLIC BLOOD PRESSURE: 69 MMHG

## 2025-02-13 VITALS — SYSTOLIC BLOOD PRESSURE: 122 MMHG | DIASTOLIC BLOOD PRESSURE: 63 MMHG | RESPIRATION RATE: 12 BRPM

## 2025-02-13 VITALS — SYSTOLIC BLOOD PRESSURE: 101 MMHG | RESPIRATION RATE: 20 BRPM | DIASTOLIC BLOOD PRESSURE: 65 MMHG

## 2025-02-13 VITALS — RESPIRATION RATE: 20 BRPM | SYSTOLIC BLOOD PRESSURE: 127 MMHG | DIASTOLIC BLOOD PRESSURE: 97 MMHG

## 2025-02-13 VITALS — RESPIRATION RATE: 12 BRPM | DIASTOLIC BLOOD PRESSURE: 64 MMHG | SYSTOLIC BLOOD PRESSURE: 107 MMHG

## 2025-02-13 VITALS — SYSTOLIC BLOOD PRESSURE: 124 MMHG | DIASTOLIC BLOOD PRESSURE: 68 MMHG | RESPIRATION RATE: 12 BRPM

## 2025-02-13 VITALS — DIASTOLIC BLOOD PRESSURE: 59 MMHG | RESPIRATION RATE: 17 BRPM | SYSTOLIC BLOOD PRESSURE: 119 MMHG

## 2025-02-13 LAB
APTT PPP: 52.4 SEC (ref 23.4–35)
APTT PPP: 92.1 SEC (ref 23.4–35)
APTT PPP: 99.2 SEC (ref 23.4–35)
BASE EXCESS BLDA CALC-SCNC: -2 MMOL/L
BUN SERPL-MCNC: 50 MG/DL (ref 9–20)
CALCIUM SERPL-MCNC: 8.2 MG/DL (ref 8.4–10.2)
CHLORIDE SERPL-SCNC: 103 MMOL/L (ref 98–107)
CO2 SERPL-SCNC: 25 MMOL/L (ref 22–30)
EGFR: 47.95
ERYTHROCYTE [DISTWIDTH] IN BLOOD BY AUTOMATED COUNT: 15.1 % (ref 11.5–14.5)
ESTIMATED CREATININE CLEARANCE: 43 ML/MIN
GLUCOSE - POINT OF CARE: 79 MG/DL (ref 70–99)
GLUCOSE - POINT OF CARE: 80 MG/DL (ref 70–99)
GLUCOSE - POINT OF CARE: 87 MG/DL (ref 70–99)
GLUCOSE SERPL-MCNC: 80 MG/DL (ref 70–99)
HCO3 BLDA-SCNC: 23.7 MMOL/L (ref 21–28)
HCT VFR BLD AUTO: 28.8 % (ref 39–52)
HGB BLD-MCNC: 9.3 G/DL (ref 13–18)
MCHC RBC AUTO-ENTMCNC: 32.3 G/DL (ref 33–37)
MCV RBC AUTO: 87.8 FL (ref 80–94)
PCO2 BLDA: 43 MMHG (ref 35–48)
PLATELET # BLD AUTO: 295 10^3/UL (ref 130–400)
PO2 BLDA: 79 MMHG (ref 83–108)
POTASSIUM SERPL-SCNC: 4.4 MMOL/L (ref 3.5–5.1)
SAO2 % BLDA: 96.7 % (ref 94–98)
SODIUM SERPL-SCNC: 136 MMOL/L (ref 135–145)
TRIGL SERPL-MCNC: 155 MG/DL (ref 10–149)

## 2025-02-13 RX ADMIN — SODIUM CHLORIDE 10 ML: 9 INJECTION, SOLUTION INTRAMUSCULAR; INTRAVENOUS; SUBCUTANEOUS at 09:21

## 2025-02-13 RX ADMIN — INSULIN ASPART: 100 INJECTION, SOLUTION INTRAVENOUS; SUBCUTANEOUS at 12:12

## 2025-02-13 RX ADMIN — ATORVASTATIN CALCIUM 10 MG: 10 TABLET, FILM COATED ORAL at 19:40

## 2025-02-13 RX ADMIN — METHIMAZOLE 5 MG: 5 TABLET ORAL at 09:22

## 2025-02-13 RX ADMIN — SODIUM CHLORIDE, SODIUM LACTATE, POTASSIUM CHLORIDE, AND CALCIUM CHLORIDE 1000: 600; 310; 30; 20 INJECTION, SOLUTION INTRAVENOUS at 18:01

## 2025-02-13 RX ADMIN — INSULIN ASPART: 100 INJECTION, SOLUTION INTRAVENOUS; SUBCUTANEOUS at 18:19

## 2025-02-13 RX ADMIN — AMPICILLIN AND SULBACTAM 120 GM: 10; 5 INJECTION, POWDER, FOR SOLUTION INTRAVENOUS at 06:13

## 2025-02-13 RX ADMIN — FENTANYL CITRATE 100: 50 INJECTION, SOLUTION INTRAMUSCULAR; INTRAVENOUS at 18:48

## 2025-02-13 RX ADMIN — INSULIN ASPART: 100 INJECTION, SOLUTION INTRAVENOUS; SUBCUTANEOUS at 05:34

## 2025-02-13 RX ADMIN — HEPARIN SODIUM 250: 10000 INJECTION, SOLUTION INTRAVENOUS at 03:54

## 2025-02-13 RX ADMIN — AMPICILLIN AND SULBACTAM 120 GM: 10; 5 INJECTION, POWDER, FOR SOLUTION INTRAVENOUS at 18:01

## 2025-02-13 RX ADMIN — SODIUM CHLORIDE, SODIUM LACTATE, POTASSIUM CHLORIDE, AND CALCIUM CHLORIDE 1000: 600; 310; 30; 20 INJECTION, SOLUTION INTRAVENOUS at 00:44

## 2025-02-13 RX ADMIN — PANTOPRAZOLE SODIUM 40 MG: 40 INJECTION, POWDER, LYOPHILIZED, FOR SOLUTION INTRAVENOUS at 09:22

## 2025-02-13 RX ADMIN — PROPOFOL 100: 10 INJECTION, EMULSION INTRAVENOUS at 09:23

## 2025-02-13 RX ADMIN — PROPOFOL 100: 10 INJECTION, EMULSION INTRAVENOUS at 19:27

## 2025-02-13 RX ADMIN — APIXABAN 5 MG: 5 TABLET, FILM COATED ORAL at 19:40

## 2025-02-13 RX ADMIN — AMPICILLIN AND SULBACTAM 120 GM: 10; 5 INJECTION, POWDER, FOR SOLUTION INTRAVENOUS at 12:12

## 2025-02-13 RX ADMIN — FENTANYL CITRATE 50 MCG: 0.05 INJECTION, SOLUTION INTRAMUSCULAR; INTRAVENOUS at 22:28

## 2025-02-13 NOTE — W.PN.HOSP.TC
"Today's Communication/Plan"~"-"~"-: "~"Wait for Dr. Martinez's input"~"Restart tube feeds at a low rate and watch"~"Continue Unasyn"~"Follow creatinine"~"SBT per pulmonary /critical care"~"Anticoagulation."~"Assessment / Plan"~"Assessment / Plan"~"-: "~"74-year-old man with small bowel obstruction .  Had a GJ tube placed.  Pleural effusion drained once.  Was tolerating tube feeds but then had acute respiratory failure and was transferred to ICU overnight February 10, 2025"~"Cardiovascular system S1-S2 appreciated"~"Chest clear to auscultation, decreased breath sounds on the right side"~"Abdomen soft , midline incision healing well, J-tube"~"No pedal edema"~"# New onset atrial fibrillation"~"Possibly from hypoxia now in sinus rhythm"~"Echo 2/11/2025-low normal LV systolic function.  EF 50 to 55%.  Trace AI"~"Started on anticoagulation with caution as he is high risk for bleeding with esophageal cancer."~"# Reflux of bile-Will likely continue because of patient's esophageal stent.  Per discussion with surgeon it is highly unlikely that patient will have reflux of his tube feeds as he is about 3 feet of small intestine from J-tube insertion to the "~"stomach. "~"G-tube insertion is not a possibility at present"~"# Acute kidney injury-likely prerenal secondary to hypotension "~"Prerenal likely from the UA"~"Avoid hypotension.  Lomeli catheter for diversion.  "~"Nephrology has been consulted and following."~"Continue to hold lisinopril"~"# Hyperkalemia better"~"# NSVT-Low-dose beta-blockers if BP tolerates"~"# Septic shock-secondary to aspiration pneumonia-Off pressors"~"# Acute hypoxic respiratory failure multifactorial"~"Patient was moved to ICU overnight on 2/10/2025 secondary to acute respiratory failure"~"Intubated-VDRF"~"Likely vomited and aspirated"~"Anxiety also likely played a role"~"Aspiration pneumonia, atelectasis, pleural effusion,  "~"Status post thoracentesis 2/4/25 with 1050 cc of cloudy yellow pleural fluid removed.  Preliminary fluid studies negative for growth."~"Patient had completed Zosyn however he had recurrent aspiration with aspiration pneumonia with elevated white count Zosyn restarted, now changed to Unasyn"~"# Acute high-grade partial small bowel obstruction"~"History of recurrent SBO"~"Had a BM 1/27/25, but continued to have obstructive symptoms"~"GE junction carcinoma status post esophageal stent on FLOT chemotherapy"~"Push enteroscopy 1/28/2025-esophageal stent was fixed with a clip, nodular mucosa in the gastric body biopsied"~"Status post diagnostic laparoscopy, laparotomy, Isolated small bowel obstruction at ligament of treats due to 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  ."~"Start  tube feeding again"~"X ray OK "~"# Locally advanced esophageal cancer diagnosed in September 2024 with stent placement November 2024 chemotherapy started January 2025"~"# GE junction carcinoma status post esophageal stent on FLOT chemotherapy"~"PET scan showed locally advanced malignancy with some extension into the surrounding tissues"~"Pleural effusion from 1/20/2025-Positive for malignant cells. Makes it stage 4"~"Follows with Dr. Martinez"~"# Mild hypernatremia-resolved"~"# Chest pain overnight 2/3/2025"~"Slightly high troponin.  Cardiology evaluation appreciated.  Nonischemic myocardial injury"~"No PE"~"# Low TSH-likely euthyroid sick syndrome.  Free T4 normal."~"# Right pleural effusion"~"History of thoracentesis done on 1/20/2025 and 2/4/25"~"Malignant effusion "~"Would repeat thoracentesis only if symptomatic and difficulty coming off of ventilator"~"# Anemia-Likely secondary to chronic disease and malignancy.  Watch for any bleeding with heparin"~" "~"# Hypertension-Hold amlodipine and lisinopril. BB when BP stable."~"# Hyperthyroidism-methimazole restarted "~"# Hyperlipidemia-statin "~"# Stage II sacral pressure injury-change position/offload/wound care"~"# Severe protein calorie malnutrition-   Restart tube feeds as tolerated.  X-ray of the abdomen without any obstruction"~"# Ex-smoker"~"# DVT prophylaxis-Lovenox"~"# Full code"~"D/W ICU team during rounds"~"D/W Cardiology, Surgeon and Nephrology"~"Total Critical Care Time 39 minutes.  I was immediately available to the patient and staff.  I personally examined,  reviewed labs, diagnostic images/reports, interpretations, treatment plans, discussed patient care with other providers and family , "~"entered orders as appropriate and documented the medical record."~"Prognosis Guarded"~"Anticipated Discharge: &gt; 48 hours"~"Subjective/Interval History"~"-"~"-: "~"Date of Service:  February 13, 2025"~"Objective Data"~"-"~"Labs: "~"Laboratory Results"~" 	02/13/25	02/13/25	02/13/25"~" 	03:46	04:35	10:00"~"WBC		 15.8 H	"~"Hgb		 9.3 L	"~"Hct		 28.8 L	"~"Plt Count		 295	"~"APTT		 99.2 H	 Pending"~"HCO3	 23.7		"~"Sodium		 136	"~"Potassium		 4.4	"~"Chloride		 103	"~"Carbon Dioxide		 25	"~"BUN		 50 H	"~"Creatinine		 1.5 H	"~"Glucose		 80	"~"Calcium		 8.2 L	"~"Vital Signs: "~"Vital Signs"~"Temp	Pulse	Resp	BP	Pulse Ox"~" 99.1 F 	 83 	 12 	 109/69 	 97 "~" 02/13/25 08:00	 02/13/25 06:00	 02/13/25 06:00	 02/13/25 06:00	 02/13/25 07:36"~"I&O"~"	02/12/25	02/13/25	02/14/25"~"	06:59	06:59	06:59"~"Intake Total	2164.5 / 2270.4	2819.6 / 2819.6	"~"Output Total	622 / 632	1175 / 1175	"~"Balance	1542.5 / 1638.4	1644.6 / 1644.6	"

## 2025-02-13 NOTE — W.PN.NEPH.PH
"Addendum entered and electronically signed by Fito Mariano DO  02/13/25 10:31: "~"Requested to provide TPN by ICU staff"~"Original Note:"~"Today's Communication / Plan"~"-"~"-: "~"Follow BMP"~"No acute dialysis indication"~"Creatinine down to 1.5 and grossly nonoliguric"~"Can maintain lactated Ringer's until tube feeds established"~"Assessment/Plan"~"-"~"-: "~"Impression:"~"Acute kidney injury"~"Aspiration PNA with subsequent acute hypoxic respiratory failure requiring intubation"~"Metastatic Esophageal ca s/p stents, s/p cycle #1 Chemo"~"Recurrent right malignant pleural effusion"~"High grade partial SBO due to bulky RP disease and around pancreas s/p open GJ bypass and J-tube placement 1/28/25"~"History of HTN"~"History of dyslipidemia"~"NSVT"~"New on set AFIB"~"Plan:"~"ESSENCE:"~"UA: From 2/10/2025 notes 2+"~"-I suspect acute kidney injury is prerenal he mediated from hemodynamic instability"~"-Creatinine improved down to 1.5 and remains grossly nonoliguric via Lomeli"~"-Will maintain lactated Ringer's until tube feeds established"~"-checked urine sodium and urine creatinine: Fractional excretion of sodium consistent with prerenal stimulus"~"-maintain hemodynamic support keeping SBP &gt; 100, patient is used to a higher MAP pressure"~"-Patient now transition to Unasyn for aspiration pneumonia GFR now Greater then 30cc/hr"~"-ACE has been held"~"-No acute dialysis requirement"~"-Patient is critically ill with vent dependent respiratory failure in the setting of pneumonia in setting of  renal failure with hemodynamic instability"~"-"~"-"~"-: "~"Date of Service:  February 13, 2025"~"CC / HPI / ROS"~"-"~"-: "~"Chief Complaint: "~"Acute kidney injury"~"History of Present Illness: "~"Creatinine improving to 1.5"~"Hemodynamically stable"~"Now on Unasyn for aspiration pneumonia"~"Remains intubated with stable FiO2"~"Review of Systems:"~"Weights up"~"Sedated on vent"~"low grade temps "~"Labs"~"-"~"Labs: "~"WBC	 15.8 10^3/uL (4.8-10.8)  H 	02/13/25  04:35    "~"RBC	 3.28 10^6/uL (4.70-6.10)  L 	02/13/25  04:35    "~"Hgb	 9.3 g/dL (13.0-18.0)  L 	02/13/25  04:35    "~"Hct	 28.8 % (39.0-52.0)  L 	02/13/25  04:35    "~"Plt Count	 295 10^3/uL (130-400)  	02/13/25  04:35    "~"Sodium	 136 mmol/L (135-145)  	02/13/25  04:35    "~"Potassium	 4.4 mmol/L (3.5-5.1)  	02/13/25  04:35    "~"Chloride	 103 mmol/L ()  	02/13/25  04:35    "~"Carbon Dioxide	 25 mmol/L (22-30)  	02/13/25  04:35    "~"BUN	 50 mg/dl (9-20)  H 	02/13/25  04:35    "~"Creatinine	 1.5 mg/dL (0.7-1.3)  H 	02/13/25  04:35    "~"eGFR	 47.95  	02/13/25  04:35    "~"Glucose	 80 mg/dl (70-99)  	02/13/25  04:35    "~"Calcium	 8.2 mg/dl (8.4-10.2)  L 	02/13/25  04:35    "~"Phosphorus	 4.3 mg/dl (2.5-4.5)  	02/11/25  02:15    "~"Pro-B-Natriuretic Pept	 676 pg/ml 	02/11/25  03:32    "~"Albumin	 3.2 g/dl (3.5-5.0)  L 	02/11/25  02:15    "~"Physical Exam"~"-"~"Vital Signs: "~"Vital Signs"~"Temp	Pulse	Resp	BP	Pulse Ox"~" 99.1 F 	 83 	 12 	 109/69 	 97 "~" 02/13/25 08:00	 02/13/25 06:00	 02/13/25 06:00	 02/13/25 06:00	 02/13/25 07:36"~"Cardiovascular:: Regular rate and rhythm"~"Lung Excursion:: Normal"~"Abdomen:: Nontender and Soft"~"Bowel Sounds:: Decreased"~"Extremity Edema:: None: Bilateral:"~"Lomeli Catheter: Yes"~"Other Findings:: "~"gen:intubated and sedated"

## 2025-02-13 NOTE — W.PN.ID1
"Date of Service"~"-: "~"Date of Service:  February 13, 2025"~"Today's Communication"~"-: "~"- Continue Unasyn through 2/17."~"Assessment / Plan"~"-: "~"# Recurrent aspiration pneumonia "~"# Acute hypoxic respiratory failure, intubated 2/11"~"# Leukocytosis -trending down"~"# ESSENCE"~"# Esophageal ca s/p stents, s/p cycle #1 Chemo"~"# High grade partial SBO due to bulky RP disease and around pancreas s/p open GJ bypass and J-tube placement 1/28/25."~"# Recurrent right malignant pleural effusion  "~"- Sputum cx usual respiratory cammy."~"- Continue Unasyn through 2/17."~"-  Ongoing aspiration expected. Patient unable to clear upper airway secretions due to esoph cancer and high grade partial SBO despite GJ bypass."~"-Prognosis remains poor. "~"Chief Complaint"~"-: Pneumonia"~"Subjective / Review of Systems"~"-: "~"Remains on vent"~"Vital Signs / Physical Exam"~"Vital Signs"~"-: "~"Vital Signs"~"Temp	Pulse	Resp	BP	Pulse Ox"~" 99.1 F 	 83 	 12 	 109/69 	 97 "~" 02/13/25 08:00	 02/13/25 06:00	 02/13/25 06:00	 02/13/25 06:00	 02/13/25 07:36"~"Physical Exam"~"Constitutional: Acutely Ill and Chronically Ill"~"Eyes: Sclera Anicteric"~"Cardiovascular: S1/S2 and Other (tachycardic)"~"Pulmonary: Coarse"~"Gastrointestinal: Soft, Non Tender and Non Distended"~"Genito-Urinary: Lomeli"~"Lines: Port"~"Objective Data"~"Lab Data"~"-: "~"Lab Results"~"02/13/25 04:35 "~"PT	 15.0 Sec (11.4-14.6)  H 	02/11/25  10:50    "~"INR	 1.15  	02/11/25  10:50    "~"APTT	 52.4 Sec (23.4-35.0)  H 	02/13/25  10:32    "~"Estimated Creat Clear	 43 ml/min 	02/13/25  04:35    "~"Lactic Acid	 1.2 mmol/L (0.7-2.0)  	02/11/25  10:50    "~"Total Bilirubin	 0.8 mg/dl (0.2-1.3)  	02/11/25  02:15    "~"AST	 26 U/L (17-59)  	02/11/25  02:15    "~"ALT	 31 U/L (0-50)  	02/11/25  02:15    "~"Alkaline Phosphatase	 158 U/L ()  H 	02/11/25  02:15    "~"Amylase	 40 U/L ()  	01/22/25  20:57    "~"Most recent labs reviewed. "~"Micro Results: "~" 02/11/25 05:19	Respiratory Culture - Final"~" Tracheal Aspirate	   Usual Respiratory Cammy"~"	Gram Stain - Final"~" 02/04/25 15:37	Fungal Culture - Preliminary"~" Pleural Fluid	   Culture in progress."~"	   Positive cultures are reported as soon as detected."~"	   Final report to follow in four to five weeks."~" 02/04/25 15:37	Acid Fast Bacilli Smear - Preliminary"~" Pleural Fluid	Acid Fast Bacilli Culture - Preliminary"~" 02/03/25 20:15	Blood Culture - Final"~" Blood/Venous	   No Growth - Final Report"~" 02/03/25 20:11	Blood Culture - Final"~" Blood/Venous	   No Growth - Final Report"~" 02/04/25 15:37	Body Fluid Culture - Final"~" Pleural Fluid	   No Growth After 72 Hours"~"	Gram Stain - Final"~" 02/03/25 20:11	Influenza Types A & B (ERIKA) - Final"~" Nasal Swab	   Negative for Influenza A & B, NAAT"~"	   Negative results must be combined with clinical observations"~"	   and patient history."~"	   Nucleic Acid Amplification test (NAAT)performed on the"~"	   [employer] NOW platform."~" 01/25/25 15:38	 - Final"~" Feces/Stool	   Negative for Norovirus GI and GII."~" 01/23/25 14:34	MRSA Screen - Final"~" Nose	   No Methicillin Resistant Staphylococcus aureus isolated."~"-: "~"1/23/25 CT a/p: The fluid-filled stomach is markedly distended and the fluid-filled duodenum is dilated to 4 cm with abrupt transition point to decompressed small bowel at the ligament of Treitz suggesting partial obstruction. Small bilateral "~"pleural effusions with associated compressive atelectasis at the posterior lung bases. Distal esophageal stent"~"2/3/25 Chest CT: Moderate right lower lobe pneumonia. Mild left lower lobe pneumonia. New bilaterally."~"2/6/25 CXR: Right basilar airspace consolidation, slightly improved compared to prior chest x-ray."~"2/10/25 CXR: Increased right basilar opacification in comparison to recent prior study at least in part suggesting increased pleural effusion, cannot exclude accompanying atelectasis and/or pneumonia."~"2/11/25 CXR: Stable right basilar opacity compared to the chest radiograph from 2/10/2025, most compatible with a small to moderate pleural effusion and adjacent atelectasis and/or pneumonia."~"Care Review"~"Plan reviewed with: Physician (Dr. Fuller)"

## 2025-02-13 NOTE — W.PN.INTV
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 status post 1 cycle chemotherapy found to have malignant right pleural effusion who has recurrent "~"aspiration and once again likely had an aspiration requiring intubation and mechanical ventilation-intensivist consulted for ventilator/pneumonia/critical care management 2/11/2025."~"Respiratory failure due to aspiration"~"	Intubated 2/11/2025"~"	Extubated"~"Aspiration pneumonia-recurrent"~"Atrial fibrillation with rapid ventricular response"~"Nonsustained ventricular tachycardia"~"Sepsis with shock unresponsive to fluids requiring pressors"~"High-grade partial small bowel obstruction with a history of recurrent small bowel obstruction"~"Hypernatremia"~"Low TSH"~"Right pleural effusion-history of thoracentesis 1/20/2025 as well as 2//25-malignant"~"Sacral pressure injury-stage II"~"Severe protein calorie malnutrition"~"Leukocytosis"~"Anemia-normocytic-hemoglobin 10.7"~"Conditions present prior to admission:"~"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  "~"Open GJ tube 1/31/2025.  "~"High-grade partial small bowel obstruction.  "~"Recurrent small bowel obstruction"~"Right thoracentesis 2//25-malignant cells.  "~"Former smoker."~"Plan"~"Critically ill on a ventilator"~"Ventilator settings reviewed and adjusted"~"Follow occasional ABG or VBG"~"Monitor airway pressures"~"Try spontaneous breathing trial later today-reviewed with respiratory therapy-will need to assess further aspiration risk and desire for reintubation prior to extubation"~"VAP prevention protocol"~"Nebulizers if needed"~"Chest x-ray 2/12/2025 without significant change in pleural effusion or infiltrates"~"Chest x-ray 2/13/2025-no change"~"Follow chest x-ray in size of pleural effusion"~"Spontaneous breathing trial once stabilized"~"Cultures reviewed"~"Sputum with usual respiratory cammy"~"Pleural fluid no growth"~"Blood cultures no growth"~"Influenza negative"~"Empiric antibiotics-Zosyn initially now Unasyn through 2/17/2025"~"Infectious disease following-correspondence reviewed"~"Monitor leukocytosis"~"Decrease fluids"~"Lactate follow"~"Pressors weaned"~"Nephrology following-correspondence reviewed-not a dialysis candidate"~"TPN if tube feeds not to be started"~"Atrial fibrillation rate control"~"Heparin drip initiated by cardiology-no obvious signs of bleeding"~"Cardiology following-correspondence reviewed"~"Surgery was following-signed off 2/7/2025"~"Oncology following-reviewed with Dr. Bhatt on 2/12/2025-overall poor prognosis-Dr. Martinez to review 2/13/2025"~"Comfort a priority"~"DVT prophylaxis"~"Nutrition-tube feeds via jejunostomy reinitiated with close monitoring of residuals from gastrostomy to decrease or minimize aspiration risk"~"Bedside range of motion"~"Dr. Bustillos reviewed with wife and daughter on multidisciplinary rounds 2/11/2025"~"Critical care statement: A total of 40 minutes of critical care time was provided for this patient today. This includes management of unstable vital signs, evaluation of the patient at bedside, reviewing the patient's pertinent medical records "~"including radiographs, ventilator management, pressor management, microbiology, laboratory evaluations, and  discussion with primary team, consultants, pharmacy, nutrition, physical therapy, case management, charge nurse, critical care nursing, and "~"respiratory therapy."~"Diagnostic data:"~"Chest x-ray 1/18/2025-esophageal stent, gastric tube tip in the distal stent margin recommend advancing at least 8 cm for positioning gastric lumen, right pleural effusion"~"Chest x-ray 2//25-moderate airspace disease right lower lobe, tiny right pleural effusion"~"Chest x-ray 2/11/2025-stable right basilar infiltrate, small left pleural effusion"~"Chest x-ray small to moderate right pleural effusion"~"CT abdomen and pelvis 1/19/2025-moderate right pleural effusion, no bowel obstruction, soft tissue density lower margin esophageal stent worrisome for tumor infiltration, 5 mm nonobstructing calculus left pole of the kidney and diverticuli"~"CT chest 2/3/2025-moderate right lower lobe pneumonia, moderate right pleural effusion"~"Thoracentesis 1/20/25--1400 mL clear yellow pleural fluid"~"Thoracentesis 2//25--1050 mL cloudy yellow pleural fluid"~"Echocardiogram 2/11/2025-EF 50-55%"~"Subjective Dataa"~"Subjective Data"~"Date of Service: "~"Date of Service:  February 13, 2025"~"Chief Complaint: Intensivist Follow Up, Pulmonary Follow Up and Vent Management Follow Up"~"Subjective: "~"Arousable, currently sedated on the ventilator, oral bilious material,"~"Review of Systems"~"General: Unobtainable - Sedation"~"Objective Data"~"Data Reviewed"~"Vital Signs / I&O / Oxygen: "~"Vital Signs"~"Temp	Pulse	Resp	BP	Pulse Ox"~" 99.0 F 	 83 	 12 	 109/69 	 97 "~" 02/13/25 03:24	 02/13/25 06:00	 02/13/25 06:00	 02/13/25 06:00	 02/13/25 06:00"~"Intake and Output"~"	02/12/25	02/13/25	02/14/25"~"	06:59	06:59	06:59"~"Intake Total	2164.5 / 2270.4	2819.6 / 2819.6	"~"Output Total	622 / 632	1175 / 1175	"~"Balance	1542.5 / 1638.4	1644.6 / 1644.6	"~"SaO2 [A/C]                    	97                                              	"~"SaO2                          	97                                              	"~"Nasal Cannula flow liters per 	2                                               	"~"minute                        	"~"Physical Exam"~"General: Respiratory Distress (n) and Comfortable"~"HEENT: Normocephalic, Anicteric and Moist Mucous Membranes"~"Cardiovascular: Regular Rhythm"~"Respiratory: Wheeze (n), Crackles, Rhonchi, Non-Labored Respirations, Accessory Resp Muscle Use (n) and ET Tube"~"GI: Soft, Non Distended and Non Tender"~"Neurology: No Motor Deficits and Other (Sedated on the ventilator)"~"Skin: Warm, Good Color, Cyanosis (n), Jaundice (n) and Rash (n)"~"Labs/Micro/Reports"~"-: "~"Lab Data"~"02/13/25 04:35 "~"02/13/25 04:35 "~"Laboratory Results"~" 	02/12/25	02/12/25	02/13/25"~" 	14:35	21:07	03:46"~"APTT	 91.3 H	 126.0 H	"~"pH			 7.35"~"pCO2			 43"~"pO2			 79 L"~"HCO3			 23.7"~"O2 Delivery Level			 "~" 	02/13/25"~" 	04:35"~"APTT	 99.2 H"~"pH	"~"pCO2	"~"pO2	"~"HCO3	"~"O2 Delivery Level	"~"Microbiology"~"02/11/25 05:19   Tracheal Aspirate   Respiratory Culture - Preliminary"~"                            Usual Respiratory Cammy"~"02/11/25 05:19   Tracheal Aspirate   Gram Stain - Preliminary"~"02/04/25 15:37   Pleural Fluid   Fungal Culture - Preliminary"~"                            Culture in progress."~"                            Positive cultures are reported as soon as detected."~"                            Final report to follow in four to five weeks."~"02/04/25 15:37   Pleural Fluid   Acid Fast Bacilli Smear - Preliminary"~"02/04/25 15:37   Pleural Fluid   Acid Fast Bacilli Culture - Preliminary"

## 2025-02-13 NOTE — PTCARENOTE
"on assessment pt intubated and sedated. pt has midline and subQ port, Fen gtt, prop gtt, and hep gtt running per order. hep will be stopped at 2000 and eliquis will be started per orders, see MAR, SR on the monitor, 98% on vent A/C 12/500/40%/5, "~"moderate amount of green thin ETT secretions, Jtube intact and trickle feeds infusing. rectal trumpet in place, mehta in place, foam on sacrum and turned Q2h"

## 2025-02-13 NOTE — PTCARENOTE
"pt drowsy on vent , on propofol and fentanyl for sedation/ pain , responds to verbal stimuli , nods head appropriately , denies pain , he is restless and attempts to pull at ETT , NSR on monitor , BP adequate , sats 98% on vent fio2 at 40% , "~"suctioning for large amt of bile via the ETT , Dr Mayer and Yonatan aware , plan of care and goals of care again discussed with wife , she is waiting to speak to oncology "

## 2025-02-13 NOTE — W.PN.NEPH.PH
"Today's Communication / Plan"~"-"~"-: "~"tpn"~"Assessment/Plan"~"-"~"-: "~"Impression:"~"Acute kidney injury"~"Aspiration PNA with subsequent acute hypoxic respiratory failure requiring intubation"~"Metastatic Esophageal ca s/p stents, s/p cycle #1 Chemo"~"Recurrent right malignant pleural effusion"~"High grade partial SBO due to bulky RP disease and around pancreas s/p open GJ bypass and J-tube placement 1/28/25"~"History of HTN"~"History of dyslipidemia"~"NSVT"~"New on set AFIB"~"Plan:"~"ESSENCE:"~"UA: From 2/10/2025 notes 2+"~"-I suspect acute kidney injury is prerenal he mediated from hemodynamic instability"~"-Creatinine improved down to 1.5 and remains grossly nonoliguric via Lomeli"~"-Will maintain lactated Ringer's until tube feeds established"~"-checked urine sodium and urine creatinine: Fractional excretion of sodium consistent with prerenal stimulus"~"-maintain hemodynamic support keeping SBP &gt; 100, patient is used to a higher MAP pressure"~"-Patient now transition to Unasyn for aspiration pneumonia GFR now Greater then 30cc/hr"~"-ACE has been held"~"-No acute dialysis requirement"~"-Patient is critically ill with vent dependent respiratory failure in the setting of pneumonia in setting of  renal failure with hemodynamic instability"~"-"~"-"~"-: "~"Date of Service:  February 13, 2025"~"CC / HPI / ROS"~"-"~"-: "~"Chief Complaint: "~"Acute kidney injury"~"History of Present Illness: "~"Creatinine improving to 1.5"~"Hemodynamically stable"~"Now on Unasyn for aspiration pneumonia"~"Remains intubated with stable FiO2"~"Review of Systems:"~"Weights up"~"Sedated on vent"~"low grade temps "~"Labs"~"-"~"Labs: "~"WBC	 15.8 10^3/uL (4.8-10.8)  H 	02/13/25  04:35    "~"RBC	 3.28 10^6/uL (4.70-6.10)  L 	02/13/25  04:35    "~"Hgb	 9.3 g/dL (13.0-18.0)  L 	02/13/25  04:35    "~"Hct	 28.8 % (39.0-52.0)  L 	02/13/25  04:35    "~"Plt Count	 295 10^3/uL (130-400)  	02/13/25  04:35    "~"Sodium	 136 mmol/L (135-145)  	02/13/25  04:35    "~"Potassium	 4.4 mmol/L (3.5-5.1)  	02/13/25  04:35    "~"Chloride	 103 mmol/L ()  	02/13/25  04:35    "~"Carbon Dioxide	 25 mmol/L (22-30)  	02/13/25  04:35    "~"BUN	 50 mg/dl (9-20)  H 	02/13/25  04:35    "~"Creatinine	 1.5 mg/dL (0.7-1.3)  H 	02/13/25  04:35    "~"eGFR	 47.95  	02/13/25  04:35    "~"Glucose	 80 mg/dl (70-99)  	02/13/25  04:35    "~"Calcium	 8.2 mg/dl (8.4-10.2)  L 	02/13/25  04:35    "~"Phosphorus	 4.3 mg/dl (2.5-4.5)  	02/11/25  02:15    "~"Pro-B-Natriuretic Pept	 676 pg/ml 	02/11/25  03:32    "~"Albumin	 3.2 g/dl (3.5-5.0)  L 	02/11/25  02:15    "~"Physical Exam"~"-"~"Vital Signs: "~"Vital Signs"~"Temp	Pulse	Resp	BP	Pulse Ox"~" 99.1 F 	 83 	 12 	 109/69 	 97 "~" 02/13/25 08:00	 02/13/25 06:00	 02/13/25 06:00	 02/13/25 06:00	 02/13/25 07:36"

## 2025-02-13 NOTE — PTCARENOTE
pt started on TF as ordered ,  pt ok to feed as per surgical team , pastoral care now consulted for emotional support for wife

## 2025-02-13 NOTE — PTCARENOTE
ongoing discussions with pts wife and daughter regarding patients condition and likelihood of returning to home , they are thinking of palliative verses hospice care and will revisit this tomorrow with primary care team

## 2025-02-13 NOTE — PTCARENOTE
while suctioning pt, small amount of green thin (bile-like) secretions noted, NP made aware. TF remain on hold

## 2025-02-13 NOTE — W.PN.CD
"Today's Communication / Plan"~"-"~"-: "~"transition heparin gtt to Eliquis"~"holding bb"~"Impression / Plan"~"-"~"-: "~"VDRF:"~"	-remains intubated "~"	-? aspiration/mucus plugging"~"	-being treated for PNA and pleural effusion this hospitalization"~"	-CXR shows recurrent pleural effusion"~"Afib: "~"	-brief during hypoxia, now back in nsr"~"	-C2V is a 3 and likely underestimates it due to CA"~"	-d/w ICU team and Dr tam, tolerarting heparin gtt, will transition to Eliquis po through the tube. "~"ESSENCE: Cr slightly better today, D/w Dr Mariano, likely related to relative hypotension"~"	-will hold bb "~"NSVT:  overy brief,  It was not HD significant"~"Esophageal adenocarcinoma of the GE junction"~"	-Advanced malignancy with extension into the surrounding soft tissue likely T3/T4 & precarinal lymph node enlargement"~"	-h/o suggesting Hospice, but family not ready for this course of  care"~"Acute high-grade partial small bowel obstruction status post open GJ bypass and feeding jejunostomy 1/31/2025 -improved"~"Malignant right pleural effusion status post thoracentesis 1/20/2025, moderate on CT 2/3/2025, recurrent on CXR 2/11/25"~"Hypertension, amlodipine and lisinopril on hold in the setting of acute illness"~"Dyslipidemia"~"Severe protein calorie malnutrition"~"Subjective:"~"He is intubated and  sedated"~"CCT 31 minutes, gathering history d/w team(Dr Tam, Dr Tomlinson and ICU Alycia) "~"Physical Exam"~"Vital Signs/Labs"~"-: "~"Vital Signs"~"Temp	Pulse	Resp	BP	Pulse Ox"~" 99.1 F 	 83 	 12 	 109/69 	 97 "~" 02/13/25 08:00	 02/13/25 06:00	 02/13/25 06:00	 02/13/25 06:00	 02/13/25 07:36"~"	02/12/25	02/13/25	02/14/25"~"	06:59	06:59	06:59"~"Actual Weight	70.5 kg	72.4 kg	"~"02/13/25 04:35 "~"02/13/25 04:35 "~"PT	 15.0 Sec (11.4-14.6)  H 	02/11/25  10:50    "~"INR	 1.15  	02/11/25  10:50    "~"APTT	 99.2 Sec (23.4-35.0)  H 	02/13/25  04:35    "~"Magnesium	 2.4 mg/dl (1.6-2.3)  H 	02/12/25  03:49    "~"Triglycerides	 Cancelled  	02/11/25  04:58    "~"Free T4	 1.35 ng/dl (0.78-2.19)  	01/30/25  12:34    "~" 	02/04/25	02/11/25"~" 	02:11	03:32"~"Pro-B-Natriuretic Pept	 1640	 676"~"LAB Results"~" 	02/10/25	02/10/25	02/11/25"~" 	16:08	23:27	02:15"~"Troponin I	 &lt; 0.012	 &lt; 0.012	 0.012"~"Physical Exam"~"Constitutional: No acute distress"~"Cardiovascular: Rhythm & rate is regular, Pedal edema is absent, JVD pressure is normal, Systolic murmur absent and Diastolic murmur absent"~"Respiratory: Respiratory effort normal, Lungs clear to auscul., Wheeze Absent, Crackles Absent and Rhonchi Absent"~"Data Reviewed"~"-"~"-: "~"Date of Service:  February 13, 2025"~"Medical Decision Making: Review of Case with other Provider (remains intubated, tolerating heparin will change to eliquis)"

## 2025-02-13 NOTE — PTOTSP
Patient remains intubated at this time. PT will sign off - please reconsult when appropriate to resume therapy services. Thank you.

## 2025-02-13 NOTE — W.PN.ONC2
"Today's Communication / Plan"~"-"~"-: "~"."~"Impression"~"Impression"~"-: "~"High-grade partial small bowel obstruction, recurrent -malignant, s/p open GJ bypass and J-tube placement 1/28/25"~"advanced GE junction cancer, w/ esophageal stent, s/p cycle #1 of FLOT chemotherapy"~"pleural effusion, s/p thora on 1/20/25, malignant, PDL1 10%, repeat thora 2/4"~"Malnutrition, started TPN during hospitalization, now transitioned to Jtube for feeding"~"aspiration PNA completed course of abx"~"Speech therapy evaluation -liquid diet"~"VT, V tach"~"Hyperthyroidism"~"Stage II sacral pressure injury"~"recurrent aspiration, VDRF intubated 2/11"~"Plan"~"Plan"~"-: "~"Continue tube feeds for severe protein calorie malnutrition"~"If goals remain restorative then will need to optimize PS, nutrition, and wound healing (sacral DTI). Prognosis guarded. Would be difficult to recover to status required for systemic palliative therapy."~"If pt/family would like to comfort focused goals then hospice appropriate"~"Subjective/Objective"~"Subjective"~"-: "~"intubated, sedated"~"Vital Signs: "~"Vital Signs"~"Temp	Pulse	Resp	BP	Pulse Ox"~" 98.3 F 	 83 	 17 	 104/61 	 98 "~" 02/13/25 15:40	 02/13/25 16:00	 02/13/25 16:00	 02/13/25 16:00	 02/13/25 16:00"~"Lab Results: "~"Laboratory Data"~"WBC	 15.8 10^3/uL (4.8-10.8)  H 	02/13/25  04:35    "~"Hgb	 9.3 g/dL (13.0-18.0)  L 	02/13/25  04:35    "~"Plt Count	 295 10^3/uL (130-400)  	02/13/25  04:35    "~"PT	 15.0 Sec (11.4-14.6)  H 	02/11/25  10:50    "~"INR	 1.15  	02/11/25  10:50    "~"APTT	 52.4 Sec (23.4-35.0)  H 	02/13/25  10:32    "~"eGFR	 Cancelled  	02/13/25  10:32    "

## 2025-02-14 VITALS — RESPIRATION RATE: 20 BRPM | DIASTOLIC BLOOD PRESSURE: 59 MMHG | SYSTOLIC BLOOD PRESSURE: 112 MMHG

## 2025-02-14 VITALS — SYSTOLIC BLOOD PRESSURE: 96 MMHG | DIASTOLIC BLOOD PRESSURE: 58 MMHG | RESPIRATION RATE: 13 BRPM

## 2025-02-14 VITALS — DIASTOLIC BLOOD PRESSURE: 61 MMHG | SYSTOLIC BLOOD PRESSURE: 102 MMHG | RESPIRATION RATE: 14 BRPM

## 2025-02-14 VITALS — DIASTOLIC BLOOD PRESSURE: 62 MMHG | SYSTOLIC BLOOD PRESSURE: 102 MMHG | RESPIRATION RATE: 16 BRPM

## 2025-02-14 VITALS — RESPIRATION RATE: 19 BRPM | DIASTOLIC BLOOD PRESSURE: 60 MMHG | SYSTOLIC BLOOD PRESSURE: 100 MMHG

## 2025-02-14 VITALS — SYSTOLIC BLOOD PRESSURE: 103 MMHG | RESPIRATION RATE: 11 BRPM | DIASTOLIC BLOOD PRESSURE: 59 MMHG

## 2025-02-14 VITALS — DIASTOLIC BLOOD PRESSURE: 65 MMHG | RESPIRATION RATE: 22 BRPM | SYSTOLIC BLOOD PRESSURE: 122 MMHG

## 2025-02-14 VITALS — RESPIRATION RATE: 18 BRPM | DIASTOLIC BLOOD PRESSURE: 73 MMHG | SYSTOLIC BLOOD PRESSURE: 134 MMHG

## 2025-02-14 VITALS — RESPIRATION RATE: 20 BRPM | DIASTOLIC BLOOD PRESSURE: 63 MMHG | SYSTOLIC BLOOD PRESSURE: 119 MMHG

## 2025-02-14 VITALS — RESPIRATION RATE: 12 BRPM | DIASTOLIC BLOOD PRESSURE: 65 MMHG | SYSTOLIC BLOOD PRESSURE: 118 MMHG

## 2025-02-14 VITALS — RESPIRATION RATE: 24 BRPM | SYSTOLIC BLOOD PRESSURE: 106 MMHG | DIASTOLIC BLOOD PRESSURE: 62 MMHG

## 2025-02-14 VITALS — SYSTOLIC BLOOD PRESSURE: 107 MMHG | RESPIRATION RATE: 18 BRPM | DIASTOLIC BLOOD PRESSURE: 59 MMHG

## 2025-02-14 VITALS — RESPIRATION RATE: 20 BRPM | DIASTOLIC BLOOD PRESSURE: 65 MMHG | SYSTOLIC BLOOD PRESSURE: 102 MMHG

## 2025-02-14 VITALS — SYSTOLIC BLOOD PRESSURE: 105 MMHG | DIASTOLIC BLOOD PRESSURE: 62 MMHG | RESPIRATION RATE: 18 BRPM

## 2025-02-14 VITALS — SYSTOLIC BLOOD PRESSURE: 104 MMHG | DIASTOLIC BLOOD PRESSURE: 64 MMHG | RESPIRATION RATE: 19 BRPM

## 2025-02-14 VITALS — SYSTOLIC BLOOD PRESSURE: 118 MMHG | DIASTOLIC BLOOD PRESSURE: 60 MMHG | RESPIRATION RATE: 24 BRPM

## 2025-02-14 VITALS — SYSTOLIC BLOOD PRESSURE: 102 MMHG | RESPIRATION RATE: 20 BRPM | DIASTOLIC BLOOD PRESSURE: 59 MMHG

## 2025-02-14 VITALS — RESPIRATION RATE: 22 BRPM

## 2025-02-14 LAB
ALBUMIN SERPL-MCNC: 2.4 G/DL (ref 3.5–5)
ALP SERPL-CCNC: 116 U/L (ref 38–126)
ALT SERPL-CCNC: 22 U/L (ref 0–50)
AST SERPL-CCNC: 23 U/L (ref 17–59)
BASE EXCESS BLDA CALC-SCNC: -0.4 MMOL/L
BUN SERPL-MCNC: 43 MG/DL (ref 9–20)
CALCIUM SERPL-MCNC: 8.3 MG/DL (ref 8.4–10.2)
CHLORIDE SERPL-SCNC: 102 MMOL/L (ref 98–107)
CO2 SERPL-SCNC: 24 MMOL/L (ref 22–30)
EGFR: 52.09
ERYTHROCYTE [DISTWIDTH] IN BLOOD BY AUTOMATED COUNT: 15.1 % (ref 11.5–14.5)
ESTIMATED CREATININE CLEARANCE: 47 ML/MIN
GLUCOSE - POINT OF CARE: 103 MG/DL (ref 70–99)
GLUCOSE - POINT OF CARE: 83 MG/DL (ref 70–99)
GLUCOSE - POINT OF CARE: 92 MG/DL (ref 70–99)
GLUCOSE SERPL-MCNC: 90 MG/DL (ref 70–99)
HCO3 BLDA-SCNC: 24.8 MMOL/L (ref 21–28)
HCT VFR BLD AUTO: 27.5 % (ref 39–52)
HGB BLD-MCNC: 8.8 G/DL (ref 13–18)
MAGNESIUM SERPL-MCNC: 2.6 MG/DL (ref 1.6–2.3)
MCHC RBC AUTO-ENTMCNC: 32 G/DL (ref 33–37)
MCV RBC AUTO: 88.7 FL (ref 80–94)
PCO2 BLDA: 42 MMHG (ref 35–48)
PLATELET # BLD AUTO: 298 10^3/UL (ref 130–400)
PO2 BLDA: 109 MMHG (ref 83–108)
POTASSIUM SERPL-SCNC: 4.2 MMOL/L (ref 3.5–5.1)
PROT SERPL-MCNC: 5.3 G/DL (ref 6.3–8.2)
SAO2 % BLDA: 99.4 % (ref 94–98)
SODIUM SERPL-SCNC: 135 MMOL/L (ref 135–145)
TRIGL SERPL-MCNC: 163 MG/DL (ref 10–149)

## 2025-02-14 RX ADMIN — PROPOFOL 100: 10 INJECTION, EMULSION INTRAVENOUS at 04:26

## 2025-02-14 RX ADMIN — SODIUM CHLORIDE, SODIUM LACTATE, POTASSIUM CHLORIDE, AND CALCIUM CHLORIDE 1000: 600; 310; 30; 20 INJECTION, SOLUTION INTRAVENOUS at 00:28

## 2025-02-14 RX ADMIN — SODIUM CHLORIDE 10 ML: 9 INJECTION, SOLUTION INTRAMUSCULAR; INTRAVENOUS; SUBCUTANEOUS at 07:36

## 2025-02-14 RX ADMIN — INSULIN ASPART: 100 INJECTION, SOLUTION INTRAVENOUS; SUBCUTANEOUS at 05:41

## 2025-02-14 RX ADMIN — METHIMAZOLE 5 MG: 5 TABLET ORAL at 07:36

## 2025-02-14 RX ADMIN — MORPHINE SULFATE 2 MG: 2 INJECTION, SOLUTION INTRAMUSCULAR; INTRAVENOUS at 16:23

## 2025-02-14 RX ADMIN — AMPICILLIN AND SULBACTAM 120 GM: 10; 5 INJECTION, POWDER, FOR SOLUTION INTRAVENOUS at 00:28

## 2025-02-14 RX ADMIN — LORAZEPAM 2 MG: 2 INJECTION INTRAMUSCULAR; INTRAVENOUS at 16:35

## 2025-02-14 RX ADMIN — MORPHINE SULFATE 2 MG: 2 INJECTION, SOLUTION INTRAMUSCULAR; INTRAVENOUS at 16:50

## 2025-02-14 RX ADMIN — INSULIN ASPART: 100 INJECTION, SOLUTION INTRAVENOUS; SUBCUTANEOUS at 12:34

## 2025-02-14 RX ADMIN — AMPICILLIN AND SULBACTAM 120 GM: 10; 5 INJECTION, POWDER, FOR SOLUTION INTRAVENOUS at 05:52

## 2025-02-14 RX ADMIN — PANTOPRAZOLE SODIUM 40 MG: 40 INJECTION, POWDER, LYOPHILIZED, FOR SOLUTION INTRAVENOUS at 07:36

## 2025-02-14 RX ADMIN — Medication 100: at 16:38

## 2025-02-14 RX ADMIN — AMPICILLIN AND SULBACTAM 120 GM: 10; 5 INJECTION, POWDER, FOR SOLUTION INTRAVENOUS at 11:53

## 2025-02-14 RX ADMIN — APIXABAN 5 MG: 5 TABLET, FILM COATED ORAL at 07:36

## 2025-02-14 RX ADMIN — SODIUM CHLORIDE, SODIUM LACTATE, POTASSIUM CHLORIDE, AND CALCIUM CHLORIDE 1000: 600; 310; 30; 20 INJECTION, SOLUTION INTRAVENOUS at 13:51

## 2025-02-14 RX ADMIN — INSULIN ASPART: 100 INJECTION, SOLUTION INTRAVENOUS; SUBCUTANEOUS at 00:25

## 2025-02-14 RX ADMIN — MORPHINE SULFATE 2 MG: 2 INJECTION, SOLUTION INTRAMUSCULAR; INTRAVENOUS at 16:30

## 2025-02-14 RX ADMIN — DEXMEDETOMIDINE HYDROCHLORIDE 100: 100 INJECTION, SOLUTION INTRAVENOUS at 11:53

## 2025-02-14 NOTE — CHAP
Emotional and spiritual support provided for Mr. Santo's wife and her sister at bedside.  End of life prayers prayed.  Evening on-call  made aware of situation in case further support is requested.

## 2025-02-14 NOTE — PTCARENOTE
no changes from prior assessment, pt continues to follow simple commands, remains intubated and sedated

## 2025-02-14 NOTE — CM
"CM continues to follow for discharge planning; MD met with pt and his wife todayto discuss code status; patient wants to speak with his daughter today, as well."~"Plan:  SNF vs. hospice care pending patient/family discussion.  CM will continue to follow to coordinate any and all discharge planning needs.  "
"CM following re: discharge planning. "~"Reviewed pt's chart, met with pt. "~"Per Oncology, continue TPN, diet per surgery, will likely need rehab/PT in hopes of regaining performance status prior to resuming systemic chemotherapy. "~"PT and OT evaluations requested to determine a level of care at discharge. "~"D/C plan: most likely SNF level of care to regain primary independent status prior resuming systemic chemotherapy. Awaiting PT/OT evaluations and recommendations. "~"CM will follow with discharge plan updates as hospitalization progress"
"CM following re: discharge planning. "~"Reviewed pt's chart, met with pt. Pt's spouse and sister in law at bedside. Both pt and his family feel that SNF level of care will be most appropriate level of are at discharge to regain prior level of functioning. Both pt and his family have a "~"list of SNFs to review. "~"PT and OT evaluations requested to determine a level of care at discharge. "~"D/C plan: most likely SNF level of care to regain primary independent status prior resuming systemic chemotherapy. Awaiting PT/OT evaluations and recommendations. "~"CM will follow with discharge plan updates as hospitalization progress"
"Chart reviewed"~"RRT this am for resp distress"~"intubated"~"onc discussed GOC with wife/daughter"~"PLAN: SNF vs. hospice care, CM will continue to follow to coordinate discharge planning needs."
"Discussed with patient possibility of home with TF and TPN. "~"Discussed Options for infusions company and [facility] chosen."~"Discussed possible need for VN in addition to [facility] and they are in agreement."~"Spoke with [facility], TPN form forwarded and placed on chart for MD to complete. "~"Will need to send current TPN orders, sheet, dietician notes, H+P, surgery, port information. +labs to [facility].  "~"Patient continues with NGT, for SBFT today. "~"Await therapy evaluations, may need short term skilled rehab."~"PlaN: home with possible TPN, TF vs skilled "~" "
"Initial assessment completed"~"Patient readmitted within 2 days with PAUL ortega"~"Pharmacy verified: [facility] @ [address], [place]"~" Lives with wife and son in a multilevel home; 1 step to enter; 14 steps to 2nd floor bed/bath; railing on stairs; bath has tub w/shower"~" Reported he is independent with ambulation, stairs and ADLs; drives"~" No SNF or Home health utilization"~" Plan: Discharge to home."
"Patient chart reviewed"~"Patient extubated, on tube feeds"~"Await PT/OT evals when appropriate"~"PLAN: continue to follow hospital progress for needs"
"Patient for OR today."~"Family aware of CM availability should needs arise. "~"Plan: to be determined pending hospital course. "
"Patient seen bedside with family.  "~"Family asking questions re palliative care. "~"CM briefly discussed palliative care and provided pamphlet. "~"Family inquired about services in the home and CM reviewed skilled needs and detention with private care givers. "~"Pamphlets and list of private care givers provided to daughter. "~"CM returned to patient room after family spoke with MD, they are going to try anther option, surgical approach. "~"Daughter Paris aware of CM availability should additional needs arise. "~"Plan: possible surgery, follow for dc needs. "
"Patient seen bedside with family. "~"Family upset over news/diagnosis and requesting to speak with Oncology. "~"Daughter would like FMLA forms completed and will speak with MD. "~"Family aware of CM availability. "~"Plan: TBD"
"Patient seen bedside with spouse. "~"Patient for enteroscopy today. "~"Continues NPO. "~"Plan: home with possible VN needs.  "
Chart reviewed; Anticipated Discharge: &gt; 48 hours. CM will monitor and support as needed
no chest pain, no cough, and no shortness of breath.

## 2025-02-14 NOTE — W.PN.UPDATE
"Addendum entered and electronically signed by Elisa Tam MD  02/15/25 11:24: "~"Dictation- [id number]"~"Cause of death Pneumonia due to Aspiration from Esophageal cancer"~"Time of death 17;30 2/14/25"~"Original Note:"~"Update Note"~"Progress Note Update"~"-: "~"Pt was extubated with the intention of not reintubating. "~"After extubation patient aspirated, "~"He was given Ativan and started on a morphine drip after Intensivist discussion with the family"~"He passed away "~"Family at bed side"

## 2025-02-14 NOTE — W.PN.UPDATE
"Update Note"~"Progress Note Update"~"-: "~"Reevaluated patient at the bedside after spontaneous breathing trial"~"Did well during spontaneous breathing trial, some oral secretions, less mild secretions, tolerating tube feeds, and patient wants endotracheal tube out"~"Readdressed with wife if he failed rapidly after extubation which she want reintubation and she again reiterated that he would not nor the family want reintubation, CPR, coding and requests comfort"~"We will respect their wishes"~"Comfort will be a priority"~"Reviewed with critical care nursing" Statement Selected

## 2025-02-14 NOTE — W.PN.ID1
"Date of Service"~"-: "~"Date of Service:  February 14, 2025"~"Today's Communication"~"-: "~"Continue current course of Unasyn."~"Assessment / Plan"~"-: "~"# Recurrent aspiration pneumonia "~"# Acute hypoxic respiratory failure, intubated 2/11"~"# Leukocytosis -trending down"~"# ESSENCE"~"# Esophageal ca s/p stents, s/p cycle #1 Chemo"~"# High grade partial SBO due to bulky RP disease and around pancreas "~"	- s/p open GJ bypass and J-tube placement 1/28/25."~"# Recurrent right malignant pleural effusion  "~"- Sputum cx usual respiratory cammy."~"- Continue Unasyn through 2/17."~"- Ongoing aspiration expected. Patient unable to clear upper airway secretions due to esoph cancer and high grade partial SBO despite GJ bypass."~"- Prognosis remains poor. "~"Chief Complaint"~"-: Pneumonia"~"Subjective / Review of Systems"~"-: "~"Patient seen and examined.  Remains on vent at this time."~"Review of Systems: No Fever"~"Vital Signs / Physical Exam"~"Vital Signs"~"-: "~"Vital Signs"~"Temp	Pulse	Resp	BP	Pulse Ox"~" 99.1 F 	 81 	 13 	 96/58 	 100 "~" 02/14/25 12:05	 02/14/25 06:00	 02/14/25 06:00	 02/14/25 06:00	 02/14/25 11:45"~"Physical Exam"~"Constitutional: Comfortable, Acutely Ill, Chronically Ill, Non-toxic and Cachetic (Mild)"~"Head: Other (ET tube in place.)"~"Eyes: Sclera Anicteric"~"Cardiovascular: S1/S2 and Other (tachycardic)"~"Pulmonary: Coarse and Other (On vent.)"~"Gastrointestinal: Soft, Non Tender, Non Distended and Other (Feeding tube in place.)"~"Genito-Urinary: Lomeli"~"Neurological: Awake"~"Psychological: Calm"~"Lines: Port"~"Objective Data"~"Lab Data"~"-: "~"Lab Results"~"02/14/25 04:03 "~"02/14/25 04:03 "~"PT	 15.0 Sec (11.4-14.6)  H 	02/11/25  10:50    "~"INR	 1.15  	02/11/25  10:50    "~"APTT	 92.1 Sec (23.4-35.0)  H 	02/13/25  18:28    "~"Estimated Creat Clear	 47 ml/min 	02/14/25  04:03    "~"Lactic Acid	 1.2 mmol/L (0.7-2.0)  	02/11/25  10:50    "~"Total Bilirubin	 0.7 mg/dl (0.2-1.3)  	02/14/25  04:03    "~"AST	 23 U/L (17-59)  	02/14/25  04:03    "~"ALT	 22 U/L (0-50)  	02/14/25  04:03    "~"Alkaline Phosphatase	 116 U/L ()  	02/14/25  04:03    "~"Amylase	 40 U/L ()  	01/22/25  20:57    "~"Most recent labs reviewed. "~"Micro Results: "~" 02/11/25 05:19	Respiratory Culture - Final"~" Tracheal Aspirate	   Usual Respiratory Cammy"~"	Gram Stain - Final"~" 02/04/25 15:37	Fungal Culture - Preliminary"~" Pleural Fluid	   Culture in progress."~"	   Positive cultures are reported as soon as detected."~"	   Final report to follow in four to five weeks."~" 02/04/25 15:37	Acid Fast Bacilli Smear - Preliminary"~" Pleural Fluid	Acid Fast Bacilli Culture - Preliminary"~" 02/03/25 20:15	Blood Culture - Final"~" Blood/Venous	   No Growth - Final Report"~" 02/03/25 20:11	Blood Culture - Final"~" Blood/Venous	   No Growth - Final Report"~" 02/04/25 15:37	Body Fluid Culture - Final"~" Pleural Fluid	   No Growth After 72 Hours"~"	Gram Stain - Final"~" 02/03/25 20:11	Influenza Types A & B (ERIKA) - Final"~" Nasal Swab	   Negative for Influenza A & B, NAAT"~"	   Negative results must be combined with clinical observations"~"	   and patient history."~"	   Nucleic Acid Amplification test (NAAT)performed on the"~"	   [employer] NOW platform."~" 01/25/25 15:38	 - Final"~" Feces/Stool	   Negative for Norovirus GI and GII."~" 01/23/25 14:34	MRSA Screen - Final"~" Nose	   No Methicillin Resistant Staphylococcus aureus isolated."~"-: "~"1/23/25 CT a/p: The fluid-filled stomach is markedly distended and the fluid-filled duodenum is dilated to 4 cm with abrupt transition point to decompressed small bowel at the ligament of Treitz suggesting partial obstruction. Small bilateral "~"pleural effusions with associated compressive atelectasis at the posterior lung bases. Distal esophageal stent"~"2/3/25 Chest CT: Moderate right lower lobe pneumonia. Mild left lower lobe pneumonia. New bilaterally."~"2/6/25 CXR: Right basilar airspace consolidation, slightly improved compared to prior chest x-ray."~"2/10/25 CXR: Increased right basilar opacification in comparison to recent prior study at least in part suggesting increased pleural effusion, cannot exclude accompanying atelectasis and/or pneumonia."~"2/11/25 CXR: Stable right basilar opacity compared to the chest radiograph from 2/10/2025, most compatible with a small to moderate pleural effusion and adjacent atelectasis and/or pneumonia."

## 2025-02-14 NOTE — W.PN.INTV
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 status post 1 cycle chemotherapy found to have malignant right pleural effusion who has recurrent "~"aspiration and once again likely had an aspiration requiring intubation and mechanical ventilation-intensivist consulted for ventilator/pneumonia/critical care management 2/11/2025."~"Respiratory failure due to aspiration"~"	Intubated 2/11/2025"~"	Extubated 2/14/2025"~"Aspiration pneumonia-recurrent"~"Atrial fibrillation with rapid ventricular response"~"Nonsustained ventricular tachycardia"~"Sepsis with shock unresponsive to fluids requiring pressors"~"High-grade partial small bowel obstruction with a history of recurrent small bowel obstruction"~"Hypernatremia"~"Low TSH"~"Right pleural effusion-history of thoracentesis 1/20/2025 as well as 2//25-malignant"~"Sacral pressure injury-stage II"~"Severe protein calorie malnutrition"~"Leukocytosis"~"Anemia-normocytic-hemoglobin 10.7"~"Conditions present prior to admission:"~"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  "~"Open GJ tube 1/31/2025.  "~"High-grade partial small bowel obstruction.  "~"Recurrent small bowel obstruction"~"Right thoracentesis 2//25-malignant cells.  "~"Former smoker."~"Plan"~"Remains critically ill sedated on the ventilator"~"Ventilator settings reviewed and adjusted"~"Follow arterial blood gases were venous blood gases"~"Monitor airway pressures-currently adequate"~"Spontaneous breathing trial and potential extubation-family would not want reintubation and would want him to pass in peace"~"VAP prevention protocol continues"~"Nebulizers if needed"~"Chest x-ray 2/12/2025 without significant change in pleural effusion or infiltrates"~"Chest x-ray 2/13/2025-no change"~"Chest x-ray 2/14/2025-patchy opacification left lower lobe slightly improved"~"Follow chest x-ray in size of pleural effusion"~"Precedex will be tried"~"Anxiolytics as needed"~"Morphine for comfort if needed"~"Cultures reviewed"~"Sputum with usual respiratory cammy"~"Pleural fluid no growth"~"Blood cultures no growth"~"Influenza negative"~"Empiric antibiotics-Zosyn initially now Unasyn through 2/17/2025"~"Infectious disease following-correspondence reviewed"~"Monitor leukocytosis"~"Decrease fluids"~"Lactate follow"~"Pressors weaned"~"Nephrology following-correspondence reviewed-not a dialysis candidate"~"Increase tube feeds"~"Atrial fibrillation rate control"~"Heparin drip initiated by cardiology-no obvious signs of bleeding"~"Cardiology following-correspondence reviewed-personally reviewed with them"~"Surgery was following-signed off 2/7/2025"~"Oncology following-reviewed with Dr. Bhatt on 2/12/2025-overall poor prognosis-Dr. Martinez to review 2/13/2025"~"Comfort a priority"~"DVT prophylaxis"~"Nutrition-tube feeds via jejunostomy reinitiated with close monitoring of residuals from gastrostomy to decrease or minimize aspiration risk"~"Bedside range of motion"~"Dr. Bustillos reviewed with wife and daughter on multidisciplinary rounds 2/11/2025 and again extensively on 2/14/2025"~"Critical care statement: A total of 45 minutes of critical care time was provided for this patient today. This includes management of unstable vital signs, evaluation of the patient at bedside, reviewing the patient's pertinent medical records "~"including radiographs, ventilator management, spontaneous breathing trial management, pressor management, microbiology, laboratory evaluations, and  discussion with primary team, consultants, pharmacy, nutrition, physical therapy, case management, "~"charge nurse, critical care nursing, and respiratory therapy."~"Diagnostic data:"~"Chest x-ray 1/18/2025-esophageal stent, gastric tube tip in the distal stent margin recommend advancing at least 8 cm for positioning gastric lumen, right pleural effusion"~"Chest x-ray 2//25-moderate airspace disease right lower lobe, tiny right pleural effusion"~"Chest x-ray 2/11/2025-stable right basilar infiltrate, small left pleural effusion"~"Chest x-ray small to moderate right pleural effusion"~"CT abdomen and pelvis 1/19/2025-moderate right pleural effusion, no bowel obstruction, soft tissue density lower margin esophageal stent worrisome for tumor infiltration, 5 mm nonobstructing calculus left pole of the kidney and diverticuli"~"CT chest 2/3/2025-moderate right lower lobe pneumonia, moderate right pleural effusion"~"Thoracentesis 1/20/25--1400 mL clear yellow pleural fluid"~"Thoracentesis 2/4/25--1050 mL cloudy yellow pleural fluid"~"Echocardiogram 2/11/2025-EF 50-55%"~"Subjective Dataa"~"Subjective Data"~"Date of Service: "~"Date of Service:  February 14, 2025"~"Chief Complaint: Intensivist Follow Up, Pulmonary Follow Up and Vent Management Follow Up"~"Subjective: "~"Less endotracheal tube secretions, still some oral secretions, less miliary secretions, tolerating tube feeds, stable on the ventilator"~"Review of Systems"~"General: Other (Per HPI)"~"Objective Data"~"Data Reviewed"~"Vital Signs / I&O / Oxygen: "~"Vital Signs"~"Temp	Pulse	Resp	BP	Pulse Ox"~" 99.6 F 	 81 	 13 	 96/58 	 98 "~" 02/14/25 03:59	 02/14/25 06:00	 02/14/25 06:00	 02/14/25 06:00	 02/14/25 06:35"~"Intake and Output"~"	02/13/25	02/14/25	02/15/25"~"	06:59	06:59	06:59"~"Intake Total	2819.6 / 2846.5	2233.1 / 2233.1	"~"Output Total	1175 / 1235	1660 / 1660	"~"Balance	1644.6 / 1611.5	573.1 / 573.1	"~"SaO2 [A/C]                    	98                                              	"~"SaO2                          	98                                              	"~"Nasal Cannula flow liters per 	2                                               	"~"minute                        	"~"Physical Exam"~"General: Respiratory Distress (n) and Comfortable"~"HEENT: Normocephalic, Anicteric and Moist Mucous Membranes"~"Cardiovascular: Regular Rhythm"~"Respiratory: Wheeze (n), Non-Labored Respirations, Accessory Resp Muscle Use (n), ET Tube and Other (decreased breath sounds of right lung base)"~"GI: Soft, Non Distended and Non Tender"~"Neurology: No Motor Deficits and Other (Sedated on the ventilator, follows commands)"~"Skin: Warm, Good Color, Cyanosis (n), Jaundice (n) and Rash (n)"~"Labs/Micro/Reports"~"-: "~"Lab Data"~"02/14/25 04:03 "~"02/14/25 04:03 "~"Laboratory Results"~" 	02/13/25	02/13/25"~" 	10:32	18:28"~"APTT	 52.4 H	 92.1 H"~"Microbiology"~"02/11/25 05:19   Tracheal Aspirate   Respiratory Culture - Final"~"                            Usual Respiratory Cammy"~"02/11/25 05:19   Tracheal Aspirate   Gram Stain - Final"

## 2025-02-14 NOTE — PTCARENOTE
"pt in am awake and communicating appropriately on vent with yes and no questions , NSR on monitor ,/59 , pt tolerated tube feeds overnight , pt family with ICU rounds and discussed goals of care and plan of care, the patient and family was "~"agreeable to patient to not be reintubated if he was extubated , at 1200 pt was placed on Precedex for agitation and weaned off of propofol and fentanyl for vent weaning and possible extubation , at 1245 pt was placed on a SBT and tolerated well, he "~"had an abg with results of 7.38/42/109/24.8 , the patient was extubated at 1430 as ordered and patient was placed on 6L NC with sats of 96% , at 1623 pt was complaining of SOB and discomfort , he had large amt of bile in his mouth and throat , he "~"was suctioned aggressively , he was placed on a NRBM and Dr Mayer was alerted , the patient was showing signs of respiratory distress and he was given 2mg of morphine for resp distess , he was then given another dose of morphine for distress and "~"lorazepam for anxiety and SOB , his wife and sister in law at bedside , emotional support given , pastoral care at bedside , pt was place on morphine gtt for comfort and family aware of patients impending death , he is a DNR , Dr Cat notified "~"of above events , at 1731 patient  "

## 2025-02-14 NOTE — W.PN.CD
"Today's Communication / Plan"~"-"~"-: "~"Remains in SR"~"on Eliquis"~"HRs controlled "~"Impression / Plan"~"-"~"-: "~"VDRF:"~"	-remains intubated "~"	-? aspiration/mucus plugging"~"	-being treated for PNA and pleural effusion this hospitalization"~"	-CXR shows recurrent pleural effusion"~"Afib: "~"	-brief during hypoxia, now back in nsr"~"	-C2V is a 3 and likely underestimates it due to CA"~"	-d/w ICU team and Dr tam, tolerarting heparin gtt, will transition to Eliquis po through the tube. "~"ESSENCE: Cr slightly better today, D/w Dr Mariano, likely related to relative hypotension"~"	-will hold bb "~"NSVT:  overy brief,  It was not HD significant"~"Esophageal adenocarcinoma of the GE junction"~"	-Advanced malignancy with extension into the surrounding soft tissue likely T3/T4 & precarinal lymph node enlargement"~"	-h/o suggesting Hospice, but family not ready for this course of  care"~"Acute high-grade partial small bowel obstruction status post open GJ bypass and feeding jejunostomy 1/31/2025 -improved"~"Malignant right pleural effusion status post thoracentesis 1/20/2025, moderate on CT 2/3/2025, recurrent on CXR 2/11/25"~"Hypertension, amlodipine and lisinopril on hold in the setting of acute illness"~"Dyslipidemia"~"Severe protein calorie malnutrition"~"Subjective:"~"He is intubated and  sedated"~"CCT 31 minutes, gathering history d/w team(Dr Tam, Dr Tomlinson and ICU Alycia) "~"Physical Exam"~"Vital Signs/Labs"~"-: "~"Vital Signs"~"Temp	Pulse	Resp	BP	Pulse Ox"~" 99.6 F 	 81 	 13 	 96/58 	 100 "~" 02/14/25 03:59	 02/14/25 06:00	 02/14/25 06:00	 02/14/25 06:00	 02/14/25 11:45"~"	02/13/25	02/14/25	02/15/25"~"	06:59	06:59	06:59"~"Actual Weight	159 lb 9.835 oz	162 lb 14.746 oz	"~"02/14/25 04:03 "~"02/14/25 04:03 "~"PT	 15.0 Sec (11.4-14.6)  H 	02/11/25  10:50    "~"INR	 1.15  	02/11/25  10:50    "~"APTT	 92.1 Sec (23.4-35.0)  H 	02/13/25  18:28    "~"Magnesium	 2.6 mg/dl (1.6-2.3)  H 	02/14/25  04:03    "~"Triglycerides	 163 mg/dl ()  H 	02/14/25  04:03    "~"Free T4	 1.35 ng/dl (0.78-2.19)  	01/30/25  12:34    "~" 	02/04/25	02/11/25"~" 	02:11	03:32"~"Pro-B-Natriuretic Pept	 1640	 676"~"Physical Exam"~"Constitutional: No acute distress, Comfortable and Other (intubated and sedated )"~"EENT: Anicteric"~"Cardiovascular: Rhythm & rate is regular"~"GI: Soft"~"Neuro/Psych: Other (intubated and sedated )"~"Data Reviewed"~"-"~"-: "~"Date of Service:  February 14, 2025"~"Medical Decision Making: Reviewed Test Results"~"EKG: Tracing Personally Visualized and interpreted (sr)"~"Echo: Report Reviewed by me"~"Labs: Labs Reviewed by me"

## 2025-02-14 NOTE — W.PN.HOSP.TC
"Addendum entered and electronically signed by Elisa Tam MD  02/14/25 10:14: "~"Correction-patient is on Eliquis and not on heparin drip"~"Original Note:"~"Today's Communication/Plan"~"-"~"-: "~"advance tube feeds"~"Carlo wean per Pulm"~"Wife leaning towards no reintubation."~"Assessment / Plan"~"Assessment / Plan"~"-: "~"74-year-old man with small bowel obstruction .  Had a GJ tube placed.  Pleural effusion drained once.  Was tolerating tube feeds but then had acute respiratory failure and was transferred to ICU overnight February 10, 2025"~"Cardiovascular system S1-S2 appreciated"~"Chest clear to auscultation, decreased breath sounds on the right side"~"Abdomen soft , midline incision healing well, J-tube"~"No pedal edema"~"# New onset atrial fibrillation"~"Possibly from hypoxia now in sinus rhythm"~"Echo 2/11/2025-low normal LV systolic function.  EF 50 to 55%.  Trace AI"~"Started on anticoagulation with caution as he is high risk for bleeding with esophageal cancer."~"# Reflux of bile-Will likely continue because of patient's esophageal stent.  Per discussion with surgeon it is highly unlikely that patient will have reflux of his tube feeds as he is about 3 feet of small intestine from J-tube insertion to the "~"stomach. "~"G-tube insertion is not a possibility at present per discussion with surgeon"~"# Acute kidney injury-likely prerenal secondary to hypotension "~"Prerenal likely from the UA"~"Avoid hypotension.  Lomeli catheter for diversion.  "~"Nephrology has been consulted and following."~"Continue to hold lisinopril"~"# Hyperkalemia better"~"# NSVT-Low-dose beta-blockers if BP tolerates"~"# Septic shock-secondary to aspiration pneumonia-Off pressors"~"# Acute hypoxic respiratory failure multifactorial"~"Patient was moved to ICU overnight on 2/10/2025 secondary to acute respiratory failure"~"Intubated-VDRF"~"Likely vomited and aspirated"~"Anxiety also likely played a role"~"Aspiration pneumonia, atelectasis, pleural effusion,  "~"Status post thoracentesis 2/4/25 with 1050 cc of cloudy yellow pleural fluid removed.  Preliminary fluid studies negative for growth."~"Patient had completed Zosyn however he had recurrent aspiration with aspiration pneumonia with elevated white count Zosyn restarted, now changed to Unasyn"~"# Acute high-grade partial small bowel obstruction"~"History of recurrent SBO"~"Had a BM 1/27/25, but continued to have obstructive symptoms"~"GE junction carcinoma status post esophageal stent on FLOT chemotherapy"~"Push enteroscopy 1/28/2025-esophageal stent was fixed with a clip, nodular mucosa in the gastric body biopsied"~"Status post diagnostic laparoscopy, laparotomy, Isolated small bowel obstruction at ligament of treats due to 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  ."~"Started  tube feeding again-advance as tolerated"~"# Locally advanced esophageal cancer diagnosed in September 2024 with stent placement November 2024 chemotherapy started January 2025"~"# GE junction carcinoma status post esophageal stent on FLOT chemotherapy"~"PET scan showed locally advanced malignancy with some extension into the surrounding tissues"~"Pleural effusion from 1/20/2025-Positive for malignant cells. Makes it stage 4"~"Follows with Dr. Martinez"~"Family is hoping to talk to Dr. Martinez regarding his options"~"# Mild hypernatremia-resolved"~"# Chest pain overnight 2/3/2025"~"Slightly high troponin.  Cardiology evaluation appreciated.  Nonischemic myocardial injury"~"No PE"~"# Low TSH-likely euthyroid sick syndrome.  Free T4 normal."~"# Right pleural effusion"~"History of thoracentesis done on 1/20/2025 and 2/4/25"~"Malignant effusion "~"Would repeat thoracentesis only if symptomatic and difficulty coming off of ventilator"~"# Anemia-Likely secondary to chronic disease and malignancy.  Watch for any bleeding with heparin"~" "~"# Hypertension-Hold amlodipine and lisinopril. BB when BP stable."~"# Hyperthyroidism-methimazole restarted "~"# Hyperlipidemia-Statin "~"# Stage II sacral pressure injury-change position/offload/wound care"~"# Severe protein calorie malnutrition-   Restarted tube feeds, advance  as tolerated.  X-ray of the abdomen without any obstruction"~"# Ex-smoker"~"# DVT prophylaxis-Heparin"~"# Full code"~"D/W ICU team"~"D/W patient's wife and daughter at bedside in detail.  They had several questions."~"I discussed with him regarding short-term and long-term goals and short-term goals would mainly depend on his long-term goals too."~"Discussed with him about physical deconditioning and performance status will be a major factor in deciding treatment for his cancer.  Also discussed about reflux and aspiration"~"Wife is leaning towards no reintubation once extubated.  Options discussed."~"Messaged on-call oncology relaying the message that  family would like to talk to or Dr. Martinez"~"Total Critical Care Time 40 minutes.  I was immediately available to the patient and staff.  I personally examined,  reviewed labs, diagnostic images/reports, interpretations, treatment plans, discussed patient care with other providers and family , "~"entered orders as appropriate and documented the medical record."~"Follow-up prognosis Guarded"~"Anticipated Discharge: &gt; 48 hours"~"Subjective/Interval History"~"-"~"-: "~"Date of Service:  February 14, 2025"~"Objective Data"~"-"~"Labs: "~"Laboratory Results"~" 	02/14/25"~" 	04:03"~"WBC	 14.4 H"~"Hgb	 8.8 L"~"Hct	 27.5 L"~"Plt Count	 298"~"Sodium	 135"~"Potassium	 4.2"~"Chloride	 102"~"Carbon Dioxide	 24"~"BUN	 43 H"~"Creatinine	 1.4 H"~"Glucose	 90"~"Calcium	 8.3 L"~"Total Bilirubin	 0.7"~"AST	 23"~"ALT	 22"~"Alkaline Phosphatase	 116"~"Vital Signs: "~"Vital Signs"~"Temp	Pulse	Resp	BP	Pulse Ox"~" 99.6 F 	 81 	 13 	 96/58 	 98 "~" 02/14/25 03:59	 02/14/25 06:00	 02/14/25 06:00	 02/14/25 06:00	 02/14/25 07:49"~"I&O"~"	02/13/25	02/14/25	02/15/25"~"	06:59	06:59	06:59"~"Intake Total	2819.6 / 2846.5	2233.1 / 2233.1	"~"Output Total	1175 / 1235	1660 / 1660	"~"Balance	1644.6 / 1611.5	573.1 / 573.1	"

## 2025-02-14 NOTE — RESPNOTE
After successful weaning and ABG analysis extubated the patient to 5 Lpm nasal cannula as per the physician order.